# Patient Record
Sex: FEMALE | Race: WHITE | NOT HISPANIC OR LATINO | Employment: OTHER | URBAN - NONMETROPOLITAN AREA
[De-identification: names, ages, dates, MRNs, and addresses within clinical notes are randomized per-mention and may not be internally consistent; named-entity substitution may affect disease eponyms.]

---

## 2017-01-01 ENCOUNTER — HOSPITAL ENCOUNTER (OUTPATIENT)
Dept: PHYSICAL THERAPY | Facility: HOSPITAL | Age: 72
Setting detail: THERAPIES SERIES
Discharge: HOME | End: 2017-01-01
Attending: ANESTHESIOLOGY | Admitting: ANESTHESIOLOGY

## 2017-01-09 ENCOUNTER — HOSPITAL ENCOUNTER (OUTPATIENT)
Dept: GENERAL RADIOLOGY | Facility: HOSPITAL | Age: 72
Discharge: HOME | End: 2017-01-09

## 2017-01-09 ENCOUNTER — OFFICE VISIT (OUTPATIENT)
Dept: NEUROSURGERY | Facility: CLINIC | Age: 72
End: 2017-01-09

## 2017-01-09 ENCOUNTER — HOSPITAL ENCOUNTER (OUTPATIENT)
Dept: GENERAL RADIOLOGY | Facility: HOSPITAL | Age: 72
Discharge: HOME | End: 2017-01-09
Admitting: NURSE PRACTITIONER

## 2017-01-09 VITALS — HEIGHT: 65 IN | BODY MASS INDEX: 33.32 KG/M2 | WEIGHT: 200 LBS

## 2017-01-09 DIAGNOSIS — M25.512 CHRONIC LEFT SHOULDER PAIN: ICD-10-CM

## 2017-01-09 DIAGNOSIS — G89.29 CHRONIC LEFT SHOULDER PAIN: ICD-10-CM

## 2017-01-09 DIAGNOSIS — M50.320 DEGENERATION OF INTERVERTEBRAL DISC OF MID-CERVICAL REGION, UNSPECIFIED SPINAL LEVEL: ICD-10-CM

## 2017-01-09 DIAGNOSIS — E66.01 MORBID OBESITY DUE TO EXCESS CALORIES (HCC): ICD-10-CM

## 2017-01-09 DIAGNOSIS — M50.320 DEGENERATION OF INTERVERTEBRAL DISC OF MID-CERVICAL REGION, UNSPECIFIED SPINAL LEVEL: Primary | ICD-10-CM

## 2017-01-09 DIAGNOSIS — Z87.891 FORMER SMOKER: ICD-10-CM

## 2017-01-09 PROCEDURE — 73030 X-RAY EXAM OF SHOULDER: CPT

## 2017-01-09 PROCEDURE — 72040 X-RAY EXAM NECK SPINE 2-3 VW: CPT

## 2017-01-09 PROCEDURE — 99213 OFFICE O/P EST LOW 20 MIN: CPT | Performed by: NURSE PRACTITIONER

## 2017-01-09 RX ORDER — BUPROPION HYDROCHLORIDE 75 MG/1
75 TABLET ORAL 2 TIMES DAILY
COMMUNITY
End: 2020-07-30 | Stop reason: ALTCHOICE

## 2017-01-09 RX ORDER — ROSUVASTATIN CALCIUM 10 MG/1
10 TABLET, COATED ORAL DAILY
COMMUNITY
End: 2020-08-24 | Stop reason: SDUPTHER

## 2017-01-09 RX ORDER — OXYCODONE HYDROCHLORIDE 30 MG/1
30 TABLET ORAL EVERY 12 HOURS
Status: ON HOLD | COMMUNITY
End: 2020-07-21

## 2017-01-09 RX ORDER — NEBIVOLOL 10 MG/1
20 TABLET ORAL DAILY
Status: ON HOLD | COMMUNITY
End: 2020-07-21

## 2017-01-09 RX ORDER — OXYCODONE AND ACETAMINOPHEN 10; 325 MG/1; MG/1
1 TABLET ORAL EVERY 6 HOURS PRN
COMMUNITY
End: 2021-07-09

## 2017-01-09 RX ORDER — FUROSEMIDE 40 MG/1
20 TABLET ORAL 2 TIMES DAILY
COMMUNITY
End: 2020-08-24 | Stop reason: SDUPTHER

## 2017-01-09 RX ORDER — ZOLPIDEM TARTRATE 10 MG/1
10 TABLET ORAL NIGHTLY PRN
Qty: 30 TABLET | Refills: 0 | Status: SHIPPED | OUTPATIENT
Start: 2017-01-09 | End: 2017-02-08

## 2017-01-09 RX ORDER — ALPRAZOLAM 1 MG/1
1 TABLET ORAL NIGHTLY
COMMUNITY
End: 2020-08-24

## 2017-01-09 RX ORDER — AMLODIPINE BESYLATE 10 MG/1
10 TABLET ORAL DAILY
Status: ON HOLD | COMMUNITY
End: 2020-07-23 | Stop reason: SDUPTHER

## 2017-01-09 RX ORDER — GUAIFENESIN AND DEXTROMETHORPHAN HYDROBROMIDE 100; 10 MG/5ML; MG/5ML
5 SOLUTION ORAL
Status: ON HOLD | COMMUNITY
End: 2020-07-21

## 2017-01-09 RX ORDER — ALPRAZOLAM 0.5 MG/1
1 TABLET ORAL EVERY MORNING
COMMUNITY
End: 2020-07-30 | Stop reason: SDDI

## 2017-01-09 RX ORDER — RANITIDINE 150 MG/1
150 TABLET ORAL 2 TIMES DAILY
COMMUNITY
End: 2020-07-23 | Stop reason: HOSPADM

## 2017-01-09 RX ORDER — DULOXETIN HYDROCHLORIDE 60 MG/1
60 CAPSULE, DELAYED RELEASE ORAL DAILY
COMMUNITY
End: 2020-07-30 | Stop reason: ALTCHOICE

## 2017-01-09 RX ORDER — FLUOROMETHOLONE 0.1 %
1 SUSPENSION, DROPS(FINAL DOSAGE FORM)(ML) OPHTHALMIC (EYE) DAILY
COMMUNITY
End: 2020-07-23 | Stop reason: HOSPADM

## 2017-01-09 NOTE — MR AVS SNAPSHOT
PatriziaVerna Blair   1/9/2017 10:30 AM   Office Visit    Dept Phone:  [phone]   Encounter #:  [id number]    Provider:  CIARA Bates   Department:  [facility] NEUROSURGERY                Your Full Care Plan              Today's Medication Changes          These changes are accurate as of: 1/9/17 11:41 AM.  If you have any questions, ask your nurse or doctor.               New Medication(s)Ordered:     zolpidem 10 MG tablet   Commonly known as:  AMBIEN   Take 1 tablet by mouth At Night As Needed for sleep for up to 30 days.   Started by:  CIARA Bates            Where to Get Your Medications      You can get these medications from any pharmacy     Bring a paper prescription for each of these medications     zolpidem 10 MG tablet                  Your Updated Medication List          This list is accurate as of: 1/9/17 11:41 AM.  Always use your most recent med list.                * ALPRAZolam 0.5 MG tablet   Commonly known as:  XANAX       * ALPRAZolam 1 MG tablet   Commonly known as:  XANAX       amLODIPine 10 MG tablet   Commonly known as:  NORVASC       buPROPion 75 MG tablet   Commonly known as:  WELLBUTRIN       dextromethorphan-guaifenesin  MG/5ML syrup   Commonly known as:  ROBITUSSIN-DM       DULoxetine 60 MG capsule   Commonly known as:  CYMBALTA       fluorometholone 0.1 % ophthalmic suspension   Commonly known as:  FML       furosemide 40 MG tablet   Commonly known as:  LASIX       metFORMIN 1000 MG tablet   Commonly known as:  GLUCOPHAGE       nebivolol 10 MG tablet   Commonly known as:  BYSTOLIC       oxyCODONE 30 MG immediate release tablet   Commonly known as:  ROXICODONE       oxyCODONE-acetaminophen  MG per tablet   Commonly known as:  PERCOCET       raNITIdine 150 MG tablet   Commonly known as:  ZANTAC       rosuvastatin 10 MG tablet   Commonly known as:  CRESTOR       zolpidem 10 MG tablet   Commonly known as:  AMBIEN    "  Take 1 tablet by mouth At Night As Needed for sleep for up to 30 days.       * Notice:  This list has 2 medication(s) that are the same as other medications prescribed for you. Read the directions carefully, and ask your doctor or other care provider to review them with you.            We Performed the Following     Ambulatory Referral to Physical Therapy Evaluate and treat (3 days a week x 3-4 weeks : may allow aqua therapy)       You Were Diagnosed With        Codes Comments    Degeneration of intervertebral disc of mid-cervical region, unspecified spinal level    -  Primary ICD-10-CM: M50.320  ICD-9-CM: 722.4     Chronic left shoulder pain     ICD-10-CM: M25.512, G89.29  ICD-9-CM: 719.41, 338.29     Morbid obesity due to excess calories     ICD-10-CM: E66.01  ICD-9-CM: 278.01     Former smoker     ICD-10-CM: Z87.891  ICD-9-CM: V15.82       Instructions     None    Patient Instructions History      Upcoming Appointments     Visit Type Date Time Department    FOLLOW UP 1/9/2017 10:30 AM [facility]OS[facility]    FOLLOW UP 2/20/2017  2:00 PM [facility] NEUROSURGICAL [facility]      MyChart Signup     Our records indicate that you have declined [facility] [employer] signup. If you would like to sign up for [employer], please email [email or web address] or call [phone] to obtain an activation code.             Other Info from Your Visit           Your Appointments     Feb 20, 2017  2:00 PM CST   Follow Up with Barry Rhodes MD   [facility] (--)    [address] [place] [id number] [phone]           Arrive 15 minutes prior to appointment.              Allergies     Aspirin  GI Intolerance      Reason for Visit     Arm Pain Severe \"bone\" pain in the left arm, and numbness of her index finger for the past 5 months. Dr. Salmon has not tried any new treatments or new imaging. She states that her screws from her previous surgery has \"started backing out\", she was " "reportedly told that by Dr. Rhodes a few years back.       Vital Signs     Height Weight Body Mass Index Smoking Status          65\" (165.1 cm) 200 lb (90.7 kg) 33.28 kg/m2 Former Smoker        Problems and Diagnoses Noted     Degeneration of intervertebral disc of cervical region    -  Primary    Chronic left shoulder pain        Severe obesity        Former smoker            "

## 2017-01-09 NOTE — PROGRESS NOTES
"Neurosurgery Follow Up Office Visit      Patient Name:  Ronda Blair  Age:  71 y.o.  YOB: 1945  MR#:  [id number]    Visit Vitals   • Ht 65\" (165.1 cm)   • Wt 200 lb (90.7 kg)   • BMI 33.28 kg/m2       Social History   Substance Use Topics   • Smoking status: Former Smoker   • Smokeless tobacco: Never Used   • Alcohol use No       Chief Complaint   Patient presents with   • Arm Pain     Severe \"bone\" pain in the left arm, and numbness of her index finger for the past 5 months. Dr. Salmon has not tried any new treatments or new imaging. She states that her screws from her previous surgery has \"started backing out\", she was reportedly told that by Dr. Rhodes a few years back.          History  Chief Complaint:  She and her  return to the office for follow-up with problems.  She was last seen in April 2016, and continues under the care of Dr. Salmon, for chronic pain issues, mostly her low back.she has history of previous cervical fusion by Dr. Guerin as subsequent imaging has shown that a middle screw has slightly backed out of position.  It has, however, been stable.following her surgery, she was completely pain free. She does not relate any problems with neck or arm pain until about 5 months ago.  She describes more of an insidious onset with no known injury.  She states this reminds her of what she has had in the past.  It is primarily pain in her left arm and she indicates the mid humerus, stating it is \"in the bone.\"  This radiates to about the elbow.  She then has numbness of the left second finger.  She is asymptomatic on the right.she has recently been hospitalized with pneumonia and has just been home for a few days.  She had to go to an urgent care last night as apparently she is having diarrhea related to previous antibiotic therapy.  Prior to this she had been in physical therapy for her low back.  She has had no current treatment for her neck and indicates that Dr. Salmon " treats her for chronic low back pain only.    Physical Examination:  Upon exam, she looks reasonably well.  Overall, her health seems to be somewhat debilitated.  She is seated in a wheelchair.  She is alert and oriented, pleasant and cooperative, speech clear and appropriate.  Skin is warm and dry. Cervical range of motion is limited but reasonable.  She has impaired range of motion of the left shoulder with some tenderness to palpation.  She cannot abduct the left shoulder or reach overhead.  She does not appear with a true, reproducible weakness to the left upper extremity; however, her exam is somewhat limited due to pain.  Deep tendon reflexes 1-2+ bilaterally.  Shasha's negative.      Medical Decision Making  Treatment Options:   In the office I reviewed her past medical records and x-rays.  She needs current x-rays of the cervical spine, and we will also get one of the left shoulder.needs to be in physical therapy and we will order that 3 days a week for 4 weeks, focusing on the neck, left shoulder, and left arm.  A good portion of etiology may be from the left shoulder, which she tends to guard and protect.  I am also concerned about development of a frozen shoulder.due to the ongoing nature of her pain we will proceed with a cervical MRI to rule out complication of her previous surgery or hardware and also to ensure there has been no new development to explain her current radicular features.  She is asked about something to help her sleep and has used Ambien in the past.  It seems reasonable to give her a small supply of Ambien to use as needed.  We will see her back to review testing and determine her response to therapy.  She probably should not start the therapy until next week to give her time to recover more from her recent hospitalization.  They understand and are agreeable.      Assessment and Plan  Ronda was seen today for arm pain.    Diagnoses and all orders for this visit:    Degeneration of  intervertebral disc of mid-cervical region, unspecified spinal level  -     Ambulatory Referral to Physical Therapy Evaluate and treat (3 days a week x 3-4 weeks : may allow aqua therapy)  -     XR spine cervical 2 vw; Future  -     MRI cervical spine wo contrast; Future  -     zolpidem (AMBIEN) 10 MG tablet; Take 1 tablet by mouth At Night As Needed for sleep for up to 30 days.    Chronic left shoulder pain  -     Ambulatory Referral to Physical Therapy Evaluate and treat (3 days a week x 3-4 weeks : may allow aqua therapy)  -     XR shoulder 2+ vw left; Future  -     MRI cervical spine wo contrast; Future  -     zolpidem (AMBIEN) 10 MG tablet; Take 1 tablet by mouth At Night As Needed for sleep for up to 30 days.    Morbid obesity due to excess calories    Former smoker        Return in about 6 weeks (around 2/20/2017) for MRI and PT follow up with Dr. Rhodes.      Roxanne Jean, APRN

## 2017-01-17 ENCOUNTER — TRANSCRIBE ORDERS (OUTPATIENT)
Dept: PHYSICAL THERAPY | Facility: HOSPITAL | Age: 72
End: 2017-01-17

## 2017-01-20 ENCOUNTER — HOSPITAL ENCOUNTER (OUTPATIENT)
Dept: PAIN MANAGEMENT | Age: 72
Discharge: HOME | End: 2017-01-20
Payer: MEDICARE

## 2017-01-20 ENCOUNTER — HOSPITAL ENCOUNTER (OUTPATIENT)
Dept: MRI IMAGING | Facility: HOSPITAL | Age: 72
Discharge: HOME | End: 2017-01-20
Admitting: NURSE PRACTITIONER

## 2017-01-20 VITALS
OXYGEN SATURATION: 93 % | HEART RATE: 65 BPM | HEIGHT: 65 IN | RESPIRATION RATE: 16 BRPM | TEMPERATURE: 97.2 F | SYSTOLIC BLOOD PRESSURE: 171 MMHG | DIASTOLIC BLOOD PRESSURE: 87 MMHG | BODY MASS INDEX: 34.82 KG/M2 | WEIGHT: 209 LBS

## 2017-01-20 DIAGNOSIS — M79.604 LOW BACK PAIN RADIATING TO BOTH LEGS: ICD-10-CM

## 2017-01-20 DIAGNOSIS — M54.9 UPPER BACK PAIN: ICD-10-CM

## 2017-01-20 DIAGNOSIS — M50.320 DEGENERATION OF INTERVERTEBRAL DISC OF MID-CERVICAL REGION, UNSPECIFIED SPINAL LEVEL: ICD-10-CM

## 2017-01-20 DIAGNOSIS — M54.50 LOW BACK PAIN RADIATING TO BOTH LEGS: ICD-10-CM

## 2017-01-20 DIAGNOSIS — M51.16 LUMBAR DISC DISEASE WITH RADICULOPATHY: ICD-10-CM

## 2017-01-20 DIAGNOSIS — G89.29 CHRONIC PAIN OF RIGHT KNEE: ICD-10-CM

## 2017-01-20 DIAGNOSIS — G89.29 CHRONIC LEFT SHOULDER PAIN: ICD-10-CM

## 2017-01-20 DIAGNOSIS — M25.561 CHRONIC PAIN OF BOTH KNEES: ICD-10-CM

## 2017-01-20 DIAGNOSIS — G89.29 CHRONIC PAIN OF BOTH KNEES: ICD-10-CM

## 2017-01-20 DIAGNOSIS — M25.562 CHRONIC PAIN OF BOTH KNEES: ICD-10-CM

## 2017-01-20 DIAGNOSIS — B02.21 NEURALGIA, GENICULATE: ICD-10-CM

## 2017-01-20 DIAGNOSIS — M79.605 LOW BACK PAIN RADIATING TO BOTH LEGS: ICD-10-CM

## 2017-01-20 DIAGNOSIS — M25.512 CHRONIC LEFT SHOULDER PAIN: ICD-10-CM

## 2017-01-20 DIAGNOSIS — M25.561 CHRONIC PAIN OF RIGHT KNEE: ICD-10-CM

## 2017-01-20 PROCEDURE — 6360000002 HC RX W HCPCS

## 2017-01-20 PROCEDURE — 72141 MRI NECK SPINE W/O DYE: CPT

## 2017-01-20 PROCEDURE — 2500000003 HC RX 250 WO HCPCS

## 2017-01-20 PROCEDURE — 99214 OFFICE O/P EST MOD 30 MIN: CPT

## 2017-01-20 RX ORDER — OXYCODONE AND ACETAMINOPHEN 10; 325 MG/1; MG/1
1 TABLET ORAL 4 TIMES DAILY PRN
Qty: 120 TABLET | Refills: 0 | Status: SHIPPED | OUTPATIENT
Start: 2017-01-20 | End: 2017-02-03 | Stop reason: SDUPTHER

## 2017-01-20 RX ORDER — OXYCODONE HYDROCHLORIDE 30 MG/1
30 TABLET, FILM COATED, EXTENDED RELEASE ORAL EVERY 12 HOURS
Qty: 60 EACH | Refills: 0 | Status: SHIPPED | OUTPATIENT
Start: 2017-01-20 | End: 2017-02-03 | Stop reason: SDUPTHER

## 2017-01-20 ASSESSMENT — PAIN DESCRIPTION - DESCRIPTORS: DESCRIPTORS: ACHING;CONSTANT

## 2017-01-20 ASSESSMENT — PAIN DESCRIPTION - PROGRESSION: CLINICAL_PROGRESSION: NOT CHANGED

## 2017-01-20 ASSESSMENT — PAIN SCALES - GENERAL: PAINLEVEL_OUTOF10: 8

## 2017-01-20 ASSESSMENT — PAIN DESCRIPTION - FREQUENCY: FREQUENCY: CONTINUOUS

## 2017-01-20 ASSESSMENT — PAIN DESCRIPTION - ORIENTATION: ORIENTATION: LOWER

## 2017-01-20 ASSESSMENT — PAIN DESCRIPTION - ONSET: ONSET: ON-GOING

## 2017-01-20 ASSESSMENT — PAIN DESCRIPTION - PAIN TYPE: TYPE: CHRONIC PAIN

## 2017-01-20 ASSESSMENT — PAIN DESCRIPTION - LOCATION: LOCATION: BACK

## 2017-02-01 ENCOUNTER — HOSPITAL ENCOUNTER (OUTPATIENT)
Dept: PHYSICAL THERAPY | Facility: HOSPITAL | Age: 72
Setting detail: THERAPIES SERIES
Discharge: HOME | End: 2017-02-01

## 2017-02-01 DIAGNOSIS — M50.320 DEGENERATION OF INTERVERTEBRAL DISC OF MID-CERVICAL REGION, UNSPECIFIED SPINAL LEVEL: Primary | ICD-10-CM

## 2017-02-01 DIAGNOSIS — G89.29 CHRONIC LEFT SHOULDER PAIN: ICD-10-CM

## 2017-02-01 DIAGNOSIS — M25.512 CHRONIC LEFT SHOULDER PAIN: ICD-10-CM

## 2017-02-01 PROCEDURE — G8985 CARRY GOAL STATUS: HCPCS | Performed by: PHYSICAL THERAPIST

## 2017-02-01 PROCEDURE — G8984 CARRY CURRENT STATUS: HCPCS | Performed by: PHYSICAL THERAPIST

## 2017-02-01 PROCEDURE — 97162 PT EVAL MOD COMPLEX 30 MIN: CPT | Performed by: PHYSICAL THERAPIST

## 2017-02-01 NOTE — PROGRESS NOTES
Outpatient Physical Therapy Ortho Initial Evaluation  [facility]     Patient Name: Ronda Blair  : 1945  MRN: [id number]  Today's Date: 2017      Visit Date: 2017    Pt reports 2/10 pain.  Reports N/A% of improvement.  Attended  visits.  Insurance available: Based upon approval   Next MD appt: ANUJA  Recertification: 2017.     Past Medical History   Diagnosis Date   • Anxiety    • Arthritis    • COPD (chronic obstructive pulmonary disease)    • Depression    • Diabetes mellitus    • History of transfusion    • Hyperlipidemia    • Hypertension    • Stroke      Greater than 5 years ago        Past Surgical History   Procedure Laterality Date   • Cervical fusion     • Lumbar spine surgery     • Hemorrhoidectomy     • Hysterectomy     • Knee arthroscopy Right    • Pain pump insertion/revision       Removed in    • Intrathecal pump removal            MEDICATIONS:  1) Aggrenox 25mg-200mg  2) Advair Disckus 500mcg-50mcg  3) Crestor 10mg  4) Zantac 150mg  5) Cymbalta 90mg  6) Amlodipine Besylate 10mg  7) Percocet 10/325mg  8) Oxycontin 30mg  9) Vit B12 100mcg  10) Vit E 400 IU  11) Wellbutrin 75mg  12) Lasix 40mg  13) Bystolic 10mg  14) Alprazolam 1mg  15) Metformin Hydrochloride 1000mg  16) Flarex 0.1%  17) Fish Oil 1200mg  18) Dextromethorphan-Guafenesin 20mg-400mg  19) Sennosides 8.6mg  20) Metamucil 0.52gm      Visit Dx:     ICD-10-CM ICD-9-CM   1. Degeneration of intervertebral disc of mid-cervical region, unspecified spinal level M50.320 722.4   2. Chronic left shoulder pain M25.512 719.41    G89.29 338.29     Occupation: Patient is unemployed retired   Patient is , has grown children.            PT Ortho       17 1100    Subjective Comments    Subjective Comments Pt reports chronic neck paoin with limited motion. Reports had a fusion, and beleives the screws are backing out. Reports she's had multiple xrays and  it was stable. Reports had an MRI on 01/20/2017, no results yet. Also reports L shoulder pain down to the elbow, an aching in the bone, also reports a numbess in the index finger for 4-5 months. Reports she is R hand dominate.  -AJ    Subjective Pain    Able to rate subjective pain? yes  -AJ    Pre-Treatment Pain Level 2  -AJ    Post-Treatment Pain Level 4  -AJ    Subjective Pain Comment arm, not the neck, at rest, more like a muscle ache.  -AJ    Posture/Observations    Forward Head Moderate  -AJ    Cervical Lordosis Moderate;Increased  -AJ    Rounded Shoulders Bilateral:;Moderate  -AJ    Sensation    Light Touch Partial deficits in the LUE  -AJ    Sharp/Dull No apparent deficits  -AJ    Special Tests/Palpation    Special Tests/Palpation --   Cervical Quadrant negative B  -AJ    Cervical/Thoracic Special Tests    Spurlings (Foraminal Compression) Negative  -AJ    Cervical Compression (Forarminal Compression vs. Facet Pain) Negative  -AJ    Cervical Distraction (Foraminal Compression vs. Facet Pain) Negative  -AJ    Transverse Ligament (Instability) Negative  -AJ    Vertebral Artery Test (VBI Sign) Negative  -AJ    Upper Level Neural Tension Tests    Median Neural Tension Test Bilateral:;Negative  -AJ    Radial Neural Tension Test Bilateral:;Negative  -AJ    Ulnar Neural Tension Test Bilateral:;Negative  -AJ    Shoulder Impingement/Rotator Cuff Special Tests    Donahue-Dale Test (RC Lesion vs. Bursitis) Left:;Positive;Right:;Negative  -AJ    Neer Impingement Test (RC Lesion vs. Bursitis) Right:;Negative;Left:;Positive  -AJ    Full Can Test (RC Lesion) Left:;Unable to test;Right:;Negative  -AJ    Empty Can Test (RC Lesion) Left:;Unable to test;Right:;Negative  -AJ    Drop Arm Test (Full Thickness RC Lesion) Bilateral:;Negative  -AJ    Internal Impingement Sign Bilateral:;Negative  -AJ    Lift-Off Test (Subscapularis Lesion) Right:;Negative;Left:;Positive  -AJ    Belly Press (Subscapularis Lesion)  Bilateral:;Negative  -AJ    Speed's Test (LH of Biceps Lesion) Bilateral:;Negative  -AJ    Yergason Test (LH of Biceps Lesion) Bilateral:;Negative  -AJ    Shoulder Laxity/Instability Special Tests    Sulcus Sign, 0 Degrees Bilateral:;Negative  -AJ    Sulcus Sign, 90 Degrees Bilateral:;Negative  -AJ    Anterior Apprehension/Relocation Test, at 90 Degrees Bilateral:;Negative  -AJ    Horizontal Adduction Test (AC Joint Pain) Bilateral:;Negative  -AJ    Biceps/Labral Special Tests    York's Test (Labral Test) Bilateral:;Negative  -AJ    Speed's Test (Biceps Tendinopathy vs. Labral Tear) Bilateral:;Negative  -AJ    Yergason's Test (Biceps Tendinopathy vs. Labral Tear) Bilateral:;Negative  -AJ    Neck    Flexion AROM --   35 degrees  -AJ    Left Lateral Flexion AROM --   16 degrees  -AJ    Rt Lateral Flexion AROM Deficit --   12 degrees  -AJ    Left Rotation AROM --   54 degrees  -AJ    Right Rotation AROM --   20 degrees  -AJ    Left Shoulder    Flexion AROM --   62 increase in pain able to go to 98 degrees.  -AJ    Flexion PROM --   105 degrees  -AJ    ABduction AROM --   62 increase in pain, able to push to 75 degrees  -AJ    ABduction PROM --   123 degrees  -AJ    Horizontal ABd AROM WNL (0-45 degrees)  -AJ    External Rotation AROM --   55 degrees at 45 degrees of abduction  -AJ    External Rotation PROM --   approximately 75 degrees at 90 degrees of abduction  -AJ    Internal Rotation AROM --   53 degrees at ~45 degrees of abduction  -AJ    Internal Rotation PROM --   ~75degrees at 90 degrees of abduction  -AJ    Right Shoulder    Flexion AROM --   158 degrees  -AJ    ABduction AROM --   160 degrees  -AJ    Horizontal ABd AROM WNL (0-45 degrees)  -AJ    External Rotation AROM --   80 degrees at 90 degrees of abduction  -AJ    Internal Rotation AROM --   70 degrees at 90 degrees of abduction  -AJ    MMT (Manual Muscle Testing)    General MMT Assessment Detail R UE 5/5 for shoulder, elbow, wrist  -AJ    Upper  Extremity    Upper Ext Manual Muscle Testing left shoulder strength deficit  -    Upper Ext Manual Muscle Testing Detail No tolerance to MMT for L shoulder  -AJ    Left Elbow/Forearm    Biceps Brachii Elbow Flexion (5/5) normal  -AJ    Triceps Brachii Elbow Extension (5/5) normal  -AJ    Transfers    Transfers, Sit-Stand [place] independent  -AJ    Transfers, Stand-Sit [place] independent  -AJ    Transfers, Sit-Stand-Sit, Assist Device straight cane  -AJ    Gait Assessment/Treatment    Gait, [place] Level conditional independence  -AJ    Gait, Assistive Device straight cane  -AJ    Gait, Gait Deviations festinating  -AJ    Gait, Comment SC in R UE, slow, shuffling gait.  -AJ      User Key  (r) = Recorded By, (t) = Taken By, (c) = Cosigned By    Initials Name Provider Type    LEONARDO Cuevas, PT Physical Therapist                    Therapy Education       02/01/17 1323    Therapy Education    Given Posture/body mechanics;Other (comment)   Discussion of POC  -AJ    Program New  -    How Provided Verbal  -AJ    Provided to Patient  -AJ    Level of Understanding Verbalized  -      User Key  (r) = Recorded By, (t) = Taken By, (c) = Cosigned By    Initials Name Provider Type    LEONARDO Cuevas, PT Physical Therapist                PT OP Goals       02/01/17 1324 02/01/17 1300       PT Short Term Goals    STG Date to Achieve  02/22/17  -     STG 1  I in HEP and have additions/changes to HEP.  -     STG 2  AROM cervical flex/ext to 40 degrees each  -     STG 3  AROM L Shoulder flexion/abduction to 90 degrees or greater  -     STG 4  AROM L Shoulder IR/ER 55 degrees each at 90 degrees of abduction  -     STG 5  L UE 4/5 or greater in available AROM  -AJ     Long Term Goals    LTG Date to Achieve  03/08/17  -     LTG 1  AROM cervical SB 25 degrees or greater B  -AJ     LTG 2  AROM cervical ROT 65 degrees or greater B  -AJ     LTG 3  AROM L shoulder flexion 140 degrees or  greater  -     LTG 4  AROM L Shld ebduction 140 degrees or greater  -     LTG 5  AROM L Shoulder IR/ER 70 degrees at 90 degrees of abduction  -     LTG 6  L UE 4+/5 or greater.  -     LTG 7  Pt to be more aware of posture and postural correction technique.  -     LTG 8  I final HEP  -     LTG 9  D/C with free 30 day fitness formula membership.  -     Time Calculation    PT Goal Re-Cert Due Date 02/22/17  -AJ 02/22/17  -       User Key  (r) = Recorded By, (t) = Taken By, (c) = Cosigned By    Initials Name Provider Type    AJ Alejandra Cuevas, PT Physical Therapist         Barriers to Rehab:   Include significant arthritic changes that have occurred within the joint/spine, the patient's obesity, the patient's generally deconditioned state, and possible poor/questionable compliance with HEP.    Safety Issues: None noted            PT Assessment/Plan       02/01/17 1300          PT Assessment    Functional Limitations Limitation in home management;Limitations in community activities;Performance in leisure activities;Performance in self-care ADL  -      Impairments Range of motion;Sensation;Posture;Poor body mechanics;Pain;Muscle strength;Motor function;Joint mobility;Impaired flexibility  -      Assessment Comments No HEp secondary to patient's insurance requiring authorization prior to treatment beginning.   -      Rehab Potential Fair  -      Patient would benefit from skilled therapy intervention Yes  -      PT Plan    PT Frequency 2x/week  -      Predicted Duration of Therapy Intervention (days/wks) 4-6 weeks  -      Planned CPT's? PT EVAL: 34049;PT RE-EVAL: 62437;PT THER PROC EA 15 MIN: 64776;PT THER ACT EA 15 MIN: 82917;PT ELECTRICAL STIM UNATTEND: ;PT THER SUPP EA 15 MIN  -      Physical Therapy Interventions (Optional Details) dry needling;home exercise program;manual therapy techniques;modalities;patient/family education;postural re-education;ROM (Range of  Motion);strengthening;stretching  -AJ        User Key  (r) = Recorded By, (t) = Taken By, (c) = Cosigned By    Initials Name Provider Type    LEONARDO Cuevas PT Physical Therapist        Other therapeutic activities and/or exercises will be prescribed depending on the patients progress or lack there of.          Exercises       02/01/17 1100          Subjective Comments    Subjective Comments Pt reports chronic neck paoin with limited motion. Reports had a fusion, and beleives the screws are backing out. Reports she's had multiple xrays and it was stable. Reports had an MRI on 01/20/2017, no results yet. Also reports L shoulder pain down to the elbow, an aching in the bone, also reports a numbess in the index finger for 4-5 months. Reports she is R hand dominate.  -AJ      Subjective Pain    Able to rate subjective pain? yes  -AJ      Pre-Treatment Pain Level 2  -AJ      Post-Treatment Pain Level 4  -AJ      Subjective Pain Comment arm, not the neck, at rest, more like a muscle ache.  -AJ        User Key  (r) = Recorded By, (t) = Taken By, (c) = Cosigned By    Initials Name Provider Type    LEONARDO Cuevas PT Physical Therapist                    Outcome Measures       02/01/17 1300          Quick DASH    Open a tight or new jar. 4  -AJ      Do heavy household chores (e.g., wash walls, wash floors) 4  -AJ      Carry a shopping bag or briefcase 4  -AJ      Wash your back 5  -AJ      Use a knife to cut food 3  -AJ      Recreational activities in which you take some force or impact through your arm, should or hand (e.g. golf, hammering, tennis, etc.) 5  -AJ      During the past week, to what extent has your arm, shoulder, or hand problem interfered with your normal social activites with family, friends, neighbors or groups? 5  -AJ      During the past week, were you limited in your work or other regular daily activities as a result of your arm, shoulder or hand problem? 4  -AJ      Arm, Shoulder, or  hand pain 4  -AJ      Tingling (pins and needles) in your arm, shoulder, or hand 4  -AJ      During the past week, how much difficulty have you had sleeping because of the pain in your arm, shoulder or hand? 4  -AJ      Number of Questions Answered 11  -AJ      Quick DASH Score 79.55  -      Neck Disability Index    Section 1 - Pain Intensity 3  -AJ      Section 2 - Personal Care 2  -AJ      Section 3 - Lifting 3  -AJ      Section 4 - Work 3  -AJ      Section 5 - Headaches 1  -AJ      Section 6 - Concentration 2  -AJ      Section 7 - Sleeping 3  -AJ      Section 8 - Driving 2  -AJ      Section 9 - Reading 2  -AJ      Section 10 - Recreation 2  -AJ      Neck Disability Index Score 23  -AJ      Neck Disability Index Comments 46%  -      Functional Assessment    Outcome Measure Options Neck Disability Index (NDI)  -        User Key  (r) = Recorded By, (t) = Taken By, (c) = Cosigned By    Initials Name Provider Type    AJ Alejandra Cuevas PT Physical Therapist            Time Calculation:   Start Time: 1115  Stop Time: 1146  Time Calculation (min): 31 min     Therapy Charges for Today     Code Description Service Date Service Provider Modifiers Qty    14508733447 HC PT CARRY MOV HAND OBJ CURRENT 2/1/2017 Alejandra Cuevas, PT GP, CK 1    20296899208 HC PT CARRY MOV HAND OBJ PROJECTED 2/1/2017 Alejandra Cuevas, PT GP, CJ 1    84484827096 HC PT EVAL MOD COMPLEXITY 1 2/1/2017 Alejandra Cuevas PT GP 1    47665466382 HC PT THER SUPP EA 15 MIN 2/1/2017 Alejandra Cuevas PT GP 1          PT G-Codes  Outcome Measure Options: Neck Disability Index (NDI)  Functional Limitation: Carrying, moving and handling objects  Carrying, Moving and Handling Objects Current Status (): At least 40 percent but less than 60 percent impaired, limited or restricted  Carrying, Moving and Handling Objects Goal Status (): At least 20 percent but less than 40 percent impaired, limited or restricted         Alejandra  Ta Cuevas, PT, DPT, CSCS  2/1/2017

## 2017-02-03 ENCOUNTER — HOSPITAL ENCOUNTER (OUTPATIENT)
Dept: PAIN MANAGEMENT | Age: 72
Discharge: HOME | End: 2017-02-03
Payer: MEDICARE

## 2017-02-03 VITALS
HEART RATE: 56 BPM | TEMPERATURE: 96.4 F | WEIGHT: 200 LBS | OXYGEN SATURATION: 97 % | DIASTOLIC BLOOD PRESSURE: 67 MMHG | BODY MASS INDEX: 33.32 KG/M2 | SYSTOLIC BLOOD PRESSURE: 175 MMHG | HEIGHT: 65 IN | RESPIRATION RATE: 20 BRPM

## 2017-02-03 VITALS
RESPIRATION RATE: 20 BRPM | DIASTOLIC BLOOD PRESSURE: 56 MMHG | SYSTOLIC BLOOD PRESSURE: 143 MMHG | OXYGEN SATURATION: 97 % | HEART RATE: 53 BPM

## 2017-02-03 DIAGNOSIS — M51.16 LUMBAR DISC DISEASE WITH RADICULOPATHY: ICD-10-CM

## 2017-02-03 DIAGNOSIS — M79.604 LOW BACK PAIN RADIATING TO BOTH LEGS: ICD-10-CM

## 2017-02-03 DIAGNOSIS — M79.605 LOW BACK PAIN RADIATING TO BOTH LEGS: ICD-10-CM

## 2017-02-03 DIAGNOSIS — M54.9 UPPER BACK PAIN: ICD-10-CM

## 2017-02-03 DIAGNOSIS — M25.562 CHRONIC PAIN OF BOTH KNEES: ICD-10-CM

## 2017-02-03 DIAGNOSIS — M51.36 LUMBAR DEGENERATIVE DISC DISEASE: ICD-10-CM

## 2017-02-03 DIAGNOSIS — G89.29 CHRONIC PAIN OF BOTH KNEES: ICD-10-CM

## 2017-02-03 DIAGNOSIS — B02.21 NEURALGIA, GENICULATE: ICD-10-CM

## 2017-02-03 DIAGNOSIS — M25.561 CHRONIC PAIN OF RIGHT KNEE: ICD-10-CM

## 2017-02-03 DIAGNOSIS — G89.29 CHRONIC PAIN OF RIGHT KNEE: ICD-10-CM

## 2017-02-03 DIAGNOSIS — M25.561 CHRONIC PAIN OF BOTH KNEES: ICD-10-CM

## 2017-02-03 DIAGNOSIS — M54.50 LOW BACK PAIN RADIATING TO BOTH LEGS: ICD-10-CM

## 2017-02-03 PROCEDURE — 6360000002 HC RX W HCPCS

## 2017-02-03 PROCEDURE — 3209999900 FLUORO FOR SURGICAL PROCEDURES

## 2017-02-03 PROCEDURE — 20610 DRAIN/INJ JOINT/BURSA W/O US: CPT

## 2017-02-03 PROCEDURE — 2500000003 HC RX 250 WO HCPCS

## 2017-02-03 PROCEDURE — 2580000003 HC RX 258

## 2017-02-03 PROCEDURE — 62323 NJX INTERLAMINAR LMBR/SAC: CPT

## 2017-02-03 PROCEDURE — 20553 NJX 1/MLT TRIGGER POINTS 3/>: CPT

## 2017-02-03 RX ORDER — BUPIVACAINE HYDROCHLORIDE 2.5 MG/ML
INJECTION, SOLUTION EPIDURAL; INFILTRATION; INTRACAUDAL
Status: COMPLETED | OUTPATIENT
Start: 2017-02-03 | End: 2017-02-03

## 2017-02-03 RX ORDER — LIDOCAINE HYDROCHLORIDE 10 MG/ML
INJECTION, SOLUTION EPIDURAL; INFILTRATION; INTRACAUDAL; PERINEURAL
Status: COMPLETED | OUTPATIENT
Start: 2017-02-03 | End: 2017-02-03

## 2017-02-03 RX ORDER — TIZANIDINE 4 MG/1
4 TABLET ORAL DAILY PRN
Qty: 30 TABLET | Refills: 2 | Status: SHIPPED | OUTPATIENT
Start: 2017-02-03

## 2017-02-03 RX ORDER — OXYCODONE AND ACETAMINOPHEN 10; 325 MG/1; MG/1
1 TABLET ORAL 4 TIMES DAILY PRN
Qty: 120 TABLET | Refills: 0 | Status: SHIPPED | OUTPATIENT
Start: 2017-02-19 | End: 2017-03-07 | Stop reason: SDUPTHER

## 2017-02-03 RX ORDER — 0.9 % SODIUM CHLORIDE 0.9 %
VIAL (ML) INJECTION
Status: COMPLETED | OUTPATIENT
Start: 2017-02-03 | End: 2017-02-03

## 2017-02-03 RX ORDER — BUPIVACAINE HYDROCHLORIDE 5 MG/ML
INJECTION, SOLUTION EPIDURAL; INTRACAUDAL
Status: COMPLETED | OUTPATIENT
Start: 2017-02-03 | End: 2017-02-03

## 2017-02-03 RX ORDER — TRIAMCINOLONE ACETONIDE 40 MG/ML
INJECTION, SUSPENSION INTRA-ARTICULAR; INTRAMUSCULAR
Status: COMPLETED | OUTPATIENT
Start: 2017-02-03 | End: 2017-02-03

## 2017-02-03 RX ORDER — METHYLPREDNISOLONE ACETATE 80 MG/ML
INJECTION, SUSPENSION INTRA-ARTICULAR; INTRALESIONAL; INTRAMUSCULAR; SOFT TISSUE
Status: COMPLETED | OUTPATIENT
Start: 2017-02-03 | End: 2017-02-03

## 2017-02-03 RX ORDER — OXYCODONE HYDROCHLORIDE 30 MG/1
30 TABLET, FILM COATED, EXTENDED RELEASE ORAL EVERY 12 HOURS
Qty: 60 EACH | Refills: 0 | Status: SHIPPED | OUTPATIENT
Start: 2017-02-19 | End: 2017-03-07 | Stop reason: SDUPTHER

## 2017-02-03 RX ADMIN — TRIAMCINOLONE ACETONIDE 40 MG: 40 INJECTION, SUSPENSION INTRA-ARTICULAR; INTRAMUSCULAR at 15:01

## 2017-02-03 RX ADMIN — LIDOCAINE HYDROCHLORIDE 3 ML: 10 INJECTION, SOLUTION EPIDURAL; INFILTRATION; INTRACAUDAL; PERINEURAL at 14:58

## 2017-02-03 RX ADMIN — BUPIVACAINE HYDROCHLORIDE 9 ML: 5 INJECTION, SOLUTION EPIDURAL; INTRACAUDAL at 15:01

## 2017-02-03 RX ADMIN — BUPIVACAINE HYDROCHLORIDE 2.5 ML: 2.5 INJECTION, SOLUTION EPIDURAL; INFILTRATION; INTRACAUDAL at 14:59

## 2017-02-03 RX ADMIN — BUPIVACAINE HYDROCHLORIDE 9.5 ML: 5 INJECTION, SOLUTION EPIDURAL; INTRACAUDAL at 15:03

## 2017-02-03 RX ADMIN — Medication 1.5 ML: at 14:59

## 2017-02-03 RX ADMIN — TRIAMCINOLONE ACETONIDE 20 MG: 40 INJECTION, SUSPENSION INTRA-ARTICULAR; INTRAMUSCULAR at 15:04

## 2017-02-03 RX ADMIN — METHYLPREDNISOLONE ACETATE 80 MG: 80 INJECTION, SUSPENSION INTRA-ARTICULAR; INTRALESIONAL; INTRAMUSCULAR; SOFT TISSUE at 15:00

## 2017-02-03 ASSESSMENT — ACTIVITIES OF DAILY LIVING (ADL): EFFECT OF PAIN ON DAILY ACTIVITIES: DAILY CHORES AND ACTIVITIES

## 2017-02-03 ASSESSMENT — PAIN - FUNCTIONAL ASSESSMENT: PAIN_FUNCTIONAL_ASSESSMENT: 0-10

## 2017-02-07 ENCOUNTER — HOSPITAL ENCOUNTER (OUTPATIENT)
Dept: PHYSICAL THERAPY | Facility: HOSPITAL | Age: 72
Setting detail: THERAPIES SERIES
Discharge: HOME | End: 2017-02-07

## 2017-02-07 DIAGNOSIS — M50.320 DEGENERATION OF INTERVERTEBRAL DISC OF MID-CERVICAL REGION, UNSPECIFIED SPINAL LEVEL: Primary | ICD-10-CM

## 2017-02-07 DIAGNOSIS — G89.29 CHRONIC LEFT SHOULDER PAIN: ICD-10-CM

## 2017-02-07 DIAGNOSIS — M25.512 CHRONIC LEFT SHOULDER PAIN: ICD-10-CM

## 2017-02-07 PROCEDURE — 97110 THERAPEUTIC EXERCISES: CPT

## 2017-02-07 NOTE — PROGRESS NOTES
Outpatient Physical Therapy Ortho Treatment Note  [facility]     Patient Name: Ronda Blair  : 1945  MRN: [id number]  Today's Date: 2017      Visit Date: 2017     Pt's pre tx pain 1/10 post tx pain 0 /10.  Pt reports n/a % improvement.  / visits w/ visits approved best on medical necessity. Next recert 2017.  MD appt on 17 and to go back in 5 weeks for more shots.      Visit Dx:    ICD-10-CM ICD-9-CM   1. Degeneration of intervertebral disc of mid-cervical region, unspecified spinal level M50.320 722.4   2. Chronic left shoulder pain M25.512 719.41    G89.29 338.29       There is no problem list on file for this patient.       Past Medical History   Diagnosis Date   • Anxiety    • Arthritis    • COPD (chronic obstructive pulmonary disease)    • Depression    • Diabetes mellitus    • History of transfusion    • Hyperlipidemia    • Hypertension    • Stroke      Greater than 5 years ago        Past Surgical History   Procedure Laterality Date   • Cervical fusion     • Lumbar spine surgery     • Hemorrhoidectomy     • Hysterectomy     • Knee arthroscopy Right    • Pain pump insertion/revision       Removed in    • Intrathecal pump removal                    PT Ortho       17 1300    Subjective Comments    Subjective Comments pt reports that she got shots in neck and shoulder last friday and is feeling much better today.   -    Subjective Pain    Able to rate subjective pain? yes  -    Pre-Treatment Pain Level 1  -      User Key  (r) = Recorded By, (t) = Taken By, (c) = Cosigned By    Initials Name Provider Type     Roxanne Doyle PTA Physical Therapy Assistant                            PT Assessment/Plan       17 1300 17 1300       PT Assessment    Functional Limitations  Limitation in home management;Limitations in community activities;Performance in leisure activities;Performance in self-care ADL  -AJ      Impairments  Range of motion;Sensation;Posture;Poor body mechanics;Pain;Muscle strength;Motor function;Joint mobility;Impaired flexibility  -     Assessment Comments good tolerance to exercises.    - No HEp secondary to patient's insurance requiring authorization prior to treatment beginning.   -     Rehab Potential  Fair  -     Patient would benefit from skilled therapy intervention  Yes  -     PT Plan    PT Frequency 2x/week  - 2x/week  -     Predicted Duration of Therapy Intervention (days/wks)  4-6 weeks  -     Planned CPT's?  PT EVAL: 70531;PT RE-EVAL: 81073;PT THER PROC EA 15 MIN: 23970;PT THER ACT EA 15 MIN: 13873;PT ELECTRICAL STIM UNATTEND: ;PT THER SUPP EA 15 MIN  -     Physical Therapy Interventions (Optional Details)  dry needling;home exercise program;manual therapy techniques;modalities;patient/family education;postural re-education;ROM (Range of Motion);strengthening;stretching  -     PT Plan Comments continue per POC, review HEP  -        User Key  (r) = Recorded By, (t) = Taken By, (c) = Cosigned By    Initials Name Provider Type     Alejandra Cuevas, PT Physical Therapist     Roxanne Doyle PTA Physical Therapy Assistant                Modalities       02/07/17 1300          Ice    Ice Applied Yes  -      Location neck and L shoulder  -      Rx Minutes 15 mins  -        User Key  (r) = Recorded By, (t) = Taken By, (c) = Cosigned By    Initials Name Provider Type     Roxanne Doyle PTA Physical Therapy Assistant                Exercises       02/07/17 1300          Subjective Comments    Subjective Comments pt reports that she got shots in neck and shoulder last friday and is feeling much better today.   -      Subjective Pain    Able to rate subjective pain? yes  -      Pre-Treatment Pain Level 1  -      Exercise 1    Exercise Name 1 pulleys 3 way  -      Reps 1 20  -      Exercise 2    Exercise Name 2 UT S B  -      Sets 2 2  -      Time  (Seconds) 2 30  -AH      Exercise 3    Exercise Name 3 Lev S  -AH      Sets 3 2  -AH      Time (Seconds) 3 30  -AH      Exercise 4    Exercise Name 4 Cervical flexion/extension  -      Reps 4 10  -AH      Exercise 5    Exercise Name 5 cervical rotation  -      Reps 5 10  -AH      Exercise 6    Exercise Name 6 chin tucks  -      Reps 6 10  -AH      Time (Seconds) 6 5  -AH      Exercise 7    Exercise Name 7 no moneys  -      Reps 7 10  -AH      Time (Seconds) 7 5  -AH        User Key  (r) = Recorded By, (t) = Taken By, (c) = Cosigned By    Initials Name Provider Type     Roxanne Doyle, PTA Physical Therapy Assistant                               PT OP Goals       02/07/17 1300 02/01/17 1324 02/01/17 1300    PT Short Term Goals    STG Date to Achieve 02/22/17  -  02/22/17  -    STG 1 I in HEP and have additions/changes to HEP.  -  I in HEP and have additions/changes to HEP.  -    STG 1 Progress Not Met  [facility]      STG 2 AROM cervical flex/ext to 40 degrees each  -  AROM cervical flex/ext to 40 degrees each  -    STG 2 Progress Not Met  [facility]      STG 3 AROM L Shoulder flexion/abduction to 90 degrees or greater  -  AROM L Shoulder flexion/abduction to 90 degrees or greater  -    STG 3 Progress Not Met  [facility]      STG 4 AROM L Shoulder IR/ER 55 degrees each at 90 degrees of abduction  -  AROM L Shoulder IR/ER 55 degrees each at 90 degrees of abduction  -    STG 4 Progress Not Met  [facility]      STG 5 L UE 4/5 or greater in available AROM  -  L UE 4/5 or greater in available AROM  -    STG 5 Progress Not Met  -      Long Term Goals    LTG Date to Achieve 03/08/17  -AH  03/08/17  -    LTG 1 AROM cervical SB 25 degrees or greater B  -  AROM cervical SB 25 degrees or greater [facility]    LTG 1 Progress Not Met  [facility]      LTG 2 AROM cervical ROT 65 degrees or greater B  -  AROM cervical ROT 65 degrees or greater [facility]    LTG 2 Progress Not Met  [facility]      LTG 3 AROM L shoulder flexion 140 degrees or  greater  -  AROM L shoulder flexion 140 degrees or greater  -    LTG 3 Progress Not Met  -      LTG 4 AROM L Shld abduction 140 degrees or greater  -  AROM L Shld ebduction 140 degrees or greater  -    LTG 4 Progress Not Met  -      LTG 5 AROM L Shoulder IR/ER 70 degrees at 90 degrees of abduction  -  AROM L Shoulder IR/ER 70 degrees at 90 degrees of abduction  -    LTG 5 Progress Not Met  -      LTG 6 L UE 4+/5 or greater.  -  L UE 4+/5 or greater.  -    LTG 6 Progress Not Met  -      LTG 7 Pt to be more aware of posture and postural correction technique.  -  Pt to be more aware of posture and postural correction technique.  -    LTG 7 Progress Not Met  -      LTG 8 I final HEP  -  I final HEP  -    LTG 8 Progress Not Met  -      LTG 9 D/C with free 30 day fitness formula membership.  -  D/C with free 30 day fitness formula membership.  -    LTG 9 Progress Not Met  -      Time Calculation    PT Goal Re-Cert Due Date 02/22/17  - 02/22/17  - 02/22/17  -      User Key  (r) = Recorded By, (t) = Taken By, (c) = Cosigned By    Initials Name Provider Type    LEONARDO Cuevas, PT Physical Therapist     Roxanne Doyle, AMADOU Physical Therapy Assistant                Therapy Education       02/07/17 1402    Therapy Education    Program New  [facility]    How Provided Written;Demonstration  -    Provided to Patient  -    Level of Understanding Verbalized  -      02/07/17 1358    Therapy Education    Given HEP   UT S, LEV S, Chin tucks, no moneys, Cervical flex, ext and B rotation  -      02/01/17 1323    Therapy Education    Given Posture/body mechanics;Other (comment)   Discussion of POC  -    Program New  [facility]    How Provided Verbal  -    Provided to Patient  -    Level of Understanding Verbalized  -      User Key  (r) = Recorded By, (t) = Taken By, (c) = Cosigned By    Initials Name Provider Type    LEONARDO Cuevas, PT Physical Therapist    MARLENE LUNA  AMADOU Doyle Physical Therapy Assistant                Time Calculation:   Start Time: 1300  Stop Time: 1358  Time Calculation (min): 58 min  PT Non-Billable Time (min): 15 min    Therapy Charges for Today     Code Description Service Date Service Provider Modifiers Qty    20582733989 HC PT THER SUPP EA 15 MIN 2/7/2017 Roxanne Doyle PTA GP 1    64328606455 HC PT THER PROC EA 15 MIN 2/7/2017 Roxanne Doyle PTA GP 3                    Roxanne Doyle PTA  2/7/2017

## 2017-02-09 ENCOUNTER — HOSPITAL ENCOUNTER (OUTPATIENT)
Dept: PHYSICAL THERAPY | Facility: HOSPITAL | Age: 72
Setting detail: THERAPIES SERIES
Discharge: HOME | End: 2017-02-09

## 2017-02-09 DIAGNOSIS — M25.512 CHRONIC LEFT SHOULDER PAIN: ICD-10-CM

## 2017-02-09 DIAGNOSIS — G89.29 CHRONIC LEFT SHOULDER PAIN: ICD-10-CM

## 2017-02-09 DIAGNOSIS — M50.320 DEGENERATION OF INTERVERTEBRAL DISC OF MID-CERVICAL REGION, UNSPECIFIED SPINAL LEVEL: Primary | ICD-10-CM

## 2017-02-09 PROCEDURE — 97110 THERAPEUTIC EXERCISES: CPT

## 2017-02-09 NOTE — PROGRESS NOTES
"    Outpatient Physical Therapy Ortho Treatment Note  [facility]  Romelia Engle PTA       Patient Name: Ronda Blair  : 1945  MRN: [id number]  Today's Date: 2017      Visit Date: 2017     Visits:3/3  Insurance Visits Approved: based on medical necessity  Recert Due: 2017  MD Appt: 2017  Pain: pretreatment \"its there\"/10; post treatment 010  Improvement: pt is subjectively reporting \"not sure because I had shots just before I came and so I think I'm doing pretty good\" when asked % improvement since initial evaluation    Visit Dx:    ICD-10-CM ICD-9-CM   1. Degeneration of intervertebral disc of mid-cervical region, unspecified spinal level M50.320 722.4   2. Chronic left shoulder pain M25.512 719.41    G89.29 338.29       There is no problem list on file for this patient.       Past Medical History   Diagnosis Date   • Anxiety    • Arthritis    • COPD (chronic obstructive pulmonary disease)    • Depression    • Diabetes mellitus    • History of transfusion    • Hyperlipidemia    • Hypertension    • Stroke      Greater than 5 years ago        Past Surgical History   Procedure Laterality Date   • Cervical fusion     • Lumbar spine surgery     • Hemorrhoidectomy     • Hysterectomy     • Knee arthroscopy Right    • Pain pump insertion/revision       Removed in    • Intrathecal pump removal          • Eye surgery Bilateral      lasix eye surgery             PT Ortho       17 1400    Precautions and Contraindications    Precautions possible fusion based on patient's reports of having a metal plate in her c-spine that is 3-4 inches long and 2 inches wide with screws  -    Subjective Pain    Able to rate subjective pain? yes  -    Pre-Treatment Pain Level --   its just there, i just feel it  -    Post-Treatment Pain Level 0  -      17 1300    Subjective Comments    Subjective Comments pt reports that she got shots in " neck and shoulder last friday and is feeling much better today.   -    Subjective Pain    Able to rate subjective pain? yes  -    Pre-Treatment Pain Level 1  -      User Key  (r) = Recorded By, (t) = Taken By, (c) = Cosigned By    Initials Name Provider Type     Romelia Engle PTA Physical Therapy Assistant     Roxanne Doyle PTA Physical Therapy Assistant                            PT Assessment/Plan       02/09/17 1400 02/07/17 1300       PT Assessment    Assessment Comments good tolerance to therex. requires only minimal cues for HEP  - good tolerance to exercises.    -     PT Plan    PT Frequency 2x/week  - 2x/week  -     PT Plan Comments next visit tband rows, bilateral extension  - continue per POC, review HEP  -       User Key  (r) = Recorded By, (t) = Taken By, (c) = Cosigned By    Initials Name Provider Type     Romelia Engle PTA Physical Therapy Assistant     Roxanne Doyle PTA Physical Therapy Assistant                Modalities       02/09/17 1400          Ice    Ice Applied Yes  -      Location neck and L shoulder  -      Rx Minutes 15 mins  -        User Key  (r) = Recorded By, (t) = Taken By, (c) = Cosigned By    Initials Name Provider Type     Romelia Engle PTA Physical Therapy Assistant                Exercises       02/09/17 1400          Subjective Pain    Able to rate subjective pain? yes  -      Pre-Treatment Pain Level --   its just there, i just feel it  -      Post-Treatment Pain Level 0  -      Exercise 1    Exercise Name 1 Pro II UE  -      Resistance 1 --   L 2.0  -      Time (Minutes) 1 10 minutes  -      Exercise 2    Exercise Name 2 Pulleys 3 way  -      Reps 2 20  -MH      Exercise 3    Exercise Name 3 Bilteral UT Stretch  -      Sets 3 2  -      Time (Seconds) 3 30 seconds  -      Exercise 4    Exercise Name 4 Bilateral Levator Stretch  -      Reps 4 2  -      Time (Seconds) 4 30 seconds  -      Exercise 5    Exercise Name 5  AROM C-Spine Rotation  -      Reps 5 10  -      Exercise 6    Exercise Name 6 AROM C-Spine Flex/Ext  -      Reps 6 10  -      Exercise 7    Exercise Name 7 Chin Tucks  -      Reps 7 20  -      Time (Seconds) 7 5 second holds  -      Exercise 8    Exercise Name 8 No Money's  -      Equipment 8 Theraband  -      Resistance 8 Red  -      Reps 8 20  -MH        User Key  (r) = Recorded By, (t) = Taken By, (c) = Cosigned By    Initials Name Provider Type     Romelia Engle, PTA Physical Therapy Assistant                               PT OP Goals       02/09/17 1400 02/07/17 1300 02/01/17 1324    PT Short Term Goals    STG Date to Achieve 02/22/17  - 02/22/17  -     STG 1 I in HEP and have additions/changes to HEP.  - I in HEP and have additions/changes to HEP.  -     STG 1 Progress Not Met  - Not Met  [facility]     STG 2 AROM cervical flex/ext to 40 degrees each  - AROM cervical flex/ext to 40 degrees each  -     STG 2 Progress Not Met  - Not Met  [facility]     STG 3 AROM L Shoulder flexion/abduction to 90 degrees or greater  - AROM L Shoulder flexion/abduction to 90 degrees or greater  -     STG 3 Progress Not Met  - Not Met  [facility]     STG 4 AROM L Shoulder IR/ER 55 degrees each at 90 degrees of abduction  - AROM L Shoulder IR/ER 55 degrees each at 90 degrees of abduction  -     STG 4 Progress Not Met  - Not Met  [facility]     STG 5 L UE 4/5 or greater in available AROM  - L UE 4/5 or greater in available [facility]  -     STG 5 Progress Not Met  - Not Met  [facility]     Long Term Goals    LTG Date to Achieve 03/08/17  -MH 03/08/17  -     LTG 1 AROM cervical SB 25 degrees or greater B  - AROM cervical SB 25 degrees or greater B  [facility]     LTG 1 Progress Not Met  - Not Met  [facility]     LTG 2 AROM cervical ROT 65 degrees or greater B  - AROM cervical ROT 65 degrees or greater B  [facility]     LTG 2 Progress Not Met  - Not Met  [facility]     LTG 3 AROM L shoulder flexion 140 degrees or greater  - AROM L  shoulder flexion 140 degrees or greater  -     LTG 3 Progress Not Met  -MH Not Met  -     LTG 4 AROM L Shld abduction 140 degrees or greater  -MH AROM L Shld abduction 140 degrees or greater  -     LTG 4 Progress Not Met  -MH Not Met  -     LTG 5 AROM L Shoulder IR/ER 70 degrees at 90 degrees of abduction  -MH AROM L Shoulder IR/ER 70 degrees at 90 degrees of abduction  -     LTG 5 Progress Not Met  -MH Not Met  -     LTG 6 L UE 4+/5 or greater.  -MH L UE 4+/5 or greater.  -     LTG 6 Progress Not Met  -MH Not Met  -[facility] 7 Pt to be more aware of posture and postural correction technique.  - Pt to be more aware of posture and postural correction technique.  -     LTG 7 Progress Not Met  -MH Not Met  -     LTG 8 I final HEP  -MH I final HEP  -[facility] 8 Progress Not Met  -MH Not Met  -     LT 9 D/C with free 30 day fitness formula membership.  -MH D/C with free 30 day fitness formula membership.  -     LT 9 Progress Not Met  -MH Not Met  -     Time Calculation    PT Goal Re-Cert Due Date 02/22/17  -MH 02/22/17  - 02/22/17  -AJ      02/01/17 1300          PT Short Term Goals    STG Date to Achieve 02/22/17  -      STG 1 I in HEP and have additions/changes to HEP.  -      STG 2 AROM cervical flex/ext to 40 degrees each  -      STG 3 AROM L Shoulder flexion/abduction to 90 degrees or greater  -      STG 4 AROM L Shoulder IR/ER 55 degrees each at 90 degrees of abduction  -      STG 5 L UE 4/5 or greater in available AROM  -      Long Term Goals    LTG Date to Achieve 03/08/17  -      LTG 1 AROM cervical SB 25 degrees or greater B  -      LTG 2 AROM cervical ROT 65 degrees or greater B  -      LTG 3 AROM L shoulder flexion 140 degrees or greater  -      LTG 4 AROM L Shld ebduction 140 degrees or greater  -      LTG 5 AROM L Shoulder IR/ER 70 degrees at 90 degrees of abduction  -      LTG 6 L UE 4+/5 or greater.  -[facility] 7 Pt to be more aware of posture and  postural correction technique.  -      LTG 8 I final HEP  -      LTG 9 D/C with free 30 day fitness formula membership.  -      Time Calculation    PT Goal Re-Cert Due Date 02/22/17  -        User Key  (r) = Recorded By, (t) = Taken By, (c) = Cosigned By    Initials Name Provider Type     Romelia Engle PTA Physical Therapy Assistant     Alejandra Cuevas, PT Physical Therapist     Roxanne Doyle PTA Physical Therapy Assistant                Therapy Education       02/09/17 1400    Therapy Education    Given HEP  -MH    Program Reinforced  -MH    How Provided Written;Demonstration  -MH    Provided to Patient  -MH    Level of Understanding Verbalized  -MH      02/07/17 1402    Therapy Education    Program New  -AH    How Provided Written;Demonstration  -AH    Provided to Patient  -AH    Level of Understanding Verbalized  -AH      02/07/17 1358    Therapy Education    Given HEP   UT S, LEV S, Chin tucks, no moneys, Cervical flex, ext and B rotation  -      User Key  (r) = Recorded By, (t) = Taken By, (c) = Cosigned By    Initials Name Provider Type     Romelia Engle PTA Physical Therapy Assistant     Roxanne Doyle PTA Physical Therapy Assistant                Time Calculation:   Start Time: 1400  Stop Time: 1510  Time Calculation (min): 70 min    Therapy Charges for Today     Code Description Service Date Service Provider Modifiers Qty    24544135009 HC PT THER SUPP EA 15 MIN 2/9/2017 Romelia Engle PTA GP 1    40070404196 HC PT THER PROC EA 15 MIN 2/9/2017 Romelia Engle PTA GP 3                    Romelia Engle PTA  2/9/2017

## 2017-02-13 ENCOUNTER — HOSPITAL ENCOUNTER (OUTPATIENT)
Dept: PHYSICAL THERAPY | Facility: HOSPITAL | Age: 72
Setting detail: THERAPIES SERIES
Discharge: HOME | End: 2017-02-13

## 2017-02-13 DIAGNOSIS — M25.512 CHRONIC LEFT SHOULDER PAIN: ICD-10-CM

## 2017-02-13 DIAGNOSIS — M50.320 DEGENERATION OF INTERVERTEBRAL DISC OF MID-CERVICAL REGION, UNSPECIFIED SPINAL LEVEL: Primary | ICD-10-CM

## 2017-02-13 DIAGNOSIS — G89.29 CHRONIC LEFT SHOULDER PAIN: ICD-10-CM

## 2017-02-13 PROCEDURE — 97110 THERAPEUTIC EXERCISES: CPT

## 2017-02-13 NOTE — PROGRESS NOTES
Outpatient Physical Therapy Ortho Treatment Note  [facility]  Romelia Engle PTA       Patient Name: Ronda Blair  : 1945  MRN: [id number]  Today's Date: 2017      Visit Date: 2017     Visits:   Insurance Visits Approved: based on medical necessity  Recert Due: 2017  MD Appt: 2017  Pain: pretreatment 1/10; post treatment 0/10  Improvement: pt is subjectively reporting some% improvement since initial evaluation    Visit Dx:    ICD-10-CM ICD-9-CM   1. Degeneration of intervertebral disc of mid-cervical region, unspecified spinal level M50.320 722.4   2. Chronic left shoulder pain M25.512 719.41    G89.29 338.29       There is no problem list on file for this patient.       Past Medical History   Diagnosis Date   • Anxiety    • Arthritis    • COPD (chronic obstructive pulmonary disease)    • Depression    • Diabetes mellitus    • History of transfusion    • Hyperlipidemia    • Hypertension    • Stroke      Greater than 5 years ago        Past Surgical History   Procedure Laterality Date   • Cervical fusion     • Lumbar spine surgery     • Hemorrhoidectomy     • Hysterectomy     • Knee arthroscopy Right    • Pain pump insertion/revision       Removed in    • Intrathecal pump removal          • Eye surgery Bilateral      lasix eye surgery             PT Ortho       17 1400    Subjective Comments    Subjective Comments states that once in a while when she moves just wrong it feels like her shoulder gets stuck or catches. then she has to pop it out and then she is fine  -    Precautions and Contraindications    Precautions possible fusion based on patient's reports of having a metal plate in her c-spine that is 3-4 inches long and 2 inches wide with screws  -    Subjective Pain    Able to rate subjective pain? yes  -    Pre-Treatment Pain Level 1  -    Post-Treatment Pain Level 0  -    Neck    Flexion AROM --   35°   -MH    Extension AROM --   40°  -MH    Left Lateral Flexion AROM --   24°  -MH    Rt Lateral Flexion AROM Deficit --   21°  -MH    Left Rotation AROM --   58°  -MH    Right Rotation AROM --   60°  -MH    Left Shoulder    Flexion AROM --   154°  -MH    ABduction AROM --   160°  -MH    External Rotation AROM --   60° @ 90° of ABD  -MH    Internal Rotation AROM --   58° @90° of ABD  -MH    Right Shoulder    Flexion AROM --   162°  -MH    ABduction AROM --   160°  -MH    External Rotation AROM --   90° @90° of ABD  -MH    Internal Rotation AROM --   61°  -MH      User Key  (r) = Recorded By, (t) = Taken By, (c) = Cosigned By    Initials Name Provider Type     Romelia Engle PTA Physical Therapy Assistant                            PT Assessment/Plan       02/13/17 1400 02/09/17 1400 02/07/17 1300    PT Assessment    Assessment Comments displays improved arom of neck and shoulder. good performance with therex  -MH good tolerance to therex. requires only minimal cues for HEP  - good tolerance to exercises.    -    PT Plan    PT Frequency 2x/week  - 2x/week  - 2x/week  -    PT Plan Comments next visit tband horizontal abd  -MH next visit tband rows, bilateral extension  -MH continue per POC, review HEP  -      User Key  (r) = Recorded By, (t) = Taken By, (c) = Cosigned By    Initials Name Provider Type     Romelia Engle PTA Physical Therapy Assistant     Roxanne Doyle PTA Physical Therapy Assistant                Modalities       02/13/17 1400          Ice    Ice Applied Yes  -MH      Location neck and L shoulder  -MH      Rx Minutes --   20 minutes  -        User Key  (r) = Recorded By, (t) = Taken By, (c) = Cosigned By    Initials Name Provider Type     Romelia Engle PTA Physical Therapy Assistant                Exercises       02/13/17 1400          Subjective Comments    Subjective Comments states that once in a while when she moves just wrong it feels like her shoulder gets stuck or catches.  then she has to pop it out and then she is fine  -MH      Subjective Pain    Able to rate subjective pain? yes  -MH      Pre-Treatment Pain Level 1  -MH      Post-Treatment Pain Level 0  -MH      Exercise 1    Exercise Name 1 Pro II UE  -MH      Resistance 1 --   L 3.0  -MH      Time (Minutes) 1 10 minutes  -MH      Exercise 2    Exercise Name 2 Pulleys 3 way  -MH      Reps 2 20  -MH      Exercise 3    Exercise Name 3 Bilteral UT Stretch  -MH      Sets 3 2  -MH      Time (Seconds) 3 30 seconds  -MH      Exercise 4    Exercise Name 4 Bilateral Levator Stretch  -MH      Reps 4 2  -MH      Time (Seconds) 4 30 seconds  -MH      Exercise 5    Exercise Name 5 C-spine isometric Flex  -MH      Reps 5 15  -MH      Time (Seconds) 5 5 second hold  -MH      Exercise 6    Exercise Name 6 C-spine isometric Rotation  -MH      Reps 6 15  -MH      Time (Seconds) 6 5 second hold  -MH      Exercise 7    Exercise Name 7 C-spine Isometric ext  -MH      Reps 7 15  -MH      Time (Seconds) 7 5 second holds  -MH      Exercise 8    Exercise Name 8 No Money's  -MH      Equipment 8 Theraband  -MH      Resistance 8 Red  -MH      Reps 8 20  -MH      Exercise 9    Exercise Name 9 Tband Bilateral Shoulder Ext.   -MH      Equipment 9 Theraband  -MH      Resistance 9 Red  -MH      Reps 9 20  -MH      Exercise 10    Exercise Name 10 Tband Rows Mid/Low  -MH      Equipment 10 Theraband  -MH      Resistance 10 Red  -MH      Reps 10 20  -MH        User Key  (r) = Recorded By, (t) = Taken By, (c) = Cosigned By    Initials Name Provider Type     Romelia Engle, PTA Physical Therapy Assistant                               PT OP Goals       02/13/17 1400 02/09/17 1400 02/07/17 1300    PT Short Term Goals    STG Date to Achieve 02/22/17  -MH 02/22/17  -MH 02/22/17  -    STG 1 I in HEP and have additions/changes to HEP.  -MH I in HEP and have additions/changes to HEP.  -MH I in HEP and have additions/changes to HEP.  -AH    STG 1 Progress Met  -MH Not Met   - Not Met  -    STG 2 AROM cervical flex/ext to 40 degrees each  - AROM cervical flex/ext to 40 degrees each  - AROM cervical flex/ext to 40 degrees each  -    STG 2 Progress Partially Met  -MH Not Met  - Not Met  -    STG 2 Progress Comments extension met  -      STG 3 AROM L Shoulder flexion/abduction to 90 degrees or greater  - AROM L Shoulder flexion/abduction to 90 degrees or greater  - AROM L Shoulder flexion/abduction to 90 degrees or greater  -    STG 3 Progress Met  -MH Not Met  - Not Met  [facility]    STG 4 AROM L Shoulder IR/ER 55 degrees each at 90 degrees of abduction  - AROM L Shoulder IR/ER 55 degrees each at 90 degrees of abduction  - AROM L Shoulder IR/ER 55 degrees each at 90 degrees of abduction  -    STG 4 Progress Met  -MH Not Met  - Not Met  [facility]    STG 5 L UE 4/5 or greater in available AROM  - L UE 4/5 or greater in available AROM  - L UE 4/5 or greater in available AROM  -    STG 5 Progress Not Met  - Not Met  - Not Met  [facility]    Long Term Goals    LTG Date to Achieve 03/08/17  - 03/08/17  - 03/08/17  -    LTG 1 AROM cervical SB 25 degrees or greater B  - AROM cervical SB 25 degrees or greater B  - AROM cervical SB 25 degrees or greater B  -    LTG 1 Progress Partially Met  -MH Not Met  - Not Met  -AH    LTG 1 Progress Comments left SB met within a margin of error  -      LTG 2 AROM cervical ROT 65 degrees or greater B  - AROM cervical ROT 65 degrees or greater B  - AROM cervical ROT 65 degrees or greater B  -    LTG 2 Progress Not Met  -MH Not Met  - Not Met  -AH    LTG 3 AROM L shoulder flexion 140 degrees or greater  - AROM L shoulder flexion 140 degrees or greater  - AROM L shoulder flexion 140 degrees or greater  -    LTG 3 Progress Met  -MH Not Met  - Not Met  [facility]    LTG 4 AROM L Shld abduction 140 degrees or greater  - AROM L Shld abduction 140 degrees or greater  - AROM L Shld abduction 140 degrees or greater  [facility]     LTG 4 Progress Met  -MH Not Met  -MH Not Met  -    LTG 5 AROM L Shoulder IR/ER 70 degrees at 90 degrees of abduction  -MH AROM L Shoulder IR/ER 70 degrees at 90 degrees of abduction  -MH AROM L Shoulder IR/ER 70 degrees at 90 degrees of abduction  -    LTG 5 Progress Not Met  -MH Not Met  -MH Not Met  -    LTG 6 L UE 4+/5 or greater.  -MH L UE 4+/5 or greater.  -MH L UE 4+/5 or greater.  -    LTG 6 Progress Not Met  -MH Not Met  -MH Not Met  -    LTG 7 Pt to be more aware of posture and postural correction technique.  -MH Pt to be more aware of posture and postural correction technique.  -MH Pt to be more aware of posture and postural correction technique.  -    LTG 7 Progress Not Met  -MH Not Met  -MH Not Met  -    LT 8 I final HEP  -MH I final HEP  -MH I final HEP  -    LTG 8 Progress Not Met  -MH Not Met  -MH Not Met  -    LTG 9 D/C with free 30 day fitness formula membership.  -MH D/C with free 30 day fitness formula membership.  -MH D/C with free 30 day fitness formula membership.  -    LTG 9 Progress Not Met  -MH Not Met  -MH Not Met  -    Time Calculation    PT Goal Re-Cert Due Date 02/22/17  -MH 02/22/17  -MH 02/22/17  -AH      02/01/17 1324 02/01/17 1300       PT Short Term Goals    STG Date to Achieve  02/22/17  -     STG 1  I in HEP and have additions/changes to HEP.  -     STG 2  AROM cervical flex/ext to 40 degrees each  -     STG 3  AROM L Shoulder flexion/abduction to 90 degrees or greater  -     STG 4  AROM L Shoulder IR/ER 55 degrees each at 90 degrees of abduction  -     STG 5  L UE 4/5 or greater in available AROM  -     Long Term Goals    LTG Date to Achieve  03/08/17  -     LTG 1  AROM cervical SB 25 degrees or greater B  -     LTG 2  AROM cervical ROT 65 degrees or greater B  -     LTG 3  AROM L shoulder flexion 140 degrees or greater  -     LTG 4  AROM L Shld ebduction 140 degrees or greater  -     LTG 5  AROM L Shoulder IR/ER 70 degrees at 90  degrees of abduction  -     LTG 6  L UE 4+/5 or greater.  -     LTG 7  Pt to be more aware of posture and postural correction technique.  -     LTG 8  I final HEP  -     LTG 9  D/C with free 30 day fitness formula membership.  -     Time Calculation    PT Goal Re-Cert Due Date 02/22/17  -AJ 02/22/17  -       User Key  (r) = Recorded By, (t) = Taken By, (c) = Cosigned By    Initials Name Provider Type     Romelia Engle PTA Physical Therapy Assistant     Alejandra Cuevas, PT Physical Therapist     Roxanne Doyle PTA Physical Therapy Assistant                Therapy Education       02/13/17 1400    Therapy Education    Given HEP  -MH    Program Reinforced  -MH    How Provided Written;Demonstration  -MH    Provided to Patient  -MH    Level of Understanding Verbalized  -MH      02/09/17 1400    Therapy Education    Given HEP  -MH    Program Reinforced  -MH    How Provided Written;Demonstration  -MH    Provided to Patient  -MH    Level of Understanding Verbalized  -MH      02/07/17 1402    Therapy Education    Program New  -AH    How Provided Written;Demonstration  -AH    Provided to Patient  -AH    Level of Understanding Verbalized  -AH      02/07/17 1358    Therapy Education    Given HEP   UT S, LEV S, Chin tucks, no moneys, Cervical flex, ext and B rotation  -      User Key  (r) = Recorded By, (t) = Taken By, (c) = Cosigned By    Initials Name Provider Type     Romelia Engle PTA Physical Therapy Assistant     Roxanne Doyle PTA Physical Therapy Assistant                Time Calculation:   Start Time: 1400  Stop Time: 1520  Time Calculation (min): 80 min    Therapy Charges for Today     Code Description Service Date Service Provider Modifiers Qty    03453195361 HC PT THER PROC EA 15 MIN 2/13/2017 Romelia Engle PTA GP 4    05952650876 HC PT THER SUPP EA 15 MIN 2/13/2017 Romelia Engle, AMADOU GP 1                    Romelia Engle PTA  2/13/2017

## 2017-02-16 ENCOUNTER — HOSPITAL ENCOUNTER (OUTPATIENT)
Dept: PHYSICAL THERAPY | Facility: HOSPITAL | Age: 72
Setting detail: THERAPIES SERIES
Discharge: HOME | End: 2017-02-16

## 2017-02-16 DIAGNOSIS — M50.320 DEGENERATION OF INTERVERTEBRAL DISC OF MID-CERVICAL REGION, UNSPECIFIED SPINAL LEVEL: Primary | ICD-10-CM

## 2017-02-16 DIAGNOSIS — G89.29 CHRONIC LEFT SHOULDER PAIN: ICD-10-CM

## 2017-02-16 DIAGNOSIS — M25.512 CHRONIC LEFT SHOULDER PAIN: ICD-10-CM

## 2017-02-16 PROCEDURE — 97110 THERAPEUTIC EXERCISES: CPT | Performed by: PHYSICAL THERAPY ASSISTANT

## 2017-02-16 NOTE — PROGRESS NOTES
Outpatient Physical Therapy Ortho Treatment Note  [facility]     Patient Name: Ronda Blair  : 1945  MRN: [id number]  Today's Date: 2017      Visit Date: 2017    Visits    Recert Date 17   MD appointment 17   Pt reports  10% improvement       Visit Dx:    ICD-10-CM ICD-9-CM   1. Degeneration of intervertebral disc of mid-cervical region, unspecified spinal level M50.320 722.4   2. Chronic left shoulder pain M25.512 719.41    G89.29 338.29       There is no problem list on file for this patient.       Past Medical History   Diagnosis Date   • Anxiety    • Arthritis    • COPD (chronic obstructive pulmonary disease)    • Depression    • Diabetes mellitus    • History of transfusion    • Hyperlipidemia    • Hypertension    • Stroke      Greater than 5 years ago        Past Surgical History   Procedure Laterality Date   • Cervical fusion     • Lumbar spine surgery     • Hemorrhoidectomy     • Hysterectomy     • Knee arthroscopy Right    • Pain pump insertion/revision       Removed in    • Intrathecal pump removal          • Eye surgery Bilateral      lasix eye surgery             PT Ortho       17 1400    Subjective Pain    Post-Treatment Pain Level 0  -      User Key  (r) = Recorded By, (t) = Taken By, (c) = Cosigned By    Initials Name Provider Type     Darren Ritchie PTA Physical Therapy Assistant                            PT Assessment/Plan       17 1500 17 1400       PT Assessment    Assessment Comments Pt debbie tx well, good technique with ther ex this date,   - displays improved arom of neck and shoulder. good performance with therex  -     PT Plan    PT Frequency  2x/week  -     PT Plan Comments dawnr alicia next visit  - next visit tband horizontal abd  -       User Key  (r) = Recorded By, (t) = Taken By, (c) = Cosigned By    Initials Name Provider Type     Romelia Engle PTA Physical  Therapy Assistant     Darren Ritchie PTA Physical Therapy Assistant                Modalities       02/16/17 1400          Ice    Ice Applied Yes  -      Location L shldr  -JH      Rx Minutes --   20  -JH      Ice S/P Rx Yes  -JH        User Key  (r) = Recorded By, (t) = Taken By, (c) = Cosigned By    Initials Name Provider Type     Darren Ritchie PTA Physical Therapy Assistant                Exercises       02/16/17 1400          Subjective Comments    Subjective Comments Pt reports that she thinks the shot is helping.   -JH      Subjective Pain    Able to rate subjective pain? yes  -JH      Pre-Treatment Pain Level 1  -JH      Post-Treatment Pain Level 0  -JH      Exercise 1    Exercise Name 1 Pro II UE  -JH      Resistance 1 --   L3.0  -JH      Time (Minutes) 1 10 minutes  -JH      Exercise 2    Exercise Name 2 Pulleys 3 way  -JH      Time (Minutes) 2 1 min ea  -JH      Exercise 3    Exercise Name 3 Bilteral UT Stretch  -JH      Sets 3 2  -JH      Time (Seconds) 3 30 seconds  -JH      Exercise 4    Exercise Name 4 Bilateral Levator Stretch  -JH      Reps 4 2  -JH      Time (Seconds) 4 30 seconds  -JH      Exercise 5    Exercise Name 5 C-spine isometric Flex  -JH      Reps 5 15  -JH      Time (Seconds) 5 5 second hold  -JH      Exercise 6    Exercise Name 6 C-spine isometric Rotation  -JH      Reps 6 15  -JH      Time (Seconds) 6 5 second hold  -JH      Exercise 7    Exercise Name 7 C-spine Isometric ext  -JH      Reps 7 15  -JH      Time (Seconds) 7 5 second holds  -JH      Exercise 8    Exercise Name 8 No Money's  -JH      Equipment 8 Theraband  -JH      Resistance 8 Red  -JH      Reps 8 20  -JH      Exercise 9    Exercise Name 9 Tband Bilateral Shoulder Ext.   -JH      Equipment 9 Theraband  -JH      Resistance 9 Red  -JH      Reps 9 20  -JH      Exercise 10    Exercise Name 10 Tband Rows Mid/Low  -JH      Equipment 10 Theraband  -JH      Resistance 10 Red  -JH      Reps 10 20  -JH      Exercise 11     Exercise Name 11 scap sqeeze  -JH      Sets 11 1  -JH      Reps 11 20  -JH      Exercise 12    Exercise Name 12 horizontal abd  -      Equipment 12 Theraband  -      Resistance 12 Red  -JH      Reps 12 20  -JH      Exercise 13    Exercise Name 13 wall wipes flex/abd  -JH      Reps 13 20  -JH        User Key  (r) = Recorded By, (t) = Taken By, (c) = Cosigned By    Initials Name Provider Type     Darren Ritchie, PTA Physical Therapy Assistant                               PT OP Goals       02/16/17 1400 02/13/17 1400 02/09/17 1400    PT Short Term Goals    STG Date to Achieve 02/22/17  -JH 02/22/17  - 02/22/17  -    STG 1 I in HEP and have additions/changes to HEP.  - I in HEP and have additions/changes to HEP.  - I in HEP and have additions/changes to HEP.  -    STG 1 Progress Met  - Met  - Not Met  -    STG 2 AROM cervical flex/ext to 40 degrees each  - AROM cervical flex/ext to 40 degrees each  - AROM cervical flex/ext to 40 degrees each  -    STG 2 Progress Partially Met  - Partially Met  - Not Met  -    STG 2 Progress Comments  extension met  -     STG 3 AROM L Shoulder flexion/abduction to 90 degrees or greater  - AROM L Shoulder flexion/abduction to 90 degrees or greater  - AROM L Shoulder flexion/abduction to 90 degrees or greater  -    STG 3 Progress Met  - Met  - Not Met  [facility]    STG 4 AROM L Shoulder IR/ER 55 degrees each at 90 degrees of abduction  - AROM L Shoulder IR/ER 55 degrees each at 90 degrees of abduction  - AROM L Shoulder IR/ER 55 degrees each at 90 degrees of abduction  -    STG 4 Progress Met  - Met  - Not Met  -    STG 5 L UE 4/5 or greater in available AROM  - L UE 4/5 or greater in available AROM  - L UE 4/5 or greater in available AROM  -    STG 5 Progress Not Met  - Not Met  - Not Met  -    Long Term Goals    LTG Date to Achieve 03/08/17  -JH 03/08/17  - 03/08/17  -    LTG 1 AROM cervical SB 25 degrees or greater B   -JH AROM cervical SB 25 degrees or greater B  -MH AROM cervical SB 25 degrees or greater B  -MH    LTG 1 Progress Partially Met  -JH Partially Met  -MH Not Met  -MH    LTG 1 Progress Comments  left SB met within a margin of error  -     LTG 2 AROM cervical ROT 65 degrees or greater B  -JH AROM cervical ROT 65 degrees or greater B  -MH AROM cervical ROT 65 degrees or greater B  -MH    LTG 2 Progress Not Met  -JH Not Met  -MH Not Met  -MH    LTG 3 AROM L shoulder flexion 140 degrees or greater  -JH AROM L shoulder flexion 140 degrees or greater  -MH AROM L shoulder flexion 140 degrees or greater  -    LTG 3 Progress Met  -JH Met  -MH Not Met  -MH    LTG 4 AROM L Shld abduction 140 degrees or greater  -JH AROM L Shld abduction 140 degrees or greater  -MH AROM L Shld abduction 140 degrees or greater  -    LTG 4 Progress Met  -JH Met  -MH Not Met  -MH    LTG 5 AROM L Shoulder IR/ER 70 degrees at 90 degrees of abduction  -JH AROM L Shoulder IR/ER 70 degrees at 90 degrees of abduction  -MH AROM L Shoulder IR/ER 70 degrees at 90 degrees of abduction  -    LTG 5 Progress Not Met  -JH Not Met  -MH Not Met  -MH    LTG 6 L UE 4+/5 or greater.  -JH L UE 4+/5 or greater.  -MH L UE 4+/5 or greater.  -    LTG 6 Progress Not Met  -JH Not Met  -MH Not Met  -MH    LTG 7 Pt to be more aware of posture and postural correction technique.  -JH Pt to be more aware of posture and postural correction technique.  -MH Pt to be more aware of posture and postural correction technique.  -MH    LTG 7 Progress Not Met  -JH Not Met  -MH Not Met  -MH    LTG 8 I final HEP  -JH I final HEP  -MH I final HEP  -MH    LTG 8 Progress Not Met  -JH Not Met  -MH Not Met  -MH    LTG 9 D/C with free 30 day fitness formula membership.  -JH D/C with free 30 day fitness formula membership.  -MH D/C with free 30 day fitness formula membership.  -MH    LTG 9 Progress Not Met  -JH Not Met  -MH Not Met  -MH    Time Calculation    PT Goal Re-Cert Due Date   02/22/17  - 02/22/17  -      02/07/17 1300          PT Short Term Goals    STG Date to Achieve 02/22/17  -      STG 1 I in HEP and have additions/changes to HEP.  -      STG 1 Progress Not Met  -      STG 2 AROM cervical flex/ext to 40 degrees each  -      STG 2 Progress Not Met  [facility]      STG 3 AROM L Shoulder flexion/abduction to 90 degrees or greater  -      STG 3 Progress Not Met  -      STG 4 AROM L Shoulder IR/ER 55 degrees each at 90 degrees of abduction  -      STG 4 Progress Not Met  [facility]      STG 5 L UE 4/5 or greater in available AROM  -      STG 5 Progress Not Met  [facility]      Long Term Goals    LTG Date to Achieve 03/08/17  -      LTG 1 AROM cervical SB 25 degrees or greater B  [facility]      LTG 1 Progress Not Met  -      LTG 2 AROM cervical ROT 65 degrees or greater B  [facility]      LTG 2 Progress Not Met  -      LTG 3 AROM L shoulder flexion 140 degrees or greater  -      LTG 3 Progress Not Met  -      LTG 4 AROM L Shld abduction 140 degrees or greater  -      LTG 4 Progress Not Met  -      LTG 5 AROM L Shoulder IR/ER 70 degrees at 90 degrees of abduction  -      LTG 5 Progress Not Met  [facility]      LTG 6 L UE 4+/5 or greater.  -      LTG 6 Progress Not Met  [facility]      LTG 7 Pt to be more aware of posture and postural correction technique.  -      LTG 7 Progress Not Met  [facility]      LTG 8 I final HEP  -      LTG 8 Progress Not Met  [facility]      LTG 9 D/C with free 30 day fitness formula membership.  -      LTG 9 Progress Not Met  -      Time Calculation    PT Goal Re-Cert Due Date 02/22/17  -        User Key  (r) = Recorded By, (t) = Taken By, (c) = Cosigned By    Initials Name Provider Type     Romelia Engle PTA Physical Therapy Assistant     Darren Ritchie PTA Physical Therapy Assistant     Roxanne Doyle PTA Physical Therapy Assistant                Therapy Education       02/13/17 1400    Therapy Education    Given [facility]    Program Reinforced  -    How Provided  Written;Demonstration  -MH    Provided to Patient  -MH    Level of Understanding Verbalized  -MH      User Key  (r) = Recorded By, (t) = Taken By, (c) = Cosigned By    Initials Name Provider Type    JARED Engle PTA Physical Therapy Assistant                Time Calculation:        Therapy Charges for Today     Code Description Service Date Service Provider Modifiers Qty    79698933622 HC PT THER PROC EA 15 MIN 2/16/2017 Darren Ritchie PTA GP 4    86557713718 HC PT THER SUPP EA 15 MIN 2/16/2017 Darren Ritchie PTA GP 1                    Darren Ritchie PTA  2/16/2017

## 2017-02-20 ENCOUNTER — OFFICE VISIT (OUTPATIENT)
Dept: NEUROSURGERY | Facility: CLINIC | Age: 72
End: 2017-02-20

## 2017-02-20 VITALS
HEIGHT: 65 IN | WEIGHT: 200 LBS | SYSTOLIC BLOOD PRESSURE: 150 MMHG | BODY MASS INDEX: 33.32 KG/M2 | DIASTOLIC BLOOD PRESSURE: 78 MMHG

## 2017-02-20 DIAGNOSIS — M50.320 DEGENERATION OF INTERVERTEBRAL DISC OF MID-CERVICAL REGION, UNSPECIFIED SPINAL LEVEL: Primary | ICD-10-CM

## 2017-02-20 DIAGNOSIS — E66.01 MORBID OBESITY DUE TO EXCESS CALORIES (HCC): ICD-10-CM

## 2017-02-20 DIAGNOSIS — Z87.891 FORMER SMOKER: ICD-10-CM

## 2017-02-20 PROCEDURE — 99213 OFFICE O/P EST LOW 20 MIN: CPT | Performed by: NEUROLOGICAL SURGERY

## 2017-02-20 NOTE — PROGRESS NOTES
Patient ID: Ronda Blair is a 71 y.o. female here today for [] chronic neck and low back pain    HPI:  [] Miss Blair has had previous anterior cervical fusion C5 6 7 by Dr. Guerin in the 1990s.  She also had lumbar surgery by him as well.  She has chronic low back pain chronic neck pain.  She has had some recent physical therapy and pain management.  She has had pain in the left shoulder which has improved with physical therapy.  No radicular pain arms or legs.  She has had x-ray of cervical spine and shows a screw with half a turn outward which is been this way for well over 2-3 years.  It is unchanged.  She has had an MRI.  There is a lot of artifact from the plate and screws.  She appears though to have adjacent level disease at C4 5.    The following portions of the patient's history were reviewed and updated as appropriate: allergies, current medications, past family history, past medical history, past social history, past surgical history and problem list.    Review of Systems   Constitutional: Negative.    HENT: Positive for sinus pressure.    Eyes: Negative.    Respiratory: Positive for chest tightness and shortness of breath.         Recent diagnosis of pneumonia treated   Cardiovascular: Negative.    Gastrointestinal: Negative.         History of peptic ulcer disease   Endocrine: Negative.    Genitourinary: Negative.    Musculoskeletal: Positive for back pain and neck pain.   Skin: Negative.    Allergic/Immunologic: Negative.    Neurological: Positive for numbness.        History of repeated TIAs.  She is on Aggrenox.  She cannot remember the name of her neurologist.   Hematological: Negative.    Psychiatric/Behavioral: Negative.         Anxiety and depressive condition   All other systems reviewed and are negative.      Past Medical History   Diagnosis Date   • Anxiety    • Arthritis    • COPD (chronic obstructive pulmonary disease)    • Depression    • Diabetes mellitus    • History  of transfusion    • Hyperlipidemia    • Hypertension    • Stroke      Greater than 5 years ago       No family history on file.    Social History   Substance Use Topics   • Smoking status: Former Smoker   • Smokeless tobacco: Never Used   • Alcohol use No       Past Surgical History   Procedure Laterality Date   • Cervical fusion  1999   • Lumbar spine surgery  1999   • Hemorrhoidectomy     • Hysterectomy     • Knee arthroscopy Right 2015   • Pain pump insertion/revision       Removed in 2006   • Intrathecal pump removal       2006   • Eye surgery Bilateral 2014     lasix eye surgery       Aspirin      Physical Exam:  [] Awake alert and oriented properly.  She is pleasant.  She has marked impaired range of motion rotation and extension in particular.  She has good motion of her shoulders today.  Chest is clear no wheezes.  Heart tones regular I heard no murmur nor bruit.  Abdomen soft and obese.  Cranial nerves II through XII intact.  No major or gross weakness.  Her reflexes are good 2-2-3+ upper and lower extremities.  No clonus Shasha's negative.  Some numbness of left index finger chronic.  Cerebellar intact.    Review of Radiographic Studies:  [] MRI and x-ray cervical spine described above    Medical Decision Making:  [] She continues on Aggrenox she has had previous mini strokes repeatedly shortly affecting left face and left arm and speech she has chronic pain in both neck and lower back.  Currently she does not have symptoms that would warrant consideration of any current surgery to her neck or lower back and she should remain in pain clinic and I will see her again in 6 months    1. Degeneration of intervertebral disc of mid-cervical region, unspecified spinal level    2. Morbid obesity due to excess calories    3. Former smoker         Return in about 6 months (around 8/20/2017).

## 2017-02-21 ENCOUNTER — HOSPITAL ENCOUNTER (OUTPATIENT)
Dept: PHYSICAL THERAPY | Facility: HOSPITAL | Age: 72
Setting detail: THERAPIES SERIES
Discharge: HOME | End: 2017-02-21

## 2017-02-21 DIAGNOSIS — G89.29 CHRONIC LEFT SHOULDER PAIN: ICD-10-CM

## 2017-02-21 DIAGNOSIS — M25.512 CHRONIC LEFT SHOULDER PAIN: ICD-10-CM

## 2017-02-21 DIAGNOSIS — M50.320 DEGENERATION OF INTERVERTEBRAL DISC OF MID-CERVICAL REGION, UNSPECIFIED SPINAL LEVEL: Primary | ICD-10-CM

## 2017-02-21 PROCEDURE — 97110 THERAPEUTIC EXERCISES: CPT

## 2017-02-21 NOTE — PROGRESS NOTES
Outpatient Physical Therapy Ortho Treatment Note  [facility]  Romelia Engle PTA       Patient Name: Ronda Blair  : 1945  MRN: [id number]  Today's Date: 2017      Visit Date: 2017     Visits: 6/6  Insurance Visits Approved: based on medical necessity  Recert Due: 2017  MD Appt: 6 months  Pain: pretreatment 1/10; post treatment 0/10  Improvement: pt is subjectively reporting 10% improvement since initial evaluation    Visit Dx:    ICD-10-CM ICD-9-CM   1. Degeneration of intervertebral disc of mid-cervical region, unspecified spinal level M50.320 722.4   2. Chronic left shoulder pain M25.512 719.41    G89.29 338.29       Patient Active Problem List   Diagnosis   • Degeneration of intervertebral disc of mid-cervical region   • Morbid obesity due to excess calories   • Former smoker        Past Medical History   Diagnosis Date   • Anxiety    • Arthritis    • COPD (chronic obstructive pulmonary disease)    • Depression    • Diabetes mellitus    • History of transfusion    • Hyperlipidemia    • Hypertension    • Stroke      Greater than 5 years ago        Past Surgical History   Procedure Laterality Date   • Cervical fusion     • Lumbar spine surgery     • Hemorrhoidectomy     • Hysterectomy     • Knee arthroscopy Right    • Pain pump insertion/revision       Removed in    • Intrathecal pump removal          • Eye surgery Bilateral      lasix eye surgery             PT Ortho       17 1400    Subjective Comments    Subjective Comments pt reports that she has a screw backing out from one of her surgeries on the spine. states that the MRI also showed that she has some spurs developing in her cervical spine. states that she just isn't feeling up to par right now.   -    Precautions and Contraindications    Precautions possible fusion based on patient's reports of having a metal plate in her c-spine that is 3-4 inches long and 2  inches wide with screws  -    Subjective Pain    Able to rate subjective pain? yes  -    Pre-Treatment Pain Level 1  -    Post-Treatment Pain Level 0  -    Posture/Observations    Posture/Observations Comments pt ambulates with Head extended, mouth open and eyes half closed and dragging feet. palor skin and excessive sweating.   -    Neck    Flexion AROM --   30°  -MH    Extension AROM --   25°  -    Left Lateral Flexion AROM --   15°  -    Rt Lateral Flexion AROM Deficit --   10°  -    Left Rotation AROM --   48°  -    Right Rotation AROM --   48°  -    Left Shoulder    Flexion AROM --   158°  -    ABduction AROM --   160°  -MH    External Rotation AROM --   80° @ 90° ABD  -MH    Internal Rotation AROM --   68° @ 90° ABD  -MH    MMT (Manual Muscle Testing)    General MMT Assessment Detail Bilateral UE 5/5 throughout   -      User Key  (r) = Recorded By, (t) = Taken By, (c) = Cosigned By    Initials Name Provider Type     Romelia Engle PTA Physical Therapy Assistant                            PT Assessment/Plan       02/21/17 1400 02/16/17 1500       PT Assessment    Assessment Comments patient has improved left shoulder AROM as well as strength. however is still limited in s-spine AROM  - Pt debbie tx well, good technique with ther ex this date,   -     PT Plan    PT Frequency 2x/week  -      PT Plan Comments recert next visit  - dawnr alicia next visit  -       User Key  (r) = Recorded By, (t) = Taken By, (c) = Cosigned By    Initials Name Provider Type     Romelia Engle PTA Physical Therapy Assistant     Darren Ritchie PTA Physical Therapy Assistant                Modalities       02/21/17 1400          Ice    Ice Applied Yes  -      Location L shldr  -      Rx Minutes --   20 minutes  -      Ice S/P Rx Yes  -        User Key  (r) = Recorded By, (t) = Taken By, (c) = Cosigned By    Initials Name Provider Type     Romelia Engle PTA Physical Therapy Assistant                 Exercises       02/21/17 1400          Subjective Comments    Subjective Comments pt reports that she has a screw backing out from one of her surgeries on the spine. states that the MRI also showed that she has some spurs developing in her cervical spine. states that she just isn't feeling up to par right now.   -      Subjective Pain    Able to rate subjective pain? yes  -      Pre-Treatment Pain Level 1  -MH      Post-Treatment Pain Level 0  -      Exercise 1    Exercise Name 1 Pro II UE  -      Resistance 1 --   L 4.0  -MH      Time (Minutes) 1 10 minutes  -      Exercise 2    Exercise Name 2 AROM C-Spine Flex;Ext;Rot; SB  -MH      Reps 2 20  -MH      Exercise 3    Exercise Name 3 Bilteral UT Stretch  -MH      Sets 3 2  -MH      Time (Seconds) 3 30 seconds  -MH      Exercise 4    Exercise Name 4 Bilateral Levator Stretch  -MH      Reps 4 2  -MH      Time (Seconds) 4 30 seconds  -MH      Exercise 5    Exercise Name 5 C-spine isometric Flex  -MH      Reps 5 15  -MH      Time (Seconds) 5 5 second hold  -      Exercise 6    Exercise Name 6 C-spine isometric Rotation  -MH      Reps 6 15  -MH      Time (Seconds) 6 5 second hold  -MH      Exercise 7    Exercise Name 7 C-spine Isometric ext  -MH      Reps 7 15  -MH      Time (Seconds) 7 5 second holds  -MH      Exercise 8    Exercise Name 8 No Money's   -      Equipment 8 Theraband  -      Resistance 8 Green  -MH      Reps 8 20  -MH        User Key  (r) = Recorded By, (t) = Taken By, (c) = Cosigned By    Initials Name Provider Type     Romelia Engle, PTA Physical Therapy Assistant                               PT OP Goals       02/21/17 1400 02/16/17 1400 02/13/17 1400    PT Short Term Goals    STG Date to Achieve 02/22/17  - 02/22/17  -[facility] 02/22/17  -    STG 1 I in HEP and have additions/changes to HEP.  - I in HEP and have additions/changes to HEP.  - I in HEP and have additions/changes to HEP.  -    STG 1 Progress Met  - Met   - Met  -    STG 2 AROM cervical flex/ext to 40 degrees each  - AROM cervical flex/ext to 40 degrees each  - AROM cervical flex/ext to 40 degrees each  -    STG 2 Progress Not Met  - Partially Met  - Partially Met  -    STG 2 Progress Comments   extension met  -    STG 3 AROM L Shoulder flexion/abduction to 90 degrees or greater  - AROM L Shoulder flexion/abduction to 90 degrees or greater  - AROM L Shoulder flexion/abduction to 90 degrees or greater  -    STG 3 Progress Met  - Met  - Met  [facility]    STG 4 AROM L Shoulder IR/ER 55 degrees each at 90 degrees of abduction  - AROM L Shoulder IR/ER 55 degrees each at 90 degrees of abduction  - AROM L Shoulder IR/ER 55 degrees each at 90 degrees of abduction  -    STG 4 Progress Met  - Met  -[facility]    STG 5 L UE 4/5 or greater in available AROM  - L UE 4/5 or greater in available AROM  - L UE 4/5 or greater in available AROM  -    STG 5 Progress Met  - Not Met  -[facility] Not Met  -MH    Long Term Goals    LTG Date to Achieve 03/08/17  - 03/08/17  - 03/08/17  -    LTG 1 AROM cervical SB 25 degrees or greater B  - AROM cervical SB 25 degrees or greater B  - AROM cervical SB 25 degrees or greater B  -    LTG 1 Progress Not Met  - Partially Met  - Partially Met  [facility]    LTG 1 Progress Comments   left SB met within a margin of error  -    LTG 2 AROM cervical ROT 65 degrees or greater B  - AROM cervical ROT 65 degrees or greater B  - AROM cervical ROT 65 degrees or greater B  [facility]    LTG 2 Progress Not Met  - Not Met  [facility] Not Met  -MH    LTG 3 AROM L shoulder flexion 140 degrees or greater  - AROM L shoulder flexion 140 degrees or greater  - AROM L shoulder flexion 140 degrees or greater  -    LTG 3 Progress Met  - Met  - Met  [facility]    LTG 4 AROM L Shld abduction 140 degrees or greater  - AROM L Shld abduction 140 degrees or greater  - AROM L Shld abduction 140 degrees or greater  -    LTG 4 Progress Met   -MH Met  -[facility] Met  -MH    LTG 5 AROM L Shoulder IR/ER 70 degrees at 90 degrees of abduction  -MH AROM L Shoulder IR/ER 70 degrees at 90 degrees of abduction  -[facility] AROM L Shoulder IR/ER 70 degrees at 90 degrees of abduction  -    LTG 5 Progress Met  -MH Not Met  -[facility] Not Met  -MH    LTG 6 L UE 4+/5 or greater.  -MH L UE 4+/5 or greater.  -[facility] L UE 4+/5 or greater.  -    LTG 6 Progress Met  -MH Not Met  -[facility] Not Met  -MH    LTG 7 Pt to be more aware of posture and postural correction technique.  -MH Pt to be more aware of posture and postural correction technique.  -[facility] Pt to be more aware of posture and postural correction technique.  -    LTG 7 Progress Met  -MH Not Met  -[facility] Not Met  -MH    LTG 8 I final HEP  -MH I final HEP  -J[facility] I final HEP  -    LTG 8 Progress Not Met  -MH Not Met  -[facility] Not Met  -    LTG 9 D/C with free 30 day fitness formula membership.  -MH D/C with free 30 day fitness formula membership.  -J[facility] D/C with free 30 day fitness formula membership.  -    LTG 9 Progress Not Met  - Not Met  -[facility] Not Met  -    Time Calculation    PT Goal Re-Cert Due Date 02/22/17  -  02/22/17  -      02/09/17 1400          PT Short Term Goals    STG Date to Achieve 02/22/17  -      STG 1 I in HEP and have additions/changes to HEP.  -      STG 1 Progress Not Met  -      STG 2 AROM cervical flex/ext to 40 degrees each  -      STG 2 Progress Not Met  -      STG 3 AROM L Shoulder flexion/abduction to 90 degrees or greater  -      STG 3 Progress Not Met  -      STG 4 AROM L Shoulder IR/ER 55 degrees each at 90 degrees of abduction  -      STG 4 Progress Not Met  -      STG 5 L UE 4/5 or greater in available AROM  -      STG 5 Progress Not Met  -      Long Term Goals    LTG Date to Achieve 03/08/17  -      LTG 1 AROM cervical SB 25 degrees or greater B  -      LTG 1 Progress Not Met  -      LTG 2 AROM cervical ROT 65 degrees or greater B  -      LTG 2 Progress Not Met  -      LTG 3  AROM L shoulder flexion 140 degrees or greater  -      LTG 3 Progress Not Met  -      LTG 4 AROM L Shld abduction 140 degrees or greater  -      LTG 4 Progress Not Met  -      LTG 5 AROM L Shoulder IR/ER 70 degrees at 90 degrees of abduction  -      LTG 5 Progress Not Met  -      LTG 6 L UE 4+/5 or greater.  -      LTG 6 Progress Not Met  -      LTG 7 Pt to be more aware of posture and postural correction technique.  -      LTG 7 Progress Not Met  -      LTG 8 I final HEP  -      LTG 8 Progress Not Met  -      LTG 9 D/C with free 30 day fitness formula membership.  -      LTG 9 Progress Not Met  -      Time Calculation    PT Goal Re-Cert Due Date 02/22/17  -        User Key  (r) = Recorded By, (t) = Taken By, (c) = Cosigned By    Initials Name Provider Type     Romelia Engle PTA Physical Therapy Assistant     Darren Ritchie PTA Physical Therapy Assistant                    Time Calculation:   Start Time: 1400  Stop Time: 1520  Time Calculation (min): 80 min    Therapy Charges for Today     Code Description Service Date Service Provider Modifiers Qty    16956731210 HC PT THER PROC EA 15 MIN 2/21/2017 Romelia Engle PTA GP 4    34751738948 HC PT THER SUPP EA 15 MIN 2/21/2017 Romelia Engle PTA GP 1                    Romelia Engle PTA  2/21/2017

## 2017-02-23 ENCOUNTER — HOSPITAL ENCOUNTER (OUTPATIENT)
Dept: PHYSICAL THERAPY | Facility: HOSPITAL | Age: 72
Setting detail: THERAPIES SERIES
Discharge: HOME | End: 2017-02-23

## 2017-02-23 DIAGNOSIS — G89.29 CHRONIC LEFT SHOULDER PAIN: ICD-10-CM

## 2017-02-23 DIAGNOSIS — M25.512 CHRONIC LEFT SHOULDER PAIN: ICD-10-CM

## 2017-02-23 DIAGNOSIS — M50.320 DEGENERATION OF INTERVERTEBRAL DISC OF MID-CERVICAL REGION, UNSPECIFIED SPINAL LEVEL: Primary | ICD-10-CM

## 2017-02-23 PROCEDURE — G8986 CARRY D/C STATUS: HCPCS | Performed by: PHYSICAL THERAPIST

## 2017-02-23 PROCEDURE — 97110 THERAPEUTIC EXERCISES: CPT | Performed by: PHYSICAL THERAPIST

## 2017-02-23 PROCEDURE — 97164 PT RE-EVAL EST PLAN CARE: CPT | Performed by: PHYSICAL THERAPIST

## 2017-02-23 PROCEDURE — G8985 CARRY GOAL STATUS: HCPCS | Performed by: PHYSICAL THERAPIST

## 2017-02-23 NOTE — THERAPY DISCHARGE NOTE
Outpatient Physical Therapy Ortho Re-evaluation/Discharge Summary  [facility]  Alejandra Cuevas, PT, DPT, CSCS       Patient Name: Ronda Blair  : 1945  MRN: [id number]  Today's Date: 2017      Visit Date: 2017     Pt reports 1/10 pain.  Reports 90-95% of improvement.  Attended 7/7 visits.  Insurance available: Medical necessity and medicare cap.  Next MD appt: 6 months.      Visit Dx:    ICD-10-CM ICD-9-CM   1. Degeneration of intervertebral disc of mid-cervical region, unspecified spinal level M50.320 722.4   2. Chronic left shoulder pain M25.512 719.41    G89.29 338.29       Patient Active Problem List   Diagnosis   • Degeneration of intervertebral disc of mid-cervical region   • Morbid obesity due to excess calories   • Former smoker        Past Medical History   Diagnosis Date   • Anxiety    • Arthritis    • COPD (chronic obstructive pulmonary disease)    • Depression    • Diabetes mellitus    • History of transfusion    • Hyperlipidemia    • Hypertension    • Stroke      Greater than 5 years ago        Past Surgical History   Procedure Laterality Date   • Cervical fusion     • Lumbar spine surgery     • Hemorrhoidectomy     • Hysterectomy     • Knee arthroscopy Right    • Pain pump insertion/revision       Removed in    • Intrathecal pump removal          • Eye surgery Bilateral      lasix eye surgery     Changes to medications: None noted.    Changes to MD orders: None noted.          PT Ortho       17 1300    Subjective Comments    Subjective Comments Just general muscle pain  -AJ    Precautions and Contraindications    Precautions possible fusion based on patient's reports of having a metal plate in her c-spine that is 3-4 inches long and 2 inches wide with screws  -AJ    Subjective Pain    Able to rate subjective pain? yes  -AJ    Pre-Treatment Pain Level 1  -AJ    Posture/Observations    Forward Head Moderate  -AJ     Cervical Lordosis Moderate;Increased  -AJ    Rounded Shoulders Bilateral:;Moderate;Increased  -AJ    Posture/Observations Comments Ambulates looking at the floor, mouth closed today, pale skin with excessive sweating.  -AJ    Sensation    Light Touch Partial deficits in the LUE   numbness in index finger.  -AJ    Sharp/Dull No apparent deficits  -AJ    Cervical/Thoracic Special Tests    Spurlings (Foraminal Compression) Bilateral:;Negative  -AJ    Cervical Compression (Forarminal Compression vs. Facet Pain) Bilateral:;Negative  -AJ    Cervical Distraction (Foraminal Compression vs. Facet Pain) Negative  -AJ    Transverse Ligament (Instability) Negative  -AJ    Vertebral Artery Test (VBI Sign) Negative  -AJ    Upper Level Neural Tension Tests    Median Neural Tension Test Bilateral:;Negative  -AJ    Radial Neural Tension Test Bilateral:;Negative  -AJ    Ulnar Neural Tension Test Bilateral:;Negative  -AJ    Shoulder Impingement/Rotator Cuff Special Tests    Donahue-Dale Test (RC Lesion vs. Bursitis) Bilateral:;Negative  -AJ    Neer Impingement Test (RC Lesion vs. Bursitis) Bilateral:;Negative  -AJ    Full Can Test (RC Lesion) Bilateral:;Negative  -AJ    Empty Can Test (RC Lesion) Bilateral:;Negative  -AJ    Drop Arm Test (Full Thickness RC Lesion) Bilateral:;Negative  -AJ    Internal Impingement Sign Bilateral:;Negative  -AJ    Lift-Off Test (Subscapularis Lesion) Bilateral:;Negative  -AJ    Belly Press (Subscapularis Lesion) Bilateral:;Negative  -AJ    Speed's Test (LH of Biceps Lesion) Bilateral:;Negative  -AJ    Yergason Test (LH of Biceps Lesion) Bilateral:;Negative  -AJ    Shoulder Laxity/Instability Special Tests    Sulcus Sign, 0 Degrees Bilateral:;Negative  -AJ    Sulcus Sign, 90 Degrees Bilateral:;Negative  -AJ    Anterior Apprehension/Relocation Test, at 90 Degrees Bilateral:;Negative  -AJ    Horizontal Adduction Test (AC Joint Pain) Bilateral:;Negative  -AJ    Biceps/Labral Special Tests    Wayland's  Test (Labral Test) Bilateral:;Negative  -    Speed's Test (Biceps Tendinopathy vs. Labral Tear) Bilateral:;Negative  -    Yergason's Test (Biceps Tendinopathy vs. Labral Tear) Bilateral:;Negative  -    Neck    Flexion AROM --   30°  -AJ    Extension AROM --   50°  -AJ    Left Lateral Flexion AROM --   28°  -AJ    Rt Lateral Flexion AROM Deficit --   25°  -AJ    Left Rotation AROM --   63°  -AJ    Right Rotation AROM --   40°  -    Left Shoulder    Flexion AROM --   152°  -AJ    ABduction AROM --   171°  -AJ    External Rotation AROM --   72° at 90° of shoulder abduction  -    Internal Rotation AROM --   70° at 90° of shoulder abduction  -    MMT (Manual Muscle Testing)    General MMT Assessment Detail B UE 5/5 no change in pain.  -    Gait Assessment/Treatment    Gait, Gait Deviations festinating;forward flexed posture  -      02/21/17 1400    Subjective Comments    Subjective Comments pt reports that she has a screw backing out from one of her surgeries on the spine. states that the MRI also showed that she has some spurs developing in her cervical spine. states that she just isn't feeling up to par right now.   -    Precautions and Contraindications    Precautions possible fusion based on patient's reports of having a metal plate in her c-spine that is 3-4 inches long and 2 inches wide with screws  -    Subjective Pain    Able to rate subjective pain? yes  -    Pre-Treatment Pain Level 1  -    Post-Treatment Pain Level 0  -    Posture/Observations    Posture/Observations Comments pt ambulates with Head extended, mouth open and eyes half closed and dragging feet. palor skin and excessive sweating.   -    Neck    Flexion AROM --   30°  -    Extension AROM --   25°  -    Left Lateral Flexion AROM --   15°  -    Rt Lateral Flexion AROM Deficit --   10°  -    Left Rotation AROM --   48°  -    Right Rotation AROM --   48°  -    Left Shoulder    Flexion AROM --   158°  -     ABduction AROM --   160°  -    External Rotation AROM --   80° @ 90° ABD  -    Internal Rotation AROM --   68° @ 90° ABD  -MH    MMT (Manual Muscle Testing)    General MMT Assessment Detail Bilateral UE 5/5 throughout   -      User Key  (r) = Recorded By, (t) = Taken By, (c) = Cosigned By    Initials Name Provider Type     Romelia Engle, PTA Physical Therapy Assistant    LEONARDO Cuevas, PT Physical Therapist         Barriers to Rehab: Include significant or possible significant arthritic or degenerative changes that have occurred within the joint/spine, The patient's generally deconditioned state.    Safety Issues: None noted.            PT Assessment/Plan       02/23/17 1300       PT Assessment    Functional Limitations Impaired gait;Limitations in community activities  -     Impairments Range of motion;Balance  -     Assessment Comments Rayne has met almost all goals and has functinal ROM and normal strength. Limitations at this time. Pt I with final HEP.  -     Rehab Potential Fair  -     Patient would benefit from skilled therapy intervention No  -     PT Plan    PT Frequency --   N/A  -     Predicted Duration of Therapy Intervention (days/wks) N/A  -     PT Plan Comments D/C today with final HEP and free 30 day fitness formula membership.  -       User Key  (r) = Recorded By, (t) = Taken By, (c) = Cosigned By    Initials Name Provider Type    LEONARDO Cuevas, PT Physical Therapist                   Modalities       02/23/17 1300          Ice    Patient denies application of Ice Yes  -        User Key  (r) = Recorded By, (t) = Taken By, (c) = Cosigned By    Initials Name Provider Type    LEONARDO Cuevas, PT Physical Therapist                Exercises       02/23/17 1300          Subjective Comments    Subjective Comments Just general muscle pain  -      Subjective Pain    Able to rate subjective pain? yes  -      Pre-Treatment Pain Level 1  -       Exercise 1    Exercise Name 1 Pro II UE F/R  -      Resistance 1 --   Level 4.0  -AJ      Time (Minutes) 1 10  -AJ      Exercise 2    Exercise Name 2 Pulley's 3-way  -AJ      Time (Minutes) 2 1 min ea  -AJ      Exercise 3    Exercise Name 3 B UT S  -AJ      Sets 3 2  -AJ      Time (Seconds) 3 30  -AJ      Exercise 4    Exercise Name 4 B LS S  -AJ      Reps 4 2  -AJ      Time (Seconds) 4 30  -AJ      Exercise 5    Exercise Name 5 Verbal review of HEP.  -        User Key  (r) = Recorded By, (t) = Taken By, (c) = Cosigned By    Initials Name Provider Type    AJ Alejandra Cuevas, PT Physical Therapist                               PT OP Goals       02/23/17 1300       PT Short Term Goals    STG Date to Achieve 02/22/17  -     STG 1 I in HEP and have additions/changes to HEP.  -     STG 1 Progress Met  -     STG 2 AROM cervical flex/ext to 40 degrees each  -     STG 2 Progress Partially Met  -     STG 2 Progress Comments extension met, not flexion  -     STG 3 AROM L Shoulder flexion/abduction to 90 degrees or greater  -     STG 3 Progress Met  -     STG 4 AROM L Shoulder IR/ER 55 degrees each at 90 degrees of abduction  -     STG 4 Progress Met  -     STG 5 L UE 4/5 or greater in available AROM  -     STG 5 Progress Met  -     Long Term Goals    LTG Date to Achieve 03/08/17  -     LTG 1 AROM cervical SB 25 degrees or greater B  -     LTG 1 Progress Met  -     LTG 2 AROM cervical ROT 65 degrees or greater B  [facility]     LTG 2 Progress Partially Met  -     LTG 2 Progress Comments L ROT met, not R  -     LTG 3 AROM L shoulder flexion 140 degrees or greater  -     LTG 3 Progress Met  -     LTG 4 AROM L Shld abduction 140 degrees or greater  -     LTG 4 Progress Met  -     LTG 5 AROM L Shoulder IR/ER 70 degrees at 90 degrees of abduction  -     LTG 5 Progress Met  -     LTG 6 L UE 4+/5 or greater.  -     LTG 6 Progress Met  -     LTG 7 Pt to be more aware of posture and  postural correction technique.  -     LTG 7 Progress Met  -     LTG 8 I final HEP  -     LTG 8 Progress Met  -     LTG 9 D/C with free 30 day fitness formula membership.  -     LTG 9 Progress Met  -       User Key  (r) = Recorded By, (t) = Taken By, (c) = Cosigned By    Initials Name Provider Type    LEONARDO Cuevas, PT Physical Therapist                Therapy Education       02/23/17 1300          Therapy Education    Given [facility]  -      Program Reinforced  -AJ      How Provided Verbal;Demonstration;Written  -AJ      Provided to Patient  -AJ      Level of Understanding Teach back education performed;Verbalized;Demonstrated  -AJ        User Key  (r) = Recorded By, (t) = Taken By, (c) = Cosigned By    Initials Name Provider Type    LEONARDO Cuevas PT Physical Therapist                Outcome Measures       02/23/17 1300          Quick DASH    Open a tight or new jar. 2  -AJ      Do heavy household chores (e.g., wash walls, wash floors) 4  -AJ      Carry a shopping bag or briefcase 2  -AJ      Wash your back 2  -AJ      Use a knife to cut food 1  -AJ      Recreational activities in which you take some force or impact through your arm, should or hand (e.g. golf, hammering, tennis, etc.) 2  -AJ      During the past week, to what extent has your arm, shoulder, or hand problem interfered with your normal social activites with family, friends, neighbors or groups? 1  -AJ      During the past week, were you limited in your work or other regular daily activities as a result of your arm, shoulder or hand problem? 2  -AJ      Arm, Shoulder, or hand pain 2  -AJ      Tingling (pins and needles) in your arm, shoulder, or hand 2  -AJ      During the past week, how much difficulty have you had sleeping because of the pain in your arm, shoulder or hand? 1  -AJ      Number of Questions Answered 11  -AJ      Quick DASH Score 22.73  -AJ      Neck Disability Index    Section 1 - Pain Intensity 1  -AJ       Section 2 - Personal Care 1  -AJ      Section 3 - Lifting 2  -AJ      Section 4 - Work 3  -AJ      Section 5 - Headaches 0  -AJ      Section 6 - Concentration 2  -AJ      Section 7 - Sleeping 0  -AJ      Section 8 - Driving 1  -AJ      Section 9 - Reading 2  -AJ      Section 10 - Recreation 1  -AJ      Neck Disability Index Score 13  -AJ      Neck Disability Index Comments 26%  -AJ      Functional Assessment    Outcome Measure Options Neck Disability Index (NDI);Quick DASH  -AJ        User Key  (r) = Recorded By, (t) = Taken By, (c) = Cosigned By    Initials Name Provider Type    AJ Alejandra Cuevas, PT Physical Therapist            Time Calculation:   Start Time: 1320 (Patient arrived late and then had to fill our surveys prior.)  Stop Time: 1352  Time Calculation (min): 32 min    Therapy Charges for Today     Code Description Service Date Service Provider Modifiers Qty    61498759171 HC PT CARRY MOV HAND OBJ PROJECTED 2/23/2017 Alejandra Cuevas, PT GP, CH 1    91096747249 HC PT CARRY MOV HAND OBJ DISCHARGE 2/23/2017 Alejandra Cuevas PT GP, CI 1    [id number] INACTIVE - HC PT RE-EVAL ESTABLISHED PLAN 1 2/23/2017 Alejandra Cuevas PT  1    28071358712 HC PT THER PROC EA 15 MIN 2/23/2017 Alejandra Cuevas PT GP 2    42695939830 HC PT THER SUPP EA 15 MIN 2/23/2017 Alejandra Cuevas PT GP 1          PT G-Codes  Outcome Measure Options: Neck Disability Index (NDI), Quick DASH  Functional Limitation: Carrying, moving and handling objects  Carrying, Moving and Handling Objects Goal Status (): 0 percent impaired, limited or restricted  Carrying, Moving and Handling Objects Discharge Status (): At least 1 percent but less than 20 percent impaired, limited or restricted     OP PT Discharge Summary  Reason for Discharge: All goals achieved  Outcomes Achieved: Able to achieve all goals within established timeline  Discharge Destination: Home with home program  Discharge Instructions: D/C  with free 30 day fitness formula membership.      Alejandra Cuevas, PT, DPT, CSCS  2/23/2017

## 2017-02-27 ENCOUNTER — APPOINTMENT (OUTPATIENT)
Dept: PHYSICAL THERAPY | Facility: HOSPITAL | Age: 72
End: 2017-02-27

## 2017-03-01 ENCOUNTER — APPOINTMENT (OUTPATIENT)
Dept: PHYSICAL THERAPY | Facility: HOSPITAL | Age: 72
End: 2017-03-01

## 2017-03-07 RX ORDER — OXYCODONE HYDROCHLORIDE 30 MG/1
30 TABLET, FILM COATED, EXTENDED RELEASE ORAL EVERY 12 HOURS
Qty: 60 EACH | Refills: 0 | Status: SHIPPED | OUTPATIENT
Start: 2017-03-21 | End: 2017-03-24 | Stop reason: SDUPTHER

## 2017-03-07 RX ORDER — OXYCODONE AND ACETAMINOPHEN 10; 325 MG/1; MG/1
1 TABLET ORAL 4 TIMES DAILY PRN
Qty: 120 TABLET | Refills: 0 | Status: SHIPPED | OUTPATIENT
Start: 2017-03-21 | End: 2017-03-24 | Stop reason: SDUPTHER

## 2017-03-24 ENCOUNTER — HOSPITAL ENCOUNTER (OUTPATIENT)
Dept: PAIN MANAGEMENT | Age: 72
Discharge: HOME | End: 2017-03-24
Payer: MEDICARE

## 2017-03-24 VITALS
HEIGHT: 65 IN | WEIGHT: 202 LBS | RESPIRATION RATE: 18 BRPM | OXYGEN SATURATION: 94 % | DIASTOLIC BLOOD PRESSURE: 67 MMHG | TEMPERATURE: 97.2 F | HEART RATE: 55 BPM | BODY MASS INDEX: 33.66 KG/M2 | SYSTOLIC BLOOD PRESSURE: 132 MMHG

## 2017-03-24 DIAGNOSIS — G89.29 CHRONIC PAIN OF RIGHT KNEE: ICD-10-CM

## 2017-03-24 DIAGNOSIS — M25.561 CHRONIC PAIN OF RIGHT KNEE: ICD-10-CM

## 2017-03-24 DIAGNOSIS — M79.605 LOW BACK PAIN RADIATING TO BOTH LEGS: ICD-10-CM

## 2017-03-24 DIAGNOSIS — M25.562 CHRONIC PAIN OF BOTH KNEES: ICD-10-CM

## 2017-03-24 DIAGNOSIS — M54.50 LOW BACK PAIN RADIATING TO BOTH LEGS: ICD-10-CM

## 2017-03-24 DIAGNOSIS — M54.9 UPPER BACK PAIN: ICD-10-CM

## 2017-03-24 DIAGNOSIS — M79.604 LOW BACK PAIN RADIATING TO BOTH LEGS: ICD-10-CM

## 2017-03-24 DIAGNOSIS — M51.16 LUMBAR DISC DISEASE WITH RADICULOPATHY: ICD-10-CM

## 2017-03-24 DIAGNOSIS — G89.29 CHRONIC PAIN OF BOTH KNEES: ICD-10-CM

## 2017-03-24 DIAGNOSIS — M25.561 CHRONIC PAIN OF BOTH KNEES: ICD-10-CM

## 2017-03-24 DIAGNOSIS — B02.21 NEURALGIA, GENICULATE: ICD-10-CM

## 2017-03-24 PROCEDURE — 6360000002 HC RX W HCPCS

## 2017-03-24 PROCEDURE — 64640 INJECTION TREATMENT OF NERVE: CPT

## 2017-03-24 PROCEDURE — 2500000003 HC RX 250 WO HCPCS

## 2017-03-24 PROCEDURE — 20610 DRAIN/INJ JOINT/BURSA W/O US: CPT

## 2017-03-24 RX ORDER — OXYCODONE HYDROCHLORIDE 30 MG/1
30 TABLET, FILM COATED, EXTENDED RELEASE ORAL EVERY 12 HOURS
Qty: 60 EACH | Refills: 0 | Status: SHIPPED | OUTPATIENT
Start: 2017-04-21 | End: 2017-06-13 | Stop reason: SDUPTHER

## 2017-03-24 RX ORDER — OXYCODONE AND ACETAMINOPHEN 10; 325 MG/1; MG/1
1 TABLET ORAL 4 TIMES DAILY PRN
Qty: 120 TABLET | Refills: 0 | Status: SHIPPED | OUTPATIENT
Start: 2017-04-21 | End: 2017-05-05 | Stop reason: SDUPTHER

## 2017-03-24 RX ORDER — TRIAMCINOLONE ACETONIDE 40 MG/ML
INJECTION, SUSPENSION INTRA-ARTICULAR; INTRAMUSCULAR
Status: COMPLETED | OUTPATIENT
Start: 2017-03-24 | End: 2017-03-24

## 2017-03-24 RX ORDER — LIDOCAINE HYDROCHLORIDE 10 MG/ML
INJECTION, SOLUTION EPIDURAL; INFILTRATION; INTRACAUDAL; PERINEURAL
Status: COMPLETED | OUTPATIENT
Start: 2017-03-24 | End: 2017-03-24

## 2017-03-24 RX ORDER — MECLIZINE HYDROCHLORIDE 25 MG/1
TABLET ORAL
COMMUNITY
Start: 2017-03-20

## 2017-03-24 RX ORDER — BUPIVACAINE HYDROCHLORIDE 5 MG/ML
INJECTION, SOLUTION EPIDURAL; INTRACAUDAL
Status: COMPLETED | OUTPATIENT
Start: 2017-03-24 | End: 2017-03-24

## 2017-03-24 RX ADMIN — TRIAMCINOLONE ACETONIDE 40 MG: 40 INJECTION, SUSPENSION INTRA-ARTICULAR; INTRAMUSCULAR at 14:05

## 2017-03-24 RX ADMIN — BUPIVACAINE HYDROCHLORIDE 9 ML: 5 INJECTION, SOLUTION EPIDURAL; INTRACAUDAL at 14:04

## 2017-03-24 ASSESSMENT — PAIN DESCRIPTION - LOCATION: LOCATION: KNEE

## 2017-03-24 ASSESSMENT — PAIN DESCRIPTION - DESCRIPTORS: DESCRIPTORS: ACHING;CONSTANT;THROBBING

## 2017-03-24 ASSESSMENT — PAIN SCALES - GENERAL: PAINLEVEL_OUTOF10: 4

## 2017-03-24 ASSESSMENT — PAIN DESCRIPTION - ORIENTATION: ORIENTATION: RIGHT;LEFT

## 2017-03-24 ASSESSMENT — PAIN DESCRIPTION - FREQUENCY: FREQUENCY: CONTINUOUS

## 2017-03-24 ASSESSMENT — PAIN DESCRIPTION - DIRECTION: RADIATING_TOWARDS: BILATERAL KNEE PAIN.

## 2017-03-24 ASSESSMENT — PAIN DESCRIPTION - PAIN TYPE: TYPE: CHRONIC PAIN

## 2017-03-24 ASSESSMENT — PAIN DESCRIPTION - PROGRESSION: CLINICAL_PROGRESSION: NOT CHANGED

## 2017-03-24 ASSESSMENT — PAIN DESCRIPTION - ONSET: ONSET: ON-GOING

## 2017-05-05 ENCOUNTER — HOSPITAL ENCOUNTER (OUTPATIENT)
Dept: PAIN MANAGEMENT | Age: 72
Discharge: HOME | End: 2017-05-05
Payer: MEDICARE

## 2017-05-05 VITALS
HEART RATE: 50 BPM | SYSTOLIC BLOOD PRESSURE: 150 MMHG | HEIGHT: 65 IN | TEMPERATURE: 96 F | BODY MASS INDEX: 34.32 KG/M2 | DIASTOLIC BLOOD PRESSURE: 72 MMHG | WEIGHT: 206 LBS | RESPIRATION RATE: 18 BRPM | OXYGEN SATURATION: 94 %

## 2017-05-05 DIAGNOSIS — M79.605 LOW BACK PAIN RADIATING TO BOTH LEGS: ICD-10-CM

## 2017-05-05 DIAGNOSIS — G89.29 CHRONIC PAIN OF RIGHT KNEE: ICD-10-CM

## 2017-05-05 DIAGNOSIS — M25.561 CHRONIC PAIN OF BOTH KNEES: ICD-10-CM

## 2017-05-05 DIAGNOSIS — M51.16 LUMBAR DISC DISEASE WITH RADICULOPATHY: ICD-10-CM

## 2017-05-05 DIAGNOSIS — M25.562 CHRONIC PAIN OF BOTH KNEES: ICD-10-CM

## 2017-05-05 DIAGNOSIS — M79.604 LOW BACK PAIN RADIATING TO BOTH LEGS: ICD-10-CM

## 2017-05-05 DIAGNOSIS — B02.21 NEURALGIA, GENICULATE: ICD-10-CM

## 2017-05-05 DIAGNOSIS — M54.9 UPPER BACK PAIN: ICD-10-CM

## 2017-05-05 DIAGNOSIS — G89.29 CHRONIC PAIN OF BOTH KNEES: ICD-10-CM

## 2017-05-05 DIAGNOSIS — M25.561 CHRONIC PAIN OF RIGHT KNEE: ICD-10-CM

## 2017-05-05 DIAGNOSIS — M51.36 LUMBAR DEGENERATIVE DISC DISEASE: ICD-10-CM

## 2017-05-05 DIAGNOSIS — M54.50 LOW BACK PAIN RADIATING TO BOTH LEGS: ICD-10-CM

## 2017-05-05 PROCEDURE — 3209999900 FLUORO FOR SURGICAL PROCEDURES

## 2017-05-05 PROCEDURE — 2500000003 HC RX 250 WO HCPCS

## 2017-05-05 PROCEDURE — 6360000002 HC RX W HCPCS

## 2017-05-05 PROCEDURE — 2580000003 HC RX 258

## 2017-05-05 PROCEDURE — 62323 NJX INTERLAMINAR LMBR/SAC: CPT

## 2017-05-05 RX ORDER — 0.9 % SODIUM CHLORIDE 0.9 %
VIAL (ML) INJECTION
Status: COMPLETED | OUTPATIENT
Start: 2017-05-05 | End: 2017-05-05

## 2017-05-05 RX ORDER — LIDOCAINE HYDROCHLORIDE 10 MG/ML
INJECTION, SOLUTION EPIDURAL; INFILTRATION; INTRACAUDAL; PERINEURAL
Status: COMPLETED | OUTPATIENT
Start: 2017-05-05 | End: 2017-05-05

## 2017-05-05 RX ORDER — CITALOPRAM 20 MG/1
TABLET ORAL
COMMUNITY
Start: 2017-04-06 | End: 2018-06-27 | Stop reason: DRUGHIGH

## 2017-05-05 RX ORDER — METHYLPREDNISOLONE ACETATE 80 MG/ML
INJECTION, SUSPENSION INTRA-ARTICULAR; INTRALESIONAL; INTRAMUSCULAR; SOFT TISSUE
Status: COMPLETED | OUTPATIENT
Start: 2017-05-05 | End: 2017-05-05

## 2017-05-05 RX ORDER — OXYCODONE AND ACETAMINOPHEN 10; 325 MG/1; MG/1
1 TABLET ORAL 4 TIMES DAILY PRN
Qty: 120 TABLET | Refills: 0 | Status: SHIPPED | OUTPATIENT
Start: 2017-05-20 | End: 2017-06-13 | Stop reason: SDUPTHER

## 2017-05-05 RX ORDER — BUPIVACAINE HYDROCHLORIDE 2.5 MG/ML
INJECTION, SOLUTION EPIDURAL; INFILTRATION; INTRACAUDAL
Status: COMPLETED | OUTPATIENT
Start: 2017-05-05 | End: 2017-05-05

## 2017-05-05 RX ADMIN — LIDOCAINE HYDROCHLORIDE 3 ML: 10 INJECTION, SOLUTION EPIDURAL; INFILTRATION; INTRACAUDAL; PERINEURAL at 14:28

## 2017-05-05 RX ADMIN — BUPIVACAINE HYDROCHLORIDE 2.5 ML: 2.5 INJECTION, SOLUTION EPIDURAL; INFILTRATION; INTRACAUDAL at 14:29

## 2017-05-05 RX ADMIN — METHYLPREDNISOLONE ACETATE 80 MG: 80 INJECTION, SUSPENSION INTRA-ARTICULAR; INTRALESIONAL; INTRAMUSCULAR; SOFT TISSUE at 14:30

## 2017-05-05 RX ADMIN — Medication 1.5 ML: at 14:30

## 2017-05-05 ASSESSMENT — PAIN DESCRIPTION - PROGRESSION: CLINICAL_PROGRESSION: NOT CHANGED

## 2017-05-05 ASSESSMENT — PAIN DESCRIPTION - DESCRIPTORS: DESCRIPTORS: ACHING;CONSTANT;THROBBING

## 2017-05-05 ASSESSMENT — PAIN DESCRIPTION - DIRECTION: RADIATING_TOWARDS: LOWER BACK PAIN

## 2017-05-05 ASSESSMENT — PAIN SCALES - GENERAL: PAINLEVEL_OUTOF10: 3

## 2017-05-05 ASSESSMENT — PAIN DESCRIPTION - LOCATION: LOCATION: BACK

## 2017-05-05 ASSESSMENT — PAIN DESCRIPTION - PAIN TYPE: TYPE: CHRONIC PAIN

## 2017-05-05 ASSESSMENT — PAIN DESCRIPTION - ORIENTATION: ORIENTATION: LOWER

## 2017-05-05 ASSESSMENT — PAIN DESCRIPTION - ONSET: ONSET: ON-GOING

## 2017-05-05 ASSESSMENT — PAIN DESCRIPTION - FREQUENCY: FREQUENCY: CONTINUOUS

## 2017-06-14 RX ORDER — OXYCODONE AND ACETAMINOPHEN 10; 325 MG/1; MG/1
1 TABLET ORAL 4 TIMES DAILY PRN
Qty: 120 TABLET | Refills: 0 | Status: SHIPPED | OUTPATIENT
Start: 2017-06-20 | End: 2017-07-11 | Stop reason: SDUPTHER

## 2017-06-14 RX ORDER — OXYCODONE HYDROCHLORIDE 30 MG/1
30 TABLET, FILM COATED, EXTENDED RELEASE ORAL EVERY 12 HOURS
Qty: 60 EACH | Refills: 0 | Status: SHIPPED | OUTPATIENT
Start: 2017-06-19 | End: 2017-07-11 | Stop reason: SDUPTHER

## 2017-06-20 ENCOUNTER — HOSPITAL ENCOUNTER (OUTPATIENT)
Dept: GENERAL RADIOLOGY | Age: 72
Discharge: HOME | End: 2017-06-20
Payer: MEDICARE

## 2017-06-20 ENCOUNTER — HOSPITAL ENCOUNTER (OUTPATIENT)
Dept: PAIN MANAGEMENT | Age: 72
Discharge: HOME | End: 2017-06-20
Payer: MEDICARE

## 2017-06-20 VITALS
BODY MASS INDEX: 34.16 KG/M2 | SYSTOLIC BLOOD PRESSURE: 133 MMHG | HEIGHT: 65 IN | RESPIRATION RATE: 18 BRPM | OXYGEN SATURATION: 94 % | DIASTOLIC BLOOD PRESSURE: 75 MMHG | HEART RATE: 52 BPM | WEIGHT: 205 LBS | TEMPERATURE: 97.7 F

## 2017-06-20 DIAGNOSIS — M79.604 LOW BACK PAIN RADIATING TO BOTH LEGS: ICD-10-CM

## 2017-06-20 DIAGNOSIS — B02.21 NEURALGIA, GENICULATE: ICD-10-CM

## 2017-06-20 DIAGNOSIS — M25.562 CHRONIC PAIN OF BOTH KNEES: ICD-10-CM

## 2017-06-20 DIAGNOSIS — M25.561 CHRONIC PAIN OF BOTH KNEES: ICD-10-CM

## 2017-06-20 DIAGNOSIS — G89.29 CHRONIC PAIN OF BOTH KNEES: ICD-10-CM

## 2017-06-20 DIAGNOSIS — M51.16 LUMBAR DISC DISEASE WITH RADICULOPATHY: ICD-10-CM

## 2017-06-20 DIAGNOSIS — M25.561 CHRONIC PAIN OF RIGHT KNEE: ICD-10-CM

## 2017-06-20 DIAGNOSIS — M54.50 LOW BACK PAIN RADIATING TO BOTH LEGS: ICD-10-CM

## 2017-06-20 DIAGNOSIS — G89.29 CHRONIC PAIN OF RIGHT KNEE: ICD-10-CM

## 2017-06-20 DIAGNOSIS — M54.9 UPPER BACK PAIN: ICD-10-CM

## 2017-06-20 DIAGNOSIS — M79.605 LOW BACK PAIN RADIATING TO BOTH LEGS: ICD-10-CM

## 2017-06-20 PROCEDURE — 73560 X-RAY EXAM OF KNEE 1 OR 2: CPT

## 2017-06-20 PROCEDURE — 99214 OFFICE O/P EST MOD 30 MIN: CPT

## 2017-06-20 PROCEDURE — 72120 X-RAY BEND ONLY L-S SPINE: CPT

## 2017-06-20 PROCEDURE — G0463 HOSPITAL OUTPT CLINIC VISIT: HCPCS

## 2017-06-20 ASSESSMENT — ACTIVITIES OF DAILY LIVING (ADL): EFFECT OF PAIN ON DAILY ACTIVITIES: LIMITS ACTIVITY

## 2017-06-20 ASSESSMENT — PAIN DESCRIPTION - ORIENTATION: ORIENTATION: RIGHT;LEFT

## 2017-06-20 ASSESSMENT — PAIN DESCRIPTION - FREQUENCY: FREQUENCY: CONTINUOUS

## 2017-06-20 ASSESSMENT — PAIN DESCRIPTION - PROGRESSION: CLINICAL_PROGRESSION: NOT CHANGED

## 2017-06-20 ASSESSMENT — PAIN DESCRIPTION - DIRECTION: RADIATING_TOWARDS: INTO LEFT LEG

## 2017-06-20 ASSESSMENT — PAIN DESCRIPTION - ONSET: ONSET: ON-GOING

## 2017-06-20 ASSESSMENT — PAIN DESCRIPTION - DESCRIPTORS: DESCRIPTORS: ACHING;CONSTANT;THROBBING

## 2017-06-20 ASSESSMENT — PAIN DESCRIPTION - LOCATION: LOCATION: BACK;NECK;KNEE

## 2017-06-20 ASSESSMENT — PAIN SCALES - GENERAL: PAINLEVEL_OUTOF10: 6

## 2017-06-20 ASSESSMENT — PAIN DESCRIPTION - PAIN TYPE: TYPE: CHRONIC PAIN

## 2017-07-12 RX ORDER — OXYCODONE AND ACETAMINOPHEN 10; 325 MG/1; MG/1
1 TABLET ORAL 4 TIMES DAILY PRN
Qty: 120 TABLET | Refills: 0 | Status: SHIPPED | OUTPATIENT
Start: 2017-07-20 | End: 2017-07-21 | Stop reason: SDUPTHER

## 2017-07-12 RX ORDER — OXYCODONE HYDROCHLORIDE 30 MG/1
30 TABLET, FILM COATED, EXTENDED RELEASE ORAL EVERY 12 HOURS
Qty: 60 EACH | Refills: 0 | Status: SHIPPED | OUTPATIENT
Start: 2017-07-19 | End: 2017-07-21 | Stop reason: SDUPTHER

## 2017-07-21 ENCOUNTER — HOSPITAL ENCOUNTER (OUTPATIENT)
Dept: PAIN MANAGEMENT | Age: 72
Discharge: HOME | End: 2017-07-21
Payer: MEDICARE

## 2017-07-21 VITALS
RESPIRATION RATE: 16 BRPM | OXYGEN SATURATION: 96 % | WEIGHT: 204 LBS | HEIGHT: 64 IN | DIASTOLIC BLOOD PRESSURE: 50 MMHG | TEMPERATURE: 96.8 F | SYSTOLIC BLOOD PRESSURE: 124 MMHG | BODY MASS INDEX: 34.83 KG/M2 | HEART RATE: 50 BPM

## 2017-07-21 DIAGNOSIS — B02.21 NEURALGIA, GENICULATE: Chronic | ICD-10-CM

## 2017-07-21 PROCEDURE — 80307 DRUG TEST PRSMV CHEM ANLYZR: CPT

## 2017-07-21 PROCEDURE — 20610 DRAIN/INJ JOINT/BURSA W/O US: CPT

## 2017-07-21 PROCEDURE — 6360000002 HC RX W HCPCS

## 2017-07-21 PROCEDURE — 64640 INJECTION TREATMENT OF NERVE: CPT

## 2017-07-21 PROCEDURE — 2500000003 HC RX 250 WO HCPCS

## 2017-07-21 ASSESSMENT — PAIN DESCRIPTION - LOCATION: LOCATION: KNEE

## 2017-07-21 ASSESSMENT — PAIN DESCRIPTION - DESCRIPTORS: DESCRIPTORS: CONSTANT

## 2017-07-21 ASSESSMENT — PAIN SCALES - GENERAL: PAINLEVEL_OUTOF10: 3

## 2017-07-21 ASSESSMENT — PAIN DESCRIPTION - PROGRESSION: CLINICAL_PROGRESSION: NOT CHANGED

## 2017-07-21 ASSESSMENT — PAIN DESCRIPTION - FREQUENCY: FREQUENCY: CONTINUOUS

## 2017-07-21 ASSESSMENT — ACTIVITIES OF DAILY LIVING (ADL): EFFECT OF PAIN ON DAILY ACTIVITIES: DAILY CHORES AND ACTIVITIES

## 2017-07-21 ASSESSMENT — PAIN DESCRIPTION - PAIN TYPE: TYPE: CHRONIC PAIN

## 2017-07-26 LAB
ALPRAZOLAM, URINE CONFIRM: 230 NG/ML
ALPRAZOLAM, URINE: POSITIVE
AMPHETAMINES, URINE: NEGATIVE NG/ML
BARBITURATES, URINE: NEGATIVE NG/ML
BENZODIAZEPINES, URINE: ABNORMAL NG/ML
BENZODIAZEPINES, URINE: POSITIVE NG/ML
CANNABINOIDS, URINE: NEGATIVE NG/ML
CLONAZEPAM, URINE: NEGATIVE
COCAINE METABOLITE, URINE: NEGATIVE NG/ML
CREATININE, URINE: 37.5 MG/DL (ref 20–300)
ETHANOL U, QUAN: NEGATIVE %
FENTANYL URINE: NEGATIVE PG/ML
FLURAZEPAM, UR: NEGATIVE
LORAZEPAM, URINE: NEGATIVE
MEPERIDINE, UR: NEGATIVE NG/ML
METHADONE SCREEN, URINE: NEGATIVE NG/ML
MIDAZOLAM, URINE: NEGATIVE
NORDIAZEPAM, URINE: NEGATIVE
OPIATES, URINE: ABNORMAL NG/ML
OPIATES, URINE: NEGATIVE NG/ML
OXAZEPAM, URINE: NEGATIVE
OXYCODONE URINE: POSITIVE
OXYCODONE, UR GC/MS: 1660 NG/ML
OXYCODONE/OXYMORPH, UR: POSITIVE
OXYCODONE/OXYMORPHONE, UR: ABNORMAL NG/ML
OXYMORPHONE, UR GC/MS: 1920 NG/ML
OXYMORPHONE, URINE: POSITIVE
PH, URINE: 5.3 (ref 4.5–8.9)
PHENCYCLIDINE, URINE: NEGATIVE NG/ML
PROPOXYPHENE, URINE: NEGATIVE NG/ML
TEMAZEPAM, URINE: NEGATIVE
TRIAZOLAM, URINE: NEGATIVE

## 2017-08-11 RX ORDER — OXYCODONE AND ACETAMINOPHEN 10; 325 MG/1; MG/1
1 TABLET ORAL 4 TIMES DAILY PRN
Qty: 120 TABLET | Refills: 0 | Status: SHIPPED | OUTPATIENT
Start: 2017-08-20 | End: 2017-09-11 | Stop reason: SDUPTHER

## 2017-08-11 RX ORDER — OXYCODONE HYDROCHLORIDE 30 MG/1
30 TABLET, FILM COATED, EXTENDED RELEASE ORAL EVERY 12 HOURS
Qty: 60 EACH | Refills: 0 | Status: SHIPPED | OUTPATIENT
Start: 2017-08-20 | End: 2017-09-11 | Stop reason: SDUPTHER

## 2017-08-28 ENCOUNTER — OFFICE VISIT (OUTPATIENT)
Dept: NEUROSURGERY | Facility: CLINIC | Age: 72
End: 2017-08-28

## 2017-08-28 VITALS
DIASTOLIC BLOOD PRESSURE: 62 MMHG | SYSTOLIC BLOOD PRESSURE: 120 MMHG | HEIGHT: 65 IN | WEIGHT: 204 LBS | BODY MASS INDEX: 33.99 KG/M2

## 2017-08-28 DIAGNOSIS — E66.01 MORBID OBESITY DUE TO EXCESS CALORIES (HCC): ICD-10-CM

## 2017-08-28 DIAGNOSIS — Z87.891 FORMER SMOKER: ICD-10-CM

## 2017-08-28 DIAGNOSIS — M50.322 DEGENERATION OF INTERVERTEBRAL DISC AT C5-C6 LEVEL: Primary | ICD-10-CM

## 2017-08-28 PROCEDURE — 99212 OFFICE O/P EST SF 10 MIN: CPT | Performed by: NEUROLOGICAL SURGERY

## 2017-08-28 RX ORDER — SWAB
SWAB, NON-MEDICATED MISCELLANEOUS
COMMUNITY
End: 2020-07-30 | Stop reason: SDDI

## 2017-08-28 RX ORDER — LISINOPRIL 2.5 MG/1
TABLET ORAL
COMMUNITY
Start: 2017-08-08 | End: 2020-07-23 | Stop reason: HOSPADM

## 2017-08-28 RX ORDER — MECLIZINE HYDROCHLORIDE 25 MG/1
TABLET ORAL
COMMUNITY
Start: 2017-03-20 | End: 2020-07-23 | Stop reason: HOSPADM

## 2017-08-28 RX ORDER — AMOXICILLIN 500 MG
1200 CAPSULE ORAL
COMMUNITY

## 2017-08-28 RX ORDER — UBIDECARENONE 75 MG
CAPSULE ORAL DAILY
COMMUNITY
End: 2020-07-30 | Stop reason: SDDI

## 2017-08-28 RX ORDER — ALBUTEROL SULFATE 90 UG/1
AEROSOL, METERED RESPIRATORY (INHALATION)
COMMUNITY
End: 2020-11-05 | Stop reason: SDUPTHER

## 2017-08-28 RX ORDER — ASPIRIN AND DIPYRIDAMOLE 25; 200 MG/1; MG/1
CAPSULE, EXTENDED RELEASE ORAL
COMMUNITY
Start: 2017-07-06 | End: 2020-08-31 | Stop reason: SDUPTHER

## 2017-08-28 RX ORDER — NEBIVOLOL 20 MG/1
TABLET ORAL
COMMUNITY
Start: 2016-10-04 | End: 2020-07-23 | Stop reason: HOSPADM

## 2017-08-28 NOTE — PROGRESS NOTES
Patient ID: Ronda Blair is a 72 y.o. female here today for []follow-up for chronic neck and low back pain    HPI:  []She is 72-year-old  female.  She does not describe any radicular pain or symptoms with regards to her neck or low back.  She had previous anterior cervical fusion C5 6 7 and lumbar disc surgery by Dr. Guerin in the past.  Her last MRI was reviewed.  There is a great deal of artifact from the plate and screws.  She is in pain clinic with Dr. Salmon and he has performed some injections across the cervical thoracic area.  She complains of discoloration and coldness of the legs and feet and has been told she has vascular disease.  She is to have an appointment to see a peripheral vascular surgeon near her home    The following portions of the patient's history were reviewed and updated as appropriate: allergies, current medications, past family history, past medical history, past social history, past surgical history and problem list.    Review of Systems   Constitutional: Negative.    HENT: Negative.    Eyes: Negative.    Respiratory: Positive for shortness of breath and wheezing.    Cardiovascular: Positive for chest pain.   Gastrointestinal: Negative.         GERD   Endocrine: Negative.    Genitourinary: Negative.    Musculoskeletal: Positive for arthralgias, back pain and neck pain.   Skin: Negative.    Allergic/Immunologic: Negative.    Neurological: Negative.         History of TIAs.  She is on Aggrenox chronically   Hematological: Bruises/bleeds easily.   Psychiatric/Behavioral: Negative.         Anxiety and depression   All other systems reviewed and are negative.      Past Medical History:   Diagnosis Date   • Anxiety    • Arthritis    • COPD (chronic obstructive pulmonary disease)    • Depression    • Diabetes mellitus    • History of transfusion    • Hyperlipidemia    • Hypertension    • Stroke     Greater than 5 years ago       No family history on file.    Social  History   Substance Use Topics   • Smoking status: Former Smoker   • Smokeless tobacco: Never Used   • Alcohol use No       Past Surgical History:   Procedure Laterality Date   • CERVICAL FUSION  1999   • EYE SURGERY Bilateral 2014    lasix eye surgery   • HEMORRHOIDECTOMY     • HYSTERECTOMY     • INTRATHECAL PUMP REMOVAL      2006   • KNEE ARTHROSCOPY Right 2015   • LUMBAR SPINE SURGERY  1999   • PAIN PUMP INSERTION/REVISION      Removed in 2006       Aspirin      Physical Exam:  []Awake alert oriented properly.  Decreased breath sounds with expiratory wheezes bilateral.  Heart tones distant but regular.  No definite bruit.  Abdomen obese.  Some mild swelling and discoloration of both feet and ankles.  Her equal strength upper extremities areflexia.  No major sensory complaints.  Cranial nerves intact.    Review of Radiographic Studies:  []Previous MRI of cervical spine with multiple abnormalities artifactual from the plate and screws    Medical Decision Making:  []She does not have symptoms currently that would warrant any surgical intervention.  Should she then myelogram would be required.  He has significant cerebrovascular and peripheral vascular disease.  She may be seen as necessary    No diagnosis found.  Cervical disc degeneration and spondylosis,   lumbar disc degeneration    There are no diagnoses linked to this encounter.    No Follow-up on file.  As necessary

## 2017-09-12 RX ORDER — OXYCODONE HYDROCHLORIDE 30 MG/1
30 TABLET, FILM COATED, EXTENDED RELEASE ORAL EVERY 12 HOURS
Qty: 60 EACH | Refills: 0 | Status: SHIPPED | OUTPATIENT
Start: 2017-09-19 | End: 2017-09-22 | Stop reason: SDUPTHER

## 2017-09-12 RX ORDER — OXYCODONE AND ACETAMINOPHEN 10; 325 MG/1; MG/1
1 TABLET ORAL 4 TIMES DAILY PRN
Qty: 120 TABLET | Refills: 0 | Status: SHIPPED | OUTPATIENT
Start: 2017-09-19 | End: 2017-09-22 | Stop reason: SDUPTHER

## 2017-09-22 ENCOUNTER — HOSPITAL ENCOUNTER (OUTPATIENT)
Dept: PAIN MANAGEMENT | Age: 72
Discharge: HOME | End: 2017-09-22
Payer: MEDICARE

## 2017-09-22 VITALS
DIASTOLIC BLOOD PRESSURE: 52 MMHG | HEIGHT: 65 IN | RESPIRATION RATE: 16 BRPM | HEART RATE: 52 BPM | WEIGHT: 205 LBS | SYSTOLIC BLOOD PRESSURE: 105 MMHG | TEMPERATURE: 98.5 F | OXYGEN SATURATION: 95 % | BODY MASS INDEX: 34.16 KG/M2

## 2017-09-22 DIAGNOSIS — B02.21 NEURALGIA, GENICULATE: ICD-10-CM

## 2017-09-22 DIAGNOSIS — M51.16 LUMBAR DISC DISEASE WITH RADICULOPATHY: ICD-10-CM

## 2017-09-22 DIAGNOSIS — M25.561 CHRONIC PAIN OF RIGHT KNEE: ICD-10-CM

## 2017-09-22 DIAGNOSIS — M79.604 LOW BACK PAIN RADIATING TO BOTH LEGS: ICD-10-CM

## 2017-09-22 DIAGNOSIS — M79.605 LOW BACK PAIN RADIATING TO BOTH LEGS: ICD-10-CM

## 2017-09-22 DIAGNOSIS — G89.29 CHRONIC PAIN OF RIGHT KNEE: ICD-10-CM

## 2017-09-22 DIAGNOSIS — M25.561 CHRONIC PAIN OF BOTH KNEES: ICD-10-CM

## 2017-09-22 DIAGNOSIS — M25.562 CHRONIC PAIN OF BOTH KNEES: ICD-10-CM

## 2017-09-22 DIAGNOSIS — G89.29 CHRONIC PAIN OF BOTH KNEES: ICD-10-CM

## 2017-09-22 DIAGNOSIS — M54.9 UPPER BACK PAIN: ICD-10-CM

## 2017-09-22 DIAGNOSIS — M51.36 LUMBAR DEGENERATIVE DISC DISEASE: ICD-10-CM

## 2017-09-22 DIAGNOSIS — M54.50 LOW BACK PAIN RADIATING TO BOTH LEGS: ICD-10-CM

## 2017-09-22 PROCEDURE — 3209999900 FLUORO FOR SURGICAL PROCEDURES

## 2017-09-22 PROCEDURE — 6360000002 HC RX W HCPCS

## 2017-09-22 PROCEDURE — 62323 NJX INTERLAMINAR LMBR/SAC: CPT

## 2017-09-22 PROCEDURE — 2580000003 HC RX 258

## 2017-09-22 PROCEDURE — 2500000003 HC RX 250 WO HCPCS

## 2017-09-22 RX ORDER — LIDOCAINE HYDROCHLORIDE 10 MG/ML
INJECTION, SOLUTION EPIDURAL; INFILTRATION; INTRACAUDAL; PERINEURAL
Status: COMPLETED | OUTPATIENT
Start: 2017-09-22 | End: 2017-09-22

## 2017-09-22 RX ORDER — OXYCODONE HYDROCHLORIDE 30 MG/1
30 TABLET, FILM COATED, EXTENDED RELEASE ORAL EVERY 12 HOURS
Qty: 60 EACH | Refills: 0 | Status: SHIPPED | OUTPATIENT
Start: 2017-10-19 | End: 2017-11-06 | Stop reason: SDUPTHER

## 2017-09-22 RX ORDER — METHYLPREDNISOLONE ACETATE 80 MG/ML
INJECTION, SUSPENSION INTRA-ARTICULAR; INTRALESIONAL; INTRAMUSCULAR; SOFT TISSUE
Status: COMPLETED | OUTPATIENT
Start: 2017-09-22 | End: 2017-09-22

## 2017-09-22 RX ORDER — OXYCODONE AND ACETAMINOPHEN 10; 325 MG/1; MG/1
1 TABLET ORAL 4 TIMES DAILY PRN
Qty: 120 TABLET | Refills: 0 | Status: SHIPPED | OUTPATIENT
Start: 2017-10-19 | End: 2017-11-06 | Stop reason: SDUPTHER

## 2017-09-22 RX ORDER — 0.9 % SODIUM CHLORIDE 0.9 %
VIAL (ML) INJECTION
Status: COMPLETED | OUTPATIENT
Start: 2017-09-22 | End: 2017-09-22

## 2017-09-22 RX ORDER — BUPIVACAINE HYDROCHLORIDE 2.5 MG/ML
INJECTION, SOLUTION EPIDURAL; INFILTRATION; INTRACAUDAL
Status: COMPLETED | OUTPATIENT
Start: 2017-09-22 | End: 2017-09-22

## 2017-09-22 RX ADMIN — Medication 1.5 ML: at 12:57

## 2017-09-22 RX ADMIN — LIDOCAINE HYDROCHLORIDE 3 ML: 10 INJECTION, SOLUTION EPIDURAL; INFILTRATION; INTRACAUDAL; PERINEURAL at 12:57

## 2017-09-22 RX ADMIN — METHYLPREDNISOLONE ACETATE 80 MG: 80 INJECTION, SUSPENSION INTRA-ARTICULAR; INTRALESIONAL; INTRAMUSCULAR; SOFT TISSUE at 12:59

## 2017-09-22 RX ADMIN — BUPIVACAINE HYDROCHLORIDE 2.5 ML: 2.5 INJECTION, SOLUTION EPIDURAL; INFILTRATION; INTRACAUDAL at 12:57

## 2017-09-22 ASSESSMENT — PAIN - FUNCTIONAL ASSESSMENT
PAIN_FUNCTIONAL_ASSESSMENT: 0-10
PAIN_FUNCTIONAL_ASSESSMENT: 0-10

## 2017-09-22 ASSESSMENT — ACTIVITIES OF DAILY LIVING (ADL): EFFECT OF PAIN ON DAILY ACTIVITIES: DAILY CHORES AND ACTIVITIES

## 2017-11-07 RX ORDER — OXYCODONE AND ACETAMINOPHEN 10; 325 MG/1; MG/1
1 TABLET ORAL 4 TIMES DAILY PRN
Qty: 120 TABLET | Refills: 0 | Status: SHIPPED | OUTPATIENT
Start: 2017-11-18 | End: 2017-12-05 | Stop reason: SDUPTHER

## 2017-11-07 RX ORDER — OXYCODONE HYDROCHLORIDE 30 MG/1
30 TABLET, FILM COATED, EXTENDED RELEASE ORAL EVERY 12 HOURS
Qty: 60 EACH | Refills: 0 | Status: SHIPPED | OUTPATIENT
Start: 2017-11-18 | End: 2017-12-05 | Stop reason: SDUPTHER

## 2017-11-10 ENCOUNTER — HOSPITAL ENCOUNTER (OUTPATIENT)
Dept: PAIN MANAGEMENT | Age: 72
Discharge: HOME | End: 2017-11-10
Payer: MEDICARE

## 2017-11-10 VITALS
HEART RATE: 76 BPM | DIASTOLIC BLOOD PRESSURE: 57 MMHG | RESPIRATION RATE: 18 BRPM | TEMPERATURE: 97.1 F | OXYGEN SATURATION: 92 % | HEIGHT: 65 IN | SYSTOLIC BLOOD PRESSURE: 109 MMHG | WEIGHT: 205 LBS | BODY MASS INDEX: 34.16 KG/M2

## 2017-11-10 DIAGNOSIS — M79.604 LOW BACK PAIN RADIATING TO BOTH LEGS: ICD-10-CM

## 2017-11-10 DIAGNOSIS — M79.605 LOW BACK PAIN RADIATING TO BOTH LEGS: ICD-10-CM

## 2017-11-10 DIAGNOSIS — M54.50 LOW BACK PAIN RADIATING TO BOTH LEGS: ICD-10-CM

## 2017-11-10 DIAGNOSIS — M25.561 CHRONIC PAIN OF RIGHT KNEE: ICD-10-CM

## 2017-11-10 DIAGNOSIS — B02.21 NEURALGIA, GENICULATE: ICD-10-CM

## 2017-11-10 DIAGNOSIS — M25.562 CHRONIC PAIN OF BOTH KNEES: ICD-10-CM

## 2017-11-10 DIAGNOSIS — M51.16 LUMBAR DISC DISEASE WITH RADICULOPATHY: ICD-10-CM

## 2017-11-10 DIAGNOSIS — G89.29 CHRONIC PAIN OF BOTH KNEES: ICD-10-CM

## 2017-11-10 DIAGNOSIS — M54.9 UPPER BACK PAIN: ICD-10-CM

## 2017-11-10 DIAGNOSIS — M25.561 CHRONIC PAIN OF BOTH KNEES: ICD-10-CM

## 2017-11-10 DIAGNOSIS — G89.29 CHRONIC PAIN OF RIGHT KNEE: ICD-10-CM

## 2017-11-10 PROCEDURE — 99214 OFFICE O/P EST MOD 30 MIN: CPT

## 2017-11-10 ASSESSMENT — PAIN DESCRIPTION - ORIENTATION: ORIENTATION: LOWER

## 2017-11-10 ASSESSMENT — PAIN DESCRIPTION - DESCRIPTORS: DESCRIPTORS: ACHING;CONSTANT;RADIATING;THROBBING;TINGLING;NUMBNESS

## 2017-11-10 ASSESSMENT — PAIN DESCRIPTION - PAIN TYPE: TYPE: CHRONIC PAIN

## 2017-11-10 ASSESSMENT — PAIN DESCRIPTION - LOCATION: LOCATION: BACK

## 2017-11-10 ASSESSMENT — PAIN DESCRIPTION - ONSET: ONSET: ON-GOING

## 2017-11-10 ASSESSMENT — PAIN DESCRIPTION - DIRECTION: RADIATING_TOWARDS: RADIATES INTO BILATERAL LEGS

## 2017-11-10 ASSESSMENT — PAIN SCALES - GENERAL: PAINLEVEL_OUTOF10: 4

## 2017-11-10 ASSESSMENT — PAIN DESCRIPTION - FREQUENCY: FREQUENCY: INTERMITTENT

## 2017-11-10 ASSESSMENT — PAIN DESCRIPTION - PROGRESSION: CLINICAL_PROGRESSION: NOT CHANGED

## 2017-11-10 NOTE — PROGRESS NOTES
Nursing Admission Record    Current Issues / Falls / ER Visits:  No    Percentage of Pain Relief after Last Procedure:  0 %    How long lasted:  0 days    Radiology exams received during the last 12 months: Yes       When 6/2017                                              Where [address] on chart: Yes         Imaging records requested: No  MRI exams received in the past 2 years:  Yes  Physical therapy during the last 6 months: No       When: na                                             Where  na  Labs during the last 12 months: Yes    Education Provided:  [x] Review of Amado Humphries  [x] Agreement Review  [x] Compliance Issues Discussed    [] Cognitive Behavior Needs [x] Exercise [] Review of Test [] Financial Issues  [] Tobacco/Alcohol Use [x] Teaching [] New Patient [] Picture Obtained    Physician Plan:  [] Outgoing Referral  [] Pharmacy Consult  [] Test Ordered   [] Obtained Test Results / Consult Notes  [] UDS due at next visit, verified per EPIC      [] Suspected Physical Abuse or Suicide Risk assessed - IF YES COMPLETE QUESTIONS BELOW    If any of the following questions are answered yes - contact attending physician for referral:    Has been considering harming self to escape stress, pain problems? [] YES  [x] NO  Has a suicide plan? [] YES  [x] NO  Has attempted suicide in the past?   [] YES  [x] NO  Has a close friend or family member who committed suicide? [] YES  [x] NO    Patient Referred To :      Additional Notes:    Assessment Completed by:  Electronically signed by Samy Christensen RN on 11/10/2017 at 2:30 PM

## 2017-11-10 NOTE — PROGRESS NOTES
Ewelina Rangel/Panchito  Patient Pain Assessment    Primary / Referring Physician: Nikkie Freeman    Chief complaint:   Chief Complaint   Patient presents with    Lower Back Pain     radiates into bilateral legs       History of Present Illness -   Eden Castro is a 67 y.o. female that presents to the office for follow-up of LESI. She says that she obtained 0% relief with this injection. She says that she has more pain when she is walking. As long as she sits she has no pain. She is wanting to stop the injections for a while and start back at a later date. Current Pain Assessment  Pain Assessment  Pain Assessment: 0-10  Pain Level: 4  Pain Type: Chronic pain  Pain Location: Back  Pain Orientation: Lower  Pain Radiating Towards: radiates into bilateral legs  Pain Descriptors: Aching, Constant, Radiating, Throbbing, Tingling, Numbness  Pain Frequency: Intermittent  Pain Onset: On-going  Clinical Progression: Not changed  Effect of Pain on Daily Activities: limits activity, worse with standing  Patient's Stated Pain Goal: No pain  Pain Intervention(s): Medication (see eMar), Repositioned, Rest  Response to Pain Intervention: Patient Satisfied  Multiple Pain Sites: Yes      Pain medication effectiveness:   Reports that pain medication helps to increase activities of daily living    Issues of concern (physical, mental, social) or changes in condition since last visit per patient report: none    BMI: Body mass index is 34.11 kg/m². low fat, low carb diet discussed for weight reduction    Activity: discussed exercise as beneficial to pain reduction, encouraged stretching exercise with a focus on torso strengthening.      Acute Bowel or Bladder Changes: no    Side Effects of Medication: no    Social History  Social History     Social History    Marital status:      Spouse name: Maurilio Elias Number of children: 2    Years of education: 10     Social History Main Topics    Smoking status: Former Smoker Packs/day: 1.50     Years: 40.00    Smokeless tobacco: Never Used    Alcohol use No    Drug use: Unknown    Sexual activity: Not Asked     Other Topics Concern    None     Social History Narrative    None      has no drug history on file. History   Alcohol Use No       Tobacco Use:    none    Review of Systems:  Other than stated above in the HPI, is negative     UDS done today? no  Are you currently pregnant? no     Amado Humphries compliant? yes  Concern for prescription abuse? no          Past Medical History      Diagnosis Date    Anxiety     COPD (chronic obstructive pulmonary disease) ([facility] [address]     DDD (degenerative disc disease), lumbar     Depression     Diabetes mellitus (HCC)     GERD (gastroesophageal reflux disease)     Hyperlipidemia     Hypertension     Knee pain, bilateral 1/29/2016    Low back pain radiating to both legs 1/29/2016    Right knee pain 3/3/2016    Upper back pain 1/29/2016       Surgical History  Past Surgical History:   Procedure Laterality Date    BACK SURGERY      HEMORRHOID SURGERY      HYSTERECTOMY      JOINT REPLACEMENT      OTHER SURGICAL HISTORY      pain pump insertion and removal       Medications  Current Outpatient Prescriptions   Medication Sig Dispense Refill    [START ON 11/18/2017] oxyCODONE-acetaminophen (PERCOCET)  MG per tablet Take 1 tablet by mouth 4 times daily as needed for Pain . Earliest Fill Date: 11/18/17 120 tablet 0    [START ON 11/18/2017] oxyCODONE (OXYCONTIN) 30 MG T12A extended release tablet Take 30 mg by mouth every 12 hours .  Earliest Fill Date: 11/18/17 60 each 0    gabapentin (NEURONTIN) 300 MG capsule Take 1 capsule by mouth 3 times daily Take 1 pill a day for 3-4 days  Then   Take 1 pill twice a day for 3-4 days  Then  Take 1 pill three times a day for 3-4 days then  Take one pill four times a day 120 capsule 2    citalopram (CELEXA) 20 MG tablet       meclizine (ANTIVERT) 25 MG tablet       tiZANidine (ZANAFLEX) 4 MG lb (93 kg)   SpO2 92%   BMI 34.11 kg/m² , Body mass index is 34.11 kg/m². Physical Exam  General appearance: no acute distress  Head: NCAT, EOMI  SKIN: Warm, Dry   Musculoskeletal: ambulatory per self, steady gait with use of cane  Continues to c/o pain with walking. No pain while sitting  Neurologic: alert and oriented X 3, speech clear  Mood and affect: appropriate, no SI or HI     UDS:  Lab Results   Component Value Date    BARBITURATES Negative 07/21/2017    BENZODIAZURI SEE BELOW 07/21/2017    BENZODIAZURI Positive 07/21/2017    OPIAU SEE BELOW 07/21/2017    OPIAU Negative 07/21/2017    OXYCOOXYMO SEE BELOW 07/21/2017    OXYCOOXYMO Positive 07/21/2017    PHENCYCU Negative 07/21/2017    LABMETH Negative 07/21/2017    PPXUR Negative 07/21/2017    MEPERIDU Negative 07/21/2017    FENTU Negative 07/21/2017    ETHUQN Negative 07/21/2017    CANNANBINURI Negative 07/21/2017    AMPHETUR Negative 07/21/2017    COCAINEMETUR Negative 07/21/2017       Physical therapy during the last 6 months: no    Injections for pain control discussed: yes    ASSESSMENT  Patient Active Problem List   Diagnosis    Knee pain, bilateral    Upper back pain    Low back pain radiating to both legs    Right knee pain    Neuralgia, geniculate    Neuralgia, geniculate    Lumbar disc disease with radiculopathy    Neuralgia, geniculate    Lumbar disc disease with radiculopathy    Lumbar disc disease with radiculopathy    Neuralgia, geniculate    Lumbar disc disease with radiculopathy    Neuralgia, geniculate    Lumbar disc disease with radiculopathy        PLAN  1. Patient is to call with any questions or concerns which may arise prior to the next office visit   2. Continue current dosage of Percocet and Oxycontin per ELLY fill date   3. F/U appointment in 3 months with me  4.   Flector Patch    Discussion:    Education Provided:  [x] Review of Amada Parker [x] Agreement Review [] Compliance Issues Discussed   [] Cognitive Behavior Needs [x] Exercise [] Review of Test [] Financial Issues  [] Tobacco/Alcohol Use [x] Teaching [] New Patient [] Picture Obtained    Controlled Substance Monitoring:   Discussed with patient possible medication side effects, risk of tolerance and/or dependence, and alternative treatments. Discussed the growing epidemic in the [address] [place] [address] with the overprescribing and at times the abuse of narcotics. Discussed the detrimental effects of long term narcotic use in younger patients. Patient encouraged to set daily goals of exercising and decreasing daily narcotic intake. Discussed with the patient about the development of hyperalgesia with long term narcotic intake.      Electronically signed by ADINA East on 11/10/2017 at 2:38 PM

## 2017-12-05 RX ORDER — OXYCODONE AND ACETAMINOPHEN 10; 325 MG/1; MG/1
1 TABLET ORAL 4 TIMES DAILY PRN
Qty: 120 TABLET | Refills: 0 | Status: SHIPPED | OUTPATIENT
Start: 2017-12-18 | End: 2018-01-08 | Stop reason: SDUPTHER

## 2017-12-05 RX ORDER — OXYCODONE HYDROCHLORIDE 30 MG/1
30 TABLET, FILM COATED, EXTENDED RELEASE ORAL EVERY 12 HOURS
Qty: 60 EACH | Refills: 0 | Status: SHIPPED | OUTPATIENT
Start: 2017-12-18 | End: 2018-01-08 | Stop reason: SDUPTHER

## 2018-01-09 RX ORDER — OXYCODONE HYDROCHLORIDE 30 MG/1
30 TABLET, FILM COATED, EXTENDED RELEASE ORAL EVERY 12 HOURS
Qty: 60 EACH | Refills: 0 | Status: SHIPPED | OUTPATIENT
Start: 2018-01-17 | End: 2018-02-05 | Stop reason: SDUPTHER

## 2018-01-09 RX ORDER — OXYCODONE AND ACETAMINOPHEN 10; 325 MG/1; MG/1
1 TABLET ORAL 4 TIMES DAILY PRN
Qty: 120 TABLET | Refills: 0 | Status: SHIPPED | OUTPATIENT
Start: 2018-01-17 | End: 2018-02-05 | Stop reason: SDUPTHER

## 2018-02-06 RX ORDER — OXYCODONE AND ACETAMINOPHEN 10; 325 MG/1; MG/1
1 TABLET ORAL 4 TIMES DAILY PRN
Qty: 120 TABLET | Refills: 0 | Status: SHIPPED | OUTPATIENT
Start: 2018-02-16 | End: 2018-02-21 | Stop reason: SDUPTHER

## 2018-02-06 RX ORDER — OXYCODONE HYDROCHLORIDE 30 MG/1
30 TABLET, FILM COATED, EXTENDED RELEASE ORAL EVERY 12 HOURS
Qty: 60 EACH | Refills: 0 | Status: SHIPPED | OUTPATIENT
Start: 2018-02-16 | End: 2018-02-21 | Stop reason: SDUPTHER

## 2018-02-21 ENCOUNTER — HOSPITAL ENCOUNTER (OUTPATIENT)
Dept: PAIN MANAGEMENT | Age: 73
Discharge: HOME | End: 2018-02-21
Payer: MEDICARE

## 2018-02-21 VITALS
HEART RATE: 57 BPM | HEIGHT: 65 IN | SYSTOLIC BLOOD PRESSURE: 133 MMHG | BODY MASS INDEX: 32.65 KG/M2 | TEMPERATURE: 97.1 F | RESPIRATION RATE: 20 BRPM | OXYGEN SATURATION: 94 % | DIASTOLIC BLOOD PRESSURE: 56 MMHG | WEIGHT: 196 LBS

## 2018-02-21 DIAGNOSIS — B02.21 NEURALGIA, GENICULATE: ICD-10-CM

## 2018-02-21 DIAGNOSIS — M51.16 LUMBAR DISC DISEASE WITH RADICULOPATHY: ICD-10-CM

## 2018-02-21 PROCEDURE — 99214 OFFICE O/P EST MOD 30 MIN: CPT

## 2018-02-21 RX ORDER — CLONIDINE HYDROCHLORIDE 0.1 MG/1
0.1 TABLET ORAL 2 TIMES DAILY
COMMUNITY

## 2018-02-21 RX ORDER — ROPINIROLE 0.5 MG/1
0.5 TABLET, FILM COATED ORAL NIGHTLY
Qty: 30 TABLET | Refills: 2 | Status: SHIPPED | OUTPATIENT
Start: 2018-02-21 | End: 2018-04-09 | Stop reason: DRUGHIGH

## 2018-02-21 ASSESSMENT — PAIN DESCRIPTION - PAIN TYPE: TYPE: CHRONIC PAIN

## 2018-02-21 ASSESSMENT — PAIN DESCRIPTION - FREQUENCY: FREQUENCY: INTERMITTENT

## 2018-02-21 ASSESSMENT — PAIN DESCRIPTION - LOCATION: LOCATION: BACK

## 2018-02-21 ASSESSMENT — PAIN DESCRIPTION - ORIENTATION: ORIENTATION: LOWER

## 2018-02-21 ASSESSMENT — PAIN DESCRIPTION - PROGRESSION: CLINICAL_PROGRESSION: NOT CHANGED

## 2018-02-21 ASSESSMENT — PAIN DESCRIPTION - ONSET: ONSET: ON-GOING

## 2018-02-21 ASSESSMENT — PAIN SCALES - GENERAL: PAINLEVEL_OUTOF10: 4

## 2018-02-21 NOTE — PROGRESS NOTES
Ewelina Rangel/Panchito  Patient Pain Assessment  Consultation - @ATTENDING RTUFWFU@    Primary Care Physician: Ward Sousa    Chief complaint:   Chief Complaint   Patient presents with    Knee Pain    Neck Pain    Back Pain   . HISTORY OF PRESENT ILLNESS:  Mike Baum is 67 y.o. female with    HPI       Nursing Admission Record    Current Issues / Vince Piety / ER Visits:  Yes ER for elevated BP    Percentage of Pain Relief after Last Procedure:  na %    How long lasted:  0 days    Radiology exams received during the last 12 months: No       When na                                              Where na       Imaging on chart: No         Imaging records requested: No  MRI exams received in the past 2 years:  No  Physical therapy during the last 6 months: No       When: na                                             Where na  Labs during the last 12 months: Yes        Ofe Ousmane compliant?  yes  Concern for prescription abuse?no    Current Pain Assessment                       ADVERSE MEDICATION EFFECTS:   Constipation: yes  Bowel Regimen: Yes  Diet: common adult  Appetite:  ok  Sedation:  no  Urinary Retention: no    FOCUSED PAIN SCALE:  Highest : 10  Lowest :3  Average: Range-6  When and What  was your last procedure:      Was your procedure effective:  not applicable    ACTIVITY/SOCIAL/EMOTIONAL:  Sleep Pattern: 7 hours per night. nightime awakenings  Energy Level:  Tired/Fatigued  Currently attending Physical Therapy:  No  Home Exercises: rarely bicycling  Mobility: no current mobility problem   Currently seeing a Psychiatrist or Psychologist:  Yes  Emotional Issues: normal   Mood: depressed     ABERRANT BEHAVIORS SINCE LAST VISIT:  Have you ever been treated in another Pain Clinic no  Refills for prescriptions appropriate: yes  Lost rx/pills: no  Taking more medication than prescribed:  no  Are you receiving PAIN medications from  other doctors: no  Last Urine/Serum Drug Screen :  Was Serum/UDS as into the lungs every 12 hours      Cholecalciferol 400 UNIT TABS tablet Take 400 Units by mouth daily      Omega-3 Fatty Acids (FISH OIL) 1200 MG CAPS Take 1 capsule by mouth 2 times daily      BYSTOLIC 20 MG TABS tablet       amLODIPine (NORVASC) 10 MG tablet Take 10 mg by mouth daily      ranitidine (ZANTAC) 150 MG tablet Take 150 mg by mouth 2 times daily      dextromethorphan-guaiFENesin  MG TABS Take 1 tablet by mouth 4 times daily      psyllium (METAMUCIL) 0.52 G capsule Take 0.52 g by mouth daily      SENNOSIDES-DOCUSATE SODIUM PO Take 8.6 mg by mouth daily      FLAREX 0.1 % ophthalmic suspension       lidocaine (XYLOCAINE) 5 % ointment       nystatin (MYCOSTATIN) 616097 UNIT/GM powder       potassium chloride SA (K-DUR;KLOR-CON M) 20 MEQ tablet       dipyridamole-aspirin (AGGRENOX)  MG per SR capsule Take 1 capsule by mouth 2 times daily      ALPRAZolam (XANAX) 1 MG tablet Take 1 mg by mouth 2 times daily Indications: 1/2 in am 1 at night      metFORMIN (GLUCOPHAGE) 1000 MG tablet Take 1,000 mg by mouth 2 times daily (with meals)      furosemide (LASIX) 40 MG tablet Take 60 mg by mouth 2 times daily      rosuvastatin (CRESTOR) 10 MG tablet Take 10 mg by mouth daily      albuterol sulfate  (90 BASE) MCG/ACT inhaler Inhale 2 puffs into the lungs daily Indications: and prn      vitamin B-12 (CYANOCOBALAMIN) 100 MCG tablet Take 50 mcg by mouth daily      docusate sodium (COLACE) 100 MG capsule Take 100 mg by mouth 4 times daily       No current facility-administered medications for this encounter. Allergies  Asa [aspirin]    Family History  family history is not on file.     Social History  Social History     Social History    Marital status:      Spouse name: Minh Ayoub Number of children: 2    Years of education: 8     Social History Main Topics    Smoking status: Former Smoker     Packs/day: 1.50     Years: 40.00    Smokeless tobacco: Never Used   

## 2018-04-09 ENCOUNTER — HOSPITAL ENCOUNTER (OUTPATIENT)
Dept: PAIN MANAGEMENT | Age: 73
Discharge: HOME | End: 2018-04-09
Payer: MEDICARE

## 2018-04-09 VITALS
BODY MASS INDEX: 32.32 KG/M2 | DIASTOLIC BLOOD PRESSURE: 61 MMHG | RESPIRATION RATE: 20 BRPM | OXYGEN SATURATION: 96 % | HEIGHT: 65 IN | WEIGHT: 194 LBS | TEMPERATURE: 97.4 F | SYSTOLIC BLOOD PRESSURE: 125 MMHG | HEART RATE: 49 BPM

## 2018-04-09 DIAGNOSIS — M54.50 LOW BACK PAIN RADIATING TO BOTH LEGS: ICD-10-CM

## 2018-04-09 DIAGNOSIS — M51.16 LUMBAR DISC DISEASE WITH RADICULOPATHY: ICD-10-CM

## 2018-04-09 DIAGNOSIS — M25.561 CHRONIC PAIN OF BOTH KNEES: ICD-10-CM

## 2018-04-09 DIAGNOSIS — G89.29 CHRONIC PAIN OF BOTH KNEES: ICD-10-CM

## 2018-04-09 DIAGNOSIS — M25.562 CHRONIC PAIN OF BOTH KNEES: ICD-10-CM

## 2018-04-09 DIAGNOSIS — M54.9 UPPER BACK PAIN: ICD-10-CM

## 2018-04-09 DIAGNOSIS — G89.29 CHRONIC PAIN OF RIGHT KNEE: ICD-10-CM

## 2018-04-09 DIAGNOSIS — M54.5 CHRONIC LOW BACK PAIN, UNSPECIFIED BACK PAIN LATERALITY, WITH SCIATICA PRESENCE UNSPECIFIED: ICD-10-CM

## 2018-04-09 DIAGNOSIS — M25.561 CHRONIC PAIN OF RIGHT KNEE: ICD-10-CM

## 2018-04-09 DIAGNOSIS — M79.605 LOW BACK PAIN RADIATING TO BOTH LEGS: ICD-10-CM

## 2018-04-09 DIAGNOSIS — M51.36 LUMBAR DEGENERATIVE DISC DISEASE: ICD-10-CM

## 2018-04-09 DIAGNOSIS — B02.21 NEURALGIA, GENICULATE: ICD-10-CM

## 2018-04-09 DIAGNOSIS — G89.29 CHRONIC LOW BACK PAIN, UNSPECIFIED BACK PAIN LATERALITY, WITH SCIATICA PRESENCE UNSPECIFIED: ICD-10-CM

## 2018-04-09 DIAGNOSIS — M79.604 LOW BACK PAIN RADIATING TO BOTH LEGS: ICD-10-CM

## 2018-04-09 PROCEDURE — 2500000003 HC RX 250 WO HCPCS

## 2018-04-09 PROCEDURE — 6360000002 HC RX W HCPCS

## 2018-04-09 PROCEDURE — 62323 NJX INTERLAMINAR LMBR/SAC: CPT

## 2018-04-09 PROCEDURE — 2580000003 HC RX 258

## 2018-04-09 PROCEDURE — 3209999900 FLUORO FOR SURGICAL PROCEDURES

## 2018-04-09 RX ORDER — METHYLPREDNISOLONE ACETATE 80 MG/ML
INJECTION, SUSPENSION INTRA-ARTICULAR; INTRALESIONAL; INTRAMUSCULAR; SOFT TISSUE
Status: COMPLETED | OUTPATIENT
Start: 2018-04-09 | End: 2018-04-09

## 2018-04-09 RX ORDER — BUPIVACAINE HYDROCHLORIDE 2.5 MG/ML
INJECTION, SOLUTION EPIDURAL; INFILTRATION; INTRACAUDAL
Status: COMPLETED | OUTPATIENT
Start: 2018-04-09 | End: 2018-04-09

## 2018-04-09 RX ORDER — ROPINIROLE 1 MG/1
TABLET, FILM COATED ORAL
Qty: 60 TABLET | Refills: 2 | Status: SHIPPED | OUTPATIENT
Start: 2018-04-09

## 2018-04-09 RX ORDER — OXYCODONE AND ACETAMINOPHEN 10; 325 MG/1; MG/1
1 TABLET ORAL 4 TIMES DAILY PRN
Qty: 120 TABLET | Refills: 0 | Status: SHIPPED | OUTPATIENT
Start: 2018-04-20 | End: 2018-05-07 | Stop reason: SDUPTHER

## 2018-04-09 RX ORDER — OXYCODONE HYDROCHLORIDE 30 MG/1
30 TABLET, FILM COATED, EXTENDED RELEASE ORAL EVERY 12 HOURS
Qty: 60 EACH | Refills: 0 | Status: SHIPPED | OUTPATIENT
Start: 2018-04-20 | End: 2018-05-07 | Stop reason: SDUPTHER

## 2018-04-09 RX ORDER — 0.9 % SODIUM CHLORIDE 0.9 %
VIAL (ML) INJECTION
Status: COMPLETED | OUTPATIENT
Start: 2018-04-09 | End: 2018-04-09

## 2018-04-09 RX ORDER — LIDOCAINE HYDROCHLORIDE 10 MG/ML
INJECTION, SOLUTION EPIDURAL; INFILTRATION; INTRACAUDAL; PERINEURAL
Status: COMPLETED | OUTPATIENT
Start: 2018-04-09 | End: 2018-04-09

## 2018-04-09 RX ORDER — GABAPENTIN 300 MG/1
300 CAPSULE ORAL 3 TIMES DAILY
Qty: 90 CAPSULE | Refills: 2 | Status: SHIPPED | OUTPATIENT
Start: 2018-04-09 | End: 2018-07-08

## 2018-04-09 RX ADMIN — BUPIVACAINE HYDROCHLORIDE 2.5 ML: 2.5 INJECTION, SOLUTION EPIDURAL; INFILTRATION; INTRACAUDAL at 15:48

## 2018-04-09 RX ADMIN — LIDOCAINE HYDROCHLORIDE 3 ML: 10 INJECTION, SOLUTION EPIDURAL; INFILTRATION; INTRACAUDAL; PERINEURAL at 15:46

## 2018-04-09 RX ADMIN — METHYLPREDNISOLONE ACETATE 80 MG: 80 INJECTION, SUSPENSION INTRA-ARTICULAR; INTRALESIONAL; INTRAMUSCULAR; SOFT TISSUE at 15:49

## 2018-04-09 RX ADMIN — Medication 1.5 ML: at 15:48

## 2018-04-09 ASSESSMENT — ACTIVITIES OF DAILY LIVING (ADL): EFFECT OF PAIN ON DAILY ACTIVITIES: LIMITS ACTIVITY

## 2018-04-09 ASSESSMENT — PAIN - FUNCTIONAL ASSESSMENT: PAIN_FUNCTIONAL_ASSESSMENT: 0-10

## 2018-05-07 DIAGNOSIS — M54.5 CHRONIC LOW BACK PAIN, UNSPECIFIED BACK PAIN LATERALITY, WITH SCIATICA PRESENCE UNSPECIFIED: ICD-10-CM

## 2018-05-07 DIAGNOSIS — G89.29 CHRONIC PAIN OF BOTH KNEES: ICD-10-CM

## 2018-05-07 DIAGNOSIS — G89.29 CHRONIC LOW BACK PAIN, UNSPECIFIED BACK PAIN LATERALITY, WITH SCIATICA PRESENCE UNSPECIFIED: ICD-10-CM

## 2018-05-07 DIAGNOSIS — M25.562 CHRONIC PAIN OF BOTH KNEES: ICD-10-CM

## 2018-05-07 DIAGNOSIS — M25.561 CHRONIC PAIN OF BOTH KNEES: ICD-10-CM

## 2018-05-08 RX ORDER — OXYCODONE AND ACETAMINOPHEN 10; 325 MG/1; MG/1
1 TABLET ORAL 4 TIMES DAILY PRN
Qty: 120 TABLET | Refills: 0 | Status: SHIPPED | OUTPATIENT
Start: 2018-05-20 | End: 2018-05-11 | Stop reason: SDUPTHER

## 2018-05-08 RX ORDER — OXYCODONE HYDROCHLORIDE 30 MG/1
30 TABLET, FILM COATED, EXTENDED RELEASE ORAL EVERY 12 HOURS
Qty: 60 EACH | Refills: 0 | Status: SHIPPED | OUTPATIENT
Start: 2018-05-20 | End: 2018-05-11 | Stop reason: SDUPTHER

## 2018-05-11 ENCOUNTER — HOSPITAL ENCOUNTER (OUTPATIENT)
Dept: PAIN MANAGEMENT | Age: 73
Discharge: HOME | End: 2018-05-11
Payer: MEDICARE

## 2018-05-11 VITALS
TEMPERATURE: 97.3 F | HEIGHT: 65 IN | WEIGHT: 193 LBS | DIASTOLIC BLOOD PRESSURE: 55 MMHG | RESPIRATION RATE: 20 BRPM | BODY MASS INDEX: 32.15 KG/M2 | SYSTOLIC BLOOD PRESSURE: 105 MMHG | OXYGEN SATURATION: 94 % | HEART RATE: 44 BPM

## 2018-05-11 DIAGNOSIS — B02.21 NEURALGIA, GENICULATE: Chronic | ICD-10-CM

## 2018-05-11 DIAGNOSIS — B02.21 NEURALGIA, GENICULATE: ICD-10-CM

## 2018-05-11 PROCEDURE — 3209999900 FLUORO FOR SURGICAL PROCEDURES

## 2018-05-11 PROCEDURE — 99152 MOD SED SAME PHYS/QHP 5/>YRS: CPT

## 2018-05-11 PROCEDURE — 64450 NJX AA&/STRD OTHER PN/BRANCH: CPT

## 2018-05-11 PROCEDURE — 2500000003 HC RX 250 WO HCPCS

## 2018-05-11 PROCEDURE — 6360000002 HC RX W HCPCS

## 2018-05-11 RX ORDER — LIDOCAINE HYDROCHLORIDE 10 MG/ML
INJECTION, SOLUTION EPIDURAL; INFILTRATION; INTRACAUDAL; PERINEURAL
Status: COMPLETED | OUTPATIENT
Start: 2018-05-11 | End: 2018-05-11

## 2018-05-11 RX ORDER — MIDAZOLAM HYDROCHLORIDE 1 MG/ML
INJECTION INTRAMUSCULAR; INTRAVENOUS
Status: COMPLETED | OUTPATIENT
Start: 2018-05-11 | End: 2018-05-11

## 2018-05-11 RX ORDER — BUPIVACAINE HYDROCHLORIDE 5 MG/ML
INJECTION, SOLUTION EPIDURAL; INTRACAUDAL
Status: COMPLETED | OUTPATIENT
Start: 2018-05-11 | End: 2018-05-11

## 2018-05-11 RX ORDER — TRIAMCINOLONE ACETONIDE 40 MG/ML
INJECTION, SUSPENSION INTRA-ARTICULAR; INTRAMUSCULAR
Status: COMPLETED | OUTPATIENT
Start: 2018-05-11 | End: 2018-05-11

## 2018-05-11 RX ADMIN — MIDAZOLAM HYDROCHLORIDE 2 MG: 1 INJECTION INTRAMUSCULAR; INTRAVENOUS at 14:08

## 2018-05-11 RX ADMIN — BUPIVACAINE HYDROCHLORIDE 9 ML: 5 INJECTION, SOLUTION EPIDURAL; INTRACAUDAL at 13:50

## 2018-05-11 RX ADMIN — TRIAMCINOLONE ACETONIDE 40 MG: 40 INJECTION, SUSPENSION INTRA-ARTICULAR; INTRAMUSCULAR at 13:51

## 2018-05-11 RX ADMIN — LIDOCAINE HYDROCHLORIDE 3 ML: 10 INJECTION, SOLUTION EPIDURAL; INFILTRATION; INTRACAUDAL; PERINEURAL at 13:49

## 2018-05-11 ASSESSMENT — PAIN - FUNCTIONAL ASSESSMENT: PAIN_FUNCTIONAL_ASSESSMENT: 0-10

## 2018-05-11 ASSESSMENT — PAIN DESCRIPTION - LOCATION: LOCATION: BACK;KNEE;FOOT

## 2018-05-11 ASSESSMENT — PAIN DESCRIPTION - PAIN TYPE: TYPE: CHRONIC PAIN

## 2018-06-25 DIAGNOSIS — M25.561 CHRONIC PAIN OF BOTH KNEES: ICD-10-CM

## 2018-06-25 DIAGNOSIS — M54.5 CHRONIC LOW BACK PAIN, UNSPECIFIED BACK PAIN LATERALITY, WITH SCIATICA PRESENCE UNSPECIFIED: ICD-10-CM

## 2018-06-25 DIAGNOSIS — M25.562 CHRONIC PAIN OF BOTH KNEES: ICD-10-CM

## 2018-06-25 DIAGNOSIS — G89.29 CHRONIC LOW BACK PAIN, UNSPECIFIED BACK PAIN LATERALITY, WITH SCIATICA PRESENCE UNSPECIFIED: ICD-10-CM

## 2018-06-25 DIAGNOSIS — G89.29 CHRONIC PAIN OF BOTH KNEES: ICD-10-CM

## 2018-06-25 RX ORDER — OXYCODONE HYDROCHLORIDE 30 MG/1
30 TABLET, FILM COATED, EXTENDED RELEASE ORAL EVERY 12 HOURS
Qty: 60 EACH | Refills: 0 | Status: SHIPPED | OUTPATIENT
Start: 2018-07-20 | End: 2018-08-19

## 2018-06-25 RX ORDER — OXYCODONE AND ACETAMINOPHEN 10; 325 MG/1; MG/1
1 TABLET ORAL 4 TIMES DAILY PRN
Qty: 120 TABLET | Refills: 0 | Status: SHIPPED | OUTPATIENT
Start: 2018-07-19 | End: 2018-08-18

## 2018-06-27 ENCOUNTER — HOSPITAL ENCOUNTER (OUTPATIENT)
Dept: PAIN MANAGEMENT | Age: 73
Discharge: HOME | End: 2018-06-27
Payer: MEDICARE

## 2018-06-27 VITALS
OXYGEN SATURATION: 97 % | HEIGHT: 65 IN | DIASTOLIC BLOOD PRESSURE: 72 MMHG | SYSTOLIC BLOOD PRESSURE: 158 MMHG | RESPIRATION RATE: 18 BRPM | WEIGHT: 194 LBS | HEART RATE: 53 BPM | BODY MASS INDEX: 32.32 KG/M2 | TEMPERATURE: 96.8 F

## 2018-06-27 DIAGNOSIS — M54.9 UPPER BACK PAIN: ICD-10-CM

## 2018-06-27 DIAGNOSIS — G89.29 CHRONIC PAIN OF RIGHT KNEE: ICD-10-CM

## 2018-06-27 DIAGNOSIS — M25.561 CHRONIC PAIN OF RIGHT KNEE: ICD-10-CM

## 2018-06-27 DIAGNOSIS — M51.36 DDD (DEGENERATIVE DISC DISEASE), LUMBAR: ICD-10-CM

## 2018-06-27 DIAGNOSIS — M79.605 LOW BACK PAIN RADIATING TO BOTH LEGS: ICD-10-CM

## 2018-06-27 DIAGNOSIS — M79.604 LOW BACK PAIN RADIATING TO BOTH LEGS: ICD-10-CM

## 2018-06-27 DIAGNOSIS — M54.50 LOW BACK PAIN RADIATING TO BOTH LEGS: ICD-10-CM

## 2018-06-27 DIAGNOSIS — M51.16 LUMBAR DISC DISEASE WITH RADICULOPATHY: ICD-10-CM

## 2018-06-27 DIAGNOSIS — B02.21 NEURALGIA, GENICULATE: ICD-10-CM

## 2018-06-27 PROCEDURE — 99213 OFFICE O/P EST LOW 20 MIN: CPT

## 2018-06-27 RX ORDER — LISINOPRIL 10 MG/1
10 TABLET ORAL DAILY
COMMUNITY

## 2018-06-27 RX ORDER — CITALOPRAM 40 MG/1
40 TABLET ORAL NIGHTLY
COMMUNITY

## 2018-06-27 ASSESSMENT — ENCOUNTER SYMPTOMS
WHEEZING: 0
DOUBLE VISION: 0
CONSTIPATION: 0
SORE THROAT: 0
SHORTNESS OF BREATH: 0
BACK PAIN: 1
ABDOMINAL PAIN: 0

## 2018-06-27 ASSESSMENT — PAIN SCALES - GENERAL: PAINLEVEL_OUTOF10: 7

## 2018-06-27 ASSESSMENT — ACTIVITIES OF DAILY LIVING (ADL): EFFECT OF PAIN ON DAILY ACTIVITIES: LIMITS ACTIVITIES

## 2018-06-27 ASSESSMENT — PAIN DESCRIPTION - PAIN TYPE: TYPE: CHRONIC PAIN

## 2018-06-27 ASSESSMENT — PAIN DESCRIPTION - ONSET: ONSET: ON-GOING

## 2018-06-27 ASSESSMENT — PAIN DESCRIPTION - LOCATION: LOCATION: BACK;KNEE

## 2018-06-27 ASSESSMENT — PAIN DESCRIPTION - DESCRIPTORS: DESCRIPTORS: ACHING;CONSTANT;RADIATING

## 2018-06-27 ASSESSMENT — PAIN DESCRIPTION - FREQUENCY: FREQUENCY: CONTINUOUS

## 2018-06-27 ASSESSMENT — PAIN DESCRIPTION - PROGRESSION: CLINICAL_PROGRESSION: NOT CHANGED

## 2018-06-27 ASSESSMENT — PAIN DESCRIPTION - ORIENTATION: ORIENTATION: RIGHT;LEFT;LOWER;MID

## 2018-07-02 ENCOUNTER — TRANSCRIBE ORDERS (OUTPATIENT)
Dept: PHYSICAL THERAPY | Facility: HOSPITAL | Age: 73
End: 2018-07-02

## 2018-07-02 DIAGNOSIS — E66.01 MORBID OBESITY DUE TO EXCESS CALORIES (HCC): ICD-10-CM

## 2018-07-02 DIAGNOSIS — M51.16 LUMBAR DISC DISEASE WITH RADICULOPATHY: Primary | ICD-10-CM

## 2018-07-09 ENCOUNTER — TELEPHONE (OUTPATIENT)
Dept: PAIN MANAGEMENT | Age: 73
End: 2018-07-09

## 2018-07-20 ENCOUNTER — HOSPITAL ENCOUNTER (OUTPATIENT)
Dept: PHYSICAL THERAPY | Facility: HOSPITAL | Age: 73
Setting detail: THERAPIES SERIES
Discharge: HOME | End: 2018-07-20

## 2018-07-20 DIAGNOSIS — M51.16 LUMBAR DISC DISEASE WITH RADICULOPATHY: Primary | ICD-10-CM

## 2018-07-20 PROCEDURE — 97162 PT EVAL MOD COMPLEX 30 MIN: CPT | Performed by: PHYSICAL THERAPIST

## 2018-07-20 PROCEDURE — G8978 MOBILITY CURRENT STATUS: HCPCS | Performed by: PHYSICAL THERAPIST

## 2018-07-20 PROCEDURE — G8979 MOBILITY GOAL STATUS: HCPCS | Performed by: PHYSICAL THERAPIST

## 2018-07-20 NOTE — THERAPY EVALUATION
Outpatient Physical Therapy Ortho Initial Evaluation  [facility]     Patient Name: Ronda Blair  : 1945  MRN: [id number]  Today's Date: 2018      Visit Date: 2018  Visit   Return to MD: ANUJA  Re-cert date: 8/10/18  Patient Active Problem List   Diagnosis   • Degeneration of intervertebral disc of mid-cervical region   • Morbid obesity due to excess calories (CMS/HCC)   • Former smoker        Past Medical History:   Diagnosis Date   • Anxiety    • Arthritis    • COPD (chronic obstructive pulmonary disease) (CMS/HCC)    • Depression    • Diabetes mellitus (CMS/HCC)    • History of transfusion    • Hyperlipidemia    • Hypertension    • Stroke (CMS/HCC)     Greater than 5 years ago        Past Surgical History:   Procedure Laterality Date   • CERVICAL FUSION     • EYE SURGERY Bilateral 2014    lasix eye surgery   • HEMORRHOIDECTOMY     • HYSTERECTOMY     • INTRATHECAL PUMP REMOVAL         • KNEE ARTHROSCOPY Right    • LUMBAR SPINE SURGERY     • PAIN PUMP INSERTION/REVISION      Removed in        Visit Dx:     ICD-10-CM ICD-9-CM   1. Lumbar disc disease with radiculopathy M51.16 722.10     Medications (Admitted on 2018)     albuterol (PROVENTIL HFA;VENTOLIN HFA) 108 (90 Base) MCG/ACT inhaler     ALPRAZolam (XANAX) 0.5 MG tablet     ALPRAZolam (XANAX) 1 MG tablet     amLODIPine (NORVASC) 10 MG tablet     aspirin-dipyridamole (AGGRENOX)  MG per 12 hr capsule     buPROPion (WELLBUTRIN) 75 MG tablet     dextromethorphan-guaifenesin (ROBITUSSIN-DM)  MG/5ML syrup     DULoxetine (CYMBALTA) 60 MG capsule     fluorometholone (FML) 0.1 % ophthalmic suspension     furosemide (LASIX) 40 MG tablet     lisinopril (PRINIVIL,ZESTRIL) 2.5 MG tablet     meclizine (ANTIVERT) 25 MG tablet     metFORMIN (GLUCOPHAGE) 1000 MG tablet     nebivolol (BYSTOLIC) 10 MG tablet     nebivolol (BYSTOLIC) 20 MG tablet     Omega-3 Fatty Acids (FISH OIL) 1200 MG  capsule capsule     oxyCODONE (ROXICODONE) 30 MG immediate release tablet     oxyCODONE-acetaminophen (PERCOCET)  MG per tablet     raNITIdine (ZANTAC) 150 MG tablet     rosuvastatin (CRESTOR) 10 MG tablet     vitamin B-12 (CYANOCOBALAMIN) 100 MCG tablet     Vitamin D, Cholecalciferol, 400 units capsule     vitamin E 100 UNIT capsule      Allergies: Aspirin            PT Ortho     Row Name 07/20/18 1100       Subjective Comments    Subjective Comments 71 yo female with chronic LBP due to lumbar DDD with B LE radiculopathy. 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 Had lumbar surgery in 1998 and has had pain ever since. Easing factors: heating pad,   -BS       Precautions and Contraindications    Precautions/Limitations fall precautions  -BS    Precautions COPD, has O2 at night, keep HOB elevated, can't lay flat  -BS       Subjective Pain    Able to rate subjective pain? yes  -BS    Pre-Treatment Pain Level 5  -BS    Post-Treatment Pain Level 6  -BS    Subjective Pain Comment 5-8/10 LBP constantly, and 3-4/10 bilat lower leg region pain.  -BS       Quarter Clearing    Quarter Clearing Lower Quarter Clearing  -BS       Sensory Screen for Light Touch- Lower Quarter Clearing    L1 (inguinal area) Bilateral:;Intact  -BS    L2 (anterior mid thigh) Bilateral:;Intact  -BS    L3 (distal anterior thigh) Bilateral:;Intact  -BS    L4 (medial lower leg/foot) Right:;Diminished;Left:;Intact  -BS    L5 (lateral lower leg/great toe) Right:;Diminished;Left:;Intact  -BS    S1 (bottom of foot) Bilateral:;Intact  -BS       Myotomal Screen- Lower Quarter Clearing    Hip flexion (L2) Right:;4+ (Good +);Left:;4 (Good)  -BS    Knee extension (L3) Right:;4 (Good);Left:;4+ (Good +)  -BS    Ankle DF (L4) Bilateral:;5 (Normal)  -BS    Great toe extension (L5) Bilateral:;5 (Normal)  -BS    Ankle PF (S1) Bilateral:;5 (Normal)  -BS    Knee flexion (S2) Bilateral:;5 (Normal)  -BS       Lumbar ROM Screen- Lower  Quarter Clearing    Lumbar Flexion Impaired   fingers to knee level  -BS    Lumbar Extension Impaired   mod limit 50%  -BS    Lumbar Lateral Flexion Impaired   min limit 75% b  -BS    Lumbar Rotation Impaired   50% mod limit R, 25% severe limit L   -BS      User Key  (r) = Recorded By, (t) = Taken By, (c) = Cosigned By    Initials Name Provider Type    BS Addison Hand, PT Physical Therapist                      Therapy Education  Given: HEP  Program: New  How Provided: Verbal  Provided to: Patient  Level of Understanding: Verbalized, Demonstrated, Teach back education performed           PT OP Goals     Row Name 07/20/18 1100          PT Short Term Goals    STG Date to Achieve 08/03/18  -BS     STG 1 Pt indep with HEP  -BS     STG 1 Progress New  -BS     STG 2 Improve lumbar flex AROM-fingers to mid shin (pain-free)  -BS     STG 2 Progress New  -BS     STG 3 Improve B hip flex MMT to 5/5 level  -BS     STG 3 Progress New  -BS     STG 4 Reduce LBP by 25% with performing household chores  -BS     STG 4 Progress New  -BS        Long Term Goals    LTG Date to Achieve 08/17/18  -BS     LTG 1 Improve lumbar flex AROM-fingers to ankles (pain-free)  -BS     LTG 1 Progress New  -BS     LTG 2 Reduce LBP by 50% with performing household chores  -BS     LTG 2 Progress New  -BS     LTG 3 Reduce Modified Oswestry score to 24 or less  -BS     LTG 3 Progress New  -BS        Time Calculation    PT Goal Re-Cert Due Date 08/10/18  -BS       User Key  (r) = Recorded By, (t) = Taken By, (c) = Cosigned By    Initials Name Provider Type    NORMAN Hand, PT Physical Therapist                PT Assessment/Plan     Row Name 07/20/18 1100          PT Assessment    Functional Limitations Performance in self-care ADL;Performance in sport activities;Performance in work activities;Performance in leisure activities  -BS     Impairments Balance;Gait;Sensation;Range of motion;Posture;Pain;Muscle strength  -BS     Assessment Comments chronic LBP  with LE radiculopathy.  -BS     Please refer to paper survey for additional self-reported information Yes  -BS     Rehab Potential Fair  -BS     Patient/caregiver participated in establishment of treatment plan and goals Yes  -BS     Patient would benefit from skilled therapy intervention Yes  -BS        PT Plan    PT Frequency 2x/week  -BS     Predicted Duration of Therapy Intervention (Therapy Eval) 4-6 weeks  -BS     Planned CPT's? PT EVAL MOD COMPLELITY: 14189;PT RE-EVAL: 22614;PT THER PROC EA 15 MIN: 70273;PT MANUAL THERAPY EA 15 MIN: 04482;PT NEUROMUSC RE-EDUCATION EA 15 MIN: 06108;PT ELECTRICAL STIM UNATTEND: ;PT ULTRASOUND EA 15 MIN: 01293;PT THER SUPP EA 15 MIN  -BS     Physical Therapy Interventions (Optional Details) balance training;home exercise program;joint mobilization;lumbar stabilization;manual therapy techniques;modalities;neuromuscular re-education;patient/family education;postural re-education;strengthening;stretching;transfer training  -BS     PT Plan Comments f/u with core stability and stretching lumbar paraspinals, add pool therapy to POC latera if land based therapy proves ineffective at reducing her back and LE symptoms.  -BS       User Key  (r) = Recorded By, (t) = Taken By, (c) = Cosigned By    Initials Name Provider Type    BS Addison Hand, PT Physical Therapist                  Exercises     Row Name 07/20/18 1100             Subjective Comments    Subjective Comments 71 yo female with chronic LBP due to lumbar DDD with B LE radiculopathy. 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 Had lumbar surgery in 1998 and has had pain ever since. Easing factors: heating pad,   -BS         Subjective Pain    Able to rate subjective pain? yes  -BS      Pre-Treatment Pain Level 5  -BS      Post-Treatment Pain Level 6  -BS      Subjective Pain Comment 5-8/10 LBP constantly, and 3-4/10 bilat lower leg region pain.  -BS         Exercise 1    Exercise Name 1 B [facility]  "stretch  -BS      Sets 1 1  -BS      Reps 1 1  -BS      Time 1 30\" hold  -BS         Exercise 2    Exercise Name 2 H/L alt leg lifts  -BS      Sets 2 1  -BS      Reps 2 5  -BS         Exercise 3    Exercise Name 3 sitting B piriformis stretch  -BS      Sets 3 1  -BS      Reps 3 1  -BS      Time 3 30\" hold  -BS        User Key  (r) = Recorded By, (t) = Taken By, (c) = Cosigned By    Initials Name Provider Type    BS Addison Hand, PT Physical Therapist                        Outcome Measure Options: Modifed Owestry  Modified Oswestry  Modified Oswestry Score/Comments: 29/50-58%      Time Calculation:     Therapy Suggested Charges     Code   Minutes Charges    None             Start Time: 1105  Stop Time: 1140  Time Calculation (min): 35 min     Therapy Charges for Today     Code Description Service Date Service Provider Modifiers Qty    06232304684 HC PT MOBILITY CURRENT 7/20/2018 Addison Hand, PT GP, CK 1    29935959039 HC PT MOBILITY PROJECTED 7/20/2018 Addison Hand, PT GP, CK 1    44522357522 HC PT EVAL MOD COMPLEXITY 2 7/20/2018 Addison Hand, PT GP 1          PT G-Codes  Outcome Measure Options: Modifed Owestry  Score: 29-58%  Functional Limitation: Mobility: Walking and moving around  Mobility: Walking and Moving Around Current Status (): At least 40 percent but less than 60 percent impaired, limited or restricted  Mobility: Walking and Moving Around Goal Status (): At least 40 percent but less than 60 percent impaired, limited or restricted         Addison aHnd PT  7/20/2018      "

## 2018-07-24 ENCOUNTER — APPOINTMENT (OUTPATIENT)
Dept: PHYSICAL THERAPY | Facility: HOSPITAL | Age: 73
End: 2018-07-24

## 2018-08-14 ENCOUNTER — HOSPITAL ENCOUNTER (OUTPATIENT)
Dept: PHYSICAL THERAPY | Facility: HOSPITAL | Age: 73
Setting detail: THERAPIES SERIES
Discharge: HOME | End: 2018-08-14

## 2018-08-14 DIAGNOSIS — M51.16 LUMBAR DISC DISEASE WITH RADICULOPATHY: Primary | ICD-10-CM

## 2018-08-14 PROCEDURE — G8979 MOBILITY GOAL STATUS: HCPCS | Performed by: PHYSICAL THERAPIST

## 2018-08-14 PROCEDURE — 97110 THERAPEUTIC EXERCISES: CPT | Performed by: PHYSICAL THERAPIST

## 2018-08-14 PROCEDURE — G8978 MOBILITY CURRENT STATUS: HCPCS | Performed by: PHYSICAL THERAPIST

## 2018-08-14 NOTE — THERAPY PROGRESS REPORT/RE-CERT
Outpatient Physical Therapy Ortho Progress Note  [facility]     Patient Name: Ronda Blair  : 1945  MRN: [id number]  Today's Date: 2018    Visit 2/  Return to MD: ANUJA  Re-cert date: 18  % improvement: 0%    Visit Date: 2018    Patient Active Problem List   Diagnosis   • Degeneration of intervertebral disc of mid-cervical region   • Morbid obesity due to excess calories (CMS/HCC)   • Former smoker        Past Medical History:   Diagnosis Date   • Anxiety    • Arthritis    • COPD (chronic obstructive pulmonary disease) (CMS/HCC)    • Depression    • Diabetes mellitus (CMS/HCC)    • History of transfusion    • Hyperlipidemia    • Hypertension    • Stroke (CMS/[facility])     Greater than 5 years ago        Past Surgical History:   Procedure Laterality Date   • CERVICAL FUSION     • EYE SURGERY Bilateral     lasix eye surgery   • HEMORRHOIDECTOMY     • HYSTERECTOMY     • INTRATHECAL PUMP REMOVAL         • KNEE ARTHROSCOPY Right    • LUMBAR SPINE SURGERY     • PAIN PUMP INSERTION/REVISION      Removed in        Visit Dx:     ICD-10-CM ICD-9-CM   1. Lumbar disc disease with radiculopathy M51.16 722.10                 PT Ortho     Row Name 18 1700       Subjective Comments    Subjective Comments pt arrived 18 min late for her session, pt reports sick over the past month and unable to attend PT since the PT consult  -BS       Precautions and Contraindications    Precautions/Limitations fall precautions  -BS    Precautions COPD, has O2 at night, keep HOB elevated, can't lay flat  -BS       Subjective Pain    Able to rate subjective pain? yes  -BS    Pre-Treatment Pain Level 4  -BS    Post-Treatment Pain Level 4  -BS       Myotomal Screen- Lower Quarter Clearing    Hip flexion (L2) Right:;5 (Normal);Left:;4+ (Good +)  -BS       Lumbar ROM Screen- Lower Quarter Clearing    Lumbar Flexion Impaired   fingers to mid shin  -BS      User Key  (r) =  Recorded By, (t) = Taken By, (c) = Cosigned By    Initials Name Provider Type    Addison Felder, PT Physical Therapist                      Therapy Education  Given: HEP  Program: Reinforced  How Provided: Verbal, Written  Provided to: Patient  Level of Understanding: Verbalized, Demonstrated, Teach back education performed           PT OP Goals     Row Name 08/14/18 1700          PT Short Term Goals    STG Date to Achieve 08/03/18  -BS     STG 1 Pt indep with HEP  -BS     STG 1 Progress New  -BS     STG 2 Improve lumbar flex AROM-fingers to mid shin (pain-free)  -BS     STG 2 Progress Met  -BS     STG 3 Improve B hip flex MMT to 5/5 level  -BS     STG 3 Progress Partially Met  -BS     STG 4 Reduce LBP by 25% with performing household chores  -BS     STG 4 Progress Ongoing  -BS        Long Term Goals    LTG Date to Achieve 08/17/18  -BS     LTG 1 Improve lumbar flex AROM-fingers to ankles (pain-free)  -BS     LTG 1 Progress Progressing  -BS     LTG 2 Reduce LBP by 50% with performing household chores  -BS     LTG 2 Progress Ongoing  -BS     LTG 3 Reduce Modified Oswestry score to 24 or less  -BS     LTG 3 Progress New  -BS        Time Calculation    PT Goal Re-Cert Due Date 09/04/18  -BS       User Key  (r) = Recorded By, (t) = Taken By, (c) = Cosigned By    Initials Name Provider Type    Addison Felder, PT Physical Therapist                PT Assessment/Plan     Row Name 08/14/18 1700          PT Assessment    Functional Limitations Performance in self-care ADL;Performance in sport activities;Performance in work activities;Performance in leisure activities  -BS     Impairments Balance;Gait;Sensation;Range of motion;Posture;Pain;Muscle strength  -BS     Assessment Comments minimal change toward goals due mostly to poor compliance with attending PT. Slight improvement in lumbar flex ROM.  -BS     Please refer to paper survey for additional self-reported information Yes  -BS     Rehab Potential Fair  -BS      "Patient/caregiver participated in establishment of treatment plan and goals Yes  -BS     Patient would benefit from skilled therapy intervention Yes  -BS        PT Plan    PT Frequency 2x/week  -BS     Predicted Duration of Therapy Intervention (Therapy Eval) 4 weeks  -BS     Planned CPT's? PT EVAL MOD COMPLELITY: 33234;PT RE-EVAL: 30300;PT THER PROC EA 15 MIN: 34346;PT MANUAL THERAPY EA 15 MIN: 94666;PT NEUROMUSC RE-EDUCATION EA 15 MIN: 55085;PT HOT OR COLD PACK TREAT MCARE;PT ELECTRICAL STIM UNATTEND: ;PT ULTRASOUND EA 15 MIN: 32695;PT THER SUPP EA 15 MIN  -BS     Physical Therapy Interventions (Optional Details) balance training;home exercise program;joint mobilization;lumbar stabilization;manual therapy techniques;modalities;neuromuscular re-education;patient/family education;postural re-education;ROM (Range of Motion);stair training;stretching;strengthening  -BS     PT Plan Comments continue with core stability  -BS       User Key  (r) = Recorded By, (t) = Taken By, (c) = Cosigned By    Initials Name Provider Type    BS Addison Hand, PT Physical Therapist                  Exercises     Row Name 08/14/18 1700             Subjective Comments    Subjective Comments pt arrived 18 min late for her session, pt reports sick over the past month and unable to attend PT since the PT consult  -BS         Subjective Pain    Able to rate subjective pain? yes  -BS      Pre-Treatment Pain Level 4  -BS      Post-Treatment Pain Level 4  -BS         Exercise 1    Exercise Name 1 Pro II, level 2  -BS      Time 1 5 min  -BS         Exercise 2    Exercise Name 2 H/L alt leg lifts  -BS      Sets 2 1  -BS      Reps 2 10  -BS         Exercise 3    Exercise Name 3 sitting B piriformis stretch  -BS      Reps 3 2  -BS      Time 3 30\" hold  -BS         Exercise 4    Exercise Name 4 B SKC stretch  -BS      Sets 4 1  -BS      Reps 4 2  -BS      Time 4 30\" hold  -BS         Exercise 5    Exercise Name 5 LTR  -BS      Sets 5 1  -BS   " "   Reps 5 5 ea  -BS      Time 5 5\" hold  -BS        User Key  (r) = Recorded By, (t) = Taken By, (c) = Cosigned By    Initials Name Provider Type    BS Addison Hand, PT Physical Therapist                        Outcome Measure Options: Modifed Owestry  Modified Oswestry  Modified Oswestry Score/Comments: 31-62%      Time Calculation:     Therapy Suggested Charges     Code   Minutes Charges    None             Start Time: 1703  Stop Time: 1730  Time Calculation (min): 27 min     Therapy Charges for Today     Code Description Service Date Service Provider Modifiers Qty    15770131534  PT MOBILITY CURRENT 8/14/2018 Addison Hand, PT GP, CL 1    63034695830 HC PT MOBILITY PROJECTED 8/14/2018 Addison Hand, PT GP, CK 1    10791455833 HC PT THER PROC EA 15 MIN 8/14/2018 Addison Hand, PT GP 2          PT G-Codes  Outcome Measure Options: Modifed Owestry  Score: 31-62%  Functional Limitation: Mobility: Walking and moving around  Mobility: Walking and Moving Around Current Status (): At least 60 percent but less than 80 percent impaired, limited or restricted  Mobility: Walking and Moving Around Goal Status (): At least 40 percent but less than 60 percent impaired, limited or restricted         Addison Hand, JANINE  8/14/2018      "

## 2018-08-16 ENCOUNTER — HOSPITAL ENCOUNTER (OUTPATIENT)
Dept: PHYSICAL THERAPY | Facility: HOSPITAL | Age: 73
Setting detail: THERAPIES SERIES
Discharge: HOME | End: 2018-08-16

## 2018-08-16 DIAGNOSIS — M51.16 LUMBAR DISC DISEASE WITH RADICULOPATHY: Primary | ICD-10-CM

## 2018-08-16 PROCEDURE — 97110 THERAPEUTIC EXERCISES: CPT

## 2018-08-16 NOTE — THERAPY TREATMENT NOTE
Outpatient Physical Therapy Ortho Treatment Note  [facility]     Patient Name: Ronda Blair  : 1945  MRN: [id number]  Today's Date: 2018      Visit Date: 2018  Visits 3/3. John . MD f/arin TBISAAK. 0% improvement.   Visit Dx:    ICD-10-CM ICD-9-CM   1. Lumbar disc disease with radiculopathy M51.16 722.10       Patient Active Problem List   Diagnosis   • Degeneration of intervertebral disc of mid-cervical region   • Morbid obesity due to excess calories (CMS/HCC)   • Former smoker        Past Medical History:   Diagnosis Date   • Anxiety    • Arthritis    • COPD (chronic obstructive pulmonary disease) (CMS/HCC)    • Depression    • Diabetes mellitus (CMS/HCC)    • History of transfusion    • Hyperlipidemia    • Hypertension    • Stroke (CMS/HCC)     Greater than 5 years ago        Past Surgical History:   Procedure Laterality Date   • CERVICAL FUSION     • EYE SURGERY Bilateral     lasix eye surgery   • HEMORRHOIDECTOMY     • HYSTERECTOMY     • INTRATHECAL PUMP REMOVAL         • KNEE ARTHROSCOPY Right    • LUMBAR SPINE SURGERY     • PAIN PUMP INSERTION/REVISION      Removed in              PT Ortho     Row Name 18 1400       Subjective Comments    Subjective Comments Pt late for treatment. She reports feeling better today than prev visit.   -JW       Precautions and Contraindications    Precautions/Limitations fall precautions  -    Precautions COPD, has O2 at night, keep HOB elevated, can't lay flat  -       Subjective Pain    Able to rate subjective pain? yes  -    Pre-Treatment Pain Level 6  -    Post-Treatment Pain Level 4  -    Row Name 18 1700       Subjective Comments    Subjective Comments pt arrived 18 min late for her session, pt reports sick over the past month and unable to attend PT since the PT consult  -BS       Precautions and Contraindications    Precautions/Limitations fall precautions  -BS    Precautions  COPD, has O2 at night, keep HOB elevated, can't lay flat  -BS       Subjective Pain    Able to rate subjective pain? yes  -BS    Pre-Treatment Pain Level 4  -BS    Post-Treatment Pain Level 4  -BS       Myotomal Screen- Lower Quarter Clearing    Hip flexion (L2) Right:;5 (Normal);Left:;4+ (Good +)  -BS       Lumbar ROM Screen- Lower Quarter Clearing    Lumbar Flexion Impaired   fingers to mid shin  -BS      User Key  (r) = Recorded By, (t) = Taken By, (c) = Cosigned By    Initials Name Provider Type    Gurmeet Ferris, PTA Physical Therapy Assistant    BS Addison Hand, PT Physical Therapist                            PT Assessment/Plan     Row Name 08/16/18 1400          PT Assessment    Assessment Comments Fair debbie of today's treatment with increased TE intensity.   -        PT Plan    PT Frequency 2x/week  -JW     Predicted Duration of Therapy Intervention (Therapy Eval) 4 weeks  -     PT Plan Comments Cont PT per POC.   -       User Key  (r) = Recorded By, (t) = Taken By, (c) = Cosigned By    Initials Name Provider Type    Gurmeet Ferris, PTA Physical Therapy Assistant                    Exercises     Row Name 08/16/18 1400 08/16/18 1300          Subjective Comments    Subjective Comments Pt late for treatment. She reports feeling better today than prev visit.   -JW  --        Subjective Pain    Able to rate subjective pain? yes  -JW  --     Pre-Treatment Pain Level 6  -JW  --     Post-Treatment Pain Level 4  -JW  --        Exercise 1    Exercise Name 1  -- Pro II, level 2  -     Time 1  -- 5 min  -JW        Exercise 2    Exercise Name 2  -- LTR  -JW     Reps 2  -- 10  -JW        Exercise 3    Exercise Name 3  -- GS  -JW     Sets 3  -- 2  -JW     Reps 3  -- 10  -JW        Exercise 4    Exercise Name 4  -- BKLL  -JW     Reps 4  -- 10  -JW        Exercise 5    Exercise Name 5  -- BKFO  -JW     Reps 5  -- 10  -JW        Exercise 6    Exercise Name 6  -- LTR iso  -JW     Reps 6  -- 10x ea  -JW         Exercise 7    Exercise Name 7  -- Seated lumbar ext iso  -     Sets 7  -- 2  -JW     Reps 7  -- 10  -JW        Exercise 8    Exercise Name 8  -- Seated thoracic ext  -     Sets 8  -- 2  -JW     Reps 8  -- 10  -JW        Exercise 9    Exercise Name 9  -- Amb  -     Time 9  -- 4'  -       User Key  (r) = Recorded By, (t) = Taken By, (c) = Cosigned By    Initials Name Provider Type    Gurmeet Ferris PTA Physical Therapy Assistant                               PT OP Goals     Row Name 08/16/18 1415 08/16/18 1400       PT Short Term Goals    STG Date to Achieve  -- 08/03/18  -    STG 1  -- Pt indep with HEP  -    STG 1 Progress  -- Ongoing  -    STG 2  -- Improve lumbar flex AROM-fingers to mid shin (pain-free)  -    STG 2 Progress  -- Ongoing  -    STG 3  -- Improve B hip flex MMT to 5/5 level  -    STG 3 Progress  -- Partially Met  -    STG 4  -- Reduce LBP by 25% with performing household chores  -    STG 4 Progress  -- Ongoing  -       Long Term Goals    LTG Date to Achieve  -- 08/17/18  -    LTG 1  -- Improve lumbar flex AROM-fingers to ankles (pain-free)  -    LTG 1 Progress  -- Progressing  -    LTG 2  -- Reduce LBP by 50% with performing household chores  -    LTG 2 Progress  -- Ongoing  -    LTG 3  -- Reduce Modified Oswestry score to 24 or less  -    LT 3 Progress  -- Ongoing  -       Time Calculation    PT Goal Re-Cert Due Date 09/04/18  -  --      User Key  (r) = Recorded By, (t) = Taken By, (c) = Cosigned By    Initials Name Provider Type    Gurmeet Ferris PTA Physical Therapy Assistant                         Time Calculation:   Start Time: 1312  Stop Time: 1400  Time Calculation (min): 48 min  Total Timed Code Minutes- PT: 48 minute(s)  Therapy Suggested Charges     Code   Minutes Charges    None           Therapy Charges for Today     Code Description Service Date Service Provider Modifiers Qty    87575764001 HC PT THER PROC EA 15 MIN 8/16/2018  Sage, Gurmeet HUERTA, PTA GP 3                    Gurmeet Cazares, PTA  8/16/2018

## 2018-08-20 ENCOUNTER — HOSPITAL ENCOUNTER (OUTPATIENT)
Dept: PHYSICAL THERAPY | Facility: HOSPITAL | Age: 73
Setting detail: THERAPIES SERIES
Discharge: HOME | End: 2018-08-20

## 2018-08-20 DIAGNOSIS — M51.16 LUMBAR DISC DISEASE WITH RADICULOPATHY: Primary | ICD-10-CM

## 2018-08-20 PROCEDURE — 97110 THERAPEUTIC EXERCISES: CPT

## 2018-08-20 NOTE — THERAPY TREATMENT NOTE
Outpatient Physical Therapy Ortho Treatment Note  [facility]  Romelia Engle PTA       Patient Name: Ronda Blair  : 1945  MRN: [id number]  Today's Date: 2018      Visit Date: 2018     Visits: 4/5  Insurance Visits Approved: based on medicare guidelines  Recert Due: 2018  MD Appt: no follow up  Pain: pretreatment 4/10; post treatment 4/10  Improvement: pt is subjectively reporting 0% improvement since initial evaluation    Visit Dx:    ICD-10-CM ICD-9-CM   1. Lumbar disc disease with radiculopathy M51.16 722.10       Patient Active Problem List   Diagnosis   • Degeneration of intervertebral disc of mid-cervical region   • Morbid obesity due to excess calories (CMS/HCC)   • Former smoker        Past Medical History:   Diagnosis Date   • Anxiety    • Arthritis    • COPD (chronic obstructive pulmonary disease) (CMS/HCC)    • Depression    • Diabetes mellitus (CMS/HCC)    • History of transfusion    • Hyperlipidemia    • Hypertension    • Stroke (CMS/HCC)     Greater than 5 years ago        Past Surgical History:   Procedure Laterality Date   • CERVICAL FUSION     • EYE SURGERY Bilateral     lasix eye surgery   • HEMORRHOIDECTOMY     • HYSTERECTOMY     • INTRATHECAL PUMP REMOVAL         • KNEE ARTHROSCOPY Right    • LUMBAR SPINE SURGERY     • PAIN PUMP INSERTION/REVISION      Removed in              PT Ortho     Row Name 18 1000       Subjective Comments    Subjective Comments states that her upper back hurts more than her lower back. needs to talk to her doctor about that.   -       Precautions and Contraindications    Precautions/Limitations fall precautions  -    Precautions COPD, has O2 at night, keep HOB elevated, can't lay flat  -       Subjective Pain    Able to rate subjective pain? yes  -MH    Pre-Treatment Pain Level 4  -MH    Post-Treatment Pain Level 4  -MH      User Key  (r) = Recorded By, (t) = Taken By, (c) =  Cosigned By    Initials Name Provider Type     Romelia Engle PTA Physical Therapy Assistant                            PT Assessment/Plan     Row Name 08/20/18 1000          PT Assessment    Assessment Comments patient able to complete all therex with no increase in complaints today.   -        PT Plan    PT Frequency 2x/week  -     PT Plan Comments attempt standing Lx Ext  -MH       User Key  (r) = Recorded By, (t) = Taken By, (c) = Cosigned By    Initials Name Provider Type     Romelia Engle PTA Physical Therapy Assistant                    Exercises     Row Name 08/20/18 1000             Subjective Comments    Subjective Comments states that her upper back hurts more than her lower back. needs to talk to her doctor about that.   -         Subjective Pain    Able to rate subjective pain? yes  -      Pre-Treatment Pain Level 4  -      Post-Treatment Pain Level 4  -MH         Exercise 1    Exercise Name 1 Pro II LE's  -MH      Time 1 10 minutes  -      Additional Comments L 4.0  -MH         Exercise 2    Exercise Name 2 assess BP and O2  -      Additional Comments HR 88, O2 94%, /75  -         Exercise 3    Exercise Name 3 Long Sitting B HS S  -MH      Reps 3 2  -MH      Time 3 30 sec hold  -MH         Exercise 4    Exercise Name 4 B Sitting Piriformis S  -MH      Reps 4 2  -MH         Exercise 5    Exercise Name 5 LTR  -MH      Reps 5 10  -MH      Time 5 10 sec hold  -MH         Exercise 6    Exercise Name 6 Bridges  -MH      Reps 6 20  -MH      Time 6 5 sec hold  -MH         Exercise 7    Exercise Name 7 BKLL  -MH      Time 7 3 minutes; 5 sec hold each leg up  -MH         Exercise 8    Exercise Name 8 Bent Knee Fall Outs  -MH      Reps 8 15  -MH      Time 8 5 sec hold  -        User Key  (r) = Recorded By, (t) = Taken By, (c) = Cosigned By    Initials Name Provider Type     Romelia Engle PTA Physical Therapy Assistant                               PT OP Goals     Row Name  08/20/18 1000          PT Short Term Goals    STG Date to Achieve 08/03/18  -     STG 1 Pt indep with HEP  -     STG 1 Progress Ongoing  -     STG 2 Improve lumbar flex AROM-fingers to mid shin (pain-free)  -     STG 2 Progress Ongoing  -     STG 3 Improve B hip flex MMT to 5/5 level  -     STG 3 Progress Partially Met  -     STG 4 Reduce LBP by 25% with performing household chores  -     STG 4 Progress Ongoing  -        Long Term Goals    LTG Date to Achieve 08/17/18  -     LTG 1 Improve lumbar flex AROM-fingers to ankles (pain-free)  -     LTG 1 Progress Progressing  -     LTG 2 Reduce LBP by 50% with performing household chores  -     LTG 2 Progress Ongoing  -     LTG 3 Reduce Modified Oswestry score to 24 or less  -     LTG 3 Progress Ongoing  -        Time Calculation    PT Goal Re-Cert Due Date 09/04/18  -       User Key  (r) = Recorded By, (t) = Taken By, (c) = Cosigned By    Initials Name Provider Type     Romelia Engle PTA Physical Therapy Assistant          Therapy Education  Given: HEP, Symptoms/condition management, Pain management, Posture/body mechanics, Fall prevention and home safety  Program: Reinforced  How Provided: Verbal, Demonstration  Provided to: Patient  Level of Understanding: Verbalized, Demonstrated              Time Calculation:   Start Time: 1018  Stop Time: 1105  Time Calculation (min): 47 min  Total Timed Code Minutes- PT: 47 minute(s)    Therapy Charges for Today     Code Description Service Date Service Provider Modifiers Qty    16085835426 HC PT THER PROC EA 15 MIN 8/20/2018 Romelia Engle PTA GP 3    98625301493 HC PT THER SUPP EA 15 MIN 8/20/2018 Romelia Engle PTA GP 1                    Romelia Engle PTA  8/20/2018

## 2018-08-22 ENCOUNTER — APPOINTMENT (OUTPATIENT)
Dept: PHYSICAL THERAPY | Facility: HOSPITAL | Age: 73
End: 2018-08-22

## 2018-08-27 ENCOUNTER — HOSPITAL ENCOUNTER (OUTPATIENT)
Dept: PHYSICAL THERAPY | Facility: HOSPITAL | Age: 73
Setting detail: THERAPIES SERIES
Discharge: HOME | End: 2018-08-27

## 2018-08-27 DIAGNOSIS — M51.16 LUMBAR DISC DISEASE WITH RADICULOPATHY: Primary | ICD-10-CM

## 2018-08-27 PROCEDURE — 97110 THERAPEUTIC EXERCISES: CPT

## 2018-08-27 NOTE — THERAPY TREATMENT NOTE
Outpatient Physical Therapy Ortho Treatment Note  [facility]  Romelia Engle PTA       Patient Name: Ronda Blair  : 1945  MRN: [id number]  Today's Date: 2018      Visit Date: 2018     Visits:  /  Insurance Visits Approved: based on medicare guidelines  Recert Due: 2018  MD Appt: TBD  Pain: pretreatment 310; post treatment 3.5/10  Improvement: pt is subjectively reporting 0% improvement since initial evaluation    Visit Dx:    ICD-10-CM ICD-9-CM   1. Lumbar disc disease with radiculopathy M51.16 722.10       Patient Active Problem List   Diagnosis   • Degeneration of intervertebral disc of mid-cervical region   • Morbid obesity due to excess calories (CMS/HCC)   • Former smoker        Past Medical History:   Diagnosis Date   • Anxiety    • Arthritis    • COPD (chronic obstructive pulmonary disease) (CMS/HCC)    • Depression    • Diabetes mellitus (CMS/HCC)    • History of transfusion    • Hyperlipidemia    • Hypertension    • Stroke (CMS/HCC)     Greater than 5 years ago        Past Surgical History:   Procedure Laterality Date   • CERVICAL FUSION     • EYE SURGERY Bilateral     lasix eye surgery   • HEMORRHOIDECTOMY     • HYSTERECTOMY     • INTRATHECAL PUMP REMOVAL         • KNEE ARTHROSCOPY Right    • LUMBAR SPINE SURGERY     • PAIN PUMP INSERTION/REVISION      Removed in              PT Ortho     Row Name 18 1100       Subjective Comments    Subjective Comments states that she had to cancel last visit secondary to being out of pain medication and in too much pain.   -       Precautions and Contraindications    Precautions/Limitations fall precautions  -    Precautions COPD, has O2 at night, keep HOB elevated, can't lay flat  -       Subjective Pain    Able to rate subjective pain? yes  -    Pre-Treatment Pain Level 3  -       Posture/Observations    Posture/Observations Comments business office does not notify  clinical staff that patient is present, therefore patient is started 15 mintues late.   -      User Key  (r) = Recorded By, (t) = Taken By, (c) = Cosigned By    Initials Name Provider Type    Romelia Vinson PTA Physical Therapy Assistant                            PT Assessment/Plan     Row Name 08/27/18 1100          PT Assessment    Assessment Comments patient did well with seated posture and core stability exercises today with only minimal increase in complaints of pain. did not complete supine activities secondary to patient wearing a short skirt  -        PT Plan    PT Frequency 2x/week  -     PT Plan Comments next visit resume supine activities.   -       User Key  (r) = Recorded By, (t) = Taken By, (c) = Cosigned By    Initials Name Provider Type    Romelia Vinson PTA Physical Therapy Assistant                Modalities     Row Name 08/27/18 1100             Subjective Pain    Post-Treatment Pain Level --   3.5/10  -        User Key  (r) = Recorded By, (t) = Taken By, (c) = Cosigned By    Initials Name Provider Type    Romelia Vinson PTA Physical Therapy Assistant                Exercises     Row Name 08/27/18 1100             Subjective Comments    Subjective Comments states that she had to cancel last visit secondary to being out of pain medication and in too much pain.   -         Subjective Pain    Able to rate subjective pain? yes  -      Pre-Treatment Pain Level 3  -      Post-Treatment Pain Level --   3.5/10  -         Exercise 1    Exercise Name 1 Pro II LE's  -      Time 1 10 minutes  -      Additional Comments L 4.0  -         Exercise 2    Exercise Name 2 B St. HS S  -      Reps 2 2  -      Time 2 30 sec hold  -         Exercise 3    Exercise Name 3 B Sitting Piriformis S  -      Reps 3 2  -      Time 3 30 sec hold  -         Exercise 4    Exercise Name 4 Sit to/from stand with Lx Ext   -      Cueing 4 Verbal;Demo;Tactile  -      Reps 4 10  -MH       Time 4 5 sec hold  -      Additional Comments Sled in front of patient for balance  -         Exercise 5    Exercise Name 5 Sitting Marching  -      Time 5 3 minutes  -         Exercise 6    Exercise Name 6 Tband Hip ABD with trans abs  -      Time 6 3 minutes  -         Exercise 7    Exercise Name 7 Tbar Chest Press up and Press out  -      Time 7 3 minutes  -      Additional Comments focus on posture  -         Exercise 8    Exercise Name 8 LAQ with Transabs  -      Time 8 4 minutes  -        User Key  (r) = Recorded By, (t) = Taken By, (c) = Cosigned By    Initials Name Provider Type     Romelia Engle, AMADOU Physical Therapy Assistant                               PT OP Goals     Row Name 08/27/18 1100          PT Short Term Goals    STG Date to Achieve 08/03/18  -     STG 1 Pt indep with HEP  -     STG 1 Progress Ongoing  -     STG 2 Improve lumbar flex AROM-fingers to mid shin (pain-free)  -     STG 2 Progress Ongoing  -     STG 3 Improve B hip flex MMT to 5/5 level  -     STG 3 Progress Partially Met  -     STG 4 Reduce LBP by 25% with performing household chores  -     STG 4 Progress Ongoing  -        Long Term Goals    LTG Date to Achieve 08/17/18  -     LTG 1 Improve lumbar flex AROM-fingers to ankles (pain-free)  -     LTG 1 Progress Progressing  -     LTG 2 Reduce LBP by 50% with performing household chores  -     LTG 2 Progress Ongoing  -     LTG 3 Reduce Modified Oswestry score to 24 or less  -     LTG 3 Progress Ongoing  -        Time Calculation    PT Goal Re-Cert Due Date 09/04/18  -       User Key  (r) = Recorded By, (t) = Taken By, (c) = Cosigned By    Initials Name Provider Type     Romelia Engle PTA Physical Therapy Assistant          Therapy Education  Given: HEP, Symptoms/condition management, Pain management, Posture/body mechanics, Fall prevention and home safety  Program: Reinforced  How Provided: Verbal, Demonstration  Provided  to: Patient  Level of Understanding: Verbalized, Demonstrated              Time Calculation:   Start Time: 1114  Stop Time: 1157  Time Calculation (min): 43 min  Total Timed Code Minutes- PT: 43 minute(s)    Therapy Charges for Today     Code Description Service Date Service Provider Modifiers Qty    86787189711 HC PT THER PROC EA 15 MIN 8/27/2018 Romelia Engle PTA GP 3    97060773798 HC PT THER SUPP EA 15 MIN 8/27/2018 Romelia Engle PTA GP 1                    Romelia Engle PTA  8/27/2018

## 2018-08-30 ENCOUNTER — HOSPITAL ENCOUNTER (OUTPATIENT)
Dept: PHYSICAL THERAPY | Facility: HOSPITAL | Age: 73
Setting detail: THERAPIES SERIES
Discharge: HOME | End: 2018-08-30

## 2018-08-30 DIAGNOSIS — G89.29 CHRONIC LEFT SHOULDER PAIN: ICD-10-CM

## 2018-08-30 DIAGNOSIS — M50.320 DEGENERATION OF INTERVERTEBRAL DISC OF MID-CERVICAL REGION, UNSPECIFIED SPINAL LEVEL: ICD-10-CM

## 2018-08-30 DIAGNOSIS — M51.16 LUMBAR DISC DISEASE WITH RADICULOPATHY: Primary | ICD-10-CM

## 2018-08-30 DIAGNOSIS — M25.512 CHRONIC LEFT SHOULDER PAIN: ICD-10-CM

## 2018-08-30 PROCEDURE — 97110 THERAPEUTIC EXERCISES: CPT

## 2018-09-06 ENCOUNTER — HOSPITAL ENCOUNTER (OUTPATIENT)
Dept: PHYSICAL THERAPY | Facility: HOSPITAL | Age: 73
Setting detail: THERAPIES SERIES
Discharge: HOME | End: 2018-09-06

## 2018-09-06 DIAGNOSIS — M51.16 LUMBAR DISC DISEASE WITH RADICULOPATHY: Primary | ICD-10-CM

## 2018-09-06 DIAGNOSIS — M25.512 CHRONIC LEFT SHOULDER PAIN: ICD-10-CM

## 2018-09-06 DIAGNOSIS — M50.320 DEGENERATION OF INTERVERTEBRAL DISC OF MID-CERVICAL REGION, UNSPECIFIED SPINAL LEVEL: ICD-10-CM

## 2018-09-06 DIAGNOSIS — G89.29 CHRONIC LEFT SHOULDER PAIN: ICD-10-CM

## 2018-09-06 PROCEDURE — 97110 THERAPEUTIC EXERCISES: CPT

## 2018-09-06 PROCEDURE — G8979 MOBILITY GOAL STATUS: HCPCS

## 2018-09-06 PROCEDURE — G8980 MOBILITY D/C STATUS: HCPCS

## 2018-09-06 NOTE — THERAPY DISCHARGE NOTE
Outpatient Physical Therapy Ortho Treatment Note/Discharge Summary  [facility]  Romelia Engle PTA       Patient Name: Ronda Blair  : 1945  MRN: [id number]  Today's Date: 2018      Visit Date: 2018     Visits: 7/10  Insurance Visits Approved: based on medicare guidelines  Recert Due: 2018  MD Appt:   Pain: pretreatment 3/10; post treatment 3/10  Improvement: pt is subjectively reporting 0% improvement since initial evaluation    Visit Dx:    ICD-10-CM ICD-9-CM   1. Lumbar disc disease with radiculopathy M51.16 722.10   2. Degeneration of intervertebral disc of mid-cervical region, unspecified spinal level M50.320 722.4   3. Chronic left shoulder pain M25.512 719.41    G89.29 338.29       Patient Active Problem List   Diagnosis   • Degeneration of intervertebral disc of mid-cervical region   • Morbid obesity due to excess calories (CMS/HCC)   • Former smoker        Past Medical History:   Diagnosis Date   • Anxiety    • Arthritis    • COPD (chronic obstructive pulmonary disease) (CMS/HCC)    • Depression    • Diabetes mellitus (CMS/HCC)    • History of transfusion    • Hyperlipidemia    • Hypertension    • Stroke (CMS/HCC)     Greater than 5 years ago        Past Surgical History:   Procedure Laterality Date   • CERVICAL FUSION     • EYE SURGERY Bilateral     lasix eye surgery   • HEMORRHOIDECTOMY     • HYSTERECTOMY     • INTRATHECAL PUMP REMOVAL         • KNEE ARTHROSCOPY Right 2015   • LUMBAR SPINE SURGERY     • PAIN PUMP INSERTION/REVISION      Removed in              PT Ortho     Row Name 18 1000       Subjective Comments    Subjective Comments didnt feel well she other day. tried to call but her phone didnt work. states that she doesn't feel like therapy is helping any at all. states that she sees her doctor the .   -MH       Precautions and Contraindications    Precautions/Limitations fall precautions   -    Precautions COPD, has O2 at night, keep HOB elevated, can't lay flat  -       Subjective Pain    Able to rate subjective pain? yes  -    Pre-Treatment Pain Level 3  -    Post-Treatment Pain Level 3  -       Head/Neck/Trunk    Trunk Flexion AROM fingertips to mid shin; painful  -      User Key  (r) = Recorded By, (t) = Taken By, (c) = Cosigned By    Initials Name Provider Type     Romelia Engle PTA Physical Therapy Assistant                            PT Assessment/Plan     Row Name 09/06/18 1000          PT Assessment    Assessment Comments patient reporting no improvement. modified oswestry has progressively gotten worse since initial evaluation. patient does have improved lumbar flexion but continues to be painful. patient unable to perform therex with no cues. patient admits to non compliance with HEP. patient has not fully met any goals at this time.   -        PT Plan    PT Frequency 2x/week  -     PT Plan Comments per patient request, discharge to independent management  -       User Key  (r) = Recorded By, (t) = Taken By, (c) = Cosigned By    Initials Name Provider Type     Romelia Engle PTA Physical Therapy Assistant                    Exercises     Row Name 09/06/18 1000             Subjective Comments    Subjective Comments didnt feel well she other day. tried to call but her phone didnt work. states that she doesn't feel like therapy is helping any at all. states that she sees her doctor the 24th of september.   -         Subjective Pain    Able to rate subjective pain? yes  -      Pre-Treatment Pain Level 3  -      Post-Treatment Pain Level 3  -         Exercise 1    Exercise Name 1 Pro II LE's  -      Time 1 10 mins  -      Additional Comments L 5.0  -         Exercise 2    Exercise Name 2 Sit to/from stand   -      Reps 2 10  -         Exercise 3    Exercise Name 3 sitting marching   -      Reps 3 30  -         Exercise 4    Exercise Name 4 B Alt  LAQ  -      Reps 4 20  -         Exercise 5    Exercise Name 5 Sitting Hip ABD Tband  -      Reps 5 20  -MH      Additional Comments green tband  -         Exercise 6    Exercise Name 6 B sitting Piriformis S  -      Reps 6 2  -      Time 6 30 sec hold  -        User Key  (r) = Recorded By, (t) = Taken By, (c) = Cosigned By    Initials Name Provider Type     Romelia Engle PTA Physical Therapy Assistant                               PT OP Goals     Row Name 09/06/18 1000          PT Short Term Goals    STG Date to Achieve 08/03/18  -     STG 1 Pt indep with HEP  -     STG 1 Progress Not Met  -     STG 2 Improve lumbar flex AROM-fingers to mid shin (pain-free)  -     STG 2 Progress Partially Met  -     STG 2 Progress Comments painful  -     STG 3 Improve B hip flex MMT to 5/5 level  -     STG 3 Progress Partially Met  -     STG 4 Reduce LBP by 25% with performing household chores  -     STG 4 Progress Not Met  -        Long Term Goals    LTG Date to Achieve 08/17/18  -     LTG 1 Improve lumbar flex AROM-fingers to ankles (pain-free)  -     LTG 1 Progress Not Met  -     LTG 2 Reduce LBP by 50% with performing household chores  -     LTG 2 Progress Not Met  -     LTG 3 Reduce Modified Oswestry score to 24 or less  -     LTG 3 Progress Not Met  -        Time Calculation    PT Goal Re-Cert Due Date 09/04/18  -       User Key  (r) = Recorded By, (t) = Taken By, (c) = Cosigned By    Initials Name Provider Type     Romelia Engle PTA Physical Therapy Assistant          Therapy Education  Given: HEP, Symptoms/condition management, Pain management, Posture/body mechanics  Program: Reinforced  How Provided: Verbal, Demonstration  Provided to: Patient  Level of Understanding: Verbalized, Demonstrated    Outcome Measure Options: Modifed Owestry  Modified Oswestry  Modified Oswestry Score/Comments: 33=66%      Time Calculation:   Start Time: 1020  Stop Time: 1104  Time  Calculation (min): 44 min  Total Timed Code Minutes- PT: 44 minute(s)    Therapy Charges for Today     Code Description Service Date Service Provider Modifiers Qty    43263936247 HC PT MOBILITY PROJECTED 9/6/2018 Romelia Engle PTA GP, CK 1    28938946313 HC PT MOBILITY DISCHARGE 9/6/2018 Romelia Engle PTA GP, CL 1    47258050082 HC PT THER PROC EA 15 MIN 9/6/2018 Romelia Engle PTA GP 3    29012637444 HC PT THER SUPP EA 15 MIN 9/6/2018 Romelia Engle PTA GP 1          PT G-Codes  Outcome Measure Options: Lucy Marinelli  Modified Oswestry Score/Comments: 33=66%  Functional Limitation: Mobility: Walking and moving around  Mobility: Walking and Moving Around Goal Status (): At least 40 percent but less than 60 percent impaired, limited or restricted  Mobility: Walking and Moving Around Discharge Status (): At least 60 percent but less than 80 percent impaired, limited or restricted     OP PT Discharge Summary  Date of Discharge: 09/06/18  Reason for Discharge: Patient/Caregiver request, Lack of progress, Maximum functional potential achieved, Non-compliant  Outcomes Achieved: Refer to plan of care for updates on goals achieved, Unable to make functional progress toward goals at this time  Discharge Destination: Home with home program      Romelia Engle PTA  9/6/2018

## 2020-07-21 ENCOUNTER — APPOINTMENT (OUTPATIENT)
Dept: GENERAL RADIOLOGY | Facility: HOSPITAL | Age: 75
End: 2020-07-21

## 2020-07-21 ENCOUNTER — HOSPITAL ENCOUNTER (OUTPATIENT)
Facility: HOSPITAL | Age: 75
Setting detail: OBSERVATION
Discharge: HOME | End: 2020-07-23
Attending: EMERGENCY MEDICINE | Admitting: INTERNAL MEDICINE

## 2020-07-21 ENCOUNTER — APPOINTMENT (OUTPATIENT)
Dept: CT IMAGING | Facility: HOSPITAL | Age: 75
End: 2020-07-21

## 2020-07-21 DIAGNOSIS — R07.9 CHEST PAIN, UNSPECIFIED TYPE: Primary | ICD-10-CM

## 2020-07-21 DIAGNOSIS — M50.322 DEGENERATION OF INTERVERTEBRAL DISC AT C5-C6 LEVEL: ICD-10-CM

## 2020-07-21 DIAGNOSIS — I16.0 HYPERTENSIVE URGENCY: ICD-10-CM

## 2020-07-21 DIAGNOSIS — J18.9 PNEUMONIA OF BOTH LUNGS DUE TO INFECTIOUS ORGANISM, UNSPECIFIED PART OF LUNG: ICD-10-CM

## 2020-07-21 LAB
ALBUMIN SERPL-MCNC: 4.9 G/DL (ref 3.5–5.2)
ALBUMIN/GLOB SERPL: 1.8 G/DL
ALP SERPL-CCNC: 79 U/L (ref 39–117)
ALT SERPL W P-5'-P-CCNC: 15 U/L (ref 1–33)
ANION GAP SERPL CALCULATED.3IONS-SCNC: 12 MMOL/L (ref 5–15)
APTT PPP: 31.9 SECONDS (ref 20–40.3)
AST SERPL-CCNC: 24 U/L (ref 1–32)
BACTERIA UR QL AUTO: ABNORMAL /HPF
BASOPHILS # BLD AUTO: 0.03 10*3/MM3 (ref 0–0.2)
BASOPHILS NFR BLD AUTO: 0.4 % (ref 0–1.5)
BILIRUB SERPL-MCNC: 0.5 MG/DL (ref 0–1.2)
BILIRUB UR QL STRIP: NEGATIVE
BUN SERPL-MCNC: 15 MG/DL (ref 8–23)
BUN/CREAT SERPL: 13.8 (ref 7–25)
CALCIUM SPEC-SCNC: 9.6 MG/DL (ref 8.6–10.5)
CHLORIDE SERPL-SCNC: 101 MMOL/L (ref 98–107)
CHOLEST SERPL-MCNC: 160 MG/DL (ref 0–200)
CLARITY UR: CLEAR
CO2 SERPL-SCNC: 27 MMOL/L (ref 22–29)
COLOR UR: YELLOW
CREAT SERPL-MCNC: 1.09 MG/DL (ref 0.57–1)
D-DIMER, QUANTITATIVE (MAD,POW, STR): 907 NG/ML (FEU) (ref 0–470)
D-LACTATE SERPL-SCNC: 0.9 MMOL/L (ref 0.5–2)
DEPRECATED RDW RBC AUTO: 46.5 FL (ref 37–54)
EOSINOPHIL # BLD AUTO: 0.07 10*3/MM3 (ref 0–0.4)
EOSINOPHIL NFR BLD AUTO: 0.9 % (ref 0.3–6.2)
ERYTHROCYTE [DISTWIDTH] IN BLOOD BY AUTOMATED COUNT: 15 % (ref 12.3–15.4)
GFR SERPL CREATININE-BSD FRML MDRD: 49 ML/MIN/1.73
GLOBULIN UR ELPH-MCNC: 2.8 GM/DL
GLUCOSE BLDC GLUCOMTR-MCNC: 94 MG/DL (ref 70–130)
GLUCOSE SERPL-MCNC: 114 MG/DL (ref 65–99)
GLUCOSE UR STRIP-MCNC: NEGATIVE MG/DL
HBA1C MFR BLD: 6 % (ref 4.8–5.6)
HCT VFR BLD AUTO: 43 % (ref 34–46.6)
HDLC SERPL-MCNC: 41 MG/DL (ref 40–60)
HGB BLD-MCNC: 13.8 G/DL (ref 12–15.9)
HGB UR QL STRIP.AUTO: ABNORMAL
HOLD SPECIMEN: NORMAL
HOLD SPECIMEN: NORMAL
HYALINE CASTS UR QL AUTO: ABNORMAL /LPF
IMM GRANULOCYTES # BLD AUTO: 0.02 10*3/MM3 (ref 0–0.05)
IMM GRANULOCYTES NFR BLD AUTO: 0.3 % (ref 0–0.5)
INR PPP: 0.95 (ref 0.8–1.2)
KETONES UR QL STRIP: NEGATIVE
LDLC SERPL CALC-MCNC: 86 MG/DL (ref 0–100)
LDLC/HDLC SERPL: 2.09 {RATIO}
LEUKOCYTE ESTERASE UR QL STRIP.AUTO: ABNORMAL
LYMPHOCYTES # BLD AUTO: 1.73 10*3/MM3 (ref 0.7–3.1)
LYMPHOCYTES NFR BLD AUTO: 21.9 % (ref 19.6–45.3)
MCH RBC QN AUTO: 27.7 PG (ref 26.6–33)
MCHC RBC AUTO-ENTMCNC: 32.1 G/DL (ref 31.5–35.7)
MCV RBC AUTO: 86.2 FL (ref 79–97)
MONOCYTES # BLD AUTO: 0.5 10*3/MM3 (ref 0.1–0.9)
MONOCYTES NFR BLD AUTO: 6.3 % (ref 5–12)
NEUTROPHILS NFR BLD AUTO: 5.56 10*3/MM3 (ref 1.7–7)
NEUTROPHILS NFR BLD AUTO: 70.2 % (ref 42.7–76)
NITRITE UR QL STRIP: NEGATIVE
NRBC BLD AUTO-RTO: 0 /100 WBC (ref 0–0.2)
NT-PROBNP SERPL-MCNC: 568.3 PG/ML (ref 0–1800)
PH UR STRIP.AUTO: 6.5 [PH] (ref 5–9)
PLATELET # BLD AUTO: 162 10*3/MM3 (ref 140–450)
PMV BLD AUTO: 11.2 FL (ref 6–12)
POTASSIUM SERPL-SCNC: 3.9 MMOL/L (ref 3.5–5.2)
PROT SERPL-MCNC: 7.7 G/DL (ref 6–8.5)
PROT UR QL STRIP: ABNORMAL
PROTHROMBIN TIME: 13.1 SECONDS (ref 11.1–15.3)
RBC # BLD AUTO: 4.99 10*6/MM3 (ref 3.77–5.28)
RBC # UR: ABNORMAL /HPF
REF LAB TEST METHOD: ABNORMAL
SARS-COV-2 N GENE RESP QL NAA+PROBE: NOT DETECTED
SODIUM SERPL-SCNC: 140 MMOL/L (ref 136–145)
SP GR UR STRIP: 1.02 (ref 1–1.03)
SQUAMOUS #/AREA URNS HPF: ABNORMAL /HPF
TRIGL SERPL-MCNC: 167 MG/DL (ref 0–150)
TROPONIN T SERPL-MCNC: <0.01 NG/ML (ref 0–0.03)
UROBILINOGEN UR QL STRIP: ABNORMAL
VLDLC SERPL-MCNC: 33.4 MG/DL
WBC # BLD AUTO: 7.91 10*3/MM3 (ref 3.4–10.8)
WBC UR QL AUTO: ABNORMAL /HPF
WHOLE BLOOD HOLD SPECIMEN: NORMAL
WHOLE BLOOD HOLD SPECIMEN: NORMAL

## 2020-07-21 PROCEDURE — 25010000002 HYDRALAZINE PER 20 MG: Performed by: EMERGENCY MEDICINE

## 2020-07-21 PROCEDURE — 85730 THROMBOPLASTIN TIME PARTIAL: CPT | Performed by: EMERGENCY MEDICINE

## 2020-07-21 PROCEDURE — 84484 ASSAY OF TROPONIN QUANT: CPT | Performed by: EMERGENCY MEDICINE

## 2020-07-21 PROCEDURE — 25010000002 ENOXAPARIN PER 10 MG: Performed by: INTERNAL MEDICINE

## 2020-07-21 PROCEDURE — 96372 THER/PROPH/DIAG INJ SC/IM: CPT

## 2020-07-21 PROCEDURE — 80061 LIPID PANEL: CPT | Performed by: INTERNAL MEDICINE

## 2020-07-21 PROCEDURE — 85025 COMPLETE CBC W/AUTO DIFF WBC: CPT | Performed by: EMERGENCY MEDICINE

## 2020-07-21 PROCEDURE — 85379 FIBRIN DEGRADATION QUANT: CPT | Performed by: EMERGENCY MEDICINE

## 2020-07-21 PROCEDURE — 83880 ASSAY OF NATRIURETIC PEPTIDE: CPT | Performed by: EMERGENCY MEDICINE

## 2020-07-21 PROCEDURE — 71045 X-RAY EXAM CHEST 1 VIEW: CPT

## 2020-07-21 PROCEDURE — 96374 THER/PROPH/DIAG INJ IV PUSH: CPT

## 2020-07-21 PROCEDURE — 87635 SARS-COV-2 COVID-19 AMP PRB: CPT | Performed by: EMERGENCY MEDICINE

## 2020-07-21 PROCEDURE — 71275 CT ANGIOGRAPHY CHEST: CPT

## 2020-07-21 PROCEDURE — 84484 ASSAY OF TROPONIN QUANT: CPT | Performed by: INTERNAL MEDICINE

## 2020-07-21 PROCEDURE — 93005 ELECTROCARDIOGRAM TRACING: CPT

## 2020-07-21 PROCEDURE — 96375 TX/PRO/DX INJ NEW DRUG ADDON: CPT

## 2020-07-21 PROCEDURE — 87040 BLOOD CULTURE FOR BACTERIA: CPT | Performed by: EMERGENCY MEDICINE

## 2020-07-21 PROCEDURE — 0 IOPAMIDOL PER 1 ML: Performed by: EMERGENCY MEDICINE

## 2020-07-21 PROCEDURE — 96361 HYDRATE IV INFUSION ADD-ON: CPT

## 2020-07-21 PROCEDURE — G0378 HOSPITAL OBSERVATION PER HR: HCPCS

## 2020-07-21 PROCEDURE — 25010000002 MORPHINE PER 10 MG: Performed by: STUDENT IN AN ORGANIZED HEALTH CARE EDUCATION/TRAINING PROGRAM

## 2020-07-21 PROCEDURE — 81001 URINALYSIS AUTO W/SCOPE: CPT | Performed by: EMERGENCY MEDICINE

## 2020-07-21 PROCEDURE — 25010000002 KETOROLAC TROMETHAMINE PER 15 MG: Performed by: INTERNAL MEDICINE

## 2020-07-21 PROCEDURE — 82962 GLUCOSE BLOOD TEST: CPT

## 2020-07-21 PROCEDURE — 99285 EMERGENCY DEPT VISIT HI MDM: CPT

## 2020-07-21 PROCEDURE — 85610 PROTHROMBIN TIME: CPT | Performed by: EMERGENCY MEDICINE

## 2020-07-21 PROCEDURE — 83036 HEMOGLOBIN GLYCOSYLATED A1C: CPT | Performed by: INTERNAL MEDICINE

## 2020-07-21 PROCEDURE — 80053 COMPREHEN METABOLIC PANEL: CPT | Performed by: EMERGENCY MEDICINE

## 2020-07-21 PROCEDURE — 83605 ASSAY OF LACTIC ACID: CPT | Performed by: EMERGENCY MEDICINE

## 2020-07-21 PROCEDURE — 25010000002 LEVOFLOXACIN PER 250 MG: Performed by: EMERGENCY MEDICINE

## 2020-07-21 RX ORDER — ISOSORBIDE MONONITRATE 30 MG/1
30 TABLET, EXTENDED RELEASE ORAL
Status: DISCONTINUED | OUTPATIENT
Start: 2020-07-21 | End: 2020-07-23 | Stop reason: HOSPADM

## 2020-07-21 RX ORDER — DEXTROSE MONOHYDRATE 25 G/50ML
25 INJECTION, SOLUTION INTRAVENOUS
Status: DISCONTINUED | OUTPATIENT
Start: 2020-07-21 | End: 2020-07-23 | Stop reason: HOSPADM

## 2020-07-21 RX ORDER — POTASSIUM CHLORIDE 750 MG/1
10 TABLET, EXTENDED RELEASE ORAL 2 TIMES DAILY
COMMUNITY
End: 2020-07-23 | Stop reason: HOSPADM

## 2020-07-21 RX ORDER — SODIUM CHLORIDE 0.9 % (FLUSH) 0.9 %
10 SYRINGE (ML) INJECTION AS NEEDED
Status: DISCONTINUED | OUTPATIENT
Start: 2020-07-21 | End: 2020-07-23 | Stop reason: HOSPADM

## 2020-07-21 RX ORDER — HYDRALAZINE HYDROCHLORIDE 20 MG/ML
10 INJECTION INTRAMUSCULAR; INTRAVENOUS EVERY 6 HOURS PRN
Status: DISCONTINUED | OUTPATIENT
Start: 2020-07-21 | End: 2020-07-23 | Stop reason: HOSPADM

## 2020-07-21 RX ORDER — TIZANIDINE 4 MG/1
4 TABLET ORAL NIGHTLY PRN
Status: DISCONTINUED | OUTPATIENT
Start: 2020-07-21 | End: 2020-07-23 | Stop reason: HOSPADM

## 2020-07-21 RX ORDER — SODIUM CHLORIDE 9 MG/ML
100 INJECTION, SOLUTION INTRAVENOUS CONTINUOUS
Status: DISCONTINUED | OUTPATIENT
Start: 2020-07-21 | End: 2020-07-22

## 2020-07-21 RX ORDER — PREGABALIN 75 MG/1
75 CAPSULE ORAL DAILY
COMMUNITY
End: 2020-08-31 | Stop reason: SDUPTHER

## 2020-07-21 RX ORDER — VITAMIN E 268 MG
400 CAPSULE ORAL DAILY
Status: DISCONTINUED | OUTPATIENT
Start: 2020-07-22 | End: 2020-07-23 | Stop reason: HOSPADM

## 2020-07-21 RX ORDER — BISACODYL 5 MG/1
5 TABLET, DELAYED RELEASE ORAL DAILY PRN
Status: DISCONTINUED | OUTPATIENT
Start: 2020-07-21 | End: 2020-07-23 | Stop reason: HOSPADM

## 2020-07-21 RX ORDER — SODIUM CHLORIDE 0.9 % (FLUSH) 0.9 %
10 SYRINGE (ML) INJECTION EVERY 12 HOURS SCHEDULED
Status: DISCONTINUED | OUTPATIENT
Start: 2020-07-21 | End: 2020-07-23 | Stop reason: HOSPADM

## 2020-07-21 RX ORDER — GABAPENTIN 300 MG/1
300 CAPSULE ORAL 4 TIMES DAILY
Status: DISCONTINUED | OUTPATIENT
Start: 2020-07-21 | End: 2020-07-23 | Stop reason: HOSPADM

## 2020-07-21 RX ORDER — FOLIC ACID 1 MG/1
1 TABLET ORAL DAILY
Status: DISCONTINUED | OUTPATIENT
Start: 2020-07-22 | End: 2020-07-23 | Stop reason: HOSPADM

## 2020-07-21 RX ORDER — FAMOTIDINE 20 MG/1
20 TABLET, FILM COATED ORAL DAILY
Status: DISCONTINUED | OUTPATIENT
Start: 2020-07-22 | End: 2020-07-23 | Stop reason: HOSPADM

## 2020-07-21 RX ORDER — ACETAMINOPHEN 650 MG/1
650 SUPPOSITORY RECTAL EVERY 4 HOURS PRN
Status: DISCONTINUED | OUTPATIENT
Start: 2020-07-21 | End: 2020-07-23 | Stop reason: HOSPADM

## 2020-07-21 RX ORDER — ROSUVASTATIN CALCIUM 10 MG/1
10 TABLET, COATED ORAL DAILY
Status: DISCONTINUED | OUTPATIENT
Start: 2020-07-22 | End: 2020-07-23 | Stop reason: HOSPADM

## 2020-07-21 RX ORDER — ALBUTEROL SULFATE 90 UG/1
2 AEROSOL, METERED RESPIRATORY (INHALATION) EVERY 6 HOURS PRN
Status: DISCONTINUED | OUTPATIENT
Start: 2020-07-21 | End: 2020-07-23 | Stop reason: HOSPADM

## 2020-07-21 RX ORDER — ASPIRIN 81 MG/1
81 TABLET ORAL DAILY
Status: DISCONTINUED | OUTPATIENT
Start: 2020-07-22 | End: 2020-07-22

## 2020-07-21 RX ORDER — ACETAMINOPHEN 160 MG/5ML
650 SOLUTION ORAL EVERY 4 HOURS PRN
Status: DISCONTINUED | OUTPATIENT
Start: 2020-07-21 | End: 2020-07-23 | Stop reason: HOSPADM

## 2020-07-21 RX ORDER — ACETAMINOPHEN 325 MG/1
650 TABLET ORAL EVERY 4 HOURS PRN
Status: DISCONTINUED | OUTPATIENT
Start: 2020-07-21 | End: 2020-07-23 | Stop reason: HOSPADM

## 2020-07-21 RX ORDER — NYSTATIN 100000 [USP'U]/G
1 POWDER TOPICAL 2 TIMES DAILY
COMMUNITY
End: 2020-07-23 | Stop reason: HOSPADM

## 2020-07-21 RX ORDER — TIZANIDINE 4 MG/1
4 TABLET ORAL NIGHTLY PRN
COMMUNITY
End: 2020-07-30 | Stop reason: SDDI

## 2020-07-21 RX ORDER — FOLIC ACID 1 MG/1
1 TABLET ORAL DAILY
COMMUNITY
End: 2020-08-31 | Stop reason: SDUPTHER

## 2020-07-21 RX ORDER — HYDRALAZINE HYDROCHLORIDE 20 MG/ML
10 INJECTION INTRAMUSCULAR; INTRAVENOUS ONCE
Status: COMPLETED | OUTPATIENT
Start: 2020-07-21 | End: 2020-07-21

## 2020-07-21 RX ORDER — ASPIRIN 325 MG
325 TABLET ORAL ONCE
Status: COMPLETED | OUTPATIENT
Start: 2020-07-21 | End: 2020-07-21

## 2020-07-21 RX ORDER — GABAPENTIN 300 MG/1
300 CAPSULE ORAL 4 TIMES DAILY
COMMUNITY
End: 2020-12-16

## 2020-07-21 RX ORDER — LEVOFLOXACIN 5 MG/ML
750 INJECTION, SOLUTION INTRAVENOUS ONCE
Status: COMPLETED | OUTPATIENT
Start: 2020-07-21 | End: 2020-07-21

## 2020-07-21 RX ORDER — ROPINIROLE 1 MG/1
1 TABLET, FILM COATED ORAL NIGHTLY
Status: DISCONTINUED | OUTPATIENT
Start: 2020-07-21 | End: 2020-07-23 | Stop reason: HOSPADM

## 2020-07-21 RX ORDER — OXYCODONE AND ACETAMINOPHEN 10; 325 MG/1; MG/1
1 TABLET ORAL EVERY 4 HOURS PRN
Status: DISCONTINUED | OUTPATIENT
Start: 2020-07-21 | End: 2020-07-23 | Stop reason: HOSPADM

## 2020-07-21 RX ORDER — UBIDECARENONE 75 MG
100 CAPSULE ORAL DAILY
Status: DISCONTINUED | OUTPATIENT
Start: 2020-07-22 | End: 2020-07-23 | Stop reason: HOSPADM

## 2020-07-21 RX ORDER — LABETALOL HYDROCHLORIDE 5 MG/ML
10 INJECTION, SOLUTION INTRAVENOUS ONCE
Status: DISCONTINUED | OUTPATIENT
Start: 2020-07-21 | End: 2020-07-21

## 2020-07-21 RX ORDER — NITROGLYCERIN 0.4 MG/1
0.4 TABLET SUBLINGUAL
Status: DISCONTINUED | OUTPATIENT
Start: 2020-07-21 | End: 2020-07-23 | Stop reason: HOSPADM

## 2020-07-21 RX ORDER — KETOROLAC TROMETHAMINE 15 MG/ML
15 INJECTION, SOLUTION INTRAMUSCULAR; INTRAVENOUS ONCE
Status: COMPLETED | OUTPATIENT
Start: 2020-07-21 | End: 2020-07-21

## 2020-07-21 RX ORDER — ALPRAZOLAM 1 MG/1
1 TABLET ORAL NIGHTLY
Status: DISCONTINUED | OUTPATIENT
Start: 2020-07-21 | End: 2020-07-23 | Stop reason: HOSPADM

## 2020-07-21 RX ORDER — ONDANSETRON 4 MG/1
4 TABLET, FILM COATED ORAL EVERY 6 HOURS PRN
Status: DISCONTINUED | OUTPATIENT
Start: 2020-07-21 | End: 2020-07-23 | Stop reason: HOSPADM

## 2020-07-21 RX ORDER — ROPINIROLE 1 MG/1
1 TABLET, FILM COATED ORAL NIGHTLY
COMMUNITY
End: 2020-08-24

## 2020-07-21 RX ORDER — AMLODIPINE BESYLATE 10 MG/1
10 TABLET ORAL DAILY
Status: DISCONTINUED | OUTPATIENT
Start: 2020-07-22 | End: 2020-07-23 | Stop reason: HOSPADM

## 2020-07-21 RX ORDER — NICOTINE POLACRILEX 4 MG
15 LOZENGE BUCCAL
Status: DISCONTINUED | OUTPATIENT
Start: 2020-07-21 | End: 2020-07-23 | Stop reason: HOSPADM

## 2020-07-21 RX ORDER — ONDANSETRON 2 MG/ML
4 INJECTION INTRAMUSCULAR; INTRAVENOUS EVERY 6 HOURS PRN
Status: DISCONTINUED | OUTPATIENT
Start: 2020-07-21 | End: 2020-07-23 | Stop reason: HOSPADM

## 2020-07-21 RX ORDER — PREGABALIN 75 MG/1
75 CAPSULE ORAL DAILY
Status: DISCONTINUED | OUTPATIENT
Start: 2020-07-22 | End: 2020-07-23 | Stop reason: HOSPADM

## 2020-07-21 RX ADMIN — ASPIRIN 325 MG: 325 TABLET ORAL at 14:14

## 2020-07-21 RX ADMIN — KETOROLAC TROMETHAMINE 15 MG: 15 INJECTION, SOLUTION INTRAMUSCULAR; INTRAVENOUS at 17:12

## 2020-07-21 RX ADMIN — ROPINIROLE 1 MG: 1 TABLET, FILM COATED ORAL at 22:15

## 2020-07-21 RX ADMIN — ALPRAZOLAM 1 MG: 1 TABLET ORAL at 22:00

## 2020-07-21 RX ADMIN — ENOXAPARIN SODIUM 40 MG: 40 INJECTION SUBCUTANEOUS at 22:01

## 2020-07-21 RX ADMIN — OXYCODONE HYDROCHLORIDE AND ACETAMINOPHEN 1 TABLET: 10; 325 TABLET ORAL at 22:00

## 2020-07-21 RX ADMIN — GABAPENTIN 300 MG: 300 CAPSULE ORAL at 22:00

## 2020-07-21 RX ADMIN — IOPAMIDOL 60 ML: 755 INJECTION, SOLUTION INTRAVENOUS at 14:41

## 2020-07-21 RX ADMIN — SODIUM CHLORIDE 100 ML/HR: 9 INJECTION, SOLUTION INTRAVENOUS at 22:19

## 2020-07-21 RX ADMIN — HYDRALAZINE HYDROCHLORIDE 10 MG: 20 INJECTION INTRAMUSCULAR; INTRAVENOUS at 16:23

## 2020-07-21 RX ADMIN — NITROGLYCERIN 0.4 MG: 0.4 TABLET SUBLINGUAL at 15:23

## 2020-07-21 RX ADMIN — MORPHINE SULFATE 4 MG: 4 INJECTION INTRAVENOUS at 14:15

## 2020-07-21 RX ADMIN — SODIUM CHLORIDE, PRESERVATIVE FREE 10 ML: 5 INJECTION INTRAVENOUS at 22:19

## 2020-07-21 RX ADMIN — SODIUM CHLORIDE 100 ML/HR: 9 INJECTION, SOLUTION INTRAVENOUS at 13:50

## 2020-07-21 RX ADMIN — LEVOFLOXACIN 750 MG: 5 INJECTION, SOLUTION INTRAVENOUS at 16:25

## 2020-07-21 RX ADMIN — ISOSORBIDE MONONITRATE 30 MG: 30 TABLET, EXTENDED RELEASE ORAL at 17:13

## 2020-07-21 RX ADMIN — NITROGLYCERIN 0.4 MG: 0.4 TABLET SUBLINGUAL at 13:38

## 2020-07-21 NOTE — ED NOTES
Pt presents to the ED with c/o left sided chest pain that radiates into her back. Pt reports pain started 1 week ago but pain is worse today. Pt denies cardiac hx. Pt reports she is currently out of her blood pressure medications.      Dana Salmon RN  07/21/20 [id number]       Dana Salmon RN  07/21/20 [id number]

## 2020-07-21 NOTE — H&P
[facility] Medicine Services  INPATIENT HISTORY AND PHYSICAL       Patient Care Team:  Emil Hedrick MD as PCP - Barry Yusuf MD (Inactive) as PCP - Family Medicine    Chief complaint   Chief Complaint   Patient presents with   • Chest Pain   • Back Pain       Subjective     Patient is a 74 y.o. female with history of diabetes mellitus, hypertension, COPD, depressive/anxiety disorder, dyslipidemia, previous CVA with no residual deficits, chronic pain syndrome, CHF, restless leg syndrome presents to the emergency room secondary to 1 week history of progressively worsening and constant left-sided /left lateral chest wall pain which is worse with inspiration.    She started having the pain after a fall.  She denies fever, chills, nausea, vomiting, shortness of air, palpitation, diaphoresis, syncope, presyncope, hematemesis, melena or hematochezia.    On triage her blood pressure was 222/98.  Was given a dose of hydralazine with some relief.  Patient has been out of her blood pressure medication for a few days.  Her first doses of troponin were negative, chest x-ray was clear and EKG did not show any acute changes.  D-dimer was 907 and patient had a CT pulmonary angiogram which was negative for pulmonary embolism but showed 2 to 3 mm peripheral pulmonary nodules and ill-defined groundglass opacity bilateral lower lobes.      Review of Systems   Constitutional: Positive for activity change and fatigue. Negative for appetite change, chills, diaphoresis and fever.   HENT: Negative for trouble swallowing and voice change.    Eyes: Negative for photophobia and visual disturbance.   Respiratory: Negative for cough, choking, chest tightness, shortness of breath, wheezing and stridor.    Cardiovascular: Positive for chest pain. Negative for palpitations and leg swelling.   Gastrointestinal: Negative for abdominal distention, abdominal pain, blood in stool,  constipation, diarrhea, nausea and vomiting.   Endocrine: Negative for cold intolerance, heat intolerance, polydipsia, polyphagia and polyuria.   Genitourinary: Negative for decreased urine volume, difficulty urinating, dysuria, enuresis, flank pain, frequency, hematuria and urgency.   Musculoskeletal: Negative for arthralgias, gait problem, myalgias, neck pain and neck stiffness.   Skin: Negative for pallor, rash and wound.   Neurological: Negative for dizziness, tremors, seizures, syncope, facial asymmetry, speech difficulty, weakness, light-headedness, numbness and headaches.   Hematological: Does not bruise/bleed easily.   Psychiatric/Behavioral: Negative for agitation, behavioral problems and confusion.         History  Past Medical History:   Diagnosis Date   • Anxiety    • Arthritis    • COPD (chronic obstructive pulmonary disease) (CMS/[facility])    • Depression    • Diabetes mellitus (CMS/[facility])    • History of transfusion    • Hyperlipidemia    • Hypertension    • Stroke (CMS/[facility])     Greater than 5 years ago     Past Surgical History:   Procedure Laterality Date   • CERVICAL FUSION  1999   • EYE SURGERY Bilateral 2014    lasix eye surgery   • HEMORRHOIDECTOMY     • HYSTERECTOMY     • INTRATHECAL PUMP REMOVAL      2006   • KNEE ARTHROSCOPY Right 2015   • LUMBAR SPINE SURGERY  1999   • PAIN PUMP INSERTION/REVISION      Removed in 2006     History reviewed. No pertinent family history.  Social History     Tobacco Use   • Smoking status: Former Smoker   • Smokeless tobacco: Never Used   Substance Use Topics   • Alcohol use: No   • Drug use: No       (Not in a hospital admission)  Allergies:  Patient has no active allergies.  Prior to Admission medications    Medication Sig Start Date End Date Taking? Authorizing Provider   albuterol (PROVENTIL HFA;VENTOLIN HFA) 108 (90 Base) MCG/ACT inhaler Inhale.   Yes Provider, MD Samina   ALPRAZolam (XANAX) 1 MG tablet Take 1 mg by mouth Every Night.   Yes Provider,  MD Samina   amLODIPine (NORVASC) 10 MG tablet Take 10 mg by mouth Daily.   Yes Samina Leggett MD   aspirin-dipyridamole (AGGRENOX)  MG per 12 hr capsule  7/6/17  Yes Samina Leggett MD   folic acid (FOLVITE) 1 MG tablet Take 1 mg by mouth Daily.   Yes Samina Leggett MD   furosemide (LASIX) 40 MG tablet Take 20 mg by mouth 2 (Two) Times a Day.   Yes Samina Leggett MD   gabapentin (NEURONTIN) 300 MG capsule Take 300 mg by mouth 4 (Four) Times a Day.   Yes Samina Leggett MD   metFORMIN (GLUCOPHAGE) 1000 MG tablet Take 1,000 mg by mouth 2 (Two) Times a Day With Meals.   Yes Samina Leggett MD   nebivolol (BYSTOLIC) 10 MG tablet Take 20 mg by mouth Daily.   Yes Samina Leggett MD   Omega-3 Fatty Acids (FISH OIL) 1200 MG capsule capsule Take  by mouth.   Yes Samina Leggett MD   oxyCODONE-acetaminophen (PERCOCET)  MG per tablet Take 1 tablet by mouth Every 4 (Four) Hours As Needed for moderate pain (4-6).   Yes Samina Leggett MD   potassium chloride (K-DUR,KLOR-CON) 10 MEQ CR tablet Take 10 mEq by mouth 2 (Two) Times a Day.   Yes Samina Leggett MD   pregabalin (LYRICA) 75 MG capsule Take 75 mg by mouth Daily.   Yes Samina Leggett MD   raNITIdine (ZANTAC) 150 MG tablet Take 150 mg by mouth 2 (Two) Times a Day.   Yes Samina Leggett MD   rOPINIRole (REQUIP) 1 MG tablet Take 1 mg by mouth Every Night. Take 1 hour before bedtime.   Yes Samina Leggett MD   rosuvastatin (CRESTOR) 10 MG tablet Take 10 mg by mouth Daily.   Yes Samina Leggett MD   tiZANidine (ZANAFLEX) 4 MG tablet Take 4 mg by mouth At Night As Needed for Muscle Spasms.   Yes Samina Leggett MD   vitamin B-12 (CYANOCOBALAMIN) 100 MCG tablet Take  by mouth.   Yes Samina Leggett MD   Vitamin D, Cholecalciferol, 400 units capsule Take  by mouth.   Yes Samina Leggett MD   vitamin E 100 UNIT capsule Take 400 Units by mouth Daily.   Yes  Samina Leggett MD   ALPRAZolam (XANAX) 0.5 MG tablet Take 1 mg by mouth Every Morning.    Samina Leggett MD   buPROPion (WELLBUTRIN) 75 MG tablet Take 75 mg by mouth 2 (Two) Times a Day.    Samina Leggett MD   dextromethorphan-guaifenesin (ROBITUSSIN-DM)  MG/5ML syrup Take 5 mL by mouth 5 (Five) Times a Day As Needed.    Samina Leggett MD   DULoxetine (CYMBALTA) 60 MG capsule Take 60 mg by mouth Daily.    Samina Leggett MD   fluorometholone (FML) 0.1 % ophthalmic suspension Administer 1 drop to both eyes Daily.    Samina Leggett MD   lisinopril (PRINIVIL,ZESTRIL) 2.5 MG tablet  8/8/17   Samina Leggett MD   meclizine (ANTIVERT) 25 MG tablet  3/20/17   Samina Leggett MD   nebivolol (BYSTOLIC) 20 MG tablet  10/4/16   Samina Leggett MD   oxyCODONE (ROXICODONE) 30 MG immediate release tablet Take 30 mg by mouth Every 12 (Twelve) Hours.    Samina Leggett MD       Objective        Vital Signs  Temp:  [97 °F (36.1 °C)] 97 °F (36.1 °C)  Heart Rate:  [50-78] 55  Resp:  [17-18] 17  BP: (185-222)/(79-98) 198/84      Physical Exam   Constitutional: She is oriented to person, place, and time. She appears well-developed and well-nourished. She is cooperative. No distress.   HENT:   Head: Normocephalic and atraumatic.   Right Ear: External ear normal.   Left Ear: External ear normal.   Nose: Nose normal.   Mouth/Throat: Oropharynx is clear and moist.   Eyes: Pupils are equal, round, and reactive to light. Conjunctivae and EOM are normal.   Neck: Normal range of motion. Neck supple. No JVD present. No thyromegaly present.   Cardiovascular: Normal rate, regular rhythm, normal heart sounds and intact distal pulses. Exam reveals no gallop and no friction rub.   No murmur heard.  Pulmonary/Chest: Effort normal and breath sounds normal. No stridor. No respiratory distress. She has no wheezes. She has no rales. She exhibits no tenderness.   She has reproducible  pain on the left anterior chest wall under the left breast and extending laterally to the lateral chest wall.   Abdominal: Soft. Bowel sounds are normal. She exhibits no distension and no mass. There is no tenderness. There is no rebound and no guarding. No hernia.   Musculoskeletal: Normal range of motion. She exhibits no edema, tenderness or deformity.   Neurological: She is alert and oriented to person, place, and time. She has normal reflexes. No cranial nerve deficit or sensory deficit. She exhibits normal muscle tone. Coordination normal.   Skin: Skin is warm and dry. No rash noted. She is not diaphoretic. No erythema. No pallor.   Psychiatric: She has a normal mood and affect. Her behavior is normal. Judgment and thought content normal.   Nursing note and vitals reviewed.        Results Review:     Results from last 7 days   Lab Units 07/21/20  1322   SODIUM mmol/L 140   POTASSIUM mmol/L 3.9   CHLORIDE mmol/L 101   CO2 mmol/L 27.0   BUN mg/dL 15   CREATININE mg/dL 1.09*   GLUCOSE mg/dL 114*   CALCIUM mg/dL 9.6   BILIRUBIN mg/dL 0.5   ALK PHOS U/L 79   ALT (SGPT) U/L 15   AST (SGOT) U/L 24             Results from last 7 days   Lab Units 07/21/20  1322   WBC 10*3/mm3 7.91   HEMOGLOBIN g/dL 13.8   HEMATOCRIT % 43.0   PLATELETS 10*3/mm3 162       Results from last 7 days   Lab Units 07/21/20  1322   INR  0.95     Imaging Results (Last 7 Days)     Procedure Component Value Units Date/Time    CT Angiogram Chest [[id number]] Collected:  07/21/20 1435     Updated:  07/21/20 1510    Narrative:       STUDY: CT CHEST ANGIOGRAPHY WITH IV CONTRAST    COMPARISON: Chest radiograph dated same day    INDICATION: Chest pain    TECHNIQUE: Multiple CT images of the chest were obtained after  the uneventful administration of iodinated intravenous contrast  in the pulmonary arterial phase. Orthogonal reformats were  provided.    FINDINGS:   Examination is technically adequate. No filling defects within  the pulmonary arteries to  suggest emboli.    Visualized thyroid is unremarkable. No supraclavicular or  axillary lymphadenopathy.    Heart size is normal. No pericardial effusion. Multivessel  coronary artery disease. Esophagus is unremarkable. Right  anterior lower lymph node measures 1.2 cm in short axis (axial  series 3 image 57/151. There additional subcentimeter slightly  prominent mediastinal and hilar lymph nodes visualized. Airways  are patent.    No focal consolidation, effusion, or pneumothorax. There are  numerous scattered 2 to 3 mm predominantly peripheral pulmonary  nodules seen throughout the bilateral lower lobes with diffuse  mosaic attenuation and superimposed, very faint ill-defined  groundglass opacities in the lower lobes. Focal mild interstitial  thickening is seen at the anterior lingula.    There is a 1.6 x 1.3 cm left adrenal nodule with Hounsfield units  of 30.8 the remaining visualized upper abdominal contents are  unremarkable.      Impression:         1. No pulmonary embolus.    2. Diffuse mosaic attenuation with superimposed very faint  ill-defined groundglass opacities in the bilateral lower lobes as  well as innumerable 2 to 3 mm predominantly peripheral pulmonary  nodules. Intermediate features for COVID-19 pneumonia reported in  the literature, but not specific to arrive at a relatively  confident radiological diagnosis. Differential includes a wide  variety of infectious and noninfectious processes. Correlation  with patient history and physical is required.    3. Coronary artery disease.    4. Incompletely characterized left adrenal nodule with  indeterminate features. Recommend follow-up with nonemergent  noncontrast adrenal CT.    Electronically signed by:  Jonathan De La Torre MD  7/21/2020  3:09 PM CDT Workstation: [id number]    XR Chest 1 View [[id number]] Collected:  07/21/20 1320     Updated:  07/21/20 1334    Narrative:       PROCEDURE: XR CHEST 1 VIEW    Clinical History: Chest Pain triage  protocol  Chest Pain Triage Protocol    Indication:     Same as above.    Comparison:     None.    Technique:     Single portable frontal projection of the chest was done.    Findings:    Prior surgery is seen in the lower cervical spine. Multiple old  right-sided rib fractures are seen.    There are no discrete airspace infiltrates, pneumothoraces or  pleural effusions.    The pulmonary vascularity is normal.    The cardiomediastinal contour is  unremarkable.      Impression:       Impression:     There is no acute pleural-parenchymal process seen in the imaged  lung fields.    Electronically signed by:  Prashant Nicholson MD  7/21/2020 1:33 PM CDT  Workstation: [id number]          Assessment / Plan       Hospital Problem List:  Active Problems:    Chest pain    Chest pain (likely atypical): Patient has risk factors for cardiovascular events.  She will be admitted to rule out acute coronary syndrome and started on guideline directed medical therapy.  Trend troponin, ECG, check echocardiogram and schedule patient for CT coronary angiogram.  Consult cardiologist depending on outcome of CTA.    Uncontrolled hypertension: Patient has been out of her medications for the past few days.  Continue amlodipine and lisinopril with adjustments as needed for optimal blood pressure control.  Hold Bystolic secondary to low heart rate.    Ill-defined bilateral lower lobe groundglass opacities: Patient is mostly asymptomatic and maintaining O2 saturation of 95 to 97% on room air.  COVID-19 PCR is pending.      2 to 3 mm peripheral pulmonary nodules: Patient will be scheduled for follow-up CT scan in 3 to 6 months as outpatient.    Diabetes mellitus: Begin Accu-Cheks and sliding scale insulin.  Hold metformin for now.    History of CHF: Patient is euvolemic and clinically not in heart failure.  Chest x-ray is clear.    Restless leg syndrome: Continue Requip.    History of COPD: Patient is not in exacerbation.  Continue  bronchodilators.    Chronic pain syndrome: Continue pain control.    Anxiety/depressive disorder: Continue home medications.    Begin GI and DVT prophylaxis.    Additional orders and treatment plan as hospital course dictates.    The patient was evaluated during the global COVID-19 pandemic, and the diagnosis was suspected/considered upon their initial presentation.  Evaluation, treatment, and testing were consistent with current guidelines for patients who present with complaints or symptoms that may be related to COVID-19.    I discussed the patient's findings and my recommendations with patient.     Chandrakant Sal MD  07/21/20  16:27      Dictated Utilizing Dragon Dictation

## 2020-07-21 NOTE — ED PROVIDER NOTES
"Subjective   73 yo female presents with progressively worsening chest pain over the last week, stating that is predominantly in the left side radiating to her back.  Patient states that she takes OxyContin for her back pain, and she has been taking up to 4 times a day over the last week and has not improved her pain.  She complains that she has pain on inspiration and expiration, that radiates to her back where \"the bra clasp goes\".  Patient also states that she has had several falls over the last week.  She recently got  and moved to [place], and has not been able to get her medications from her physician for the last couple of days.  She states that she had a gastric ulcer years ago.  Patient states she takes blood thinners because of her \"mini strokes\" over 10 years ago.  Patient states that she is looking to transfer her care here to [place], her daughter is the point of contact.  Patient called her daughter on the phone to help complete the story on her medications and allergies.  Her daughter states that they have history of cancers in her family, lung and GYN.  Patient states that her father had \"heart problems\".  Patient states that she quit smoking 20 years ago.  Daughter states that she has had right knee surgery, several back surgeries, and a hysterectomy she thinks.           Review of Systems   Constitutional: Negative for chills, diaphoresis and fever.   HENT: Negative for rhinorrhea and sore throat.    Eyes: Negative for photophobia and visual disturbance.   Respiratory: Positive for shortness of breath. Negative for cough.    Cardiovascular: Positive for chest pain. Negative for palpitations.   Gastrointestinal: Negative for abdominal pain, constipation, nausea and vomiting.   Genitourinary: Negative for difficulty urinating and flank pain.   Musculoskeletal: Positive for back pain. Negative for arthralgias.   Neurological: Negative for dizziness and headaches.   Psychiatric/Behavioral: " Negative for confusion. The patient is not nervous/anxious.        Past Medical History:   Diagnosis Date   • Anxiety    • Arthritis    • COPD (chronic obstructive pulmonary disease) (CMS/HCC)    • Depression    • Diabetes mellitus (CMS/HCC)    • History of transfusion    • Hyperlipidemia    • Hypertension    • Stroke (CMS/HCC)     Greater than 5 years ago       Allergies   Allergen Reactions   • Aspirin GI Intolerance       Past Surgical History:   Procedure Laterality Date   • CERVICAL FUSION  1999   • EYE SURGERY Bilateral 2014    lasix eye surgery   • HEMORRHOIDECTOMY     • HYSTERECTOMY     • INTRATHECAL PUMP REMOVAL      2006   • KNEE ARTHROSCOPY Right 2015   • LUMBAR SPINE SURGERY  1999   • PAIN PUMP INSERTION/REVISION      Removed in 2006       No family history on file.    Social History     Socioeconomic History   • Marital status:      Spouse name: Not on file   • Number of children: Not on file   • Years of education: Not on file   • Highest education level: Not on file   Tobacco Use   • Smoking status: Former Smoker   • Smokeless tobacco: Never Used   Substance and Sexual Activity   • Alcohol use: No   • Drug use: No   • Sexual activity: Defer           Objective   Physical Exam   Constitutional: She is oriented to person, place, and time. She appears well-developed and well-nourished. She appears distressed. Face mask in place.   Patient was tearful on interview.   HENT:   Head: Normocephalic and atraumatic.   Right Ear: Hearing and external ear normal.   Left Ear: Hearing and external ear normal.   Nose: Nose normal.   Mouth/Throat: Uvula is midline, oropharynx is clear and moist and mucous membranes are normal.   Eyes: Pupils are equal, round, and reactive to light. Conjunctivae, EOM and lids are normal.   Neck: Neck supple.   Cardiovascular: Normal rate, regular rhythm, normal heart sounds and intact distal pulses. Frequent extrasystoles are present. Exam reveals no gallop and no friction rub.    No murmur heard.  Pulmonary/Chest: Effort normal and breath sounds normal. She has no wheezes. She has no rhonchi. She has no rales.   Abdominal: Soft. Normal appearance and bowel sounds are normal. There is tenderness in the right upper quadrant, epigastric area and left upper quadrant. There is no rigidity, no rebound and no guarding.   Lymphadenopathy:     She has no cervical adenopathy.   Neurological: She is alert and oriented to person, place, and time.   Skin: Skin is warm.   Psychiatric: Her speech is normal and behavior is normal. Judgment and thought content normal. Her mood appears anxious. Cognition and memory are normal.   Nursing note and vitals reviewed.      ECG 12 Lead    Date/Time: 7/21/2020 1:25 PM  Performed by: Nadeen Mccain MD  Authorized by: Emergency, Triage Protocol, MD   Interpreted by physician  Comparison: not compared with previous ECG   Rhythm: sinus rhythm  Rate: normal  BPM: 76  QRS axis: normal  Conduction: conduction normal  ST Segments: ST segments normal  T Waves: T waves normal  Other: no other findings  Clinical impression: normal ECG  Comments: Sinus arrhythmia              ED Course  ED Course as of Jul 21 1615   Tue Jul 21, 2020   1324 CC: chest pain, back pain.    EKG, BNP, CMP, Troponin trend, CBC, UA, CXR. D-dimer, PT/INR, aPTT.  ASA 325mg, Nitrostat 0.4mg, Labetalol 10mg.     [NA]   1429 D-dimer elevated. CT angiogram.     [NA]   1503 BP elevated after morphine. Labetalol 10mg.     [NA]   1514 Labetalol held, HR 55. Patient still complaining about pain. Nitro for pain.     [NA]   1543 Hydralazine for BP. Will call hospitalists for admission for observation. Hypertensive urgency, suspected Covid based on CT angio, no PE, CAD found.     [NA]   1550 Spoke with Dr. Sal with the hospitalist group. Will admit the patient for CP obs, and hypertensive urgency.       [NA]      ED Course User Index  [NA] Nadeen Mccain MD      Results for orders placed or performed during  the hospital encounter of 07/21/20   Troponin   Result Value Ref Range    Troponin T <0.010 0.000 - 0.030 ng/mL   Troponin   Result Value Ref Range    Troponin T <0.010 0.000 - 0.030 ng/mL   Comprehensive Metabolic Panel   Result Value Ref Range    Glucose 114 (H) 65 - 99 mg/dL    BUN 15 8 - 23 mg/dL    Creatinine 1.09 (H) 0.57 - 1.00 mg/dL    Sodium 140 136 - 145 mmol/L    Potassium 3.9 3.5 - 5.2 mmol/L    Chloride 101 98 - 107 mmol/L    CO2 27.0 22.0 - 29.0 mmol/L    Calcium 9.6 8.6 - 10.5 mg/dL    Total Protein 7.7 6.0 - 8.5 g/dL    Albumin 4.90 3.50 - 5.20 g/dL    ALT (SGPT) 15 1 - 33 U/L    AST (SGOT) 24 1 - 32 U/L    Alkaline Phosphatase 79 39 - 117 U/L    Total Bilirubin 0.5 0.0 - 1.2 mg/dL    eGFR Non African Amer 49 (L) >60 mL/min/1.73    Globulin 2.8 gm/dL    A/G Ratio 1.8 g/dL    BUN/Creatinine Ratio 13.8 7.0 - 25.0    Anion Gap 12.0 5.0 - 15.0 mmol/L   BNP   Result Value Ref Range    proBNP 568.3 0.0-1,800.0 pg/mL   CBC Auto Differential   Result Value Ref Range    WBC 7.91 3.40 - 10.80 10*3/mm3    RBC 4.99 3.77 - 5.28 10*6/mm3    Hemoglobin 13.8 12.0 - 15.9 g/dL    Hematocrit 43.0 34.0 - 46.6 %    MCV 86.2 79.0 - 97.0 fL    MCH 27.7 26.6 - 33.0 pg    MCHC 32.1 31.5 - 35.7 g/dL    RDW 15.0 12.3 - 15.4 %    RDW-SD 46.5 37.0 - 54.0 fl    MPV 11.2 6.0 - 12.0 fL    Platelets 162 140 - 450 10*3/mm3    Neutrophil % 70.2 42.7 - 76.0 %    Lymphocyte % 21.9 19.6 - 45.3 %    Monocyte % 6.3 5.0 - 12.0 %    Eosinophil % 0.9 0.3 - 6.2 %    Basophil % 0.4 0.0 - 1.5 %    Immature Grans % 0.3 0.0 - 0.5 %    Neutrophils, Absolute 5.56 1.70 - 7.00 10*3/mm3    Lymphocytes, Absolute 1.73 0.70 - 3.10 10*3/mm3    Monocytes, Absolute 0.50 0.10 - 0.90 10*3/mm3    Eosinophils, Absolute 0.07 0.00 - 0.40 10*3/mm3    Basophils, Absolute 0.03 0.00 - 0.20 10*3/mm3    Immature Grans, Absolute 0.02 0.00 - 0.05 10*3/mm3    nRBC 0.0 0.0 - 0.2 /100 WBC   Protime-INR   Result Value Ref Range    Protime 13.1 11.1 - 15.3 Seconds    INR 0.95  0.80 - 1.20   aPTT   Result Value Ref Range    PTT 31.9 20.0 - 40.3 seconds   Troponin   Result Value Ref Range    Troponin T <0.010 0.000 - 0.030 ng/mL   D-dimer, Quantitative   Result Value Ref Range    D-Dimer, Quantitative 907 (H) 0 - 470 ng/mL (FEU)   Urinalysis With Microscopic If Indicated (No Culture) - Urine, Clean Catch   Result Value Ref Range    Color, UA Yellow Yellow, Straw, Dark Yellow, Ning    Appearance, UA Clear Clear    pH, UA 6.5 5.0 - 9.0    Specific Gravity, UA 1.022 1.003 - 1.030    Glucose, UA Negative Negative    Ketones, UA Negative Negative    Bilirubin, UA Negative Negative    Blood, UA Trace (A) Negative    Protein, UA >=300 mg/dL (3+) (A) Negative    Leuk Esterase, UA Small (1+) (A) Negative    Nitrite, UA Negative Negative    Urobilinogen, UA 0.2 E.U./dL 0.2 - 1.0 E.U./dL   Urinalysis, Microscopic Only - Urine, Clean Catch   Result Value Ref Range    RBC, UA 3-5 (A) None Seen /HPF    WBC, UA 6-12 (A) None Seen, 0-2, 3-5 /HPF    Bacteria, UA None Seen None Seen /HPF    Squamous Epithelial Cells, UA 3-5 (A) None Seen, 0-2 /HPF    Hyaline Casts, UA 7-12 None Seen /LPF    Methodology Automated Microscopy    Light Blue Top   Result Value Ref Range    Extra Tube hold for add-on    Green Top (Gel)   Result Value Ref Range    Extra Tube Hold for add-ons.    Lavender Top   Result Value Ref Range    Extra Tube hold for add-on    Gold Top - SST   Result Value Ref Range    Extra Tube Hold for add-ons.      CT Angiogram Chest  Narrative: STUDY: CT CHEST ANGIOGRAPHY WITH IV CONTRAST    COMPARISON: Chest radiograph dated same day    INDICATION: Chest pain    TECHNIQUE: Multiple CT images of the chest were obtained after  the uneventful administration of iodinated intravenous contrast  in the pulmonary arterial phase. Orthogonal reformats were  provided.    FINDINGS:   Examination is technically adequate. No filling defects within  the pulmonary arteries to suggest emboli.    Visualized thyroid is  unremarkable. No supraclavicular or  axillary lymphadenopathy.    Heart size is normal. No pericardial effusion. Multivessel  coronary artery disease. Esophagus is unremarkable. Right  anterior lower lymph node measures 1.2 cm in short axis (axial  series 3 image 57/151. There additional subcentimeter slightly  prominent mediastinal and hilar lymph nodes visualized. Airways  are patent.    No focal consolidation, effusion, or pneumothorax. There are  numerous scattered 2 to 3 mm predominantly peripheral pulmonary  nodules seen throughout the bilateral lower lobes with diffuse  mosaic attenuation and superimposed, very faint ill-defined  groundglass opacities in the lower lobes. Focal mild interstitial  thickening is seen at the anterior lingula.    There is a 1.6 x 1.3 cm left adrenal nodule with Hounsfield units  of 30.8 the remaining visualized upper abdominal contents are  unremarkable.  Impression: 1. No pulmonary embolus.    2. Diffuse mosaic attenuation with superimposed very faint  ill-defined groundglass opacities in the bilateral lower lobes as  well as innumerable 2 to 3 mm predominantly peripheral pulmonary  nodules. Intermediate features for COVID-19 pneumonia reported in  the literature, but not specific to arrive at a relatively  confident radiological diagnosis. Differential includes a wide  variety of infectious and noninfectious processes. Correlation  with patient history and physical is required.    3. Coronary artery disease.    4. Incompletely characterized left adrenal nodule with  indeterminate features. Recommend follow-up with nonemergent  noncontrast adrenal CT.    Electronically signed by:  Jonathan De La Torre MD  7/21/2020  3:09 PM CDT Workstation: [id number]  XR Chest 1 View  Narrative: PROCEDURE: XR CHEST 1 VIEW    Clinical History: Chest Pain triage protocol  Chest Pain Triage Protocol    Indication:     Same as above.    Comparison:     None.    Technique:     Single portable  frontal projection of the chest was done.    Findings:    Prior surgery is seen in the lower cervical spine. Multiple old  right-sided rib fractures are seen.    There are no discrete airspace infiltrates, pneumothoraces or  pleural effusions.    The pulmonary vascularity is normal.    The cardiomediastinal contour is  unremarkable.  Impression: Impression:     There is no acute pleural-parenchymal process seen in the imaged  lung fields.    Electronically signed by:  Prashant Nicholson MD  7/21/2020 1:33 PM CDT  Workstation: [id number]          [employer]  Number of Diagnoses or Management Options     Amount and/or Complexity of Data Reviewed  Clinical lab tests: reviewed and ordered  Tests in the radiology section of CPT®: reviewed and ordered  Tests in the medicine section of CPT®: reviewed and ordered  Obtain history from someone other than the patient: yes  Discuss the patient with other providers: yes  Independent visualization of images, tracings, or specimens: yes    Risk of Complications, Morbidity, and/or Mortality  Presenting problems: moderate  Diagnostic procedures: moderate  Management options: moderate    Critical Care  Total time providing critical care: 30-74 minutes    Patient Progress  Patient progress: stable      Final diagnoses:   Chest pain, unspecified type   Hypertensive urgency   Pneumonia of both lungs due to infectious organism, unspecified part of lung       This document has been electronically signed by Nadeen Mccain MD on July 21, 2020 16:17            Nadeen Mccain MD  Resident  07/21/20 0510

## 2020-07-22 ENCOUNTER — APPOINTMENT (OUTPATIENT)
Dept: CT IMAGING | Facility: HOSPITAL | Age: 75
End: 2020-07-22

## 2020-07-22 ENCOUNTER — APPOINTMENT (OUTPATIENT)
Dept: CARDIOLOGY | Facility: HOSPITAL | Age: 75
End: 2020-07-22

## 2020-07-22 LAB
ANION GAP SERPL CALCULATED.3IONS-SCNC: 10 MMOL/L (ref 5–15)
BASOPHILS # BLD AUTO: 0.01 10*3/MM3 (ref 0–0.2)
BASOPHILS NFR BLD AUTO: 0.2 % (ref 0–1.5)
BH CV ECHO MEAS - ACS: 2 CM
BH CV ECHO MEAS - AO MAX PG (FULL): 6.9 MMHG
BH CV ECHO MEAS - AO MAX PG: 12.1 MMHG
BH CV ECHO MEAS - AO MEAN PG (FULL): 3 MMHG
BH CV ECHO MEAS - AO MEAN PG: 6 MMHG
BH CV ECHO MEAS - AO ROOT AREA (BSA CORRECTED): 1.4
BH CV ECHO MEAS - AO ROOT AREA: 6.2 CM^2
BH CV ECHO MEAS - AO ROOT DIAM: 2.8 CM
BH CV ECHO MEAS - AO V2 MAX: 174 CM/SEC
BH CV ECHO MEAS - AO V2 MEAN: 111 CM/SEC
BH CV ECHO MEAS - AO V2 VTI: 45.3 CM
BH CV ECHO MEAS - ASC AORTA: 3.2 CM
BH CV ECHO MEAS - AVA(I,A): 2.1 CM^2
BH CV ECHO MEAS - AVA(I,D): 2.1 CM^2
BH CV ECHO MEAS - AVA(V,A): 2.1 CM^2
BH CV ECHO MEAS - AVA(V,D): 2.1 CM^2
BH CV ECHO MEAS - BSA(HAYCOCK): 2.1 M^2
BH CV ECHO MEAS - BSA: 2 M^2
BH CV ECHO MEAS - BZI_BMI: 32.8 KILOGRAMS/M^2
BH CV ECHO MEAS - BZI_METRIC_HEIGHT: 165.1 CM
BH CV ECHO MEAS - BZI_METRIC_WEIGHT: 89.4 KG
BH CV ECHO MEAS - EDV(CUBED): 132.7 ML
BH CV ECHO MEAS - EDV(MOD-SP2): 87.6 ML
BH CV ECHO MEAS - EDV(MOD-SP4): 66.7 ML
BH CV ECHO MEAS - EDV(TEICH): 123.8 ML
BH CV ECHO MEAS - EF(CUBED): 68.2 %
BH CV ECHO MEAS - EF(MOD-SP2): 68.7 %
BH CV ECHO MEAS - EF(MOD-SP4): 49.8 %
BH CV ECHO MEAS - EF(TEICH): 59.5 %
BH CV ECHO MEAS - ESV(CUBED): 42.1 ML
BH CV ECHO MEAS - ESV(MOD-SP2): 27.4 ML
BH CV ECHO MEAS - ESV(MOD-SP4): 33.5 ML
BH CV ECHO MEAS - ESV(TEICH): 50.2 ML
BH CV ECHO MEAS - FS: 31.8 %
BH CV ECHO MEAS - IVS/LVPW: 0.93
BH CV ECHO MEAS - IVSD: 1.1 CM
BH CV ECHO MEAS - LA DIMENSION: 4.1 CM
BH CV ECHO MEAS - LA/AO: 1.5
BH CV ECHO MEAS - LV DIASTOLIC VOL/BSA (35-75): 33.9 ML/M^2
BH CV ECHO MEAS - LV MASS(C)D: 224.7 GRAMS
BH CV ECHO MEAS - LV MASS(C)DI: 114.3 GRAMS/M^2
BH CV ECHO MEAS - LV MAX PG: 5.2 MMHG
BH CV ECHO MEAS - LV MEAN PG: 3 MMHG
BH CV ECHO MEAS - LV SYSTOLIC VOL/BSA (12-30): 17 ML/M^2
BH CV ECHO MEAS - LV V1 MAX: 114 CM/SEC
BH CV ECHO MEAS - LV V1 MEAN: 77.1 CM/SEC
BH CV ECHO MEAS - LV V1 VTI: 30.8 CM
BH CV ECHO MEAS - LVIDD: 5.1 CM
BH CV ECHO MEAS - LVIDS: 3.5 CM
BH CV ECHO MEAS - LVLD AP2: 7.1 CM
BH CV ECHO MEAS - LVLD AP4: 7.6 CM
BH CV ECHO MEAS - LVLS AP2: 6 CM
BH CV ECHO MEAS - LVLS AP4: 6.4 CM
BH CV ECHO MEAS - LVOT AREA (M): 3.1 CM^2
BH CV ECHO MEAS - LVOT AREA: 3.1 CM^2
BH CV ECHO MEAS - LVOT DIAM: 2 CM
BH CV ECHO MEAS - LVPWD: 1.2 CM
BH CV ECHO MEAS - MR MAX PG: 233.5 MMHG
BH CV ECHO MEAS - MR MAX VEL: 764 CM/SEC
BH CV ECHO MEAS - MV A MAX VEL: 79.3 CM/SEC
BH CV ECHO MEAS - MV DEC SLOPE: 386 CM/SEC^2
BH CV ECHO MEAS - MV E MAX VEL: 99.8 CM/SEC
BH CV ECHO MEAS - MV E/A: 1.3
BH CV ECHO MEAS - MV P1/2T MAX VEL: 112 CM/SEC
BH CV ECHO MEAS - MV P1/2T: 85 MSEC
BH CV ECHO MEAS - MVA P1/2T LCG: 2 CM^2
BH CV ECHO MEAS - MVA(P1/2T): 2.6 CM^2
BH CV ECHO MEAS - PA MAX PG (FULL): 1.5 MMHG
BH CV ECHO MEAS - PA MAX PG: 3.4 MMHG
BH CV ECHO MEAS - PA V2 MAX: 92.3 CM/SEC
BH CV ECHO MEAS - PULM A REVS DUR: 0.14 SEC
BH CV ECHO MEAS - PULM A REVS VEL: 30.8 CM/SEC
BH CV ECHO MEAS - PULM DIAS VEL: 78.1 CM/SEC
BH CV ECHO MEAS - PULM S/D: 0.84
BH CV ECHO MEAS - PULM SYS VEL: 65.4 CM/SEC
BH CV ECHO MEAS - RAP SYSTOLE: 5 MMHG
BH CV ECHO MEAS - RV MAX PG: 1.9 MMHG
BH CV ECHO MEAS - RV MEAN PG: 1 MMHG
BH CV ECHO MEAS - RV V1 MAX: 69.8 CM/SEC
BH CV ECHO MEAS - RV V1 MEAN: 47.1 CM/SEC
BH CV ECHO MEAS - RV V1 VTI: 18.7 CM
BH CV ECHO MEAS - RVDD: 3.1 CM
BH CV ECHO MEAS - RVSP: 38.9 MMHG
BH CV ECHO MEAS - SI(AO): 141.9 ML/M^2
BH CV ECHO MEAS - SI(CUBED): 46 ML/M^2
BH CV ECHO MEAS - SI(LVOT): 49.2 ML/M^2
BH CV ECHO MEAS - SI(MOD-SP2): 30.6 ML/M^2
BH CV ECHO MEAS - SI(MOD-SP4): 16.9 ML/M^2
BH CV ECHO MEAS - SI(TEICH): 37.5 ML/M^2
BH CV ECHO MEAS - SV(AO): 278.9 ML
BH CV ECHO MEAS - SV(CUBED): 90.5 ML
BH CV ECHO MEAS - SV(LVOT): 96.8 ML
BH CV ECHO MEAS - SV(MOD-SP2): 60.2 ML
BH CV ECHO MEAS - SV(MOD-SP4): 33.2 ML
BH CV ECHO MEAS - SV(TEICH): 73.6 ML
BH CV ECHO MEAS - TR MAX VEL: 291 CM/SEC
BUN SERPL-MCNC: 18 MG/DL (ref 8–23)
BUN/CREAT SERPL: 17.1 (ref 7–25)
CALCIUM SPEC-SCNC: 9 MG/DL (ref 8.6–10.5)
CHLORIDE SERPL-SCNC: 106 MMOL/L (ref 98–107)
CO2 SERPL-SCNC: 25 MMOL/L (ref 22–29)
CREAT SERPL-MCNC: 1.05 MG/DL (ref 0.57–1)
DEPRECATED RDW RBC AUTO: 47.8 FL (ref 37–54)
EOSINOPHIL # BLD AUTO: 0.11 10*3/MM3 (ref 0–0.4)
EOSINOPHIL NFR BLD AUTO: 1.8 % (ref 0.3–6.2)
ERYTHROCYTE [DISTWIDTH] IN BLOOD BY AUTOMATED COUNT: 15.3 % (ref 12.3–15.4)
GFR SERPL CREATININE-BSD FRML MDRD: 51 ML/MIN/1.73
GLUCOSE BLDC GLUCOMTR-MCNC: 104 MG/DL (ref 70–130)
GLUCOSE BLDC GLUCOMTR-MCNC: 93 MG/DL (ref 70–130)
GLUCOSE SERPL-MCNC: 89 MG/DL (ref 65–99)
HCT VFR BLD AUTO: 35.1 % (ref 34–46.6)
HGB BLD-MCNC: 11.4 G/DL (ref 12–15.9)
IMM GRANULOCYTES # BLD AUTO: 0.02 10*3/MM3 (ref 0–0.05)
IMM GRANULOCYTES NFR BLD AUTO: 0.3 % (ref 0–0.5)
LYMPHOCYTES # BLD AUTO: 1.44 10*3/MM3 (ref 0.7–3.1)
LYMPHOCYTES NFR BLD AUTO: 23.5 % (ref 19.6–45.3)
MAXIMAL PREDICTED HEART RATE: 145 BPM
MCH RBC QN AUTO: 27.9 PG (ref 26.6–33)
MCHC RBC AUTO-ENTMCNC: 32.5 G/DL (ref 31.5–35.7)
MCV RBC AUTO: 86 FL (ref 79–97)
MONOCYTES # BLD AUTO: 0.56 10*3/MM3 (ref 0.1–0.9)
MONOCYTES NFR BLD AUTO: 9.2 % (ref 5–12)
NEUTROPHILS NFR BLD AUTO: 3.98 10*3/MM3 (ref 1.7–7)
NEUTROPHILS NFR BLD AUTO: 65 % (ref 42.7–76)
NRBC BLD AUTO-RTO: 0 /100 WBC (ref 0–0.2)
PLATELET # BLD AUTO: 137 10*3/MM3 (ref 140–450)
PMV BLD AUTO: 11.4 FL (ref 6–12)
POTASSIUM SERPL-SCNC: 3.7 MMOL/L (ref 3.5–5.2)
RBC # BLD AUTO: 4.08 10*6/MM3 (ref 3.77–5.28)
SODIUM SERPL-SCNC: 141 MMOL/L (ref 136–145)
STRESS TARGET HR: 123 BPM
WBC # BLD AUTO: 6.12 10*3/MM3 (ref 3.4–10.8)

## 2020-07-22 PROCEDURE — 93306 TTE W/DOPPLER COMPLETE: CPT

## 2020-07-22 PROCEDURE — 82962 GLUCOSE BLOOD TEST: CPT

## 2020-07-22 PROCEDURE — G0378 HOSPITAL OBSERVATION PER HR: HCPCS

## 2020-07-22 PROCEDURE — 75574 CT ANGIO HRT W/3D IMAGE: CPT

## 2020-07-22 PROCEDURE — 94760 N-INVAS EAR/PLS OXIMETRY 1: CPT

## 2020-07-22 PROCEDURE — 85025 COMPLETE CBC W/AUTO DIFF WBC: CPT | Performed by: INTERNAL MEDICINE

## 2020-07-22 PROCEDURE — 80048 BASIC METABOLIC PNL TOTAL CA: CPT | Performed by: INTERNAL MEDICINE

## 2020-07-22 PROCEDURE — 96361 HYDRATE IV INFUSION ADD-ON: CPT

## 2020-07-22 PROCEDURE — 94799 UNLISTED PULMONARY SVC/PX: CPT

## 2020-07-22 PROCEDURE — 96376 TX/PRO/DX INJ SAME DRUG ADON: CPT

## 2020-07-22 PROCEDURE — 25010000002 KETOROLAC TROMETHAMINE PER 15 MG: Performed by: INTERNAL MEDICINE

## 2020-07-22 PROCEDURE — 0 IOPAMIDOL PER 1 ML: Performed by: INTERNAL MEDICINE

## 2020-07-22 PROCEDURE — 93306 TTE W/DOPPLER COMPLETE: CPT | Performed by: INTERNAL MEDICINE

## 2020-07-22 RX ORDER — NEBIVOLOL 5 MG/1
5 TABLET ORAL
Status: DISCONTINUED | OUTPATIENT
Start: 2020-07-22 | End: 2020-07-23 | Stop reason: HOSPADM

## 2020-07-22 RX ORDER — ALPRAZOLAM 1 MG/1
1 TABLET ORAL EVERY MORNING
Status: DISCONTINUED | OUTPATIENT
Start: 2020-07-23 | End: 2020-07-23 | Stop reason: HOSPADM

## 2020-07-22 RX ORDER — BUPROPION HYDROCHLORIDE 75 MG/1
75 TABLET ORAL 2 TIMES DAILY
Status: DISCONTINUED | OUTPATIENT
Start: 2020-07-22 | End: 2020-07-23 | Stop reason: HOSPADM

## 2020-07-22 RX ORDER — LISINOPRIL 10 MG/1
10 TABLET ORAL
Status: DISCONTINUED | OUTPATIENT
Start: 2020-07-23 | End: 2020-07-22

## 2020-07-22 RX ORDER — LISINOPRIL 5 MG/1
5 TABLET ORAL
Status: DISCONTINUED | OUTPATIENT
Start: 2020-07-22 | End: 2020-07-22

## 2020-07-22 RX ORDER — SODIUM CHLORIDE 9 MG/ML
75 INJECTION, SOLUTION INTRAVENOUS CONTINUOUS
Status: DISCONTINUED | OUTPATIENT
Start: 2020-07-22 | End: 2020-07-23 | Stop reason: HOSPADM

## 2020-07-22 RX ORDER — MORPHINE SULFATE 2 MG/ML
2 INJECTION, SOLUTION INTRAMUSCULAR; INTRAVENOUS EVERY 6 HOURS PRN
Status: DISCONTINUED | OUTPATIENT
Start: 2020-07-22 | End: 2020-07-23 | Stop reason: HOSPADM

## 2020-07-22 RX ORDER — FUROSEMIDE 20 MG/1
20 TABLET ORAL
Status: DISCONTINUED | OUTPATIENT
Start: 2020-07-22 | End: 2020-07-22

## 2020-07-22 RX ORDER — ASPIRIN AND DIPYRIDAMOLE 25; 200 MG/1; MG/1
1 CAPSULE, EXTENDED RELEASE ORAL 2 TIMES DAILY
Status: DISCONTINUED | OUTPATIENT
Start: 2020-07-22 | End: 2020-07-23 | Stop reason: HOSPADM

## 2020-07-22 RX ORDER — DULOXETIN HYDROCHLORIDE 60 MG/1
60 CAPSULE, DELAYED RELEASE ORAL DAILY
Status: DISCONTINUED | OUTPATIENT
Start: 2020-07-22 | End: 2020-07-23 | Stop reason: HOSPADM

## 2020-07-22 RX ORDER — KETOROLAC TROMETHAMINE 15 MG/ML
15 INJECTION, SOLUTION INTRAMUSCULAR; INTRAVENOUS ONCE
Status: COMPLETED | OUTPATIENT
Start: 2020-07-22 | End: 2020-07-22

## 2020-07-22 RX ORDER — FUROSEMIDE 20 MG/1
20 TABLET ORAL
Status: DISCONTINUED | OUTPATIENT
Start: 2020-07-23 | End: 2020-07-23 | Stop reason: HOSPADM

## 2020-07-22 RX ORDER — IBUPROFEN 400 MG/1
400 TABLET ORAL 3 TIMES DAILY PRN
Status: DISCONTINUED | OUTPATIENT
Start: 2020-07-22 | End: 2020-07-23 | Stop reason: HOSPADM

## 2020-07-22 RX ORDER — HYDRALAZINE HYDROCHLORIDE 25 MG/1
25 TABLET, FILM COATED ORAL EVERY 8 HOURS SCHEDULED
Status: DISCONTINUED | OUTPATIENT
Start: 2020-07-22 | End: 2020-07-22

## 2020-07-22 RX ORDER — LISINOPRIL 10 MG/1
10 TABLET ORAL EVERY 12 HOURS SCHEDULED
Status: DISCONTINUED | OUTPATIENT
Start: 2020-07-22 | End: 2020-07-23 | Stop reason: HOSPADM

## 2020-07-22 RX ADMIN — ROSUVASTATIN CALCIUM 10 MG: 10 TABLET, FILM COATED ORAL at 11:05

## 2020-07-22 RX ADMIN — FOLIC ACID 1 MG: 1 TABLET ORAL at 11:05

## 2020-07-22 RX ADMIN — OXYCODONE HYDROCHLORIDE AND ACETAMINOPHEN 1 TABLET: 10; 325 TABLET ORAL at 18:49

## 2020-07-22 RX ADMIN — AMLODIPINE BESYLATE 10 MG: 10 TABLET ORAL at 11:06

## 2020-07-22 RX ADMIN — LISINOPRIL 10 MG: 10 TABLET ORAL at 21:48

## 2020-07-22 RX ADMIN — SODIUM CHLORIDE, PRESERVATIVE FREE 10 ML: 5 INJECTION INTRAVENOUS at 11:08

## 2020-07-22 RX ADMIN — SODIUM CHLORIDE, PRESERVATIVE FREE 10 ML: 5 INJECTION INTRAVENOUS at 21:49

## 2020-07-22 RX ADMIN — LISINOPRIL 5 MG: 5 TABLET ORAL at 16:15

## 2020-07-22 RX ADMIN — VITAMIN E CAP 400 UNIT 400 UNITS: 400 CAP at 11:06

## 2020-07-22 RX ADMIN — GABAPENTIN 300 MG: 300 CAPSULE ORAL at 11:06

## 2020-07-22 RX ADMIN — BUPROPION HYDROCHLORIDE 75 MG: 75 TABLET, FILM COATED ORAL at 20:14

## 2020-07-22 RX ADMIN — SODIUM CHLORIDE 75 ML/HR: 9 INJECTION, SOLUTION INTRAVENOUS at 16:19

## 2020-07-22 RX ADMIN — ASPIRIN 81 MG: 81 TABLET, COATED ORAL at 11:05

## 2020-07-22 RX ADMIN — ISOSORBIDE MONONITRATE 30 MG: 30 TABLET, EXTENDED RELEASE ORAL at 11:04

## 2020-07-22 RX ADMIN — KETOROLAC TROMETHAMINE 15 MG: 15 INJECTION, SOLUTION INTRAMUSCULAR; INTRAVENOUS at 11:06

## 2020-07-22 RX ADMIN — IOPAMIDOL 90 ML: 755 INJECTION, SOLUTION INTRAVENOUS at 09:45

## 2020-07-22 RX ADMIN — VITAM B12 100 MCG: 100 TAB at 11:07

## 2020-07-22 RX ADMIN — IBUPROFEN 400 MG: 400 TABLET, FILM COATED ORAL at 20:14

## 2020-07-22 RX ADMIN — FAMOTIDINE 20 MG: 20 TABLET ORAL at 11:04

## 2020-07-22 RX ADMIN — BUPROPION HYDROCHLORIDE 75 MG: 75 TABLET, FILM COATED ORAL at 16:17

## 2020-07-22 RX ADMIN — HYDRALAZINE HYDROCHLORIDE 25 MG: 25 TABLET, FILM COATED ORAL at 16:15

## 2020-07-22 RX ADMIN — DULOXETINE HYDROCHLORIDE 60 MG: 60 CAPSULE, DELAYED RELEASE ORAL at 16:14

## 2020-07-22 RX ADMIN — GABAPENTIN 300 MG: 300 CAPSULE ORAL at 20:12

## 2020-07-22 RX ADMIN — OXYCODONE HYDROCHLORIDE AND ACETAMINOPHEN 1 TABLET: 10; 325 TABLET ORAL at 11:05

## 2020-07-22 RX ADMIN — ASPIRIN AND EXTENDED-RELEASE DIPYRIDAMOLE 1 CAPSULE: 25; 200 CAPSULE ORAL at 20:12

## 2020-07-22 RX ADMIN — GABAPENTIN 300 MG: 300 CAPSULE ORAL at 18:46

## 2020-07-22 RX ADMIN — ALPRAZOLAM 1 MG: 1 TABLET ORAL at 20:15

## 2020-07-22 RX ADMIN — PREGABALIN 75 MG: 75 CAPSULE ORAL at 11:06

## 2020-07-22 RX ADMIN — ROPINIROLE 1 MG: 1 TABLET, FILM COATED ORAL at 20:13

## 2020-07-22 RX ADMIN — NEBIVOLOL HYDROCHLORIDE 5 MG: 5 TABLET ORAL at 17:30

## 2020-07-22 NOTE — PROGRESS NOTES
Adult Nutrition  Assessment    Patient Name:  Ronda Blair  YOB: 1945  MRN: [id number]  Admit Date:  7/21/2020    Assessment Date:  7/22/2020    Comments:  75yo female admit w/ CP and uncontrolled HTN. COVID 19 pending. PMH CHF, COPD, DM, CVA. Labs and meds noted. Soft ground diet, cardiac ADA- no intakes recorded. Wt 197.2#, BMI 32.7. Spoke w/ pt via phone d/t isolation restrictions. Pt states nkfa. She states no chewing problems but difficutly with pills- when asked if she wanted to keep the meats gound as orderd- she said yes, it works. She avoids spicy foods. She says appetite has been decreased. RD to follow hospital course, pt says she may be d/c later today.    Reason for Assessment     Row Name 07/22/20 1500          Reason for Assessment    Reason For Assessment  identified at risk by screening criteria     Diagnosis  cardiac disease     Identified At Risk by Screening Criteria  difficulty chewing/swallowing         Nutrition/Diet History     Row Name 07/22/20 1500          Nutrition/Diet History    Typical Food/Fluid Intake  Spoke w/ pt via phone d/t isolation restrictions. Pt states nkfa. She states no chewing problems but difficutly with pills- when asked if she wanted to keep the meats gound as orderd- she said yes, it works. She avoids spicy foods. She says appetite has been decreased.       Food Preferences  No spicy/chili.     Factors Affecting Nutritional Intake  chewing difficulties/inability to chew food           Labs/Tests/Procedures/Meds     Row Name 07/22/20 1503          Labs/Procedures/Meds    Lab Results Reviewed  reviewed     Lab Results Comments  A1C 6.0, Glu , BUN 18/Cr 1.0H, elev triglyc, chol wnl, Alb 4.9        Diagnostic Tests/Procedures    Diagnostic Test/Procedure Reviewed  reviewed     Diagnostic Test/Procedures Comments  COVID 19 pending        Medications    Pertinent Medications Reviewed  reviewed     Pertinent Medications Comments  Fa/B12, Vit  Hpi Title: Evaluation of Skin Lesions E, lasix 20Qd, SSI           Estimated/Assessed Needs     Row Name 07/22/20 1504          Calculation Measurements    Weight Used For Calculations  89.4 kg (197 lb)        Estimated/Assessed Needs    Additional Documentation  Fluid Requirements (Group);Protein Requirements (Group);Calorie Requirements (Group);KCAL/KG (Group)        Calorie Requirements    Measured Resting Energy Expenditure (MREE)  1700 Kcal/day        KCAL/KG    KCAL/KG  20 Kcal/Kg (kcal);18 Kcal/Kg (kcal)     18 Kcal/Kg (kcal)  1608.462     20 Kcal/Kg (kcal)  1787.18        Protein Requirements    Weight Used For Protein Calculations  89.4 kg (197 lb)     Est Protein Requirement Amount (gms/kg)  0.8 gm protein     Estimated Protein Requirements (gms/day)  71.49        Fluid Requirements    Estimated Fluid Requirements (mL/day)  1600     RDA Method (mL)  1600         Nutrition Prescription Ordered     Row Name 07/22/20 1505          Nutrition Prescription PO    Current PO Diet  Soft Texture     Texture  Ground     Fluid Consistency  Thin     Common Modifiers  Cardiac;Consistent Carbohydrate         Evaluation of Received Nutrient/Fluid Intake     Row Name 07/22/20 1505          PO Evaluation    Number of Days PO Intake Evaluated  Insufficient Data               Electronically signed by:  Rosy Patricia RD  07/22/20 15:06   How Severe Are Your Spot(S)?: moderate Have Your Spot(S) Been Treated In The Past?: has not been treated Location: Right arm Year Removed: 2008

## 2020-07-22 NOTE — PLAN OF CARE
Problem: Patient Care Overview  Goal: Plan of Care Review  Outcome: Ongoing (interventions implemented as appropriate)  Flowsheets (Taken 7/22/2020 2514)  Progress: no change  Plan of Care Reviewed With: patient  Outcome Summary: hospitalist and MD Cipriano believe patients chest pain is not cardaic related. Medically managaing BP to assess if chest pain is better controlled overnight. If so, may DC tomorrow.

## 2020-07-22 NOTE — PROGRESS NOTES
Discharge Planning Assessment  [facility]     Patient Name: Ronda Blair  MRN: [id number]  Today's Date: 7/22/2020    Admit Date: 7/21/2020    Discharge Needs Assessment     Row Name 07/22/20 135       Living Environment    Lives With  child(lalo), adult    Current Living Arrangements  home/apartment/condo    Primary Care Provided by  self    Provides Primary Care For  no one    Family Caregiver if Needed  child(lalo), adult    Quality of Family Relationships  helpful;involved;supportive    Able to Return to Prior Arrangements  yes    Living Arrangement Comments  Pt resides at home with daughter. Daughter voiced good support system.        Resource/Environmental Concerns    Resource/Environmental Concerns  none    Transportation Concerns  car, none       Transition Planning    Patient/Family Anticipates Transition to  home with family    Patient/Family Anticipated Services at Transition  none    Transportation Anticipated  family or friend will provide       Discharge Needs Assessment    Concerns to be Addressed  adjustment to diagnosis/illness    Equipment Currently Used at Home  oxygen;walker, standard;cane, straight;glucometer    Anticipated Changes Related to Illness  none    Equipment Needed After Discharge  none    Discharge Facility/Level of Care Needs  -- Daughter did not anticipate any needs at this time. Awaiting MD and therapy recomendations.     Current Discharge Risk  chronically ill        Discharge Plan     Row Name 07/22/20 8549       Plan    Plan Comments  LSW assessment complete. Pt recently moved in with daughter. Appears to have good support system. Daughter reports pt was independent with ADLs and IADLs prior to hospitalization. Pt has walker and cane if she needs assistance with mobility. Pt is on home o2 at night only. Pt has used home health in past no services currently. Plan is for pt to return home with daughter at d/c. Daughter unsure of any needs at this time. LSW  awaiting MD and therapy recomendations. LSW/case mgt will follow up as consulted and complete arrangements as ordered. oB BLAKE.         Destination      Coordination has not been started for this encounter.      Durable Medical Equipment      Coordination has not been started for this encounter.      Dialysis/Infusion      Coordination has not been started for this encounter.      Home Medical Care      Coordination has not been started for this encounter.      Therapy      Coordination has not been started for this encounter.      Community Resources      Coordination has not been started for this encounter.        Expected Discharge Date and Time     Expected Discharge Date Expected Discharge Time    Jul 26, 2020         Demographic Summary     Row Name 07/22/20 1345       General Information    Admission Type  inpatient    Referral Source  nursing    Reason for Consult  discharge planning    Preferred Language  English     Used During This Interaction  yes Daughter       Contact Information    Contact Information Obtained for          Functional Status     Row Name 07/22/20 1346       Functional Status    Usual Activity Tolerance  moderate    Current Activity Tolerance  fair    Functional Status Comments  Pt uses walker and cane to assist with mobility.        Functional Status, IADL    Medications  independent Pt uses [facility] in [place].     Meal Preparation  independent    Housekeeping  assistive person    Laundry  independent    Shopping  independent       Mental Status Summary    Recent Changes in Mental Status/Cognitive Functioning  unable to assess        Psychosocial    No documentation.       Abuse/Neglect    No documentation.       Legal    No documentation.       Substance Abuse    No documentation.       Patient Forms    No documentation.           HAYDEN Alves

## 2020-07-22 NOTE — PROGRESS NOTES
[facility] Medicine Services  INPATIENT PROGRESS NOTE    Length of Stay: 1  Date of Admission: 7/21/2020  Primary Care Physician: Emil Hedrick MD    Subjective   Chief Complaint: Left-sided chest wall pain    HPI: Patient is a 74 y.o. female with history of diabetes mellitus, hypertension, COPD, depressive/anxiety disorder, dyslipidemia, previous CVA with no residual deficits, chronic pain syndrome, CHF, restless leg syndrome who was admitted for chest pain (likely atypical) and uncontrolled blood pressure.    She still complains of left lateral chest wall pain which is worse with inspiration.  She is doing well otherwise and maintains O2 saturation of 97 to 98% on 2 L of supplemental oxygen.      Review of Systems   Constitutional: Positive for activity change and fatigue. Negative for appetite change, chills, diaphoresis and fever.   HENT: Negative for trouble swallowing and voice change.    Eyes: Negative for photophobia and visual disturbance.   Respiratory: Negative for cough, choking, chest tightness, shortness of breath, wheezing and stridor.    Cardiovascular: Positive for chest pain. Negative for palpitations and leg swelling.   Gastrointestinal: Negative for abdominal distention, abdominal pain, blood in stool, constipation, diarrhea, nausea and vomiting.   Endocrine: Negative for cold intolerance, heat intolerance, polydipsia, polyphagia and polyuria.   Genitourinary: Negative for decreased urine volume, difficulty urinating, dysuria, enuresis, flank pain, frequency, hematuria and urgency.   Musculoskeletal: Negative for arthralgias, gait problem, myalgias, neck pain and neck stiffness.   Skin: Negative for pallor, rash and wound.   Neurological: Negative for dizziness, tremors, seizures, syncope, facial asymmetry, speech difficulty, weakness, light-headedness, numbness and headaches.   Hematological: Does not bruise/bleed easily.   Psychiatric/Behavioral:  Negative for agitation, behavioral problems and confusion.       Objective    Temp:  [96 °F (35.6 °C)-97.3 °F (36.3 °C)] 96 °F (35.6 °C)  Heart Rate:  [48-86] 59  Resp:  [16-20] 18  BP: (131-222)/(64-98) 188/89    Physical Exam   Constitutional: She is oriented to person, place, and time. She appears well-developed and well-nourished. She is cooperative. No distress.   HENT:   Head: Normocephalic and atraumatic.   Right Ear: External ear normal.   Left Ear: External ear normal.   Nose: Nose normal.   Mouth/Throat: Oropharynx is clear and moist.   Eyes: Pupils are equal, round, and reactive to light. Conjunctivae and EOM are normal.   Neck: Normal range of motion. Neck supple. No JVD present. No thyromegaly present.   Cardiovascular: Normal rate, regular rhythm, normal heart sounds and intact distal pulses. Exam reveals no gallop and no friction rub.   No murmur heard.  Pulmonary/Chest: Effort normal and breath sounds normal. No stridor. No respiratory distress. She has no wheezes. She has no rales. She exhibits no tenderness.   She has reproducible pain beneath the left breast and extending to the lateral chest wall.   Abdominal: Soft. Bowel sounds are normal. She exhibits no distension and no mass. There is no tenderness. There is no rebound and no guarding. No hernia.   Musculoskeletal: Normal range of motion. She exhibits no edema, tenderness or deformity.   Neurological: She is alert and oriented to person, place, and time. She has normal reflexes. She displays normal reflexes. No cranial nerve deficit or sensory deficit. Coordination normal.   Skin: Skin is warm and dry. No rash noted. She is not diaphoretic. No erythema. No pallor.   Psychiatric: She has a normal mood and affect. Her behavior is normal. Judgment and thought content normal.   Nursing note and vitals reviewed.        Medication Review:    Current Facility-Administered Medications:   •  acetaminophen (TYLENOL) tablet 650 mg, 650 mg, Oral, Q4H PRN  **OR** acetaminophen (TYLENOL) 160 MG/5ML solution 650 mg, 650 mg, Oral, Q4H PRN **OR** acetaminophen (TYLENOL) suppository 650 mg, 650 mg, Rectal, Q4H PRN, Chandrakant Sal MD  •  albuterol sulfate HFA (PROVENTIL HFA;VENTOLIN HFA;PROAIR HFA) inhaler 2 puff, 2 puff, Inhalation, Q6H PRN, Chandrakant Sal MD  •  ALPRAZolam (XANAX) tablet 1 mg, 1 mg, Oral, Nightly, Chandrakant Sal MD, 1 mg at 07/21/20 2200  •  [START ON 7/23/2020] ALPRAZolam (XANAX) tablet 1 mg, 1 mg, Oral, QAM, Chandrakant Sal MD  •  amLODIPine (NORVASC) tablet 10 mg, 10 mg, Oral, Daily, Chandrakant Sal MD, 10 mg at 07/22/20 1106  •  aspirin-dipyridamole (AGGRENOX)  MG per 12 hr capsule 1 capsule, 1 capsule, Oral, BID, Chandrakant Sal MD  •  bisacodyl (DULCOLAX) EC tablet 5 mg, 5 mg, Oral, Daily PRN, Chandrakant Sal MD  •  buPROPion (WELLBUTRIN) tablet 75 mg, 75 mg, Oral, BID, Chandrakant Sal MD  •  dextrose (D50W) 25 g/ 50mL Intravenous Solution 25 g, 25 g, Intravenous, Q15 Min PRN, Chandrakant Sal MD  •  dextrose (GLUTOSE) oral gel 15 g, 15 g, Oral, Q15 Min PRN, Chandrakant Sal MD  •  DULoxetine (CYMBALTA) DR capsule 60 mg, 60 mg, Oral, Daily, Chandrakant Sal MD  •  enoxaparin (LOVENOX) syringe 40 mg, 40 mg, Subcutaneous, Q24H, Chandrakant Sal MD, 40 mg at 07/21/20 2201  •  famotidine (PEPCID) tablet 20 mg, 20 mg, Oral, Daily, Chandrakant Sal MD, 20 mg at 07/22/20 1104  •  folic acid (FOLVITE) tablet 1 mg, 1 mg, Oral, Daily, Chandrakant Sal MD, 1 mg at 07/22/20 1105  •  furosemide (LASIX) tablet 20 mg, 20 mg, Oral, BID, Chandrakant Sal MD  •  gabapentin (NEURONTIN) capsule 300 mg, 300 mg, Oral, 4x Daily, Chandrakant Sal MD, 300 mg at 07/22/20 1106  •  glucagon (human recombinant) (GLUCAGEN DIAGNOSTIC) injection 1 mg, 1 mg, Subcutaneous, Q15 Min PRN, Chandrakant Sal MD  •  hydrALAZINE (APRESOLINE) injection 10 mg, 10 mg, Intravenous, Q6H PRN, Chandrakant Sal MD  •   hydrALAZINE (APRESOLINE) tablet 25 mg, 25 mg, Oral, Q8H, Chandrakant Sal MD  •  insulin aspart (novoLOG) injection 0-9 Units, 0-9 Units, Subcutaneous, TID AC, Chandrakant Sal MD  •  isosorbide mononitrate (IMDUR) 24 hr tablet 30 mg, 30 mg, Oral, Q24H, Chandrakant Sal MD, 30 mg at 07/22/20 1104  •  lisinopril (PRINIVIL,ZESTRIL) tablet 5 mg, 5 mg, Oral, Q24H, Chandrakant Sal MD  •  nitroglycerin (NITROSTAT) SL tablet 0.4 mg, 0.4 mg, Sublingual, Q5 Min PRN, Chandrakant Sal MD, 0.4 mg at 07/21/20 1523  •  ondansetron (ZOFRAN) tablet 4 mg, 4 mg, Oral, Q6H PRN **OR** ondansetron (ZOFRAN) injection 4 mg, 4 mg, Intravenous, Q6H PRN, Chandrakant Sal MD  •  oxyCODONE-acetaminophen (PERCOCET)  MG per tablet 1 tablet, 1 tablet, Oral, Q4H PRN, Chandrakant Sal MD, 1 tablet at 07/22/20 1105  •  pregabalin (LYRICA) capsule 75 mg, 75 mg, Oral, Daily, Chandrakant Sal MD, 75 mg at 07/22/20 1106  •  rOPINIRole (REQUIP) tablet 1 mg, 1 mg, Oral, Nightly, Chandrakant Sal MD, 1 mg at 07/21/20 2215  •  rosuvastatin (CRESTOR) tablet 10 mg, 10 mg, Oral, Daily, Chandrakant Sal MD, 10 mg at 07/22/20 1105  •  sodium chloride 0.9 % flush 10 mL, 10 mL, Intravenous, PRN, Chandrakant Sal MD  •  sodium chloride 0.9 % flush 10 mL, 10 mL, Intravenous, Q12H, Chandrakant Sal MD, 10 mL at 07/22/20 1108  •  sodium chloride 0.9 % flush 10 mL, 10 mL, Intravenous, PRN, Chandrakant Sal MD  •  tiZANidine (ZANAFLEX) tablet 4 mg, 4 mg, Oral, Nightly PRN, Chandrakant Sal MD  •  vitamin B-12 (CYANOCOBALAMIN) tablet 100 mcg, 100 mcg, Oral, Daily, Chandrakant Sal MD, 100 mcg at 07/22/20 1107  •  vitamin E capsule 400 Units, 400 Units, Oral, Daily, Chandrakant Sal MD, 400 Units at 07/22/20 1106    Results Review:  I have reviewed the labs, radiology results, and diagnostic studies.    Laboratory Data:   Results from last 7 days   Lab Units 07/22/20  0652 07/21/20  1322   SODIUM mmol/L 141 140    POTASSIUM mmol/L 3.7 3.9   CHLORIDE mmol/L 106 101   CO2 mmol/L 25.0 27.0   BUN mg/dL 18 15   CREATININE mg/dL 1.05* 1.09*   GLUCOSE mg/dL 89 114*   CALCIUM mg/dL 9.0 9.6   BILIRUBIN mg/dL  --  0.5   ALK PHOS U/L  --  79   ALT (SGPT) U/L  --  15   AST (SGOT) U/L  --  24   ANION GAP mmol/L 10.0 12.0     Estimated Creatinine Clearance: 51.2 mL/min (A) (by C-G formula based on SCr of 1.05 mg/dL (H)).          Results from last 7 days   Lab Units 07/22/20  0652 07/21/20  1322   WBC 10*3/mm3 6.12 7.91   HEMOGLOBIN g/dL 11.4* 13.8   HEMATOCRIT % 35.1 43.0   PLATELETS 10*3/mm3 137* 162     Results from last 7 days   Lab Units 07/21/20  1322   INR  0.95       Culture Data:   No results found for: BLOODCX  No results found for: URINECX  No results found for: RESPCX  No results found for: WOUNDCX  No results found for: STOOLCX  No components found for: BODYFLD    Radiology Data:   Imaging Results (Last 24 Hours)     Procedure Component Value Units Date/Time    CT Angiogram Coronary [[id number]] Collected:  07/22/20 0830     Updated:  07/22/20 0957    Narrative:       PROCEDURE: CT ANGIOGRAM CORONARY WITH CONTRAST    CLINICAL HISTORY: Chest pain    COMPARISON: None.    Post processing was performed by the radiologist at the [employer]  workstation. Serial axial CT images were obtained through the  heart at 3 mm thickness without contrast for calcium scoring. 3D  images including vessel probing technique were also obtained.  Subsequently, following the intravenous administration of 90 ml  of Isovue-370, serial axial CT images were obtained through the  heart at 0.6 mm thickness utilizing retrospective gating. This  exam was performed according to our departmental dose  optimization program, which includes automated exposure control,  adjustment of the mA and/or KV according to patient size and/or  use of iterative reconstruction technique. Full field of view  reconstructed images were used for evaluation of the  extracardiac  tissues.    CALCIUM PLAQUE BURDEN:    REGION                                         CALCIUM SCORE  (Agatston)  Left Main                                                      31    Right Coronary Artery                                 171   Left Anterior Descending                            22   Circumflex                                                    121    Posterior Descending Artery                       0          Your Calcium Score is 345      This places the patent in the mild coronary artery disease highly  likely, significant narrowings possible risk for coronary artery  disease.    CTA OF THE CORONARY ARTERIES: There is good visualization of the  left main, LAD, diagonals, circumflex, and RCA. The patient is  right dominant.    Left Main: No calcified or soft tissue density plaque and no  stenosis.  LAD: Proximal calcified atherosclerotic plaque causing mild  stenosis of less than 50%. Second diagonal branch proximal  calcified atherosclerotic plaque causing mild stenosis of less  than 50%. No other calcified or soft tissue density plaque and/or  stenosis.  Circumflex: Proximal calcified atherosclerotic plaque causing  mild stenosis of less than 50%. No other calcified or soft tissue  density plaque and/or stenosis.  RCA: Mid calcified atherosclerotic plaque causing mild stenosis  of less than 50%. No other calcified or soft tissue density  plaque and/or stenosis.    The aortic valve is tricuspid. There is no myocardial bridging.  On short axis views, the myocardium is homogeneous in thickness.    EXTRACARDIAC SOFT TISSUES:  Mediastinum: On the imaging, the ascending and descending aorta  are normal in caliber. There is no mediastinal or hilar  lymphadenopathy. The pericardium is normal.  Lungs: No consolidation or focal nodules are present. There is no  pneumothorax or effusion. The pulmonary arteries are normal in  appearance.  Abdomen: No evidence of hiatal hernia. The imaged liver  and  spleen are unremarkable. There is no lymphadenopathy in the upper  abdomen.  Bones: There are no lytic or blastic lesions within the osseous  structures.    CT FUNCTIONAL ANALYSIS:  Ejection Fraction     71 %  Diastolic Volume     136 ml  Systolic Volume      40 ml  Stroke Volume        97 ml  Cardiac Output       4.1 L/minute      Impression:       CONCLUSION:   1.  Patient's calcium score is 345.This places the patent in the  mild coronary artery disease highly likely, significant  narrowings possible risk for coronary artery disease.  2.  LAD: Proximal calcified atherosclerotic plaque causing mild  stenosis of less than 50%. Second diagonal branch proximal  calcified atherosclerotic plaque causing mild stenosis of less  than 50%. No other calcified or soft tissue density plaque and/or  stenosis.  3.  Circumflex: Proximal calcified atherosclerotic plaque causing  mild stenosis of less than 50%. No other calcified or soft tissue  density plaque and/or stenosis.  4.  RCA: Mid calcified atherosclerotic plaque causing mild  stenosis of less than 50%. No other calcified or soft tissue  density plaque and/or stenosis.  5.  Remainder CT angiogram coronary exam unremarkable.    Electronically signed by:  Addison Saha MD  7/22/2020 9:55 AM CDT  Workstation: [id number]    CT Angiogram Chest [[id number]] Collected:  07/21/20 1435     Updated:  07/21/20 1510    Narrative:       STUDY: CT CHEST ANGIOGRAPHY WITH IV CONTRAST    COMPARISON: Chest radiograph dated same day    INDICATION: Chest pain    TECHNIQUE: Multiple CT images of the chest were obtained after  the uneventful administration of iodinated intravenous contrast  in the pulmonary arterial phase. Orthogonal reformats were  provided.    FINDINGS:   Examination is technically adequate. No filling defects within  the pulmonary arteries to suggest emboli.    Visualized thyroid is unremarkable. No supraclavicular or  axillary lymphadenopathy.    Heart size is normal.  No pericardial effusion. Multivessel  coronary artery disease. Esophagus is unremarkable. Right  anterior lower lymph node measures 1.2 cm in short axis (axial  series 3 image 57/151. There additional subcentimeter slightly  prominent mediastinal and hilar lymph nodes visualized. Airways  are patent.    No focal consolidation, effusion, or pneumothorax. There are  numerous scattered 2 to 3 mm predominantly peripheral pulmonary  nodules seen throughout the bilateral lower lobes with diffuse  mosaic attenuation and superimposed, very faint ill-defined  groundglass opacities in the lower lobes. Focal mild interstitial  thickening is seen at the anterior lingula.    There is a 1.6 x 1.3 cm left adrenal nodule with Hounsfield units  of 30.8 the remaining visualized upper abdominal contents are  unremarkable.      Impression:         1. No pulmonary embolus.    2. Diffuse mosaic attenuation with superimposed very faint  ill-defined groundglass opacities in the bilateral lower lobes as  well as innumerable 2 to 3 mm predominantly peripheral pulmonary  nodules. Intermediate features for COVID-19 pneumonia reported in  the literature, but not specific to arrive at a relatively  confident radiological diagnosis. Differential includes a wide  variety of infectious and noninfectious processes. Correlation  with patient history and physical is required.    3. Coronary artery disease.    4. Incompletely characterized left adrenal nodule with  indeterminate features. Recommend follow-up with nonemergent  noncontrast adrenal CT.    Electronically signed by:  Jonathan De La Torre MD  7/21/2020  3:09 PM CDT Workstation: [id number]    XR Chest 1 View [[id number]] Collected:  07/21/20 1320     Updated:  07/21/20 1334    Narrative:       PROCEDURE: XR CHEST 1 VIEW    Clinical History: Chest Pain triage protocol  Chest Pain Triage Protocol    Indication:     Same as above.    Comparison:     None.    Technique:     Single portable  frontal projection of the chest was done.    Findings:    Prior surgery is seen in the lower cervical spine. Multiple old  right-sided rib fractures are seen.    There are no discrete airspace infiltrates, pneumothoraces or  pleural effusions.    The pulmonary vascularity is normal.    The cardiomediastinal contour is  unremarkable.      Impression:       Impression:     There is no acute pleural-parenchymal process seen in the imaged  lung fields.    Electronically signed by:  Prashant Nicholson MD  7/21/2020 1:33 PM CDT  Workstation: [id number]          I have reviewed the patient's current medications.     Assessment/Plan     Hospital Problem List:  Active Problems:    Chest pain    Chest pain (likely atypical): Patient has risk factors for cardiovascular events.  EKG did not show any acute changes and she has had 3 negative serial troponins.  CTA showed a calcium score 345 with mild stenosis of less than 50% in the LAD, circumflex and RCA.  Patient is to be treated medically and is to follow-up with cardiologist as outpatient on discharge.  Echocardiogram showed:  · Estimated EF appears to be in the range of 56 - 60%.  · Left ventricular systolic function is normal.  · Left ventricular diastolic dysfunction (grade II) consistent with pseudonormalization.  · Left ventricular wall thickness is consistent with borderline concentric hypertrophy.  · Left atrial cavity size is moderately dilated.  · Mild mitral valve regurgitation is present  · Mild tricuspid valve regurgitation is present      Uncontrolled hypertension: Patient has been out of her medications for the past few days.  Blood pressure remains uncontrolled. Continue amlodipine and lisinopril with adjustments as needed for optimal blood pressure control.    Reduce the dose of Bystolic secondary to low heart rate.     Ill-defined bilateral lower lobe groundglass opacities: Patient is mostly asymptomatic and COVID-19 PCR is negative.         2 to 3 mm  peripheral pulmonary nodules: Patient will be scheduled for follow-up CT scan in 3 to 6 months as outpatient.     Diabetes mellitus: Stable.  Continue Accu-Cheks and sliding scale insulin.  Hold metformin for now.     History of CHF: Patient is euvolemic and clinically not in heart failure.  Chest x-ray is clear.     Restless leg syndrome: Continue Requip.     History of COPD: Patient is not in exacerbation.  Continue bronchodilators.     Chronic pain syndrome: Continue pain control.     Anxiety/depressive disorder: Continue home medications.     Continue GI and DVT prophylaxis    Discharge Planning: Likely discharge in [facility].    Chandrakant Sal MD   07/22/20   13:21

## 2020-07-22 NOTE — PROGRESS NOTES
Discharge Planning Assessment  [facility]     Patient Name: Ronda Blair  MRN: [id number]  Today's Date: 7/22/2020    Admit Date: 7/21/2020    Discharge Needs Assessment     Row Name 07/22/20 1354       Living Environment    Lives With  child(lalo), adult    Current Living Arrangements  home/apartment/condo    Primary Care Provided by  self    Provides Primary Care For  no one    Family Caregiver if Needed  child(lalo), adult    Quality of Family Relationships  helpful;involved;supportive    Able to Return to Prior Arrangements  yes    Living Arrangement Comments  Pt resides at home with daughter. Daughter voiced good support system.        Resource/Environmental Concerns    Resource/Environmental Concerns  none    Transportation Concerns  car, none       Transition Planning    Patient/Family Anticipates Transition to  home with family    Patient/Family Anticipated Services at Transition  none    Transportation Anticipated  family or friend will provide       Discharge Needs Assessment    Concerns to be Addressed  adjustment to diagnosis/illness    Equipment Currently Used at Home  oxygen;walker, standard;cane, straight;glucometer Pennyrile home medical.     Anticipated Changes Related to Illness  none    Equipment Needed After Discharge  none    Discharge Facility/Level of Care Needs  -- Daughter did not anticipate any needs at this time. Awaiting MD and therapy recomendations.     Current Discharge Risk  chronically ill        Discharge Plan     Row Name 07/22/20 6522       Plan    Plan Comments  LSW assessment complete. Pt recently moved in with daughter. Appears to have good support system. Daughter reports pt was independent with ADLs and IADLs prior to hospitalization. Pt has walker and cane if she needs assistance with mobility. Pt is on home o2 at night only. Pt has used home health in past no services currently. Plan is for pt to return home with daughter at d/c. Daughter unsure of any needs  at this time. LSW awaiting MD and therapy recomendations. LSW/case mgt will follow up as consulted and complete arrangements as ordered. Bo BLAKE.         Destination      Coordination has not been started for this encounter.      Durable Medical Equipment      Coordination has not been started for this encounter.      Dialysis/Infusion      Coordination has not been started for this encounter.      Home Medical Care      Coordination has not been started for this encounter.      Therapy      Coordination has not been started for this encounter.      Community Resources      Coordination has not been started for this encounter.        Expected Discharge Date and Time     Expected Discharge Date Expected Discharge Time    Jul 26, 2020         Demographic Summary     Row Name 07/22/20 1345       General Information    Admission Type  inpatient    Referral Source  nursing    Reason for Consult  discharge planning    Preferred Language  English     Used During This Interaction  yes Daughter       Contact Information    Contact Information Obtained for          Functional Status     Row Name 07/22/20 1346       Functional Status    Usual Activity Tolerance  moderate    Current Activity Tolerance  fair    Functional Status Comments  Pt uses walker and cane to assist with mobility.        Functional Status, IADL    Medications  independent Pt uses [facility] pharmacy in [place].     Meal Preparation  independent    Housekeeping  assistive person    Laundry  independent    Shopping  independent       Mental Status Summary    Recent Changes in Mental Status/Cognitive Functioning  unable to assess        Psychosocial    No documentation.       Abuse/Neglect    No documentation.       Legal    No documentation.       Substance Abuse    No documentation.       Patient Forms    No documentation.           HAYDEN Alves

## 2020-07-22 NOTE — PLAN OF CARE
Problem: Patient Care Overview  Goal: Plan of Care Review  7/22/2020 0451 by Amarilis Moura RN  Outcome: Ongoing (interventions implemented as appropriate)  Flowsheets  Taken 7/22/2020 6629  Progress: no change  Outcome Summary: new admission  Taken 7/21/2020 2105  Plan of Care Reviewed With: patient

## 2020-07-23 ENCOUNTER — READMISSION MANAGEMENT (OUTPATIENT)
Dept: CALL CENTER | Facility: HOSPITAL | Age: 75
End: 2020-07-23

## 2020-07-23 VITALS
BODY MASS INDEX: 32.99 KG/M2 | HEIGHT: 65 IN | WEIGHT: 198 LBS | TEMPERATURE: 97.7 F | DIASTOLIC BLOOD PRESSURE: 68 MMHG | SYSTOLIC BLOOD PRESSURE: 149 MMHG | OXYGEN SATURATION: 99 % | RESPIRATION RATE: 18 BRPM | HEART RATE: 51 BPM

## 2020-07-23 LAB
GLUCOSE BLDC GLUCOMTR-MCNC: 115 MG/DL (ref 70–130)
GLUCOSE BLDC GLUCOMTR-MCNC: 121 MG/DL (ref 70–130)
GLUCOSE BLDC GLUCOMTR-MCNC: 148 MG/DL (ref 70–130)

## 2020-07-23 PROCEDURE — 96361 HYDRATE IV INFUSION ADD-ON: CPT

## 2020-07-23 PROCEDURE — G0378 HOSPITAL OBSERVATION PER HR: HCPCS

## 2020-07-23 PROCEDURE — 82962 GLUCOSE BLOOD TEST: CPT

## 2020-07-23 RX ORDER — NEBIVOLOL 5 MG/1
5 TABLET ORAL
Qty: 30 TABLET | Refills: 1 | Status: SHIPPED | OUTPATIENT
Start: 2020-07-24 | End: 2020-08-31 | Stop reason: SDUPTHER

## 2020-07-23 RX ORDER — FAMOTIDINE 20 MG/1
20 TABLET, FILM COATED ORAL DAILY
Qty: 30 TABLET | Refills: 1 | Status: SHIPPED | OUTPATIENT
Start: 2020-07-24 | End: 2020-08-24 | Stop reason: SDUPTHER

## 2020-07-23 RX ORDER — ISOSORBIDE MONONITRATE 30 MG/1
30 TABLET, EXTENDED RELEASE ORAL
Qty: 30 TABLET | Refills: 1 | Status: SHIPPED | OUTPATIENT
Start: 2020-07-24 | End: 2020-08-24 | Stop reason: SDUPTHER

## 2020-07-23 RX ORDER — NITROGLYCERIN 0.4 MG/1
0.4 TABLET SUBLINGUAL
Qty: 30 TABLET | Refills: 1 | Status: SHIPPED | OUTPATIENT
Start: 2020-07-23 | End: 2020-07-30 | Stop reason: SDDI

## 2020-07-23 RX ORDER — LISINOPRIL 10 MG/1
10 TABLET ORAL EVERY 12 HOURS SCHEDULED
Qty: 60 TABLET | Refills: 1 | Status: SHIPPED | OUTPATIENT
Start: 2020-07-23 | End: 2020-08-24 | Stop reason: SDUPTHER

## 2020-07-23 RX ORDER — AMLODIPINE BESYLATE 10 MG/1
10 TABLET ORAL DAILY
Qty: 30 TABLET | Refills: 1 | Status: SHIPPED | OUTPATIENT
Start: 2020-07-23 | End: 2020-08-24 | Stop reason: SDUPTHER

## 2020-07-23 RX ADMIN — ROSUVASTATIN CALCIUM 10 MG: 10 TABLET, FILM COATED ORAL at 09:53

## 2020-07-23 RX ADMIN — BUPROPION HYDROCHLORIDE 75 MG: 75 TABLET, FILM COATED ORAL at 09:53

## 2020-07-23 RX ADMIN — LISINOPRIL 10 MG: 10 TABLET ORAL at 09:53

## 2020-07-23 RX ADMIN — OXYCODONE HYDROCHLORIDE AND ACETAMINOPHEN 1 TABLET: 10; 325 TABLET ORAL at 05:19

## 2020-07-23 RX ADMIN — FOLIC ACID 1 MG: 1 TABLET ORAL at 09:54

## 2020-07-23 RX ADMIN — VITAM B12 100 MCG: 100 TAB at 09:53

## 2020-07-23 RX ADMIN — FUROSEMIDE 20 MG: 20 TABLET ORAL at 09:54

## 2020-07-23 RX ADMIN — DULOXETINE HYDROCHLORIDE 60 MG: 60 CAPSULE, DELAYED RELEASE ORAL at 09:53

## 2020-07-23 RX ADMIN — GABAPENTIN 300 MG: 300 CAPSULE ORAL at 09:53

## 2020-07-23 RX ADMIN — FAMOTIDINE 20 MG: 20 TABLET ORAL at 09:54

## 2020-07-23 RX ADMIN — NEBIVOLOL HYDROCHLORIDE 5 MG: 5 TABLET ORAL at 09:53

## 2020-07-23 RX ADMIN — AMLODIPINE BESYLATE 10 MG: 10 TABLET ORAL at 09:54

## 2020-07-23 RX ADMIN — ISOSORBIDE MONONITRATE 30 MG: 30 TABLET, EXTENDED RELEASE ORAL at 09:54

## 2020-07-23 RX ADMIN — OXYCODONE HYDROCHLORIDE AND ACETAMINOPHEN 1 TABLET: 10; 325 TABLET ORAL at 12:20

## 2020-07-23 RX ADMIN — ALPRAZOLAM 1 MG: 1 TABLET ORAL at 06:01

## 2020-07-23 RX ADMIN — IBUPROFEN 400 MG: 400 TABLET, FILM COATED ORAL at 10:10

## 2020-07-23 RX ADMIN — VITAMIN E CAP 400 UNIT 400 UNITS: 400 CAP at 09:53

## 2020-07-23 RX ADMIN — PREGABALIN 75 MG: 75 CAPSULE ORAL at 09:54

## 2020-07-23 RX ADMIN — TIZANIDINE 4 MG: 4 TABLET ORAL at 12:20

## 2020-07-23 RX ADMIN — ASPIRIN AND EXTENDED-RELEASE DIPYRIDAMOLE 1 CAPSULE: 25; 200 CAPSULE ORAL at 09:53

## 2020-07-23 NOTE — DISCHARGE SUMMARY
[facility] Medicine Services  DISCHARGE SUMMARY       Date of Admission: 7/21/2020  Date of Discharge:  7/23/2020  Primary Care Physician: Emil Hedrick MD    Presenting Problem/History of Present Illness:  Hypertensive urgency [I16.0]  Pneumonia of both lungs due to infectious organism, unspecified part of lung [J18.9]  Chest pain, unspecified type [R07.9]   Patient is a 74 y.o. female with history of diabetes mellitus, hypertension, COPD, depressive/anxiety disorder, dyslipidemia, previous CVA with no residual deficits, chronic pain syndrome, CHF, restless leg syndrome presents to the emergency room secondary to 1 week history of progressively worsening and constant left-sided /left lateral chest wall pain which is worse with inspiration.    She started having the pain after a fall.  She denies fever, chills, nausea, vomiting, shortness of air, palpitation, diaphoresis, syncope, presyncope, hematemesis, melena or hematochezia.     On triage her blood pressure was 222/98.  Was given a dose of hydralazine with some relief.  Patient has been out of her blood pressure medication for a few days.  Her first doses of troponin were negative, chest x-ray was clear and EKG did not show any acute changes.  D-dimer was 907 and patient had a CT pulmonary angiogram which was negative for pulmonary embolism but showed 2 to 3 mm peripheral pulmonary nodules and ill-defined groundglass opacity bilateral lower lobes.    Final Discharge Diagnoses:  Active Hospital Problems    Diagnosis   • Chest pain       Consults:   Consults     Date and Time Order Name Status Description    7/21/2020 1542 Hospitalist (on-call MD unless specified)            Procedures Performed: None.                Pertinent Test Results:   Lab Results (last 7 days)     Procedure Component Value Units Date/Time    POC Glucose Once [[id number]]  (Abnormal) Collected:  07/23/20 1002    Specimen:  Blood Updated:  07/23/20  1020     Glucose 148 mg/dL      Comment: RN NotifiedOperator: [id number] BERNARDO Pedroza ID: [id number]       POC Glucose Once [[id number]]  (Normal) Collected:  07/23/20 0617    Specimen:  Blood Updated:  07/23/20 0705     Glucose 121 mg/dL      Comment: RN NotifiedOperator: [id number] KAISER NIEVESMeter ID: [id number]       POC Glucose Once [[id number]]  (Normal) Collected:  07/22/20 2001    Specimen:  Blood Updated:  07/23/20 0311     Glucose 115 mg/dL      Comment: RN NotifiedOperator: [id number] KAISER NIEVESMeter ID: [id number]       Blood Culture - Blood, Hand, Left [[id number]] Collected:  07/21/20 1908    Specimen:  Blood from Hand, Left Updated:  07/22/20 1915     Blood Culture No growth at 24 hours    POC Glucose Once [[id number]]  (Normal) Collected:  07/22/20 1616    Specimen:  Blood Updated:  07/22/20 1630     Glucose 104 mg/dL      Comment: : [id number] ADY Parkinson ID: [id number]       Blood Culture - Blood, Arm, Left [[id number]] Collected:  07/21/20 1619    Specimen:  Blood from Arm, Left Updated:  07/22/20 1630     Blood Culture No growth at 24 hours    POC Glucose Once [[id number]]  (Normal) Collected:  07/22/20 1024    Specimen:  Blood Updated:  07/22/20 1041     Glucose 93 mg/dL      Comment: : [id number] ADY Parkinson ID: [id number]       Basic Metabolic Panel [[id number]]  (Abnormal) Collected:  07/22/20 0652    Specimen:  Blood Updated:  07/22/20 0722     Glucose 89 mg/dL      BUN 18 mg/dL      Creatinine 1.05 mg/dL      Sodium 141 mmol/L      Potassium 3.7 mmol/L      Chloride 106 mmol/L      CO2 25.0 mmol/L      Calcium 9.0 mg/dL      eGFR Non African Amer 51 mL/min/1.73      BUN/Creatinine Ratio 17.1     Anion Gap 10.0 mmol/L     Narrative:       GFR Normal >60  Chronic Kidney Disease <60  Kidney Failure <15      CBC Auto Differential [[id number]]  (Abnormal) Collected:  07/22/20 0652    Specimen:  Blood Updated:  07/22/20 0701     WBC 6.12  10*3/mm3      RBC 4.08 10*6/mm3      Hemoglobin 11.4 g/dL      Hematocrit 35.1 %      MCV 86.0 fL      MCH 27.9 pg      MCHC 32.5 g/dL      RDW 15.3 %      RDW-SD 47.8 fl      MPV 11.4 fL      Platelets 137 10*3/mm3      Neutrophil % 65.0 %      Lymphocyte % 23.5 %      Monocyte % 9.2 %      Eosinophil % 1.8 %      Basophil % 0.2 %      Immature Grans % 0.3 %      Neutrophils, Absolute 3.98 10*3/mm3      Lymphocytes, Absolute 1.44 10*3/mm3      Monocytes, Absolute 0.56 10*3/mm3      Eosinophils, Absolute 0.11 10*3/mm3      Basophils, Absolute 0.01 10*3/mm3      Immature Grans, Absolute 0.02 10*3/mm3      nRBC 0.0 /100 WBC     Troponin [[id number]]  (Normal) Collected:  07/21/20 2116    Specimen:  Blood Updated:  07/21/20 2141     Troponin T <0.010 ng/mL     Narrative:       Troponin T Reference Range:  <= 0.03 ng/mL-   Negative for AMI  >0.03 ng/mL-     Abnormal for myocardial necrosis.  Clinicians would have to utilize clinical acumen, EKG, Troponin and serial changes to determine if it is an Acute Myocardial Infarction or myocardial injury due to an underlying chronic condition.       Results may be falsely decreased if patient taking Biotin.      Troponin [[id number]]  (Normal) Collected:  07/21/20 1908    Specimen:  Blood Updated:  07/21/20 1926     Troponin T <0.010 ng/mL     Narrative:       Troponin T Reference Range:  <= 0.03 ng/mL-   Negative for AMI  >0.03 ng/mL-     Abnormal for myocardial necrosis.  Clinicians would have to utilize clinical acumen, EKG, Troponin and serial changes to determine if it is an Acute Myocardial Infarction or myocardial injury due to an underlying chronic condition.       Results may be falsely decreased if patient taking Biotin.      COVID-19, [facility] IN-HOUSE, NP SWAB IN TRANSPORT MEDIA 8-10 HR TAT - Swab, Nasopharynx [[id number]]  (Normal) Collected:  07/21/20 1609    Specimen:  Swab from Nasopharynx Updated:  07/21/20 1905     COVID19 Not Detected    Narrative:       Testing  performed by Real Time RT-PCR  This test has not been approved by the [employer] but is authorized under the Emergency Use Act (EUA)    Fact sheet for providers: https://www.fda.gov/media/385051/download    Fact sheet for patients: https://www.fda.gov/media/922895/download        POC Glucose Once [[id number]]  (Normal) Collected:  07/21/20 1806    Specimen:  Blood Updated:  07/21/20 1818     Glucose 94 mg/dL      Comment: : [id number] GABRIELE Ramsey ID: [id number]       Lipid Panel [[id number]]  (Abnormal) Collected:  07/21/20 1525    Specimen:  Blood Updated:  07/21/20 1731     Total Cholesterol 160 mg/dL      Triglycerides 167 mg/dL      HDL Cholesterol 41 mg/dL      LDL Cholesterol  86 mg/dL      VLDL Cholesterol 33.4 mg/dL      LDL/HDL Ratio 2.09    Narrative:       Cholesterol Reference Ranges  (U.S. Department of Health and Human Services ATP III Classifications)    Desirable          <200 mg/dL  Borderline High    200-239 mg/dL  High Risk          >240 mg/dL      Triglyceride Reference Ranges  (U.S. Department of Health and Human Services ATP III Classifications)    Normal           <150 mg/dL  Borderline High  150-199 mg/dL  High             200-499 mg/dL  Very High        >500 mg/dL    HDL Reference Ranges  (U.S. Department of Health and Human Services ATP III Classifcations)    Low     <40 mg/dl (major risk factor for CHD)  High    >60 mg/dl ('negative' risk factor for CHD)        LDL Reference Ranges  (U.S. Department of Health and Human Services ATP III Classifcations)    Optimal          <100 mg/dL  Near Optimal     100-129 mg/dL  Borderline High  130-159 mg/dL  High             160-189 mg/dL  Very High        >189 mg/dL    Hemoglobin A1c [[id number]]  (Abnormal) Collected:  07/21/20 1322    Specimen:  Blood Updated:  07/21/20 1658     Hemoglobin A1C 6.00 %     Narrative:       Hemoglobin A1C Ranges:    Increased Risk for Diabetes  5.7% to 6.4%  Diabetes                     >= 6.5%  Diabetic Goal                 < 7.0%    Lactic Acid, Plasma [[id number]]  (Normal) Collected:  07/21/20 1619    Specimen:  Blood Updated:  07/21/20 1640     Lactate 0.9 mmol/L     Troponin [[id number]]  (Normal) Collected:  07/21/20 1525    Specimen:  Blood Updated:  07/21/20 1548     Troponin T <0.010 ng/mL     Narrative:       Troponin T Reference Range:  <= 0.03 ng/mL-   Negative for AMI  >0.03 ng/mL-     Abnormal for myocardial necrosis.  Clinicians would have to utilize clinical acumen, EKG, Troponin and serial changes to determine if it is an Acute Myocardial Infarction or myocardial injury due to an underlying chronic condition.       Results may be falsely decreased if patient taking Biotin.      Urinalysis With Microscopic If Indicated (No Culture) - Urine, Clean Catch [[id number]]  (Abnormal) Collected:  07/21/20 1407    Specimen:  Urine, Clean Catch Updated:  07/21/20 1435     Color, UA Yellow     Appearance, UA Clear     pH, UA 6.5     Specific Gravity, UA 1.022     Glucose, UA Negative     Ketones, UA Negative     Bilirubin, UA Negative     Blood, UA Trace     Protein, UA >=300 mg/dL (3+)     Leuk Esterase, UA Small (1+)     Nitrite, UA Negative     Urobilinogen, UA 0.2 E.U./dL    Urinalysis, Microscopic Only - Urine, Clean Catch [[id number]]  (Abnormal) Collected:  07/21/20 1407    Specimen:  Urine, Clean Catch Updated:  07/21/20 1435     RBC, UA 3-5 /HPF      WBC, UA 6-12 /HPF      Bacteria, UA None Seen /HPF      Squamous Epithelial Cells, UA 3-5 /HPF      Hyaline Casts, UA 7-12 /LPF      Methodology Automated Microscopy    Mobile Draw [[id number]] Collected:  07/21/20 1322    Specimen:  Blood Updated:  07/21/20 1430    Narrative:       The following orders were created for panel order Mobile Draw.  Procedure                               Abnormality         Status                     ---------                               -----------         ------                     Light Blue Top[[id number]]                                    Final result               Green Top (Gel)[[id number]]                                  Final result               Lavender Top[[id number]]                                     Final result               Gold Top - SST[[id number]]                                   Final result                 Please view results for these tests on the individual orders.    Light Blue Top [[id number]] Collected:  07/21/20 1322    Specimen:  Blood Updated:  07/21/20 1430     Extra Tube hold for add-on     Comment: Auto resulted       Green Top (Gel) [[id number]] Collected:  07/21/20 1322    Specimen:  Blood Updated:  07/21/20 1430     Extra Tube Hold for add-ons.     Comment: Auto resulted.       Lavender Top [[id number]] Collected:  07/21/20 1322    Specimen:  Blood Updated:  07/21/20 1430     Extra Tube hold for add-on     Comment: Auto resulted       Gold Top - SST [[id number]] Collected:  07/21/20 1322    Specimen:  Blood Updated:  07/21/20 1430     Extra Tube Hold for add-ons.     Comment: Auto resulted.       Troponin [[id number]]  (Normal) Collected:  07/21/20 1322    Specimen:  Blood Updated:  07/21/20 1401     Troponin T <0.010 ng/mL     Narrative:       Troponin T Reference Range:  <= 0.03 ng/mL-   Negative for AMI  >0.03 ng/mL-     Abnormal for myocardial necrosis.  Clinicians would have to utilize clinical acumen, EKG, Troponin and serial changes to determine if it is an Acute Myocardial Infarction or myocardial injury due to an underlying chronic condition.       Results may be falsely decreased if patient taking Biotin.      Protime-INR [[id number]]  (Normal) Collected:  07/21/20 1322    Specimen:  Blood Updated:  07/21/20 1354     Protime 13.1 Seconds      INR 0.95    Narrative:       Therapeutic range for most indications is 2.0-3.0 INR,  or 2.5-3.5 for mechanical heart valves.    aPTT [[id number]]  (Normal) Collected:  07/21/20 1322    Specimen:  Blood Updated:  07/21/20 1354     PTT 31.9 seconds     Narrative:       The  recommended Heparin therapeutic range is 68-97 seconds.    D-dimer, Quantitative [[id number]]  (Abnormal) Collected:  07/21/20 1322    Specimen:  Blood Updated:  07/21/20 1354     D-Dimer, Quantitative 907 ng/mL (FEU)     Narrative:       Dimer values <500 ng/ml FEU are FDA approved as aid in diagnosis of deep venous thrombosis and pulmonary embolism.  This test should not be used in an exclusion strategy with pretest probability alone.    A recent guideline regarding diagnosis for pulmonary thromboembolism recommends an adjusted exclusion criterion of age x 10 ng/ml FEU for patients >50 years of age ( Intern Med 2015; 163: 701-711).      Troponin [[id number]]  (Normal) Collected:  07/21/20 1322    Specimen:  Blood Updated:  07/21/20 1352     Troponin T <0.010 ng/mL     Narrative:       Troponin T Reference Range:  <= 0.03 ng/mL-   Negative for AMI  >0.03 ng/mL-     Abnormal for myocardial necrosis.  Clinicians would have to utilize clinical acumen, EKG, Troponin and serial changes to determine if it is an Acute Myocardial Infarction or myocardial injury due to an underlying chronic condition.       Results may be falsely decreased if patient taking Biotin.      Comprehensive Metabolic Panel [[id number]]  (Abnormal) Collected:  07/21/20 1322    Specimen:  Blood Updated:  07/21/20 1352     Glucose 114 mg/dL      BUN 15 mg/dL      Creatinine 1.09 mg/dL      Sodium 140 mmol/L      Potassium 3.9 mmol/L      Chloride 101 mmol/L      CO2 27.0 mmol/L      Calcium 9.6 mg/dL      Total Protein 7.7 g/dL      Albumin 4.90 g/dL      ALT (SGPT) 15 U/L      AST (SGOT) 24 U/L      Alkaline Phosphatase 79 U/L      Total Bilirubin 0.5 mg/dL      eGFR Non African Amer 49 mL/min/1.73      Globulin 2.8 gm/dL      A/G Ratio 1.8 g/dL      BUN/Creatinine Ratio 13.8     Anion Gap 12.0 mmol/L     Narrative:       GFR Normal >60  Chronic Kidney Disease <60  Kidney Failure <15      BNP [[id number]]  (Normal) Collected:  07/21/20 1322     Specimen:  Blood Updated:  07/21/20 1350     proBNP 568.3 pg/mL     Narrative:       Among patients with dyspnea, NT-proBNP is highly sensitive for the detection of acute congestive heart failure. In addition NT-proBNP of <300 pg/ml effectively rules out acute congestive heart failure with 99% negative predictive value.    Results may be falsely decreased if patient taking Biotin.      CBC & Differential [[id number]] Collected:  07/21/20 1322    Specimen:  Blood Updated:  07/21/20 1329    Narrative:       The following orders were created for panel order CBC & Differential.  Procedure                               Abnormality         Status                     ---------                               -----------         ------                     CBC Auto Differential[[id number]]        Normal              Final result                 Please view results for these tests on the individual orders.    CBC Auto Differential [[id number]]  (Normal) Collected:  07/21/20 1322    Specimen:  Blood Updated:  07/21/20 1329     WBC 7.91 10*3/mm3      RBC 4.99 10*6/mm3      Hemoglobin 13.8 g/dL      Hematocrit 43.0 %      MCV 86.2 fL      MCH 27.7 pg      MCHC 32.1 g/dL      RDW 15.0 %      RDW-SD 46.5 fl      MPV 11.2 fL      Platelets 162 10*3/mm3      Neutrophil % 70.2 %      Lymphocyte % 21.9 %      Monocyte % 6.3 %      Eosinophil % 0.9 %      Basophil % 0.4 %      Immature Grans % 0.3 %      Neutrophils, Absolute 5.56 10*3/mm3      Lymphocytes, Absolute 1.73 10*3/mm3      Monocytes, Absolute 0.50 10*3/mm3      Eosinophils, Absolute 0.07 10*3/mm3      Basophils, Absolute 0.03 10*3/mm3      Immature Grans, Absolute 0.02 10*3/mm3      nRBC 0.0 /100 WBC         Imaging Results (Last 7 Days)     Procedure Component Value Units Date/Time    CT Angiogram Coronary [[id number]] Collected:  07/22/20 0830     Updated:  07/22/20 0957    Narrative:       PROCEDURE: CT ANGIOGRAM CORONARY WITH CONTRAST    CLINICAL HISTORY: Chest  pain    COMPARISON: None.    Post processing was performed by the radiologist at the [employer]  workstation. Serial axial CT images were obtained through the  heart at 3 mm thickness without contrast for calcium scoring. 3D  images including vessel probing technique were also obtained.  Subsequently, following the intravenous administration of 90 ml  of Isovue-370, serial axial CT images were obtained through the  heart at 0.6 mm thickness utilizing retrospective gating. This  exam was performed according to our departmental dose  optimization program, which includes automated exposure control,  adjustment of the mA and/or KV according to patient size and/or  use of iterative reconstruction technique. Full field of view  reconstructed images were used for evaluation of the extracardiac  tissues.    CALCIUM PLAQUE BURDEN:    REGION                                         CALCIUM SCORE  ([place])  Left Main                                                      31    Right Coronary Artery                                 171   Left Anterior Descending                            22   Circumflex                                                    121    Posterior Descending Artery                       0          Your Calcium Score is 345      This places the patent in the mild coronary artery disease highly  likely, significant narrowings possible risk for coronary artery  disease.    CTA OF THE CORONARY ARTERIES: There is good visualization of the  left main, LAD, diagonals, circumflex, and RCA. The patient is  right dominant.    Left Main: No calcified or soft tissue density plaque and no  stenosis.  LAD: Proximal calcified atherosclerotic plaque causing mild  stenosis of less than 50%. Second diagonal branch proximal  calcified atherosclerotic plaque causing mild stenosis of less  than 50%. No other calcified or soft tissue density plaque and/or  stenosis.  Circumflex: Proximal calcified atherosclerotic plaque  causing  mild stenosis of less than 50%. No other calcified or soft tissue  density plaque and/or stenosis.  RCA: Mid calcified atherosclerotic plaque causing mild stenosis  of less than 50%. No other calcified or soft tissue density  plaque and/or stenosis.    The aortic valve is tricuspid. There is no myocardial bridging.  On short axis views, the myocardium is homogeneous in thickness.    EXTRACARDIAC SOFT TISSUES:  Mediastinum: On the imaging, the ascending and descending aorta  are normal in caliber. There is no mediastinal or hilar  lymphadenopathy. The pericardium is normal.  Lungs: No consolidation or focal nodules are present. There is no  pneumothorax or effusion. The pulmonary arteries are normal in  appearance.  Abdomen: No evidence of hiatal hernia. The imaged liver and  spleen are unremarkable. There is no lymphadenopathy in the upper  abdomen.  Bones: There are no lytic or blastic lesions within the osseous  structures.    CT FUNCTIONAL ANALYSIS:  Ejection Fraction     71 %  Diastolic Volume     136 ml  Systolic Volume      40 ml  Stroke Volume        97 ml  Cardiac Output       4.1 L/minute      Impression:       CONCLUSION:   1.  Patient's calcium score is 345.This places the patent in the  mild coronary artery disease highly likely, significant  narrowings possible risk for coronary artery disease.  2.  LAD: Proximal calcified atherosclerotic plaque causing mild  stenosis of less than 50%. Second diagonal branch proximal  calcified atherosclerotic plaque causing mild stenosis of less  than 50%. No other calcified or soft tissue density plaque and/or  stenosis.  3.  Circumflex: Proximal calcified atherosclerotic plaque causing  mild stenosis of less than 50%. No other calcified or soft tissue  density plaque and/or stenosis.  4.  RCA: Mid calcified atherosclerotic plaque causing mild  stenosis of less than 50%. No other calcified or soft tissue  density plaque and/or stenosis.  5.  Remainder CT  angiogram coronary exam unremarkable.    Electronically signed by:  Addison Saha MD  7/22/2020 9:55 AM CDT  Workstation: [id number]    CT Angiogram Chest [[id number]] Collected:  07/21/20 1435     Updated:  07/21/20 1510    Narrative:       STUDY: CT CHEST ANGIOGRAPHY WITH IV CONTRAST    COMPARISON: Chest radiograph dated same day    INDICATION: Chest pain    TECHNIQUE: Multiple CT images of the chest were obtained after  the uneventful administration of iodinated intravenous contrast  in the pulmonary arterial phase. Orthogonal reformats were  provided.    FINDINGS:   Examination is technically adequate. No filling defects within  the pulmonary arteries to suggest emboli.    Visualized thyroid is unremarkable. No supraclavicular or  axillary lymphadenopathy.    Heart size is normal. No pericardial effusion. Multivessel  coronary artery disease. Esophagus is unremarkable. Right  anterior lower lymph node measures 1.2 cm in short axis (axial  series 3 image 57/151. There additional subcentimeter slightly  prominent mediastinal and hilar lymph nodes visualized. Airways  are patent.    No focal consolidation, effusion, or pneumothorax. There are  numerous scattered 2 to 3 mm predominantly peripheral pulmonary  nodules seen throughout the bilateral lower lobes with diffuse  mosaic attenuation and superimposed, very faint ill-defined  groundglass opacities in the lower lobes. Focal mild interstitial  thickening is seen at the anterior lingula.    There is a 1.6 x 1.3 cm left adrenal nodule with Hounsfield units  of 30.8 the remaining visualized upper abdominal contents are  unremarkable.      Impression:         1. No pulmonary embolus.    2. Diffuse mosaic attenuation with superimposed very faint  ill-defined groundglass opacities in the bilateral lower lobes as  well as innumerable 2 to 3 mm predominantly peripheral pulmonary  nodules. Intermediate features for COVID-19 pneumonia reported in  the literature, but not  specific to arrive at a relatively  confident radiological diagnosis. Differential includes a wide  variety of infectious and noninfectious processes. Correlation  with patient history and physical is required.    3. Coronary artery disease.    4. Incompletely characterized left adrenal nodule with  indeterminate features. Recommend follow-up with nonemergent  noncontrast adrenal CT.    Electronically signed by:  Jonathan De La Torre MD  7/21/2020  3:09 PM CDT Workstation: [id number]    XR Chest 1 View [[id number]] Collected:  07/21/20 1320     Updated:  07/21/20 1334    Narrative:       PROCEDURE: XR CHEST 1 VIEW    Clinical History: Chest Pain triage protocol  Chest Pain Triage Protocol    Indication:     Same as above.    Comparison:     None.    Technique:     Single portable frontal projection of the chest was done.    Findings:    Prior surgery is seen in the lower cervical spine. Multiple old  right-sided rib fractures are seen.    There are no discrete airspace infiltrates, pneumothoraces or  pleural effusions.    The pulmonary vascularity is normal.    The cardiomediastinal contour is  unremarkable.      Impression:       Impression:     There is no acute pleural-parenchymal process seen in the imaged  lung fields.    Electronically signed by:  Prashant Nicholson MD  7/21/2020 1:33 PM CDT  Workstation: -[id number]            Chief Complaint on Day of Discharge: None.    Hospital Course:  Patient was admitted for chest pain (likely atypical) and started on guideline directed medical therapy. EKG did not show any acute changes and she had 3 negative serial troponins.  Pain was mostly reproducible and did improve with Motrin.  CTA showed a calcium score 345 with mild stenosis of less than 50% in the LAD, circumflex and RCA.  Patient is to be treated medically and is to follow-up with Dr. Cota as outpatient in 3 to 4 weeks.    Echocardiogram showed:  · Estimated EF appears to be in the range of 56 -  "60%.  · Left ventricular systolic function is normal.  · Left ventricular diastolic dysfunction (grade II) consistent with pseudonormalization.  · Left ventricular wall thickness is consistent with borderline concentric hypertrophy.  · Left atrial cavity size is moderately dilated.  · Mild mitral valve regurgitation is present  · Mild tricuspid valve regurgitation is present      Uncontrolled hypertension: Patient has been out of her medications for the past few days.  Blood pressure was controlled with some medication adjustments.  She will be discharged home on amlodipine, lisinopril and Bystolic.       Ill-defined bilateral lower lobe groundglass opacities: Patient is mostly asymptomatic and COVID-19 PCR is negative.         2 to 3 mm peripheral pulmonary nodules: Patient will be scheduled for follow-up CT scan in 3 to 6 months as outpatient by her primary care provider.    She was continued on outpatient medications for COPD, chronic pain syndrome, anxiety/depressive disorder and was placed on Accu-Cheks and sliding scale insulin.  She has been advised to start taking metformin in 48 hours secondary to recent contrast exposure.    Condition on Discharge: Stable and improved.    Physical Exam on Discharge:  /67 (BP Location: Left arm, Patient Position: Lying)   Pulse 67   Temp 97.3 °F (36.3 °C) (Oral)   Resp 18   Ht 165.1 cm (65\")   Wt 89.8 kg (198 lb)   SpO2 94%   BMI 32.95 kg/m²   Physical Exam   Constitutional: She is oriented to person, place, and time. She appears well-developed and well-nourished. She is cooperative. No distress.   HENT:   Head: Normocephalic and atraumatic.   Right Ear: External ear normal.   Left Ear: External ear normal.   Nose: Nose normal.   Mouth/Throat: Oropharynx is clear and moist.   Eyes: Pupils are equal, round, and reactive to light. Conjunctivae and EOM are normal.   Neck: Normal range of motion. Neck supple. No JVD present. No thyromegaly present. "   Cardiovascular: Normal rate, regular rhythm, normal heart sounds and intact distal pulses. Exam reveals no gallop and no friction rub.   No murmur heard.  Pulmonary/Chest: Effort normal and breath sounds normal. No stridor. No respiratory distress. She has no wheezes. She has no rales. She exhibits no tenderness.   Abdominal: Soft. Bowel sounds are normal. She exhibits no distension and no mass. There is no tenderness. There is no rebound and no guarding. No hernia.   Musculoskeletal: Normal range of motion. She exhibits no edema, tenderness or deformity.   Neurological: She is alert and oriented to person, place, and time. She has normal reflexes. No cranial nerve deficit or sensory deficit. She exhibits normal muscle tone. Coordination normal.   Skin: Skin is warm and dry. No rash noted. She is not diaphoretic. No erythema. No pallor.   Psychiatric: She has a normal mood and affect. Her behavior is normal. Judgment and thought content normal.   Nursing note and vitals reviewed.        Discharge Disposition:  Home or Self Care    Discharge Medications:     Discharge Medications      New Medications      Instructions Start Date   famotidine 20 MG tablet  Commonly known as:  PEPCID   20 mg, Oral, Daily   Start Date:  July 24, 2020     isosorbide mononitrate 30 MG 24 hr tablet  Commonly known as:  IMDUR   30 mg, Oral, Every 24 Hours Scheduled   Start Date:  July 24, 2020     nitroglycerin 0.4 MG SL tablet  Commonly known as:  NITROSTAT   0.4 mg, Sublingual, Every 5 Minutes PRN, Take no more than 3 doses in 15 minutes.         Changes to Medications      Instructions Start Date   lisinopril 10 MG tablet  Commonly known as:  KEHINDE GIRONSTRIL  What changed:    · medication strength  · how much to take  · how to take this  · when to take this   10 mg, Oral, Every 12 Hours Scheduled      nebivolol 5 MG tablet  Commonly known as:  BYSTOLIC  What changed:    · medication strength  · how much to take  · how to take  this  · when to take this   5 mg, Oral, Every 24 Hours Scheduled   Start Date:  July 24, 2020        Continue These Medications      Instructions Start Date   albuterol sulfate  (90 Base) MCG/ACT inhaler  Commonly known as:  PROVENTIL HFA;VENTOLIN HFA;PROAIR HFA   Inhalation      ALPRAZolam 0.5 MG tablet  Commonly known as:  XANAX   1 mg, Oral, Every Morning      ALPRAZolam 1 MG tablet  Commonly known as:  XANAX   1 mg, Oral, Nightly      amLODIPine 10 MG tablet  Commonly known as:  NORVASC   10 mg, Oral, Daily      aspirin-dipyridamole  MG per 12 hr capsule  Commonly known as:  AGGRENOX   No dose, route, or frequency recorded.      buPROPion 75 MG tablet  Commonly known as:  WELLBUTRIN   75 mg, Oral, 2 Times Daily      DULoxetine 60 MG capsule  Commonly known as:  CYMBALTA   60 mg, Oral, Daily      fish oil 1200 MG capsule capsule   Oral      folic acid 1 MG tablet  Commonly known as:  FOLVITE   1 mg, Oral, Daily      furosemide 40 MG tablet  Commonly known as:  LASIX   20 mg, Oral, 2 Times Daily      gabapentin 300 MG capsule  Commonly known as:  NEURONTIN   300 mg, Oral, 4 Times Daily      metFORMIN 1000 MG tablet  Commonly known as:  GLUCOPHAGE   1,000 mg, Oral, 2 Times Daily With Meals      oxyCODONE-acetaminophen  MG per tablet  Commonly known as:  PERCOCET   1 tablet, Oral, Every 6 Hours PRN      pregabalin 75 MG capsule  Commonly known as:  LYRICA   75 mg, Oral, Daily      rOPINIRole 1 MG tablet  Commonly known as:  REQUIP   1 mg, Oral, Nightly, Take 1 hour before bedtime.       rosuvastatin 10 MG tablet  Commonly known as:  CRESTOR   10 mg, Oral, Daily      Sennosides 8.6 MG capsule   1 capsule, Oral, Daily      tiZANidine 4 MG tablet  Commonly known as:  ZANAFLEX   4 mg, Oral, Nightly PRN      vitamin B-12 100 MCG tablet  Commonly known as:  CYANOCOBALAMIN   Oral, Daily      Vitamin D (Cholecalciferol) 10 MCG (400 UNIT) capsule   Oral      vitamin E 100 UNIT capsule   400 Units, Oral,  Daily         Stop These Medications    fluorometholone 0.1 % ophthalmic suspension  Commonly known as:  FML     meclizine 25 MG tablet  Commonly known as:  ANTIVERT     nystatin 431212 UNIT/GM powder  Commonly known as:  MYCOSTATIN     potassium chloride 10 MEQ CR tablet  Commonly known as:  K-DUR,KLOR-CON     Psyllium 52.3 % powder     raNITIdine 150 MG tablet  Commonly known as:  ZANTAC     triamcinolone 0.1 % ointment  Commonly known as:  KENALOG            Discharge Diet: Cardiac and diabetic.     Activity at Discharge: As tolerated.    Discharge Care Plan/Instructions: Patient has been advised to take her medications as prescribed and to return to emergency room in the event of any worsening symptoms.    Follow-up Appointments: Follow-up with primary care provider in 1 week and with Dr. Cota in 3 to 4 weeks.    Test Results Pending at Discharge:    Order Current Status    Blood Culture - Blood, Arm, Left Preliminary result    Blood Culture - Blood, Hand, Left Preliminary result          Chandrakant Sal MD  07/23/20  10:25    Time: 35 minutes.

## 2020-07-23 NOTE — NURSING NOTE
Dr. ILENE Hedrick office called and notified via voicemail to transfer patient prescriptions to [facility] for filling and refills as she no longer resides in [place], [place] and now resides in [place], [place]. Patient informed RN that she has called and left voicemails to have prescriptions filled and the office does not answer and voicemail box is full. Informed RN that her  still went to the office to fill her prescriptions and still did not receive a refill.

## 2020-07-23 NOTE — OUTREACH NOTE
Prep Survey      Responses   [facility] patient discharged from?  [place]   Is LACE score < 7 ?  Yes   Eligibility  [facility]   Date of Admission  07/21/20   Date of Discharge  07/23/20   Discharge Disposition  Home or Self Care   Discharge diagnosis  Hypertensive urgency, Pneumonia of both lungs due to infectious organism   COVID-19 Test Status  Negative   Does the patient have one of the following disease processes/diagnoses(primary or secondary)?  COPD/Pneumonia   Does the patient have Home health ordered?  No   Is there a DME ordered?  Yes   What DME was ordered?  rolling walker per [facility] DME   Prep survey completed?  Yes          Melanie Yun RN

## 2020-07-23 NOTE — DISCHARGE PLACEMENT REQUEST
"  If additional clinicals or questions, please call Aliyah at [phone]. Patient would like rollator delivered to room 327 please.      Jese Blair (74 y.o. Female)     Date of Birth Social Security Number Address Home Phone MRN    1945  [address] [place] [id number] [phone] [id number]    [employer] Mar[employer] Status          [employer]        Admission Date Admission Type Admitting Provider Attending Provider Department, Room/Bed    7/21/20 Emergency Chandrakant Sal MD Echendu, Anthony W, MD [address], 327/1    Discharge Date Discharge Disposition Discharge Destination         Home or Self Care              Attending Provider:  Chandrakant Sal MD    Allergies:  No Known Allergies    Isolation:  None   Infection:  None   Code Status:  CPR    Ht:  165.1 cm (65\")   Wt:  89.8 kg (198 lb)    Admission Cmt:  None   Principal Problem:  None                Active Insurance as of 7/21/2020     Primary Coverage     Payor Plan Insurance Group Employer/Plan Group    HUMANA MEDICARE REPLACEMENT HUMANA MEDICARE REPLACEMENT [id number]     Payor Plan Address Payor Plan Phone Number Payor Plan Fax Number Effective Dates    [address] [address] [phone]  1/1/2013 - None Entered    [facility] [address]       Subscriber Name Subscriber Birth Date Member ID       JESE BLAIR 1945 [id number]                 Emergency Contacts      (Rel.) Home Phone Work Phone Mobile Phone    Adore Ho (Daughter) [phone] -- [phone]            Insurance Information                HUMANA MEDICARE REPLACEMENT/HUMANA MEDICARE REPLACEMENT Phone: [phone]    Subscriber: Jese Blair Subscriber#: [id number]    Group#: [id number] Precert#:              History & Physical      Chandrakant Sal MD at 07/21/20 [address] [place] [address] Medicine Services  INPATIENT HISTORY AND " PHYSICAL       Patient Care Team:  Emil Hedrick MD as PCP - [facility]  Barry Rhodes MD (Inactive) as PCP - Family Medicine    Chief complaint   Chief Complaint   Patient presents with   • Chest Pain   • Back Pain       Subjective     Patient is a 74 y.o. female with history of diabetes mellitus, hypertension, COPD, depressive/anxiety disorder, dyslipidemia, previous CVA with no residual deficits, chronic pain syndrome, CHF, restless leg syndrome presents to the emergency room secondary to 1 week history of progressively worsening and constant left-sided /left lateral chest wall pain which is worse with inspiration.    She started having the pain after a fall.  She denies fever, chills, nausea, vomiting, shortness of air, palpitation, diaphoresis, syncope, presyncope, hematemesis, melena or hematochezia.    On triage her blood pressure was 222/98.  Was given a dose of hydralazine with some relief.  Patient has been out of her blood pressure medication for a few days.  Her first doses of troponin were negative, chest x-ray was clear and EKG did not show any acute changes.  D-dimer was 907 and patient had a CT pulmonary angiogram which was negative for pulmonary embolism but showed 2 to 3 mm peripheral pulmonary nodules and ill-defined groundglass opacity bilateral lower lobes.      Review of Systems   Constitutional: Positive for activity change and fatigue. Negative for appetite change, chills, diaphoresis and fever.   HENT: Negative for trouble swallowing and voice change.    Eyes: Negative for photophobia and visual disturbance.   Respiratory: Negative for cough, choking, chest tightness, shortness of breath, wheezing and stridor.    Cardiovascular: Positive for chest pain. Negative for palpitations and leg swelling.   Gastrointestinal: Negative for abdominal distention, abdominal pain, blood in stool, constipation, diarrhea, nausea and vomiting.   Endocrine: Negative for cold intolerance, heat  intolerance, polydipsia, polyphagia and polyuria.   Genitourinary: Negative for decreased urine volume, difficulty urinating, dysuria, enuresis, flank pain, frequency, hematuria and urgency.   Musculoskeletal: Negative for arthralgias, gait problem, myalgias, neck pain and neck stiffness.   Skin: Negative for pallor, rash and wound.   Neurological: Negative for dizziness, tremors, seizures, syncope, facial asymmetry, speech difficulty, weakness, light-headedness, numbness and headaches.   Hematological: Does not bruise/bleed easily.   Psychiatric/Behavioral: Negative for agitation, behavioral problems and confusion.         History  Past Medical History:   Diagnosis Date   • Anxiety    • Arthritis    • COPD (chronic obstructive pulmonary disease) (CMS/[facility])    • Depression    • Diabetes mellitus (CMS/[facility])    • History of transfusion    • Hyperlipidemia    • Hypertension    • Stroke (CMS/[facility])     Greater than 5 years ago     Past Surgical History:   Procedure Laterality Date   • CERVICAL FUSION  1999   • EYE SURGERY Bilateral 2014    lasix eye surgery   • HEMORRHOIDECTOMY     • HYSTERECTOMY     • INTRATHECAL PUMP REMOVAL      2006   • KNEE ARTHROSCOPY Right 2015   • LUMBAR SPINE SURGERY  1999   • PAIN PUMP INSERTION/REVISION      Removed in 2006     History reviewed. No pertinent family history.  Social History     Tobacco Use   • Smoking status: Former Smoker   • Smokeless tobacco: Never Used   Substance Use Topics   • Alcohol use: No   • Drug use: No       (Not in a hospital admission)  Allergies:  Patient has no active allergies.  Prior to Admission medications    Medication Sig Start Date End Date Taking? Authorizing Provider   albuterol (PROVENTIL HFA;VENTOLIN HFA) 108 (90 Base) MCG/ACT inhaler Inhale.   Yes Samina Leggett MD   ALPRAZolam (XANAX) 1 MG tablet Take 1 mg by mouth Every Night.   Yes Samina Leggett MD   amLODIPine (NORVASC) 10 MG tablet Take 10 mg by mouth Daily.   Yes Provider  MD Samina   aspirin-dipyridamole (AGGRENOX)  MG per 12 hr capsule  7/6/17  Yes Samina Leggett MD   folic acid (FOLVITE) 1 MG tablet Take 1 mg by mouth Daily.   Yes Samina Leggett MD   furosemide (LASIX) 40 MG tablet Take 20 mg by mouth 2 (Two) Times a Day.   Yes Samina Leggett MD   gabapentin (NEURONTIN) 300 MG capsule Take 300 mg by mouth 4 (Four) Times a Day.   Yes Samina Leggett MD   metFORMIN (GLUCOPHAGE) 1000 MG tablet Take 1,000 mg by mouth 2 (Two) Times a Day With Meals.   Yes Samina Leggett MD   nebivolol (BYSTOLIC) 10 MG tablet Take 20 mg by mouth Daily.   Yes Samina Leggett MD   Omega-3 Fatty Acids (FISH OIL) 1200 MG capsule capsule Take  by mouth.   Yes Samina Leggett MD   oxyCODONE-acetaminophen (PERCOCET)  MG per tablet Take 1 tablet by mouth Every 4 (Four) Hours As Needed for moderate pain (4-6).   Yes Samina Leggett MD   potassium chloride (K-DUR,KLOR-CON) 10 MEQ CR tablet Take 10 mEq by mouth 2 (Two) Times a Day.   Yes Samina Leggett MD   pregabalin (LYRICA) 75 MG capsule Take 75 mg by mouth Daily.   Yes Samina Leggett MD   raNITIdine (ZANTAC) 150 MG tablet Take 150 mg by mouth 2 (Two) Times a Day.   Yes Samina Leggett MD   rOPINIRole (REQUIP) 1 MG tablet Take 1 mg by mouth Every Night. Take 1 hour before bedtime.   Yes Samina Leggett MD   rosuvastatin (CRESTOR) 10 MG tablet Take 10 mg by mouth Daily.   Yes Samina Leggett MD   tiZANidine (ZANAFLEX) 4 MG tablet Take 4 mg by mouth At Night As Needed for Muscle Spasms.   Yes Samina Leggett MD   vitamin B-12 (CYANOCOBALAMIN) 100 MCG tablet Take  by mouth.   Yes Samina Leggett MD   Vitamin D, Cholecalciferol, 400 units capsule Take  by mouth.   Yes Samina Leggett MD   vitamin E 100 UNIT capsule Take 400 Units by mouth Daily.   Yes Samina Leggett MD   ALPRAZolam (XANAX) 0.5 MG tablet Take 1 mg by mouth Every Morning.     Samina Leggett MD   buPROPion (WELLBUTRIN) 75 MG tablet Take 75 mg by mouth 2 (Two) Times a Day.    Samina Leggett MD   dextromethorphan-guaifenesin (ROBITUSSIN-DM)  MG/5ML syrup Take 5 mL by mouth 5 (Five) Times a Day As Needed.    Samina Leggett MD   DULoxetine (CYMBALTA) 60 MG capsule Take 60 mg by mouth Daily.    Samina Leggett MD   fluorometholone (FML) 0.1 % ophthalmic suspension Administer 1 drop to both eyes Daily.    Samina Leggett MD   lisinopril (PRINIVIL,ZESTRIL) 2.5 MG tablet  8/8/17   Samina Leggett MD   meclizine (ANTIVERT) 25 MG tablet  3/20/17   Samina Leggett MD   nebivolol (BYSTOLIC) 20 MG tablet  10/4/16   Samina Leggett MD   oxyCODONE (ROXICODONE) 30 MG immediate release tablet Take 30 mg by mouth Every 12 (Twelve) Hours.    Samina Leggett MD       Objective        Vital Signs  Temp:  [97 °F (36.1 °C)] 97 °F (36.1 °C)  Heart Rate:  [50-78] 55  Resp:  [17-18] 17  BP: (185-222)/(79-98) 198/84      Physical Exam   Constitutional: She is oriented to person, place, and time. She appears well-developed and well-nourished. She is cooperative. No distress.   HENT:   Head: Normocephalic and atraumatic.   Right Ear: External ear normal.   Left Ear: External ear normal.   Nose: Nose normal.   Mouth/Throat: Oropharynx is clear and moist.   Eyes: Pupils are equal, round, and reactive to light. Conjunctivae and EOM are normal.   Neck: Normal range of motion. Neck supple. No JVD present. No thyromegaly present.   Cardiovascular: Normal rate, regular rhythm, normal heart sounds and intact distal pulses. Exam reveals no gallop and no friction rub.   No murmur heard.  Pulmonary/Chest: Effort normal and breath sounds normal. No stridor. No respiratory distress. She has no wheezes. She has no rales. She exhibits no tenderness.   She has reproducible pain on the left anterior chest wall under the left breast and extending laterally to the lateral  chest wall.   Abdominal: Soft. Bowel sounds are normal. She exhibits no distension and no mass. There is no tenderness. There is no rebound and no guarding. No hernia.   Musculoskeletal: Normal range of motion. She exhibits no edema, tenderness or deformity.   Neurological: She is alert and oriented to person, place, and time. She has normal reflexes. No cranial nerve deficit or sensory deficit. She exhibits normal muscle tone. Coordination normal.   Skin: Skin is warm and dry. No rash noted. She is not diaphoretic. No erythema. No pallor.   Psychiatric: She has a normal mood and affect. Her behavior is normal. Judgment and thought content normal.   Nursing note and vitals reviewed.        Results Review:     Results from last 7 days   Lab Units 07/21/20  1322   SODIUM mmol/L 140   POTASSIUM mmol/L 3.9   CHLORIDE mmol/L 101   CO2 mmol/L 27.0   BUN mg/dL 15   CREATININE mg/dL 1.09*   GLUCOSE mg/dL 114*   CALCIUM mg/dL 9.6   BILIRUBIN mg/dL 0.5   ALK PHOS U/L 79   ALT (SGPT) U/L 15   AST (SGOT) U/L 24             Results from last 7 days   Lab Units 07/21/20  1322   WBC 10*3/mm3 7.91   HEMOGLOBIN g/dL 13.8   HEMATOCRIT % 43.0   PLATELETS 10*3/mm3 162       Results from last 7 days   Lab Units 07/21/20  1322   INR  0.95     Imaging Results (Last 7 Days)     Procedure Component Value Units Date/Time    CT Angiogram Chest [[id number]] Collected:  07/21/20 1435     Updated:  07/21/20 1510    Narrative:       STUDY: CT CHEST ANGIOGRAPHY WITH IV CONTRAST    COMPARISON: Chest radiograph dated same day    INDICATION: Chest pain    TECHNIQUE: Multiple CT images of the chest were obtained after  the uneventful administration of iodinated intravenous contrast  in the pulmonary arterial phase. Orthogonal reformats were  provided.    FINDINGS:   Examination is technically adequate. No filling defects within  the pulmonary arteries to suggest emboli.    Visualized thyroid is unremarkable. No supraclavicular or  axillary  lymphadenopathy.    Heart size is normal. No pericardial effusion. Multivessel  coronary artery disease. Esophagus is unremarkable. Right  anterior lower lymph node measures 1.2 cm in short axis (axial  series 3 image 57/151. There additional subcentimeter slightly  prominent mediastinal and hilar lymph nodes visualized. Airways  are patent.    No focal consolidation, effusion, or pneumothorax. There are  numerous scattered 2 to 3 mm predominantly peripheral pulmonary  nodules seen throughout the bilateral lower lobes with diffuse  mosaic attenuation and superimposed, very faint ill-defined  groundglass opacities in the lower lobes. Focal mild interstitial  thickening is seen at the anterior lingula.    There is a 1.6 x 1.3 cm left adrenal nodule with Hounsfield units  of 30.8 the remaining visualized upper abdominal contents are  unremarkable.      Impression:         1. No pulmonary embolus.    2. Diffuse mosaic attenuation with superimposed very faint  ill-defined groundglass opacities in the bilateral lower lobes as  well as innumerable 2 to 3 mm predominantly peripheral pulmonary  nodules. Intermediate features for COVID-19 pneumonia reported in  the literature, but not specific to arrive at a relatively  confident radiological diagnosis. Differential includes a wide  variety of infectious and noninfectious processes. Correlation  with patient history and physical is required.    3. Coronary artery disease.    4. Incompletely characterized left adrenal nodule with  indeterminate features. Recommend follow-up with nonemergent  noncontrast adrenal CT.    Electronically signed by:  Jonathan De La Torre MD  7/21/2020  3:09 PM CDT Workstation: [id number]    XR Chest 1 View [[id number]] Collected:  07/21/20 1320     Updated:  07/21/20 1334    Narrative:       PROCEDURE: XR CHEST 1 VIEW    Clinical History: Chest Pain triage protocol  Chest Pain Triage Protocol    Indication:     Same as above.    Comparison:      None.    Technique:     Single portable frontal projection of the chest was done.    Findings:    Prior surgery is seen in the lower cervical spine. Multiple old  right-sided rib fractures are seen.    There are no discrete airspace infiltrates, pneumothoraces or  pleural effusions.    The pulmonary vascularity is normal.    The cardiomediastinal contour is  unremarkable.      Impression:       Impression:     There is no acute pleural-parenchymal process seen in the imaged  lung fields.    Electronically signed by:  Prashant Nicholson MD  7/21/2020 1:33 PM CDT  Workstation: [id number]          Assessment / Plan       Hospital Problem List:  Active Problems:    Chest pain    Chest pain (likely atypical): Patient has risk factors for cardiovascular events.  She will be admitted to rule out acute coronary syndrome and started on guideline directed medical therapy.  Trend troponin, ECG, check echocardiogram and schedule patient for CT coronary angiogram.  Consult cardiologist depending on outcome of CTA.    Uncontrolled hypertension: Patient has been out of her medications for the past few days.  Continue amlodipine and lisinopril with adjustments as needed for optimal blood pressure control.  Hold Bystolic secondary to low heart rate.    Ill-defined bilateral lower lobe groundglass opacities: Patient is mostly asymptomatic and maintaining O2 saturation of 95 to 97% on room air.  COVID-19 PCR is pending.      2 to 3 mm peripheral pulmonary nodules: Patient will be scheduled for follow-up CT scan in 3 to 6 months as outpatient.    Diabetes mellitus: Begin Accu-Cheks and sliding scale insulin.  Hold metformin for now.    History of CHF: Patient is euvolemic and clinically not in heart failure.  Chest x-ray is clear.    Restless leg syndrome: Continue Requip.    History of COPD: Patient is not in exacerbation.  Continue bronchodilators.    Chronic pain syndrome: Continue pain control.    Anxiety/depressive disorder:  Continue home medications.    Begin GI and DVT prophylaxis.    Additional orders and treatment plan as hospital course dictates.    The patient was evaluated during the global COVID-19 pandemic, and the diagnosis was suspected/considered upon their initial presentation.  Evaluation, treatment, and testing were consistent with current guidelines for patients who present with complaints or symptoms that may be related to COVID-19.    I discussed the patient's findings and my recommendations with patient.     Chandrakant Sal MD  07/21/20  16:27      Dictated Utilizing Dragon Dictation         Electronically signed by Chandrakant Sal MD at 07/21/20 1642  Walker [] (Order [id number])   Order   Date: 7/23/2020 Department: [address] Ordering/Authorizing: Chandrakant Sal MD   Order History   Outpatient   Date/Time Action Taken User Additional Information   07/23/20 1024 Sign Chandrakant Sal MD    Order Details     Frequency Duration Priority Order Class   None None Routine External   Start Date/Time     Start Date   07/23/20   Order Information     Order Date Service Start Date Start Time   07/23/20 Medicine 07/23/20    Reference Links     Associated Reports   View Encounter   Order Questions     Question Answer Comment   Equipment  Walker Folding with Wheels    Accessories:  Seat Attachment, Walker    Length of Need (99 Months = Lifetime) 99 Months = Lifetime    Note:  Custom values to enter length of need for DME          Source Order Set / Preference List     Preference List   AMB SUPPLIES [[id number]]   Clinical Indications      ICD-10-CM ICD-9-CM   Degeneration of intervertebral disc at C5-C6 level     M50.322 722.4   Reprint Order Requisition     Walker (Order #[id number]) on 7/23/20   Encounter-Level Cardiology Documents:     There are no encounter-level cardiology documents.   Encounter     View Encounter          Order Provider Info         Office phone Pager E-mail      Ordering User Chandrakant Sal [phone] -- --   Authorizing Provider Cahndrakant Sal [phone] -- --   Attending Provider Chandrakant Sal [phone] -- --   Linked Charges     No charges linked to this procedure   Tracking Reports      Cosign Tracking Order Transmittal Tracking   Authorized by:  Chandrakant Sal MD  (NPI: [id number])       Lab Component SmartPhrase Guide     Walker (Order #[id number]) on 7/23/20

## 2020-07-24 ENCOUNTER — TRANSITIONAL CARE MANAGEMENT TELEPHONE ENCOUNTER (OUTPATIENT)
Dept: CALL CENTER | Facility: HOSPITAL | Age: 75
End: 2020-07-24

## 2020-07-24 LAB
GLUCOSE BLDC GLUCOMTR-MCNC: 131 MG/DL (ref 70–130)
GLUCOSE BLDC GLUCOMTR-MCNC: 92 MG/DL (ref 70–130)

## 2020-07-24 NOTE — OUTREACH NOTE
Call Center TCM Note      Responses   [facility] patient discharged from?  [place]   COVID-19 Test Status  Negative   Does the patient have one of the following disease processes/diagnoses(primary or secondary)?  COPD/Pneumonia   Was the primary reason for admission:  Pneumonia   TCM attempt successful?  No   Unsuccessful attempts  Attempt 2          Jarocho Odgen RN    7/24/2020, 16:58

## 2020-07-24 NOTE — OUTREACH NOTE
Call Center TCM Note      Responses   [facility] patient discharged from?  [place]   COVID-19 Test Status  Negative   Does the patient have one of the following disease processes/diagnoses(primary or secondary)?  COPD/Pneumonia   Was the primary reason for admission:  Pneumonia   TCM attempt successful?  No   Unsuccessful attempts  Attempt 1          Jarocho Ogden RN    7/24/2020, 16:54

## 2020-07-26 ENCOUNTER — TRANSITIONAL CARE MANAGEMENT TELEPHONE ENCOUNTER (OUTPATIENT)
Dept: CALL CENTER | Facility: HOSPITAL | Age: 75
End: 2020-07-26

## 2020-07-26 LAB
BACTERIA SPEC AEROBE CULT: NORMAL
BACTERIA SPEC AEROBE CULT: NORMAL

## 2020-07-26 NOTE — OUTREACH NOTE
"Call Center TCM Note      Responses   [facility] patient discharged from?  [place]   COVID-19 Test Status  Negative   Does the patient have one of the following disease processes/diagnoses(primary or secondary)?  COPD/Pneumonia   Was the primary reason for admission:  Pneumonia   TCM attempt successful?  Yes   Call start time  1233   Call end time  1239   Discharge diagnosis  Hypertensive urgency, Pneumonia of both lungs due to infectious organism   Is patient permission given to speak with other caregiver?  Yes   List who call center can speak with  Martha Ho-daughter   Person spoke with today (if not patient) and relationship  Patient and Charlet-daughter   Meds reviewed with patient/caregiver?  Yes   Is the patient having any side effects they believe may be caused by any medication additions or changes?  No   Does the patient have all medications ordered at discharge?  Yes   Is the patient taking all medications as directed (includes completed medication regime)?  Yes   Does the patient have a primary care provider?   Yes   Does the patient have an appointment with their PCP or pulmonologist within 7 days of discharge?  Yes   Comments regarding PCP  7/30/20   Has the patient kept scheduled appointments due by today?  N/A   What DME was ordered?  rolling walker per [facility]   Has all DME been delivered?  Yes   Pulse Ox monitoring  Intermittent   Pulse Ox device source  Patient   Psychosocial issues?  No   Comments  Pt wears 2.5 LPM of O2-nocturnal only   Did the patient receive a copy of their discharge instructions?  Yes   Nursing interventions  Reviewed instructions with patient   What is the patient's perception of their health status since discharge?  Improving   Nursing Interventions  Nurse provided patient education   Are the patient's immunizations up to date?   -- [Pt reports, \"I think so\"]   Nursing interventions  Advised patient to discuss with provider at next visit, Educated on " "importance of maintaining up to date immunizations as advised by provider   If the patient is a current smoker, are they able to teach back resources for cessation?  -- [Nonsmoker]   Is the patient/caregiver able to teach back the hierarchy of who to call/visit for symptoms/problems? PCP, Specialist, Home health nurse, Urgent Care, ED, 911  Yes   Is the patient/caregiver able to teach back signs and symptoms of worsening condition:  Fever/chills, Shortness of breath, Chest pain   Is the patient/caregiver able to teach back importance of completing antibiotic course of treatment?  -- [Pt reports, \"not this time\" when talking about pneumonia]   TCM call completed?  Yes   Wrap up additional comments  Updated numbers in Epic          Patricia Lynn RN    7/26/2020, 12:40      "

## 2020-07-30 ENCOUNTER — TELEPHONE (OUTPATIENT)
Dept: FAMILY MEDICINE CLINIC | Facility: CLINIC | Age: 75
End: 2020-07-30

## 2020-07-30 ENCOUNTER — OFFICE VISIT (OUTPATIENT)
Dept: FAMILY MEDICINE CLINIC | Facility: CLINIC | Age: 75
End: 2020-07-30

## 2020-07-30 VITALS
DIASTOLIC BLOOD PRESSURE: 82 MMHG | TEMPERATURE: 97.2 F | OXYGEN SATURATION: 97 % | SYSTOLIC BLOOD PRESSURE: 130 MMHG | HEIGHT: 65 IN | BODY MASS INDEX: 33.15 KG/M2 | WEIGHT: 199 LBS | HEART RATE: 67 BPM

## 2020-07-30 DIAGNOSIS — M51.16 LUMBAR DISC DISEASE WITH RADICULOPATHY: ICD-10-CM

## 2020-07-30 DIAGNOSIS — J44.9 CHRONIC OBSTRUCTIVE PULMONARY DISEASE, UNSPECIFIED COPD TYPE (HCC): Chronic | ICD-10-CM

## 2020-07-30 DIAGNOSIS — E27.8 ADRENAL NODULE (HCC): ICD-10-CM

## 2020-07-30 DIAGNOSIS — N18.2 STAGE 2 CHRONIC KIDNEY DISEASE: ICD-10-CM

## 2020-07-30 DIAGNOSIS — R26.9 GAIT DISTURBANCE: ICD-10-CM

## 2020-07-30 DIAGNOSIS — I10 ESSENTIAL HYPERTENSION: ICD-10-CM

## 2020-07-30 DIAGNOSIS — K76.9 CHRONIC NONALCOHOLIC LIVER DISEASE: ICD-10-CM

## 2020-07-30 DIAGNOSIS — M25.562 CHRONIC PAIN OF BOTH KNEES: ICD-10-CM

## 2020-07-30 DIAGNOSIS — R91.8 PULMONARY NODULES: ICD-10-CM

## 2020-07-30 DIAGNOSIS — M25.561 CHRONIC PAIN OF BOTH KNEES: ICD-10-CM

## 2020-07-30 DIAGNOSIS — E11.42 TYPE 2 DIABETES MELLITUS WITH DIABETIC POLYNEUROPATHY, WITHOUT LONG-TERM CURRENT USE OF INSULIN (HCC): ICD-10-CM

## 2020-07-30 DIAGNOSIS — E78.5 DYSLIPIDEMIA: Primary | Chronic | ICD-10-CM

## 2020-07-30 DIAGNOSIS — G89.29 CHRONIC PAIN OF BOTH KNEES: ICD-10-CM

## 2020-07-30 PROBLEM — K64.8 INTERNAL HEMORRHOIDS: Status: ACTIVE | Noted: 2020-07-30

## 2020-07-30 PROBLEM — K57.90 DIVERTICULAR DISEASE: Status: ACTIVE | Noted: 2020-07-30

## 2020-07-30 PROBLEM — D12.6 BENIGN NEOPLASM OF COLON: Status: ACTIVE | Noted: 2020-07-30

## 2020-07-30 PROBLEM — I50.42 CHRONIC COMBINED SYSTOLIC AND DIASTOLIC CONGESTIVE HEART FAILURE (HCC): Chronic | Status: ACTIVE | Noted: 2020-07-30

## 2020-07-30 PROBLEM — D64.9 CHRONIC ANEMIA: Status: ACTIVE | Noted: 2020-07-30

## 2020-07-30 PROBLEM — R07.9 CHEST PAIN: Status: RESOLVED | Noted: 2020-07-21 | Resolved: 2020-07-30

## 2020-07-30 PROBLEM — R19.5 FECAL OCCULT BLOOD TEST POSITIVE: Status: ACTIVE | Noted: 2020-07-30

## 2020-07-30 PROBLEM — E11.9 TYPE 2 DIABETES MELLITUS, WITHOUT LONG-TERM CURRENT USE OF INSULIN: Status: ACTIVE | Noted: 2020-07-30

## 2020-07-30 PROBLEM — R13.10 DYSPHAGIA: Status: ACTIVE | Noted: 2020-07-30

## 2020-07-30 PROCEDURE — 99496 TRANSJ CARE MGMT HIGH F2F 7D: CPT | Performed by: STUDENT IN AN ORGANIZED HEALTH CARE EDUCATION/TRAINING PROGRAM

## 2020-07-30 RX ORDER — METFORMIN HYDROCHLORIDE 500 MG/1
500 TABLET, EXTENDED RELEASE ORAL
COMMUNITY
End: 2020-08-24

## 2020-07-30 RX ORDER — VENLAFAXINE HYDROCHLORIDE 37.5 MG/1
37.5 CAPSULE, EXTENDED RELEASE ORAL DAILY
COMMUNITY
End: 2021-07-09

## 2020-07-30 RX ORDER — NEBIVOLOL 20 MG/1
20 TABLET ORAL DAILY
COMMUNITY
End: 2020-08-24

## 2020-07-30 NOTE — TELEPHONE ENCOUNTER
PATIENT CALLED AND WAS JUST SEEN BY DR BAZAN TODAY AND SHE IS NEEDING A HANDICAP STICKER FORM FILLED OUT SO SHE CAN RENEW HER HANDICAP STICKER. IT IS SET TO  ON . HER NUMBER TO CALL BACK [phone].      THANK YOU,      MARIBEL

## 2020-07-30 NOTE — PROGRESS NOTES
Family Medicine Residency  Willem Son MD    Subjective:     Ronda Blair is a 74 y.o. female who history of diabetes mellitus, hypertension, COPD, depressive/anxiety disorder, dyslipidemia, previous CVA with no residual deficits, chronic pain syndrome, CHF, restless leg syndrome presents for hospital follow up for, hypertensive urgency and to establish care.  Patient's medication list and prior surgical history list were scanned into the chart but not yet available.    Patient was admitted 7-21-20 through 7/23/20 chest pain and hypertensive urgency.  Troponins were negative EKG did not show any changes and chest x-ray was clear.  D-dimer was elevated and a CTA was done which showed calcium score of 345, mild stenosis less than 50% LAD, circumflex and RCA (Following up with Dr. Cota) and pulmonary nodules and left adrenal nodule. F/u CT scan needed in 3-6 months.     Chest pain: Troponins were negative, no EKG changes, improved with Motrin. Has not reoccurred since discharge.     CAD: Calcium score of 345, mild stenosis less than 50% LAD, circumflex, RCA.  Following up with Dr. Cota, appointment scheduled.     HTN: Presented to the ER with blood pressure of 222/98.  Had been out of medications for a few days.  Discharged home on amlodipine 10 mg p.o. daily, lisinopril 10 mg twice daily and nebivolol 5 mg p.o. Daily.     HLD:   Lab Results   Component Value Date    LDL 86 07/21/2020     DM2: takes Neurontin and lyrica from outside provider.  Takes metformin 500 mg 4 tablets daily.  Follows with ophthalmologist in [place] and she believes that she had a recent diabetic eye exam.  No diabetic foot exam on file will complete today.  Lab Results   Component Value Date    HGBA1C 6.00 (H) 07/21/2020     COPD: Patient does not follow with a pulmonologist.  She reports that she takes 2 inhalers, however they are not on her medication list and she cannot recall their names.  She states that one  inhaler is a red inhaler and one inhaler is a great inhaler that she should be using twice daily.  She reports that she does not use it twice daily and uses it very infrequently.  She reports that her coughing has gotten worse over the past year.  She also reports that she uses oxygen at home during the night.  She believes that she had PFTs done a few years ago.     Anxiety/depression: Patient is followed by a psychiatrist who prescribes her Xanax and venlafaxine.     Restless leg syndrome: Patient takes Requip    Recent history of falls: She reports that she has had upwards of 6 falls in the past year and did not have an issue with falls before that.  Suspect polypharmacy given her medication list.    The following portions of the patient's history were reviewed and updated as appropriate: allergies, current medications, past family history, past medical history, past social history, past surgical history and problem list.    Past Medical Hx:  Past Medical History:   Diagnosis Date   • Anxiety    • Arthritis    • COPD (chronic obstructive pulmonary disease) (CMS/[facility])    • Depression    • Diabetes mellitus (CMS/[facility])    • History of transfusion    • Hyperlipidemia    • Hypertension    • Stroke (CMS/[facility])     Greater than 5 years ago       Past Surgical Hx:  Past Surgical History:   Procedure Laterality Date   • CERVICAL FUSION  1999   • EYE SURGERY Bilateral 2014    lasix eye surgery   • HEMORRHOIDECTOMY     • HYSTERECTOMY     • INTRATHECAL PUMP REMOVAL      2006   • KNEE ARTHROSCOPY Right 2015   • LUMBAR SPINE SURGERY  1999   • PAIN PUMP INSERTION/REVISION      Removed in 2006       Current Meds:    Current Outpatient Medications:   •  albuterol (PROVENTIL HFA;VENTOLIN HFA) 108 (90 Base) MCG/ACT inhaler, Inhale., Disp: , Rfl:   •  ALPRAZolam (XANAX) 1 MG tablet, Take 1 mg by mouth Every Night., Disp: , Rfl:   •  amLODIPine (NORVASC) 10 MG tablet, Take 1 tablet by mouth Daily., Disp: 30 tablet, Rfl: 1  •   aspirin-dipyridamole (AGGRENOX)  MG per 12 hr capsule, , Disp: , Rfl:   •  famotidine (PEPCID) 20 MG tablet, Take 1 tablet by mouth Daily., Disp: 30 tablet, Rfl: 1  •  folic acid (FOLVITE) 1 MG tablet, Take 1 mg by mouth Daily., Disp: , Rfl:   •  furosemide (LASIX) 40 MG tablet, Take 20 mg by mouth 2 (Two) Times a Day., Disp: , Rfl:   •  gabapentin (NEURONTIN) 300 MG capsule, Take 300 mg by mouth 4 (Four) Times a Day., Disp: , Rfl:   •  isosorbide mononitrate (IMDUR) 30 MG 24 hr tablet, Take 1 tablet by mouth Daily., Disp: 30 tablet, Rfl: 1  •  lisinopril (PRINIVIL,ZESTRIL) 10 MG tablet, Take 1 tablet by mouth Every 12 (Twelve) Hours., Disp: 60 tablet, Rfl: 1  •  metFORMIN ER (GLUCOPHAGE-XR) 500 MG 24 hr tablet, Take 500 mg by mouth Daily With Breakfast., Disp: , Rfl:   •  nebivolol (BYSTOLIC) 20 MG tablet, Take 20 mg by mouth Daily., Disp: , Rfl:   •  nebivolol (BYSTOLIC) 5 MG tablet, Take 1 tablet by mouth Daily., Disp: 30 tablet, Rfl: 1  •  Omega-3 Fatty Acids (FISH OIL) 1200 MG capsule capsule, Take  by mouth., Disp: , Rfl:   •  oxyCODONE-acetaminophen (PERCOCET)  MG per tablet, Take 1 tablet by mouth Every 6 (Six) Hours As Needed for Moderate Pain ., Disp: , Rfl:   •  pregabalin (LYRICA) 75 MG capsule, Take 75 mg by mouth Daily., Disp: , Rfl:   •  rOPINIRole (REQUIP) 1 MG tablet, Take 1 mg by mouth Every Night. Take 1 hour before bedtime., Disp: , Rfl:   •  rosuvastatin (CRESTOR) 10 MG tablet, Take 10 mg by mouth Daily., Disp: , Rfl:   •  Sennosides 8.6 MG capsule, Take 1 capsule by mouth Daily., Disp: , Rfl:   •  venlafaxine XR (EFFEXOR-XR) 37.5 MG 24 hr capsule, Take 37.5 mg by mouth Daily., Disp: , Rfl:     Current outpatient and discharge medications have been reconciled for the patient.  Reviewed by: Willem Son MD     Allergies:  Allergies   Allergen Reactions   • Aspirin GI Intolerance and Other (See Comments)       Family Hx:  History reviewed. No pertinent family history.     Social  "History:  Social History     Socioeconomic History   • Marital status:      Spouse name: Not on file   • Number of children: Not on file   • Years of education: Not on file   • Highest education level: Not on file   Tobacco Use   • Smoking status: Former Smoker   • Smokeless tobacco: Never Used   Substance and Sexual Activity   • Alcohol use: No   • Drug use: No   • Sexual activity: Defer     Review of Systems  Review of Systems   Constitutional: Negative for fatigue and fever.   Respiratory: Negative for chest tightness and shortness of breath.    Cardiovascular: Negative for chest pain, palpitations and leg swelling.   Gastrointestinal: Negative for abdominal distention and abdominal pain.   Genitourinary: Negative for difficulty urinating.   Musculoskeletal: Positive for arthralgias and back pain.   Skin: Negative for rash.   Neurological:        Recent falls   Psychiatric/Behavioral: Positive for agitation.        Anxiety     Objective:     /82   Pulse 67   Temp 97.2 °F (36.2 °C)   Ht 165.1 cm (65\")   Wt 90.3 kg (199 lb)   SpO2 97%   BMI 33.12 kg/m²   Physical Exam   Constitutional: She is oriented to person, place, and time. She appears well-developed and well-nourished. No distress.   HENT:   Head: Normocephalic and atraumatic.   Eyes: Conjunctivae and EOM are normal.   Neck: No JVD present.   Cardiovascular: Normal rate, regular rhythm, normal heart sounds and intact distal pulses. Exam reveals no gallop and no friction rub.   No murmur heard.  Pulmonary/Chest: Effort normal and breath sounds normal. She has no wheezes. She has no rales.   Abdominal: Soft. Bowel sounds are normal. There is no tenderness.   Musculoskeletal: She exhibits edema.   1+ pitting edema   Neurological: She is alert and oriented to person, place, and time.   Skin: Skin is warm and dry. Capillary refill takes less than 2 seconds.   Psychiatric: She has a normal mood and affect.        Assessment/Plan:     Ronda was " seen today for chest pain and anxiety.    Diagnoses and all orders for this visit:    Dyslipidemia: Recent lipid panel shows LDL within acceptable range.    Adrenal nodule (CMS/HCC): Follow-up imaging in 3 6 months    Type 2 diabetes mellitus with diabetic polyneuropathy, without long-term current use of insulin (CMS/HCC): Well-controlled on metformin 2000 mg daily.  Last hemoglobin A1c 6.0.  Follows with ophthalmology.  Foot exam at next visit.    Pulmonary nodules: Follow-up imaging in 3 6 months    Chronic obstructive pulmonary disease, unspecified COPD type (CMS/[facility]): Current inhaler regimen unknown, patient started to bring inhalers and all medications to follow-up visit.     Essential hypertension: Patient managed on amlodipine, lisinopril, nebivolol.  Blood pressure well controlled.  Patient counseled to do home blood pressure monitoring and keep a log and bring to follow-up.    · Rx changes: No changes  · Patient Education: Home blood pressure monitoring  · Compliance at present is estimated to be good.   · Efforts to improve compliance (if necessary) will be directed at Learning her medication names and indications for the medicine.     Follow-up:   Patient instructed to return in 4 weeks with all her medications and over-the-counter medications that she takes including inhalers.  Patient was also instructed to keep a blood pressure log.  Return in about 4 weeks (around 8/27/2020) for Next scheduled follow up.    Preventative:  Health Maintenance   Topic Date Due   • URINE MICROALBUMIN  1945   • ZOSTER VACCINE (1 of 2) 08/11/1994   • Pneumococcal Vaccine Once at 65 Years Old  08/11/2009   • HEPATITIS C SCREENING  08/28/2017   • MEDICARE ANNUAL WELLNESS  08/28/2017   • DIABETIC FOOT EXAM  08/28/2017   • MAMMOGRAM  08/28/2017   • DIABETIC EYE EXAM  08/28/2017   • COLONOSCOPY  08/28/2017   • INFLUENZA VACCINE  08/01/2020   • HEMOGLOBIN A1C  01/21/2021   • LIPID PANEL  07/21/2021   • TDAP/TD VACCINES (2 -  Tdap) 09/25/2023     Female Preventative: We will address at follow-up visit    Weight  -Class: Obese Class I: 30-34.9kg/m2  -Patient's Body mass index is 33.12 kg/m². BMI is above normal parameters. Recommendations include: nutrition counseling.   eat more fruits and vegetables    Alcohol use:  reports that she does not drink alcohol.  Nicotine status  reports that she has quit smoking. She has never used smokeless tobacco.    Goals     • Medication management     • Self-monitor blood pressure           RISK SCORE: 5      This document has been electronically signed by Willem Son MD on July 30, 2020 17:08  \

## 2020-07-31 ENCOUNTER — TELEPHONE (OUTPATIENT)
Dept: FAMILY MEDICINE CLINIC | Facility: CLINIC | Age: 75
End: 2020-07-31

## 2020-07-31 NOTE — TELEPHONE ENCOUNTER
I called the patient and discussed the form.  She does not know exactly what form she needs to fill out.  I believe I found the correct form for handicap sticker and [facility] online.  Patient is coming by in the afternoon today to  the form.    This document has been electronically signed by Willem Son MD on July 31, 2020 09:01

## 2020-07-31 NOTE — TELEPHONE ENCOUNTER
----- Message from Sadia Herrera MA sent at 7/31/2020  8:25 AM CDT -----      ----- Message -----  From: Willem Son MD  Sent: 7/30/2020   5:13 PM CDT  To: Sadia Herrera MA    Could you contact patient to have her sign a record release form for her former PCP Dr. Argueta?  Thank you

## 2020-08-06 PROBLEM — R26.9 GAIT DISTURBANCE: Status: ACTIVE | Noted: 2020-08-06

## 2020-08-24 ENCOUNTER — LAB (OUTPATIENT)
Dept: LAB | Facility: HOSPITAL | Age: 75
End: 2020-08-24

## 2020-08-24 ENCOUNTER — OFFICE VISIT (OUTPATIENT)
Dept: FAMILY MEDICINE CLINIC | Facility: CLINIC | Age: 75
End: 2020-08-24

## 2020-08-24 VITALS
OXYGEN SATURATION: 90 % | HEART RATE: 77 BPM | BODY MASS INDEX: 33.78 KG/M2 | SYSTOLIC BLOOD PRESSURE: 134 MMHG | DIASTOLIC BLOOD PRESSURE: 72 MMHG | WEIGHT: 203 LBS | TEMPERATURE: 96.8 F
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
## 2020-08-24 DIAGNOSIS — R53.83 FATIGUE, UNSPECIFIED TYPE: Primary | ICD-10-CM

## 2020-08-24 DIAGNOSIS — R53.83 FATIGUE, UNSPECIFIED TYPE: ICD-10-CM

## 2020-08-24 PROCEDURE — 85025 COMPLETE CBC W/AUTO DIFF WBC: CPT

## 2020-08-24 PROCEDURE — 99213 OFFICE O/P EST LOW 20 MIN: CPT | Performed by: STUDENT IN AN ORGANIZED HEALTH CARE EDUCATION/TRAINING PROGRAM

## 2020-08-24 PROCEDURE — 82746 ASSAY OF FOLIC ACID SERUM: CPT

## 2020-08-24 PROCEDURE — 84443 ASSAY THYROID STIM HORMONE: CPT

## 2020-08-24 PROCEDURE — 82607 VITAMIN B-12: CPT

## 2020-08-24 PROCEDURE — 36415 COLL VENOUS BLD VENIPUNCTURE: CPT

## 2020-08-24 PROCEDURE — 80053 COMPREHEN METABOLIC PANEL: CPT

## 2020-08-24 RX ORDER — TIZANIDINE 4 MG/1
4 TABLET ORAL NIGHTLY PRN
COMMUNITY
End: 2020-08-31 | Stop reason: SDUPTHER

## 2020-08-24 RX ORDER — CITALOPRAM 40 MG/1
40 TABLET ORAL NIGHTLY
COMMUNITY
End: 2021-07-12 | Stop reason: HOSPADM

## 2020-08-24 RX ORDER — FUROSEMIDE 40 MG/1
20 TABLET ORAL 2 TIMES DAILY
Qty: 30 TABLET | Refills: 0 | Status: SHIPPED | OUTPATIENT
Start: 2020-08-24 | End: 2020-09-02

## 2020-08-24 RX ORDER — LISINOPRIL 10 MG/1
10 TABLET ORAL EVERY 12 HOURS SCHEDULED
Qty: 60 TABLET | Refills: 1 | Status: SHIPPED | OUTPATIENT
Start: 2020-08-24 | End: 2020-11-03

## 2020-08-24 RX ORDER — ROPINIROLE 1 MG/1
1-3 TABLET, FILM COATED ORAL NIGHTLY
Qty: 90 TABLET | Refills: 0 | Status: SHIPPED | OUTPATIENT
Start: 2020-08-24 | End: 2020-09-15

## 2020-08-24 RX ORDER — ROSUVASTATIN CALCIUM 10 MG/1
10 TABLET, COATED ORAL DAILY
Qty: 30 TABLET | Refills: 2 | Status: SHIPPED | OUTPATIENT
Start: 2020-08-24 | End: 2020-11-16

## 2020-08-24 RX ORDER — ALPRAZOLAM 1 MG/1
1 TABLET ORAL DAILY
COMMUNITY
End: 2021-04-30

## 2020-08-24 RX ORDER — AMLODIPINE BESYLATE 10 MG/1
10 TABLET ORAL DAILY
Qty: 30 TABLET | Refills: 1 | Status: SHIPPED | OUTPATIENT
Start: 2020-08-24 | End: 2020-10-30 | Stop reason: SDUPTHER

## 2020-08-24 RX ORDER — METFORMIN HYDROCHLORIDE 500 MG/1
1000 TABLET, EXTENDED RELEASE ORAL 2 TIMES DAILY
COMMUNITY
End: 2020-09-22 | Stop reason: SDUPTHER

## 2020-08-24 RX ORDER — FAMOTIDINE 20 MG/1
20 TABLET, FILM COATED ORAL DAILY
Qty: 30 TABLET | Refills: 1 | Status: SHIPPED | OUTPATIENT
Start: 2020-08-24 | End: 2020-11-05

## 2020-08-24 RX ORDER — ISOSORBIDE MONONITRATE 30 MG/1
30 TABLET, EXTENDED RELEASE ORAL
Qty: 30 TABLET | Refills: 1 | Status: SHIPPED | OUTPATIENT
Start: 2020-08-24 | End: 2020-09-15

## 2020-08-24 RX ORDER — ROPINIROLE 1 MG/1
1-3 TABLET, FILM COATED ORAL NIGHTLY
COMMUNITY
End: 2020-08-24 | Stop reason: SDUPTHER

## 2020-08-24 NOTE — PROGRESS NOTES
Family Medicine Residency  Isabel Donald MD    Subjective:     Ronda Blair is a 75 y.o. female who presents for fatigue.  She is accompanied by her daughter who gives much of the history.  She reports progressively worsening fatigue for the past 2 months.  During this time, she seems to be more tired and drowsy constantly.  She has been observed falling asleep while eating.  She has also had 3-4 falls in the past 2 months.  She most recently had a fall last night as she was walking down some steps and then fell back and hit her head on the house before reaching the ground.  Patient reports that she just slipped on the steps but the daughter is concerned about a pathological cause.  Patient denies any preceding symptoms, loss of consciousness, or urinary/bowel incontinence.  She has a history of previous strokes.    The following portions of the patient's history were reviewed and updated as appropriate: allergies, current medications, past family history, past medical history, past social history, past surgical history and problem list.    Past Medical Hx:  Past Medical History:   Diagnosis Date   • Anxiety    • Arthritis    • COPD (chronic obstructive pulmonary disease) (CMS/[facility])    • Depression    • Diabetes mellitus (CMS/[facility])    • History of transfusion    • Hyperlipidemia    • Hypertension    • Stroke (CMS/[facility])     Greater than 5 years ago       Past Surgical Hx:  Past Surgical History:   Procedure Laterality Date   • CERVICAL FUSION  1999   • EYE SURGERY Bilateral 2014    lasix eye surgery   • HEMORRHOIDECTOMY     • HYSTERECTOMY     • INTRATHECAL PUMP REMOVAL      2006   • KNEE ARTHROSCOPY Right 2015   • LUMBAR SPINE SURGERY  1999   • PAIN PUMP INSERTION/REVISION      Removed in 2006       Current Meds:    Current Outpatient Medications:   •  ALPRAZolam (XANAX) 1 MG tablet, Take 1 mg by mouth Daily. Take 1/2 in the morning and one at bedtime PRN anxiety and insomnia, Disp: , Rfl:   •   amLODIPine (NORVASC) 10 MG tablet, Take 1 tablet by mouth Daily., Disp: 30 tablet, Rfl: 1  •  aspirin-dipyridamole (AGGRENOX)  MG per 12 hr capsule, , Disp: , Rfl:   •  citalopram (CeleXA) 40 MG tablet, Take 40 mg by mouth Every Night., Disp: , Rfl:   •  famotidine (PEPCID) 20 MG tablet, Take 1 tablet by mouth Daily., Disp: 30 tablet, Rfl: 1  •  folic acid (FOLVITE) 1 MG tablet, Take 1 mg by mouth Daily., Disp: , Rfl:   •  furosemide (LASIX) 40 MG tablet, Take 0.5 tablets by mouth 2 (Two) Times a Day for 30 days., Disp: 30 tablet, Rfl: 0  •  gabapentin (NEURONTIN) 300 MG capsule, Take 300 mg by mouth 4 (Four) Times a Day., Disp: , Rfl:   •  isosorbide mononitrate (IMDUR) 30 MG 24 hr tablet, Take 1 tablet by mouth Daily., Disp: 30 tablet, Rfl: 1  •  lisinopril (PRINIVIL,ZESTRIL) 10 MG tablet, Take 1 tablet by mouth Every 12 (Twelve) Hours., Disp: 60 tablet, Rfl: 1  •  metFORMIN ER (GLUCOPHAGE-XR) 500 MG 24 hr tablet, Take 1,000 mg by mouth 2 (two) times a day., Disp: , Rfl:   •  nebivolol (BYSTOLIC) 5 MG tablet, Take 1 tablet by mouth Daily., Disp: 30 tablet, Rfl: 1  •  Omega-3 Fatty Acids (FISH OIL) 1200 MG capsule capsule, Take  by mouth., Disp: , Rfl:   •  oxyCODONE-acetaminophen (PERCOCET)  MG per tablet, Take 1 tablet by mouth Every 6 (Six) Hours As Needed for Moderate Pain ., Disp: , Rfl:   •  pregabalin (LYRICA) 75 MG capsule, Take 75 mg by mouth Daily., Disp: , Rfl:   •  rOPINIRole (REQUIP) 1 MG tablet, Take 1-3 tablets by mouth Every Night. Take 1 hour before bedtime., Disp: 90 tablet, Rfl: 0  •  rosuvastatin (CRESTOR) 10 MG tablet, Take 1 tablet by mouth Daily., Disp: 30 tablet, Rfl: 2  •  tiZANidine (ZANAFLEX) 4 MG tablet, Take 4 mg by mouth At Night As Needed for Muscle Spasms., Disp: , Rfl:   •  venlafaxine XR (EFFEXOR-XR) 37.5 MG 24 hr capsule, Take 37.5 mg by mouth Daily., Disp: , Rfl:   •  albuterol (PROVENTIL HFA;VENTOLIN HFA) 108 (90 Base) MCG/ACT inhaler, Inhale., Disp: , Rfl:   •   Sennosides 8.6 MG capsule, Take 1 capsule by mouth Daily., Disp: , Rfl:     Allergies:  Allergies   Allergen Reactions   • Aspirin GI Intolerance and Other (See Comments)       Family Hx:  History reviewed. No pertinent family history.     Social History:  Social History     Socioeconomic History   • Marital status:      Spouse name: Not on file   • Number of children: Not on file   • Years of education: Not on file   • Highest education level: Not on file   Tobacco Use   • Smoking status: Former Smoker   • Smokeless tobacco: Never Used   Substance and Sexual Activity   • Alcohol use: No   • Drug use: No   • Sexual activity: Defer       Review of Systems  Review of Systems   Constitutional: Positive for fatigue. Negative for activity change, appetite change, chills and fever.   HENT: Negative for congestion, rhinorrhea, sinus pain and sore throat.    Eyes: Negative for pain, redness and visual disturbance.   Respiratory: Negative for cough, shortness of breath and wheezing.    Cardiovascular: Negative for chest pain, palpitations and leg swelling.   Gastrointestinal: Negative for abdominal pain, constipation, diarrhea, nausea and vomiting.   Genitourinary: Negative for dysuria and flank pain.   Musculoskeletal: Negative for arthralgias, back pain, joint swelling and myalgias.   Skin: Negative for color change, pallor and rash.   Neurological: Negative for dizziness, light-headedness and headaches.       Objective:     /72   Pulse 77   Temp 96.8 °F (36 °C)   Wt 92.1 kg (203 lb)   SpO2 90%   BMI 33.78 kg/m²   Physical Exam   Constitutional: She is oriented to person, place, and time. Vital signs are normal. She appears well-developed and well-nourished. No distress.   HENT:   Head: Normocephalic and atraumatic.   Right Ear: Hearing normal.   Left Ear: Hearing normal.   Eyes: Pupils are equal, round, and reactive to light. Conjunctivae, EOM and lids are normal.   Neck: Normal range of motion. Neck  supple.   Cardiovascular: Normal rate, regular rhythm and normal heart sounds.   No murmur heard.  Pulmonary/Chest: Effort normal and breath sounds normal. No respiratory distress. She has no wheezes. She has no rales.   Abdominal: Soft. Normal appearance and bowel sounds are normal. She exhibits no distension. There is no tenderness.   Musculoskeletal: Normal range of motion. She exhibits no edema, tenderness or deformity.   Neurological: She is alert and oriented to person, place, and time. She has normal strength. She is not disoriented.   Skin: Skin is warm, dry and intact. No rash noted. She is not diaphoretic. No erythema. No pallor.   Nursing note and vitals reviewed.    Assessment/Plan:     Ronda was seen today for fatigue.    Diagnoses and all orders for this visit:    Fatigue, unspecified type  -     Comprehensive metabolic panel; Future  -     CBC w AUTO Differential; Future  -     TSH; Future  -     Vitamin B12; Future  -     Folate; Future  -     CT Head Without Contrast; Future  - Unclear etiology at this time. Will order labs and CT head to evaluate further. Could be age-related changes, poor sleep hygiene, polypharmacy, or other disease process. Patient has follow-up on 8/31 with her PCP. Will defer to PCP to reconcile medications as patient is also requesting to reduce her medication list. Will follow-up of results and evaluate further from there.    Fall, initial encounter  -     CT Head Without Contrast; Future  - Accidental fall vs. Underlying pathology. Patient states she slipped on the steps. Will get CT and assess from there.  - Advised patient to present to nearest ER if she has more falls, particularly if she sustains trauma to the head.    Other orders  -     amLODIPine (NORVASC) 10 MG tablet; Take 1 tablet by mouth Daily.  -     famotidine (PEPCID) 20 MG tablet; Take 1 tablet by mouth Daily.  -     furosemide (LASIX) 40 MG tablet; Take 0.5 tablets by mouth 2 (Two) Times a Day for 30  days.  -     isosorbide mononitrate (IMDUR) 30 MG 24 hr tablet; Take 1 tablet by mouth Daily.  -     lisinopril (PRINIVIL,ZESTRIL) 10 MG tablet; Take 1 tablet by mouth Every 12 (Twelve) Hours.  -     rOPINIRole (REQUIP) 1 MG tablet; Take 1-3 tablets by mouth Every Night. Take 1 hour before bedtime.  -     rosuvastatin (CRESTOR) 10 MG tablet; Take 1 tablet by mouth Daily.    · Rx changes: none  · Patient Education: possible causes of fatigue; indicates to present to ER  · Compliance at present is estimated to be good.      Follow-up:     Return in about 1 week (around 8/31/2020) for Recheck.    Preventative:  Health Maintenance   Topic Date Due   • URINE MICROALBUMIN  1945   • ZOSTER VACCINE (1 of 2) 08/11/1994   • Pneumococcal Vaccine Once at 65 Years Old  08/11/2009   • HEPATITIS C SCREENING  08/28/2017   • MEDICARE ANNUAL WELLNESS  08/28/2017   • MAMMOGRAM  08/28/2017   • DIABETIC EYE EXAM  08/28/2017   • COLONOSCOPY  08/28/2017   • INFLUENZA VACCINE  08/01/2020   • HEMOGLOBIN A1C  01/21/2021   • LIPID PANEL  07/21/2021   • TDAP/TD VACCINES (2 - Tdap) 09/25/2023     Female Preventative: Mammogram is due  Colon cancer screening is due  Recommended: Varicella and Pneumovax  Vaccine Counseling: N/A    Weight  -Class: Obese Class I: 30-34.9kg/m2  -Patient's Body mass index is 33.78 kg/m². BMI is above normal parameters. Recommendations include: exercise counseling and nutrition counseling.   eat more fruits and vegetables, decrease soda or juice intake, increase water intake, increase physical activity, reduce screen time and reduce portion size    Alcohol use:  reports that she does not drink alcohol.  Nicotine status  reports that she has quit smoking. She has never used smokeless tobacco.    Goals     • Medication management     • Self-monitor blood pressure           RISK SCORE: 3      Isabel Donald M.D. PGY2  [facility] Medicine [facility]idency  [address] [place]  [address]  Office: [phone]  This document has been electronically signed by Isabel Donald MD on August 24, 2020 18:39      Dictated utilizing Dragon dictation.

## 2020-08-25 LAB
ALBUMIN SERPL-MCNC: 4.1 G/DL (ref 3.5–5.2)
ALBUMIN/GLOB SERPL: 1.8 G/DL
ALP SERPL-CCNC: 104 U/L (ref 39–117)
ALT SERPL W P-5'-P-CCNC: 32 U/L (ref 1–33)
ANION GAP SERPL CALCULATED.3IONS-SCNC: 10.7 MMOL/L (ref 5–15)
AST SERPL-CCNC: 40 U/L (ref 1–32)
BASOPHILS # BLD AUTO: 0.01 10*3/MM3 (ref 0–0.2)
BASOPHILS NFR BLD AUTO: 0.1 % (ref 0–1.5)
BILIRUB SERPL-MCNC: 0.3 MG/DL (ref 0–1.2)
BUN SERPL-MCNC: 62 MG/DL (ref 8–23)
BUN/CREAT SERPL: 20.2 (ref 7–25)
CALCIUM SPEC-SCNC: 8.5 MG/DL (ref 8.6–10.5)
CHLORIDE SERPL-SCNC: 105 MMOL/L (ref 98–107)
CO2 SERPL-SCNC: 21.3 MMOL/L (ref 22–29)
CREAT SERPL-MCNC: 3.07 MG/DL (ref 0.57–1)
DEPRECATED RDW RBC AUTO: 47.9 FL (ref 37–54)
EOSINOPHIL # BLD AUTO: 0.32 10*3/MM3 (ref 0–0.4)
EOSINOPHIL NFR BLD AUTO: 4.6 % (ref 0.3–6.2)
ERYTHROCYTE [DISTWIDTH] IN BLOOD BY AUTOMATED COUNT: 14.7 % (ref 12.3–15.4)
FOLATE SERPL-MCNC: >20 NG/ML (ref 4.78–24.2)
GFR SERPL CREATININE-BSD FRML MDRD: 15 ML/MIN/1.73
GLOBULIN UR ELPH-MCNC: 2.3 GM/DL
GLUCOSE SERPL-MCNC: 120 MG/DL (ref 65–99)
HCT VFR BLD AUTO: 34.1 % (ref 34–46.6)
HGB BLD-MCNC: 10.9 G/DL (ref 12–15.9)
IMM GRANULOCYTES # BLD AUTO: 0.01 10*3/MM3 (ref 0–0.05)
IMM GRANULOCYTES NFR BLD AUTO: 0.1 % (ref 0–0.5)
LYMPHOCYTES # BLD AUTO: 1.17 10*3/MM3 (ref 0.7–3.1)
LYMPHOCYTES NFR BLD AUTO: 17 % (ref 19.6–45.3)
MCH RBC QN AUTO: 28.3 PG (ref 26.6–33)
MCHC RBC AUTO-ENTMCNC: 32 G/DL (ref 31.5–35.7)
MCV RBC AUTO: 88.6 FL (ref 79–97)
MONOCYTES # BLD AUTO: 0.53 10*3/MM3 (ref 0.1–0.9)
MONOCYTES NFR BLD AUTO: 7.7 % (ref 5–12)
NEUTROPHILS NFR BLD AUTO: 4.85 10*3/MM3 (ref 1.7–7)
NEUTROPHILS NFR BLD AUTO: 70.5 % (ref 42.7–76)
NRBC BLD AUTO-RTO: 0 /100 WBC (ref 0–0.2)
PLATELET # BLD AUTO: 135 10*3/MM3 (ref 140–450)
PMV BLD AUTO: 12.7 FL (ref 6–12)
POTASSIUM SERPL-SCNC: 4.8 MMOL/L (ref 3.5–5.2)
PROT SERPL-MCNC: 6.4 G/DL (ref 6–8.5)
RBC # BLD AUTO: 3.85 10*6/MM3 (ref 3.77–5.28)
SODIUM SERPL-SCNC: 137 MMOL/L (ref 136–145)
TSH SERPL DL<=0.05 MIU/L-ACNC: 0.54 UIU/ML (ref 0.27–4.2)
VIT B12 BLD-MCNC: 214 PG/ML (ref 211–946)
WBC # BLD AUTO: 6.89 10*3/MM3 (ref 3.4–10.8)

## 2020-08-28 ENCOUNTER — HOSPITAL ENCOUNTER (OUTPATIENT)
Facility: HOSPITAL | Age: 75
Setting detail: OBSERVATION
Discharge: HOME | End: 2020-08-30
Attending: EMERGENCY MEDICINE | Admitting: HOSPITALIST

## 2020-08-28 ENCOUNTER — APPOINTMENT (OUTPATIENT)
Dept: ULTRASOUND IMAGING | Facility: HOSPITAL | Age: 75
End: 2020-08-28

## 2020-08-28 ENCOUNTER — APPOINTMENT (OUTPATIENT)
Dept: GENERAL RADIOLOGY | Facility: HOSPITAL | Age: 75
End: 2020-08-28

## 2020-08-28 DIAGNOSIS — J44.9 CHRONIC OBSTRUCTIVE PULMONARY DISEASE, UNSPECIFIED COPD TYPE (HCC): Chronic | ICD-10-CM

## 2020-08-28 DIAGNOSIS — R06.09 EXERTIONAL DYSPNEA: Primary | ICD-10-CM

## 2020-08-28 DIAGNOSIS — Z78.9 IMPAIRED MOBILITY AND ADLS: ICD-10-CM

## 2020-08-28 DIAGNOSIS — Z74.09 IMPAIRED MOBILITY AND ADLS: ICD-10-CM

## 2020-08-28 DIAGNOSIS — N17.9 ACUTE KIDNEY INJURY (HCC): ICD-10-CM

## 2020-08-28 DIAGNOSIS — Z74.09 IMPAIRED PHYSICAL MOBILITY: ICD-10-CM

## 2020-08-28 DIAGNOSIS — J44.1 COPD EXACERBATION (HCC): ICD-10-CM

## 2020-08-28 PROBLEM — D64.9 NORMOCYTIC ANEMIA: Status: ACTIVE | Noted: 2020-08-28

## 2020-08-28 PROBLEM — I50.32 CHRONIC HEART FAILURE WITH PRESERVED EJECTION FRACTION: Status: ACTIVE | Noted: 2020-08-28

## 2020-08-28 PROBLEM — J18.9 CAP (COMMUNITY ACQUIRED PNEUMONIA): Status: ACTIVE | Noted: 2020-08-28

## 2020-08-28 PROBLEM — G47.34 NOCTURNAL HYPOXIA: Status: ACTIVE | Noted: 2020-08-28

## 2020-08-28 LAB
ALBUMIN SERPL-MCNC: 4.1 G/DL (ref 3.5–5.2)
ALBUMIN/GLOB SERPL: 1.5 G/DL
ALP SERPL-CCNC: 87 U/L (ref 39–117)
ALT SERPL W P-5'-P-CCNC: 27 U/L (ref 1–33)
ANION GAP SERPL CALCULATED.3IONS-SCNC: 11 MMOL/L (ref 5–15)
AST SERPL-CCNC: 29 U/L (ref 1–32)
BASOPHILS # BLD AUTO: 0.02 10*3/MM3 (ref 0–0.2)
BASOPHILS NFR BLD AUTO: 0.2 % (ref 0–1.5)
BILIRUB SERPL-MCNC: 0.3 MG/DL (ref 0–1.2)
BUN SERPL-MCNC: 31 MG/DL (ref 8–23)
BUN/CREAT SERPL: 23.1 (ref 7–25)
CALCIUM SPEC-SCNC: 9.4 MG/DL (ref 8.6–10.5)
CHLORIDE SERPL-SCNC: 103 MMOL/L (ref 98–107)
CO2 SERPL-SCNC: 28 MMOL/L (ref 22–29)
CREAT SERPL-MCNC: 1.34 MG/DL (ref 0.57–1)
D-DIMER, QUANTITATIVE (MAD,POW, STR): 1652 NG/ML (FEU) (ref 0–470)
DEPRECATED RDW RBC AUTO: 44.4 FL (ref 37–54)
EOSINOPHIL # BLD AUTO: 0.19 10*3/MM3 (ref 0–0.4)
EOSINOPHIL NFR BLD AUTO: 2.2 % (ref 0.3–6.2)
ERYTHROCYTE [DISTWIDTH] IN BLOOD BY AUTOMATED COUNT: 13.6 % (ref 12.3–15.4)
GFR SERPL CREATININE-BSD FRML MDRD: 38 ML/MIN/1.73
GLOBULIN UR ELPH-MCNC: 2.7 GM/DL
GLUCOSE SERPL-MCNC: 173 MG/DL (ref 65–99)
HCT VFR BLD AUTO: 34.2 % (ref 34–46.6)
HGB BLD-MCNC: 10.9 G/DL (ref 12–15.9)
HOLD SPECIMEN: NORMAL
IMM GRANULOCYTES # BLD AUTO: 0.03 10*3/MM3 (ref 0–0.05)
IMM GRANULOCYTES NFR BLD AUTO: 0.4 % (ref 0–0.5)
L PNEUMO1 AG UR QL IA: NEGATIVE
LYMPHOCYTES # BLD AUTO: 0.97 10*3/MM3 (ref 0.7–3.1)
LYMPHOCYTES NFR BLD AUTO: 11.4 % (ref 19.6–45.3)
MCH RBC QN AUTO: 28.5 PG (ref 26.6–33)
MCHC RBC AUTO-ENTMCNC: 31.9 G/DL (ref 31.5–35.7)
MCV RBC AUTO: 89.3 FL (ref 79–97)
MONOCYTES # BLD AUTO: 0.53 10*3/MM3 (ref 0.1–0.9)
MONOCYTES NFR BLD AUTO: 6.2 % (ref 5–12)
NEUTROPHILS NFR BLD AUTO: 6.78 10*3/MM3 (ref 1.7–7)
NEUTROPHILS NFR BLD AUTO: 79.6 % (ref 42.7–76)
NRBC BLD AUTO-RTO: 0 /100 WBC (ref 0–0.2)
NT-PROBNP SERPL-MCNC: 2918 PG/ML (ref 0–1800)
PLATELET # BLD AUTO: 134 10*3/MM3 (ref 140–450)
PMV BLD AUTO: 12.1 FL (ref 6–12)
POTASSIUM SERPL-SCNC: 4.8 MMOL/L (ref 3.5–5.2)
PROCALCITONIN SERPL-MCNC: 0.05 NG/ML (ref 0–0.25)
PROT SERPL-MCNC: 6.8 G/DL (ref 6–8.5)
RBC # BLD AUTO: 3.83 10*6/MM3 (ref 3.77–5.28)
S PNEUM AG SPEC QL LA: NEGATIVE
SARS-COV-2 N GENE RESP QL NAA+PROBE: NOT DETECTED
SODIUM SERPL-SCNC: 142 MMOL/L (ref 136–145)
TROPONIN T SERPL-MCNC: <0.01 NG/ML (ref 0–0.03)
WBC # BLD AUTO: 8.52 10*3/MM3 (ref 3.4–10.8)
WHOLE BLOOD HOLD SPECIMEN: NORMAL
WHOLE BLOOD HOLD SPECIMEN: NORMAL

## 2020-08-28 PROCEDURE — 96368 THER/DIAG CONCURRENT INF: CPT

## 2020-08-28 PROCEDURE — 71045 X-RAY EXAM CHEST 1 VIEW: CPT

## 2020-08-28 PROCEDURE — 87040 BLOOD CULTURE FOR BACTERIA: CPT | Performed by: STUDENT IN AN ORGANIZED HEALTH CARE EDUCATION/TRAINING PROGRAM

## 2020-08-28 PROCEDURE — 94799 UNLISTED PULMONARY SVC/PX: CPT

## 2020-08-28 PROCEDURE — 87899 AGENT NOS ASSAY W/OPTIC: CPT | Performed by: STUDENT IN AN ORGANIZED HEALTH CARE EDUCATION/TRAINING PROGRAM

## 2020-08-28 PROCEDURE — 94640 AIRWAY INHALATION TREATMENT: CPT

## 2020-08-28 PROCEDURE — 25010000002 CEFTRIAXONE PER 250 MG: Performed by: STUDENT IN AN ORGANIZED HEALTH CARE EDUCATION/TRAINING PROGRAM

## 2020-08-28 PROCEDURE — C9803 HOPD COVID-19 SPEC COLLECT: HCPCS

## 2020-08-28 PROCEDURE — 96375 TX/PRO/DX INJ NEW DRUG ADDON: CPT

## 2020-08-28 PROCEDURE — 93005 ELECTROCARDIOGRAM TRACING: CPT | Performed by: EMERGENCY MEDICINE

## 2020-08-28 PROCEDURE — 25010000002 METHYLPREDNISOLONE PER 125 MG: Performed by: EMERGENCY MEDICINE

## 2020-08-28 PROCEDURE — 85025 COMPLETE CBC W/AUTO DIFF WBC: CPT | Performed by: EMERGENCY MEDICINE

## 2020-08-28 PROCEDURE — G0378 HOSPITAL OBSERVATION PER HR: HCPCS

## 2020-08-28 PROCEDURE — 80053 COMPREHEN METABOLIC PANEL: CPT | Performed by: EMERGENCY MEDICINE

## 2020-08-28 PROCEDURE — 93970 EXTREMITY STUDY: CPT

## 2020-08-28 PROCEDURE — 84484 ASSAY OF TROPONIN QUANT: CPT | Performed by: EMERGENCY MEDICINE

## 2020-08-28 PROCEDURE — 63710000001 PREDNISONE PER 1 MG: Performed by: STUDENT IN AN ORGANIZED HEALTH CARE EDUCATION/TRAINING PROGRAM

## 2020-08-28 PROCEDURE — 87635 SARS-COV-2 COVID-19 AMP PRB: CPT | Performed by: EMERGENCY MEDICINE

## 2020-08-28 PROCEDURE — 96365 THER/PROPH/DIAG IV INF INIT: CPT

## 2020-08-28 PROCEDURE — 93010 ELECTROCARDIOGRAM REPORT: CPT | Performed by: INTERNAL MEDICINE

## 2020-08-28 PROCEDURE — 84145 PROCALCITONIN (PCT): CPT | Performed by: STUDENT IN AN ORGANIZED HEALTH CARE EDUCATION/TRAINING PROGRAM

## 2020-08-28 PROCEDURE — 25010000002 AZITHROMYCIN PER 500 MG: Performed by: STUDENT IN AN ORGANIZED HEALTH CARE EDUCATION/TRAINING PROGRAM

## 2020-08-28 PROCEDURE — 94760 N-INVAS EAR/PLS OXIMETRY 1: CPT

## 2020-08-28 PROCEDURE — 83880 ASSAY OF NATRIURETIC PEPTIDE: CPT | Performed by: EMERGENCY MEDICINE

## 2020-08-28 PROCEDURE — 99285 EMERGENCY DEPT VISIT HI MDM: CPT

## 2020-08-28 PROCEDURE — 85379 FIBRIN DEGRADATION QUANT: CPT | Performed by: STUDENT IN AN ORGANIZED HEALTH CARE EDUCATION/TRAINING PROGRAM

## 2020-08-28 PROCEDURE — 99232 SBSQ HOSP IP/OBS MODERATE 35: CPT | Performed by: STUDENT IN AN ORGANIZED HEALTH CARE EDUCATION/TRAINING PROGRAM

## 2020-08-28 RX ORDER — IPRATROPIUM BROMIDE AND ALBUTEROL SULFATE 2.5; .5 MG/3ML; MG/3ML
3 SOLUTION RESPIRATORY (INHALATION) EVERY 4 HOURS PRN
Status: DISCONTINUED | OUTPATIENT
Start: 2020-08-28 | End: 2020-08-29

## 2020-08-28 RX ORDER — LISINOPRIL 10 MG/1
10 TABLET ORAL EVERY 12 HOURS SCHEDULED
Status: DISCONTINUED | OUTPATIENT
Start: 2020-08-28 | End: 2020-08-28

## 2020-08-28 RX ORDER — VENLAFAXINE HYDROCHLORIDE 37.5 MG/1
37.5 CAPSULE, EXTENDED RELEASE ORAL DAILY
Status: DISCONTINUED | OUTPATIENT
Start: 2020-08-28 | End: 2020-08-30 | Stop reason: HOSPADM

## 2020-08-28 RX ORDER — SODIUM CHLORIDE 0.9 % (FLUSH) 0.9 %
10 SYRINGE (ML) INJECTION AS NEEDED
Status: DISCONTINUED | OUTPATIENT
Start: 2020-08-28 | End: 2020-08-30 | Stop reason: HOSPADM

## 2020-08-28 RX ORDER — PREGABALIN 75 MG/1
75 CAPSULE ORAL DAILY
Status: DISCONTINUED | OUTPATIENT
Start: 2020-08-28 | End: 2020-08-30 | Stop reason: HOSPADM

## 2020-08-28 RX ORDER — ALPRAZOLAM 0.5 MG/1
0.5 TABLET ORAL DAILY
Status: DISCONTINUED | OUTPATIENT
Start: 2020-08-28 | End: 2020-08-29

## 2020-08-28 RX ORDER — CITALOPRAM 40 MG/1
40 TABLET ORAL DAILY
Status: DISCONTINUED | OUTPATIENT
Start: 2020-08-28 | End: 2020-08-30 | Stop reason: HOSPADM

## 2020-08-28 RX ORDER — ASPIRIN AND DIPYRIDAMOLE 25; 200 MG/1; MG/1
1 CAPSULE, EXTENDED RELEASE ORAL 2 TIMES DAILY
Status: DISCONTINUED | OUTPATIENT
Start: 2020-08-28 | End: 2020-08-30 | Stop reason: HOSPADM

## 2020-08-28 RX ORDER — SENNA PLUS 8.6 MG/1
1 TABLET ORAL NIGHTLY PRN
Status: DISCONTINUED | OUTPATIENT
Start: 2020-08-28 | End: 2020-08-30 | Stop reason: HOSPADM

## 2020-08-28 RX ORDER — ALBUTEROL SULFATE 90 UG/1
2 AEROSOL, METERED RESPIRATORY (INHALATION)
Status: DISCONTINUED | OUTPATIENT
Start: 2020-08-28 | End: 2020-08-30 | Stop reason: HOSPADM

## 2020-08-28 RX ORDER — GUAIFENESIN 600 MG/1
600 TABLET, EXTENDED RELEASE ORAL EVERY 12 HOURS SCHEDULED
Status: DISCONTINUED | OUTPATIENT
Start: 2020-08-28 | End: 2020-08-30 | Stop reason: HOSPADM

## 2020-08-28 RX ORDER — PREDNISONE 20 MG/1
40 TABLET ORAL
Status: DISCONTINUED | OUTPATIENT
Start: 2020-08-28 | End: 2020-08-30 | Stop reason: HOSPADM

## 2020-08-28 RX ORDER — ROPINIROLE 1 MG/1
1 TABLET, FILM COATED ORAL NIGHTLY
Status: DISCONTINUED | OUTPATIENT
Start: 2020-08-28 | End: 2020-08-30 | Stop reason: HOSPADM

## 2020-08-28 RX ORDER — TIZANIDINE 4 MG/1
4 TABLET ORAL NIGHTLY PRN
Status: DISCONTINUED | OUTPATIENT
Start: 2020-08-28 | End: 2020-08-30 | Stop reason: HOSPADM

## 2020-08-28 RX ORDER — AMLODIPINE BESYLATE 10 MG/1
10 TABLET ORAL DAILY
Status: DISCONTINUED | OUTPATIENT
Start: 2020-08-28 | End: 2020-08-30 | Stop reason: HOSPADM

## 2020-08-28 RX ORDER — FOLIC ACID 1 MG/1
1 TABLET ORAL DAILY
Status: DISCONTINUED | OUTPATIENT
Start: 2020-08-28 | End: 2020-08-30 | Stop reason: HOSPADM

## 2020-08-28 RX ORDER — ISOSORBIDE MONONITRATE 30 MG/1
30 TABLET, EXTENDED RELEASE ORAL
Status: DISCONTINUED | OUTPATIENT
Start: 2020-08-28 | End: 2020-08-30 | Stop reason: HOSPADM

## 2020-08-28 RX ORDER — IPRATROPIUM BROMIDE AND ALBUTEROL SULFATE 2.5; .5 MG/3ML; MG/3ML
3 SOLUTION RESPIRATORY (INHALATION) ONCE
Status: COMPLETED | OUTPATIENT
Start: 2020-08-28 | End: 2020-08-28

## 2020-08-28 RX ORDER — OXYCODONE AND ACETAMINOPHEN 10; 325 MG/1; MG/1
1 TABLET ORAL EVERY 6 HOURS PRN
Status: DISCONTINUED | OUTPATIENT
Start: 2020-08-28 | End: 2020-08-30 | Stop reason: HOSPADM

## 2020-08-28 RX ORDER — PROMETHAZINE HYDROCHLORIDE 12.5 MG/1
12.5 TABLET ORAL EVERY 6 HOURS PRN
Status: DISCONTINUED | OUTPATIENT
Start: 2020-08-28 | End: 2020-08-30 | Stop reason: HOSPADM

## 2020-08-28 RX ORDER — FUROSEMIDE 20 MG/1
20 TABLET ORAL 2 TIMES DAILY
Status: DISCONTINUED | OUTPATIENT
Start: 2020-08-28 | End: 2020-08-30 | Stop reason: HOSPADM

## 2020-08-28 RX ORDER — SODIUM CHLORIDE 0.9 % (FLUSH) 0.9 %
10 SYRINGE (ML) INJECTION EVERY 12 HOURS SCHEDULED
Status: DISCONTINUED | OUTPATIENT
Start: 2020-08-28 | End: 2020-08-30 | Stop reason: HOSPADM

## 2020-08-28 RX ORDER — FAMOTIDINE 20 MG/1
20 TABLET, FILM COATED ORAL DAILY
Status: DISCONTINUED | OUTPATIENT
Start: 2020-08-28 | End: 2020-08-30 | Stop reason: HOSPADM

## 2020-08-28 RX ORDER — GABAPENTIN 300 MG/1
300 CAPSULE ORAL 4 TIMES DAILY
Status: DISCONTINUED | OUTPATIENT
Start: 2020-08-28 | End: 2020-08-30 | Stop reason: HOSPADM

## 2020-08-28 RX ORDER — NEBIVOLOL 5 MG/1
5 TABLET ORAL
Status: DISCONTINUED | OUTPATIENT
Start: 2020-08-28 | End: 2020-08-30 | Stop reason: HOSPADM

## 2020-08-28 RX ORDER — ROSUVASTATIN CALCIUM 10 MG/1
10 TABLET, COATED ORAL DAILY
Status: DISCONTINUED | OUTPATIENT
Start: 2020-08-28 | End: 2020-08-30 | Stop reason: HOSPADM

## 2020-08-28 RX ORDER — METHYLPREDNISOLONE SODIUM SUCCINATE 125 MG/2ML
125 INJECTION, POWDER, LYOPHILIZED, FOR SOLUTION INTRAMUSCULAR; INTRAVENOUS ONCE
Status: COMPLETED | OUTPATIENT
Start: 2020-08-28 | End: 2020-08-28

## 2020-08-28 RX ADMIN — VENLAFAXINE HYDROCHLORIDE 37.5 MG: 37.5 CAPSULE, EXTENDED RELEASE ORAL at 12:59

## 2020-08-28 RX ADMIN — ASPIRIN AND EXTENDED-RELEASE DIPYRIDAMOLE 1 CAPSULE: 25; 200 CAPSULE ORAL at 20:49

## 2020-08-28 RX ADMIN — FOLIC ACID 1 MG: 1 TABLET ORAL at 12:58

## 2020-08-28 RX ADMIN — CEFTRIAXONE SODIUM 1 G: 1 INJECTION, POWDER, FOR SOLUTION INTRAMUSCULAR; INTRAVENOUS at 12:59

## 2020-08-28 RX ADMIN — ALBUTEROL SULFATE 2 PUFF: 90 AEROSOL, METERED RESPIRATORY (INHALATION) at 20:57

## 2020-08-28 RX ADMIN — ROPINIROLE 1 MG: 1 TABLET, FILM COATED ORAL at 20:49

## 2020-08-28 RX ADMIN — FUROSEMIDE 20 MG: 20 TABLET ORAL at 12:58

## 2020-08-28 RX ADMIN — AMLODIPINE BESYLATE 10 MG: 10 TABLET ORAL at 12:57

## 2020-08-28 RX ADMIN — GABAPENTIN 300 MG: 300 CAPSULE ORAL at 20:48

## 2020-08-28 RX ADMIN — SODIUM CHLORIDE, PRESERVATIVE FREE 10 ML: 5 INJECTION INTRAVENOUS at 04:53

## 2020-08-28 RX ADMIN — SODIUM CHLORIDE, PRESERVATIVE FREE 10 ML: 5 INJECTION INTRAVENOUS at 13:00

## 2020-08-28 RX ADMIN — RIVAROXABAN 15 MG: 15 TABLET, FILM COATED ORAL at 20:49

## 2020-08-28 RX ADMIN — PREGABALIN 75 MG: 75 CAPSULE ORAL at 12:59

## 2020-08-28 RX ADMIN — NEBIVOLOL HYDROCHLORIDE 5 MG: 5 TABLET ORAL at 12:59

## 2020-08-28 RX ADMIN — GABAPENTIN 300 MG: 300 CAPSULE ORAL at 17:20

## 2020-08-28 RX ADMIN — ALPRAZOLAM 0.5 MG: 0.5 TABLET ORAL at 12:58

## 2020-08-28 RX ADMIN — ISOSORBIDE MONONITRATE 30 MG: 30 TABLET, EXTENDED RELEASE ORAL at 12:58

## 2020-08-28 RX ADMIN — ROSUVASTATIN CALCIUM 10 MG: 10 TABLET, FILM COATED ORAL at 12:59

## 2020-08-28 RX ADMIN — LISINOPRIL 10 MG: 10 TABLET ORAL at 12:58

## 2020-08-28 RX ADMIN — FUROSEMIDE 20 MG: 20 TABLET ORAL at 20:49

## 2020-08-28 RX ADMIN — METHYLPREDNISOLONE SODIUM SUCCINATE 125 MG: 125 INJECTION, POWDER, FOR SOLUTION INTRAMUSCULAR; INTRAVENOUS at 04:53

## 2020-08-28 RX ADMIN — AZITHROMYCIN MONOHYDRATE 500 MG: 500 INJECTION, POWDER, LYOPHILIZED, FOR SOLUTION INTRAVENOUS at 13:08

## 2020-08-28 RX ADMIN — ASPIRIN AND EXTENDED-RELEASE DIPYRIDAMOLE 1 CAPSULE: 25; 200 CAPSULE ORAL at 12:59

## 2020-08-28 RX ADMIN — OXYCODONE HYDROCHLORIDE AND ACETAMINOPHEN 1 TABLET: 10; 325 TABLET ORAL at 15:09

## 2020-08-28 RX ADMIN — GABAPENTIN 300 MG: 300 CAPSULE ORAL at 12:57

## 2020-08-28 RX ADMIN — FAMOTIDINE 20 MG: 20 TABLET ORAL at 12:58

## 2020-08-28 RX ADMIN — PREDNISONE 40 MG: 20 TABLET ORAL at 12:59

## 2020-08-28 RX ADMIN — GUAIFENESIN 600 MG: 600 TABLET, EXTENDED RELEASE ORAL at 12:58

## 2020-08-28 RX ADMIN — GUAIFENESIN 600 MG: 600 TABLET, EXTENDED RELEASE ORAL at 20:49

## 2020-08-28 RX ADMIN — CITALOPRAM HYDROBROMIDE 40 MG: 40 TABLET ORAL at 12:58

## 2020-08-28 RX ADMIN — IPRATROPIUM BROMIDE AND ALBUTEROL SULFATE 3 ML: .5; 3 SOLUTION RESPIRATORY (INHALATION) at 05:09

## 2020-08-29 ENCOUNTER — APPOINTMENT (OUTPATIENT)
Dept: GENERAL RADIOLOGY | Facility: HOSPITAL | Age: 75
End: 2020-08-29

## 2020-08-29 ENCOUNTER — APPOINTMENT (OUTPATIENT)
Dept: NUCLEAR MEDICINE | Facility: HOSPITAL | Age: 75
End: 2020-08-29

## 2020-08-29 LAB
ALBUMIN SERPL-MCNC: 3.8 G/DL (ref 3.5–5.2)
ALBUMIN/GLOB SERPL: 1.5 G/DL
ALP SERPL-CCNC: 75 U/L (ref 39–117)
ALT SERPL W P-5'-P-CCNC: 22 U/L (ref 1–33)
ANION GAP SERPL CALCULATED.3IONS-SCNC: 11 MMOL/L (ref 5–15)
AST SERPL-CCNC: 19 U/L (ref 1–32)
BASOPHILS # BLD AUTO: 0.01 10*3/MM3 (ref 0–0.2)
BASOPHILS NFR BLD AUTO: 0.1 % (ref 0–1.5)
BILIRUB SERPL-MCNC: 0.3 MG/DL (ref 0–1.2)
BUN SERPL-MCNC: 33 MG/DL (ref 8–23)
BUN/CREAT SERPL: 24.6 (ref 7–25)
CALCIUM SPEC-SCNC: 9.2 MG/DL (ref 8.6–10.5)
CHLORIDE SERPL-SCNC: 105 MMOL/L (ref 98–107)
CO2 SERPL-SCNC: 26 MMOL/L (ref 22–29)
CREAT SERPL-MCNC: 1.34 MG/DL (ref 0.57–1)
DEPRECATED RDW RBC AUTO: 42.9 FL (ref 37–54)
EOSINOPHIL # BLD AUTO: 0 10*3/MM3 (ref 0–0.4)
EOSINOPHIL NFR BLD AUTO: 0 % (ref 0.3–6.2)
ERYTHROCYTE [DISTWIDTH] IN BLOOD BY AUTOMATED COUNT: 13.6 % (ref 12.3–15.4)
FERRITIN SERPL-MCNC: 73.76 NG/ML (ref 13–150)
GFR SERPL CREATININE-BSD FRML MDRD: 38 ML/MIN/1.73
GLOBULIN UR ELPH-MCNC: 2.5 GM/DL
GLUCOSE BLDC GLUCOMTR-MCNC: 148 MG/DL (ref 70–130)
GLUCOSE SERPL-MCNC: 133 MG/DL (ref 65–99)
HCT VFR BLD AUTO: 30.8 % (ref 34–46.6)
HGB BLD-MCNC: 10.1 G/DL (ref 12–15.9)
IMM GRANULOCYTES # BLD AUTO: 0.05 10*3/MM3 (ref 0–0.05)
IMM GRANULOCYTES NFR BLD AUTO: 0.5 % (ref 0–0.5)
IRON 24H UR-MRATE: 29 MCG/DL (ref 37–145)
IRON SATN MFR SERPL: 8 % (ref 20–50)
LYMPHOCYTES # BLD AUTO: 0.96 10*3/MM3 (ref 0.7–3.1)
LYMPHOCYTES NFR BLD AUTO: 9.3 % (ref 19.6–45.3)
MCH RBC QN AUTO: 28.6 PG (ref 26.6–33)
MCHC RBC AUTO-ENTMCNC: 32.8 G/DL (ref 31.5–35.7)
MCV RBC AUTO: 87.3 FL (ref 79–97)
MONOCYTES # BLD AUTO: 0.52 10*3/MM3 (ref 0.1–0.9)
MONOCYTES NFR BLD AUTO: 5 % (ref 5–12)
NEUTROPHILS NFR BLD AUTO: 8.77 10*3/MM3 (ref 1.7–7)
NEUTROPHILS NFR BLD AUTO: 85.1 % (ref 42.7–76)
NRBC BLD AUTO-RTO: 0 /100 WBC (ref 0–0.2)
PLATELET # BLD AUTO: 126 10*3/MM3 (ref 140–450)
PMV BLD AUTO: 12.4 FL (ref 6–12)
POTASSIUM SERPL-SCNC: 4.6 MMOL/L (ref 3.5–5.2)
PROT SERPL-MCNC: 6.3 G/DL (ref 6–8.5)
RBC # BLD AUTO: 3.53 10*6/MM3 (ref 3.77–5.28)
SODIUM SERPL-SCNC: 142 MMOL/L (ref 136–145)
TIBC SERPL-MCNC: 346 MCG/DL (ref 298–536)
TRANSFERRIN SERPL-MCNC: 232 MG/DL (ref 200–360)
WBC # BLD AUTO: 10.31 10*3/MM3 (ref 3.4–10.8)

## 2020-08-29 PROCEDURE — 80053 COMPREHEN METABOLIC PANEL: CPT | Performed by: STUDENT IN AN ORGANIZED HEALTH CARE EDUCATION/TRAINING PROGRAM

## 2020-08-29 PROCEDURE — G0378 HOSPITAL OBSERVATION PER HR: HCPCS

## 2020-08-29 PROCEDURE — 94799 UNLISTED PULMONARY SVC/PX: CPT

## 2020-08-29 PROCEDURE — 0 TECHNETIUM ALBUMIN AGGREGATED: Performed by: STUDENT IN AN ORGANIZED HEALTH CARE EDUCATION/TRAINING PROGRAM

## 2020-08-29 PROCEDURE — 99232 SBSQ HOSP IP/OBS MODERATE 35: CPT | Performed by: STUDENT IN AN ORGANIZED HEALTH CARE EDUCATION/TRAINING PROGRAM

## 2020-08-29 PROCEDURE — 25010000002 CEFTRIAXONE PER 250 MG: Performed by: STUDENT IN AN ORGANIZED HEALTH CARE EDUCATION/TRAINING PROGRAM

## 2020-08-29 PROCEDURE — 78580 LUNG PERFUSION IMAGING: CPT

## 2020-08-29 PROCEDURE — 84466 ASSAY OF TRANSFERRIN: CPT | Performed by: STUDENT IN AN ORGANIZED HEALTH CARE EDUCATION/TRAINING PROGRAM

## 2020-08-29 PROCEDURE — 83540 ASSAY OF IRON: CPT | Performed by: STUDENT IN AN ORGANIZED HEALTH CARE EDUCATION/TRAINING PROGRAM

## 2020-08-29 PROCEDURE — 71046 X-RAY EXAM CHEST 2 VIEWS: CPT

## 2020-08-29 PROCEDURE — 96366 THER/PROPH/DIAG IV INF ADDON: CPT

## 2020-08-29 PROCEDURE — 25010000002 AZITHROMYCIN PER 500 MG: Performed by: STUDENT IN AN ORGANIZED HEALTH CARE EDUCATION/TRAINING PROGRAM

## 2020-08-29 PROCEDURE — A9540 TC99M MAA: HCPCS | Performed by: STUDENT IN AN ORGANIZED HEALTH CARE EDUCATION/TRAINING PROGRAM

## 2020-08-29 PROCEDURE — 82728 ASSAY OF FERRITIN: CPT | Performed by: STUDENT IN AN ORGANIZED HEALTH CARE EDUCATION/TRAINING PROGRAM

## 2020-08-29 PROCEDURE — 82962 GLUCOSE BLOOD TEST: CPT

## 2020-08-29 PROCEDURE — 85025 COMPLETE CBC W/AUTO DIFF WBC: CPT | Performed by: STUDENT IN AN ORGANIZED HEALTH CARE EDUCATION/TRAINING PROGRAM

## 2020-08-29 PROCEDURE — 63710000001 PREDNISONE PER 1 MG: Performed by: STUDENT IN AN ORGANIZED HEALTH CARE EDUCATION/TRAINING PROGRAM

## 2020-08-29 RX ORDER — BUDESONIDE AND FORMOTEROL FUMARATE DIHYDRATE 160; 4.5 UG/1; UG/1
2 AEROSOL RESPIRATORY (INHALATION)
Status: DISCONTINUED | OUTPATIENT
Start: 2020-08-29 | End: 2020-08-30 | Stop reason: HOSPADM

## 2020-08-29 RX ORDER — ALPRAZOLAM 0.5 MG/1
0.5 TABLET ORAL 2 TIMES DAILY PRN
Status: DISCONTINUED | OUTPATIENT
Start: 2020-08-29 | End: 2020-08-30 | Stop reason: HOSPADM

## 2020-08-29 RX ADMIN — GUAIFENESIN 600 MG: 600 TABLET, EXTENDED RELEASE ORAL at 19:35

## 2020-08-29 RX ADMIN — ALBUTEROL SULFATE 2 PUFF: 90 AEROSOL, METERED RESPIRATORY (INHALATION) at 14:31

## 2020-08-29 RX ADMIN — NEBIVOLOL HYDROCHLORIDE 5 MG: 5 TABLET ORAL at 09:14

## 2020-08-29 RX ADMIN — GABAPENTIN 300 MG: 300 CAPSULE ORAL at 09:14

## 2020-08-29 RX ADMIN — OXYCODONE HYDROCHLORIDE AND ACETAMINOPHEN 1 TABLET: 10; 325 TABLET ORAL at 13:07

## 2020-08-29 RX ADMIN — OXYCODONE HYDROCHLORIDE AND ACETAMINOPHEN 1 TABLET: 10; 325 TABLET ORAL at 19:38

## 2020-08-29 RX ADMIN — BUDESONIDE AND FORMOTEROL FUMARATE DIHYDRATE 2 PUFF: 160; 4.5 AEROSOL RESPIRATORY (INHALATION) at 20:09

## 2020-08-29 RX ADMIN — FAMOTIDINE 20 MG: 20 TABLET ORAL at 09:15

## 2020-08-29 RX ADMIN — GABAPENTIN 300 MG: 300 CAPSULE ORAL at 13:07

## 2020-08-29 RX ADMIN — ASPIRIN AND EXTENDED-RELEASE DIPYRIDAMOLE 1 CAPSULE: 25; 200 CAPSULE ORAL at 09:15

## 2020-08-29 RX ADMIN — ISOSORBIDE MONONITRATE 30 MG: 30 TABLET, EXTENDED RELEASE ORAL at 09:14

## 2020-08-29 RX ADMIN — ASPIRIN AND EXTENDED-RELEASE DIPYRIDAMOLE 1 CAPSULE: 25; 200 CAPSULE ORAL at 19:35

## 2020-08-29 RX ADMIN — ALPRAZOLAM 0.5 MG: 0.5 TABLET ORAL at 09:15

## 2020-08-29 RX ADMIN — PREGABALIN 75 MG: 75 CAPSULE ORAL at 09:15

## 2020-08-29 RX ADMIN — AZITHROMYCIN MONOHYDRATE 500 MG: 500 INJECTION, POWDER, LYOPHILIZED, FOR SOLUTION INTRAVENOUS at 11:20

## 2020-08-29 RX ADMIN — Medication 1 DOSE: at 12:39

## 2020-08-29 RX ADMIN — ROPINIROLE 1 MG: 1 TABLET, FILM COATED ORAL at 19:36

## 2020-08-29 RX ADMIN — VENLAFAXINE HYDROCHLORIDE 37.5 MG: 37.5 CAPSULE, EXTENDED RELEASE ORAL at 09:14

## 2020-08-29 RX ADMIN — GUAIFENESIN 600 MG: 600 TABLET, EXTENDED RELEASE ORAL at 09:15

## 2020-08-29 RX ADMIN — FUROSEMIDE 20 MG: 20 TABLET ORAL at 09:14

## 2020-08-29 RX ADMIN — ALBUTEROL SULFATE 2 PUFF: 90 AEROSOL, METERED RESPIRATORY (INHALATION) at 11:30

## 2020-08-29 RX ADMIN — SODIUM CHLORIDE, PRESERVATIVE FREE 10 ML: 5 INJECTION INTRAVENOUS at 09:17

## 2020-08-29 RX ADMIN — CEFTRIAXONE SODIUM 1 G: 1 INJECTION, POWDER, FOR SOLUTION INTRAMUSCULAR; INTRAVENOUS at 13:00

## 2020-08-29 RX ADMIN — CITALOPRAM HYDROBROMIDE 40 MG: 40 TABLET ORAL at 09:15

## 2020-08-29 RX ADMIN — GABAPENTIN 300 MG: 300 CAPSULE ORAL at 19:35

## 2020-08-29 RX ADMIN — RIVAROXABAN 15 MG: 15 TABLET, FILM COATED ORAL at 09:14

## 2020-08-29 RX ADMIN — FOLIC ACID 1 MG: 1 TABLET ORAL at 09:14

## 2020-08-29 RX ADMIN — FUROSEMIDE 20 MG: 20 TABLET ORAL at 19:36

## 2020-08-29 RX ADMIN — GABAPENTIN 300 MG: 300 CAPSULE ORAL at 17:48

## 2020-08-29 RX ADMIN — IPRATROPIUM BROMIDE 0.5 MG: 0.5 SOLUTION RESPIRATORY (INHALATION) at 20:05

## 2020-08-29 RX ADMIN — IPRATROPIUM BROMIDE 0.5 MG: 0.5 SOLUTION RESPIRATORY (INHALATION) at 11:30

## 2020-08-29 RX ADMIN — AMLODIPINE BESYLATE 10 MG: 10 TABLET ORAL at 09:14

## 2020-08-29 RX ADMIN — PREDNISONE 40 MG: 20 TABLET ORAL at 09:15

## 2020-08-29 RX ADMIN — ALPRAZOLAM 0.5 MG: 0.5 TABLET ORAL at 21:03

## 2020-08-29 RX ADMIN — ALBUTEROL SULFATE 2 PUFF: 90 AEROSOL, METERED RESPIRATORY (INHALATION) at 07:20

## 2020-08-29 RX ADMIN — ROSUVASTATIN CALCIUM 10 MG: 10 TABLET, FILM COATED ORAL at 09:15

## 2020-08-30 VITALS
WEIGHT: 200 LBS | HEART RATE: 60 BPM | OXYGEN SATURATION: 95 % | DIASTOLIC BLOOD PRESSURE: 57 MMHG | HEIGHT: 65 IN | BODY MASS INDEX: 33.32 KG/M2 | TEMPERATURE: 97.4 F | SYSTOLIC BLOOD PRESSURE: 115 MMHG | RESPIRATION RATE: 16 BRPM

## 2020-08-30 PROBLEM — R06.09 DYSPNEA ON EXERTION: Status: RESOLVED | Noted: 2020-08-28 | Resolved: 2020-08-30

## 2020-08-30 PROBLEM — M10.9 ACUTE GOUT INVOLVING TOE OF RIGHT FOOT: Status: ACTIVE | Noted: 2020-08-30

## 2020-08-30 LAB
ALBUMIN SERPL-MCNC: 3.6 G/DL (ref 3.5–5.2)
ALBUMIN/GLOB SERPL: 1.6 G/DL
ALP SERPL-CCNC: 72 U/L (ref 39–117)
ALT SERPL W P-5'-P-CCNC: 26 U/L (ref 1–33)
ANION GAP SERPL CALCULATED.3IONS-SCNC: 11 MMOL/L (ref 5–15)
AST SERPL-CCNC: 18 U/L (ref 1–32)
BASOPHILS # BLD AUTO: 0.01 10*3/MM3 (ref 0–0.2)
BASOPHILS NFR BLD AUTO: 0.1 % (ref 0–1.5)
BILIRUB SERPL-MCNC: 0.2 MG/DL (ref 0–1.2)
BUN SERPL-MCNC: 31 MG/DL (ref 8–23)
BUN/CREAT SERPL: 24 (ref 7–25)
CALCIUM SPEC-SCNC: 8.6 MG/DL (ref 8.6–10.5)
CHLORIDE SERPL-SCNC: 106 MMOL/L (ref 98–107)
CO2 SERPL-SCNC: 27 MMOL/L (ref 22–29)
CREAT SERPL-MCNC: 1.29 MG/DL (ref 0.57–1)
DEPRECATED RDW RBC AUTO: 43.1 FL (ref 37–54)
EOSINOPHIL # BLD AUTO: 0.01 10*3/MM3 (ref 0–0.4)
EOSINOPHIL NFR BLD AUTO: 0.1 % (ref 0.3–6.2)
ERYTHROCYTE [DISTWIDTH] IN BLOOD BY AUTOMATED COUNT: 13.7 % (ref 12.3–15.4)
GFR SERPL CREATININE-BSD FRML MDRD: 40 ML/MIN/1.73
GLOBULIN UR ELPH-MCNC: 2.2 GM/DL
GLUCOSE SERPL-MCNC: 88 MG/DL (ref 65–99)
HCT VFR BLD AUTO: 28.4 % (ref 34–46.6)
HGB BLD-MCNC: 9.4 G/DL (ref 12–15.9)
IMM GRANULOCYTES # BLD AUTO: 0.05 10*3/MM3 (ref 0–0.05)
IMM GRANULOCYTES NFR BLD AUTO: 0.5 % (ref 0–0.5)
LYMPHOCYTES # BLD AUTO: 1.49 10*3/MM3 (ref 0.7–3.1)
LYMPHOCYTES NFR BLD AUTO: 15.6 % (ref 19.6–45.3)
MCH RBC QN AUTO: 28.5 PG (ref 26.6–33)
MCHC RBC AUTO-ENTMCNC: 33.1 G/DL (ref 31.5–35.7)
MCV RBC AUTO: 86.1 FL (ref 79–97)
MONOCYTES # BLD AUTO: 0.51 10*3/MM3 (ref 0.1–0.9)
MONOCYTES NFR BLD AUTO: 5.3 % (ref 5–12)
NEUTROPHILS NFR BLD AUTO: 7.5 10*3/MM3 (ref 1.7–7)
NEUTROPHILS NFR BLD AUTO: 78.4 % (ref 42.7–76)
NRBC BLD AUTO-RTO: 0 /100 WBC (ref 0–0.2)
PLATELET # BLD AUTO: 121 10*3/MM3 (ref 140–450)
PMV BLD AUTO: 12.1 FL (ref 6–12)
POTASSIUM SERPL-SCNC: 4.2 MMOL/L (ref 3.5–5.2)
PROT SERPL-MCNC: 5.8 G/DL (ref 6–8.5)
RBC # BLD AUTO: 3.3 10*6/MM3 (ref 3.77–5.28)
SODIUM SERPL-SCNC: 144 MMOL/L (ref 136–145)
WBC # BLD AUTO: 9.57 10*3/MM3 (ref 3.4–10.8)

## 2020-08-30 PROCEDURE — 97166 OT EVAL MOD COMPLEX 45 MIN: CPT

## 2020-08-30 PROCEDURE — 80053 COMPREHEN METABOLIC PANEL: CPT | Performed by: STUDENT IN AN ORGANIZED HEALTH CARE EDUCATION/TRAINING PROGRAM

## 2020-08-30 PROCEDURE — 85025 COMPLETE CBC W/AUTO DIFF WBC: CPT | Performed by: STUDENT IN AN ORGANIZED HEALTH CARE EDUCATION/TRAINING PROGRAM

## 2020-08-30 PROCEDURE — 63710000001 PREDNISONE PER 1 MG: Performed by: STUDENT IN AN ORGANIZED HEALTH CARE EDUCATION/TRAINING PROGRAM

## 2020-08-30 PROCEDURE — 94760 N-INVAS EAR/PLS OXIMETRY 1: CPT

## 2020-08-30 PROCEDURE — 94799 UNLISTED PULMONARY SVC/PX: CPT

## 2020-08-30 PROCEDURE — 97161 PT EVAL LOW COMPLEX 20 MIN: CPT

## 2020-08-30 PROCEDURE — G0378 HOSPITAL OBSERVATION PER HR: HCPCS

## 2020-08-30 PROCEDURE — 99239 HOSP IP/OBS DSCHRG MGMT >30: CPT | Performed by: STUDENT IN AN ORGANIZED HEALTH CARE EDUCATION/TRAINING PROGRAM

## 2020-08-30 RX ORDER — BUDESONIDE AND FORMOTEROL FUMARATE DIHYDRATE 160; 4.5 UG/1; UG/1
2 AEROSOL RESPIRATORY (INHALATION)
Qty: 6 G | Refills: 0 | Status: SHIPPED | OUTPATIENT
Start: 2020-08-30 | End: 2020-09-22

## 2020-08-30 RX ORDER — MORPHINE SULFATE 2 MG/ML
1 INJECTION, SOLUTION INTRAMUSCULAR; INTRAVENOUS EVERY 4 HOURS PRN
Status: DISCONTINUED | OUTPATIENT
Start: 2020-08-30 | End: 2020-08-30 | Stop reason: HOSPADM

## 2020-08-30 RX ORDER — PREDNISONE 20 MG/1
40 TABLET ORAL
Qty: 2 TABLET | Refills: 0 | Status: SHIPPED | OUTPATIENT
Start: 2020-08-31 | End: 2020-09-02

## 2020-08-30 RX ADMIN — IPRATROPIUM BROMIDE 0.5 MG: 0.5 SOLUTION RESPIRATORY (INHALATION) at 06:26

## 2020-08-30 RX ADMIN — FUROSEMIDE 20 MG: 20 TABLET ORAL at 07:40

## 2020-08-30 RX ADMIN — NEBIVOLOL HYDROCHLORIDE 5 MG: 5 TABLET ORAL at 07:40

## 2020-08-30 RX ADMIN — ASPIRIN AND EXTENDED-RELEASE DIPYRIDAMOLE 1 CAPSULE: 25; 200 CAPSULE ORAL at 07:40

## 2020-08-30 RX ADMIN — GABAPENTIN 300 MG: 300 CAPSULE ORAL at 07:39

## 2020-08-30 RX ADMIN — ALBUTEROL SULFATE 2 PUFF: 90 AEROSOL, METERED RESPIRATORY (INHALATION) at 06:32

## 2020-08-30 RX ADMIN — VENLAFAXINE HYDROCHLORIDE 37.5 MG: 37.5 CAPSULE, EXTENDED RELEASE ORAL at 07:40

## 2020-08-30 RX ADMIN — GABAPENTIN 300 MG: 300 CAPSULE ORAL at 11:30

## 2020-08-30 RX ADMIN — FOLIC ACID 1 MG: 1 TABLET ORAL at 07:38

## 2020-08-30 RX ADMIN — GUAIFENESIN 600 MG: 600 TABLET, EXTENDED RELEASE ORAL at 07:38

## 2020-08-30 RX ADMIN — BUDESONIDE AND FORMOTEROL FUMARATE DIHYDRATE 2 PUFF: 160; 4.5 AEROSOL RESPIRATORY (INHALATION) at 06:32

## 2020-08-30 RX ADMIN — AMLODIPINE BESYLATE 10 MG: 10 TABLET ORAL at 07:39

## 2020-08-30 RX ADMIN — ISOSORBIDE MONONITRATE 30 MG: 30 TABLET, EXTENDED RELEASE ORAL at 07:39

## 2020-08-30 RX ADMIN — CITALOPRAM HYDROBROMIDE 40 MG: 40 TABLET ORAL at 07:39

## 2020-08-30 RX ADMIN — ROSUVASTATIN CALCIUM 10 MG: 10 TABLET, FILM COATED ORAL at 07:40

## 2020-08-30 RX ADMIN — OXYCODONE HYDROCHLORIDE AND ACETAMINOPHEN 1 TABLET: 10; 325 TABLET ORAL at 07:49

## 2020-08-30 RX ADMIN — ALBUTEROL SULFATE 2 PUFF: 90 AEROSOL, METERED RESPIRATORY (INHALATION) at 10:11

## 2020-08-30 RX ADMIN — PREDNISONE 40 MG: 20 TABLET ORAL at 07:39

## 2020-08-30 RX ADMIN — FAMOTIDINE 20 MG: 20 TABLET ORAL at 07:39

## 2020-08-30 RX ADMIN — PREGABALIN 75 MG: 75 CAPSULE ORAL at 07:39

## 2020-08-31 ENCOUNTER — READMISSION MANAGEMENT (OUTPATIENT)
Dept: CALL CENTER | Facility: HOSPITAL | Age: 75
End: 2020-08-31

## 2020-08-31 ENCOUNTER — OFFICE VISIT (OUTPATIENT)
Dept: FAMILY MEDICINE CLINIC | Facility: CLINIC | Age: 75
End: 2020-08-31

## 2020-08-31 VITALS
TEMPERATURE: 97 F | HEART RATE: 54 BPM | WEIGHT: 199 LBS | BODY MASS INDEX: 33.15 KG/M2 | OXYGEN SATURATION: 96 % | HEIGHT: 65 IN | SYSTOLIC BLOOD PRESSURE: 130 MMHG | DIASTOLIC BLOOD PRESSURE: 78 MMHG

## 2020-08-31 DIAGNOSIS — I50.32 CHRONIC HEART FAILURE WITH PRESERVED EJECTION FRACTION (HCC): ICD-10-CM

## 2020-08-31 DIAGNOSIS — D64.9 CHRONIC ANEMIA: ICD-10-CM

## 2020-08-31 DIAGNOSIS — M51.16 LUMBAR DISC DISEASE WITH RADICULOPATHY: ICD-10-CM

## 2020-08-31 DIAGNOSIS — M10.072 ACUTE IDIOPATHIC GOUT INVOLVING TOE OF LEFT FOOT: ICD-10-CM

## 2020-08-31 DIAGNOSIS — N18.2 STAGE 2 CHRONIC KIDNEY DISEASE: Primary | ICD-10-CM

## 2020-08-31 DIAGNOSIS — J44.9 CHRONIC OBSTRUCTIVE PULMONARY DISEASE, UNSPECIFIED COPD TYPE (HCC): Chronic | ICD-10-CM

## 2020-08-31 DIAGNOSIS — M50.322 DEGENERATION OF INTERVERTEBRAL DISC AT C5-C6 LEVEL: ICD-10-CM

## 2020-08-31 PROCEDURE — 99496 TRANSJ CARE MGMT HIGH F2F 7D: CPT | Performed by: STUDENT IN AN ORGANIZED HEALTH CARE EDUCATION/TRAINING PROGRAM

## 2020-08-31 RX ORDER — ASPIRIN AND DIPYRIDAMOLE 25; 200 MG/1; MG/1
1 CAPSULE, EXTENDED RELEASE ORAL 2 TIMES DAILY
Qty: 120 CAPSULE | Refills: 2 | Status: SHIPPED | OUTPATIENT
Start: 2020-08-31 | End: 2021-02-18 | Stop reason: SDUPTHER

## 2020-08-31 RX ORDER — NEBIVOLOL 5 MG/1
5 TABLET ORAL
Qty: 90 TABLET | Refills: 1 | Status: SHIPPED | OUTPATIENT
Start: 2020-08-31 | End: 2021-02-18 | Stop reason: SDUPTHER

## 2020-08-31 RX ORDER — TIZANIDINE 4 MG/1
4 TABLET ORAL NIGHTLY PRN
Qty: 90 TABLET | Refills: 2 | Status: SHIPPED | OUTPATIENT
Start: 2020-08-31 | End: 2021-04-30 | Stop reason: SDUPTHER

## 2020-08-31 RX ORDER — COLCHICINE 0.6 MG/1
0.6 TABLET ORAL DAILY
Qty: 3 TABLET | Refills: 0 | Status: SHIPPED | OUTPATIENT
Start: 2020-08-31 | End: 2020-09-02

## 2020-08-31 RX ORDER — PREGABALIN 75 MG/1
75 CAPSULE ORAL DAILY
Qty: 90 CAPSULE | Refills: 2 | Status: CANCELLED | OUTPATIENT
Start: 2020-08-31

## 2020-08-31 RX ORDER — PREGABALIN 75 MG/1
75 CAPSULE ORAL DAILY
Qty: 30 CAPSULE | Refills: 2 | Status: SHIPPED | OUTPATIENT
Start: 2020-08-31 | End: 2020-12-16 | Stop reason: SDUPTHER

## 2020-08-31 RX ORDER — FOLIC ACID 1 MG/1
1 TABLET ORAL DAILY
Qty: 90 TABLET | Refills: 2 | Status: SHIPPED | OUTPATIENT
Start: 2020-08-31 | End: 2020-11-05

## 2020-08-31 NOTE — PROGRESS NOTES
"Transitional Care Follow Up Visit  Subjective     PatriziaVerna Blair is a 75 y.o. female who presents for a transitional care management visit.    Within 48 business hours after discharge our office saw her in our clinic to answer her questions and take care of her needs. She was discharged on 08/30/20.      I reviewed and discussed the details of that call along with the discharge summary, hospital problems, inpatient lab results, inpatient diagnostic studies, and consultation reports with Ronda.     Patient reports that her breathing has been stable since discharge from the hospital. She still reports dyspnea on exertion but denies SOB at rest. She does not report any other symptoms and denies CP, palpitations, leg swelling or dizziness.  We discussed her new inhalers and she reports compliance with them.  We also discussed pulmonary rehab as an option for chronic management of her COPD, however she was not interested at this time.  Patient brought in a list of medications that she needs refills and also requested that we replace referral for nephrology here in [place].  We discussed the patient's history of gout which recently flared up while she was in the hospital.     Current outpatient and discharge medications have been reconciled for the patient.  Reviewed by: Willem Son MD    Date of TCM Phone Call 7/23/2020   [facility]   Date of Admission 7/21/2020   Date of Discharge 7/23/2020   Discharge Disposition Home or Self Care     Risk for Readmission (LACE) Score: 9 (8/30/2020  5:00 AM)    History of Present Illness   Course During Hospital Stay: copied from discharge summary written by Chanda Vu on 8/30/20: \"Patient was admitted with dyspnea on exertion with possible acute on chronic chf vs pulmonary htn vs COPD vs PE. Patient's chest X-ray showed slightly low lung volumes with findings suggestive of bilateral infrahilar atelectasis. She was on antibiotics for " "short period of time which was discontinued after pneumonia was ruled out. PE was ruled out with negative V/Q scan and negative lower extremity dopplers. Patient's dyspnea on exertion significantly improved with Prednisone 40 mg daily, Ipratropium nebulizer and Symbicort and was sent home with prescription for a month until she follows up with her primary care provider. PT/OT was consulted and she tolerated it well. Patient complained of left great toe pain and apparently was diagnosed with gout previously, however she was not on any maintenance therapy.\"     The following portions of the patient's history were reviewed and updated as appropriate: allergies, current medications, past family history, past medical history, past social history, past surgical history and problem list.    Review of Systems   Constitutional: Negative for fever.   HENT: Negative for sore throat.    Eyes: Negative for visual disturbance.   Respiratory: Positive for cough and shortness of breath.         Dyspnea on exertion   Cardiovascular: Negative for chest pain, palpitations and leg swelling.   Gastrointestinal: Negative for abdominal pain.   Endocrine: Negative for polyuria.   Genitourinary: Negative for difficulty urinating.   Musculoskeletal: Positive for gait problem.   Neurological: Negative for dizziness.       Objective    Vitals:    08/31/20 1059   BP: 130/78   Pulse: 54   Temp: 97 °F (36.1 °C)   SpO2: 96%     Physical Exam   Constitutional: She is oriented to person, place, and time. She appears well-developed and well-nourished. No distress.   Walks w/ cane    HENT:   Head: Normocephalic and atraumatic.   Eyes: Conjunctivae and EOM are normal.   Neck: No JVD present.   Cardiovascular: Normal rate, regular rhythm and normal heart sounds. Exam reveals no gallop and no friction rub.   No murmur heard.  Pulmonary/Chest: Effort normal. She has no rales.   Diffuse wheezes present bilaterally    Musculoskeletal: She exhibits no edema. "   Neurological: She is alert and oriented to person, place, and time.   Skin: Skin is warm and dry.       Assessment/Plan   Ronda was seen today for TCM visit for her recent hospitalization for COPD exacerbation.    Diagnoses and all orders for this visit:    Stage 2 chronic kidney disease  -     Ambulatory Referral to Nephrology    Acute idiopathic gout involving toe of left foot  -     colchicine 0.6 MG tablet; Take 1 tablet by mouth Daily. Take 1 tablet by mouth every 8 hours for 1 day    Chronic anemia  -     folic acid (FOLVITE) 1 MG tablet; Take 1 tablet by mouth Daily.    Chronic obstructive pulmonary disease, unspecified COPD type (CMS/HCC)    Chronic heart failure with preserved ejection fraction (CMS/HCC)  -     nebivolol (BYSTOLIC) 5 MG tablet; Take 1 tablet by mouth Daily.  -     aspirin-dipyridamole (AGGRENOX)  MG per 12 hr capsule; Take 1 capsule by mouth 2 (Two) Times a Day.    Lumbar disc disease with radiculopathy    Degeneration of intervertebral disc at C5-C6 level  -     tiZANidine (ZANAFLEX) 4 MG tablet; Take 1 tablet by mouth At Night As Needed for Muscle Spasms.    Patient is to follow-up with me in clinic in 2 to 3 weeks to begin going over her chronic medical conditions and address her adult health maintenance.      This document has been electronically signed by Willem Son MD on August 31, 2020 11:48

## 2020-08-31 NOTE — OUTREACH NOTE
Prep Survey      Responses   [facility] facility patient discharged from?  [place]   Is LACE score < 7 ?  No   Eligibility  Readm Mgmt   Discharge diagnosis  Chronic HFpEF, KIRSTEN, Normocytic anemia, Acute gout toe of left foot, COPD Exac. CAP, DM II, Dyslipidemia, HTN   COVID-19 Test Status  Negative   Does the patient have one of the following disease processes/diagnoses(primary or secondary)?  CHF   Does the patient have Home health ordered?  No   Is there a DME ordered?  Yes   What DME was ordered?  [facility] for [facility] medication   Comments regarding appointments  Pt to schedule F/U with PCP   Medication alerts for this patient  see AVS   Prep survey completed?  Yes          Serina Hilliard RN

## 2020-09-02 ENCOUNTER — TELEPHONE (OUTPATIENT)
Dept: CARDIOLOGY | Facility: CLINIC | Age: 75
End: 2020-09-02

## 2020-09-02 ENCOUNTER — OFFICE VISIT (OUTPATIENT)
Dept: CARDIOLOGY | Facility: CLINIC | Age: 75
End: 2020-09-02

## 2020-09-02 VITALS
OXYGEN SATURATION: 94 % | HEIGHT: 65 IN | WEIGHT: 203 LBS | HEART RATE: 75 BPM | DIASTOLIC BLOOD PRESSURE: 65 MMHG | SYSTOLIC BLOOD PRESSURE: 150 MMHG | BODY MASS INDEX: 33.82 KG/M2

## 2020-09-02 DIAGNOSIS — R06.02 SOB (SHORTNESS OF BREATH): Primary | ICD-10-CM

## 2020-09-02 LAB
BACTERIA SPEC AEROBE CULT: NORMAL
BACTERIA SPEC AEROBE CULT: NORMAL

## 2020-09-02 PROCEDURE — 99204 OFFICE O/P NEW MOD 45 MIN: CPT | Performed by: INTERNAL MEDICINE

## 2020-09-02 RX ORDER — HYDROCHLOROTHIAZIDE 25 MG/1
25 TABLET ORAL DAILY
Qty: 30 TABLET | Refills: 11 | Status: SHIPPED | OUTPATIENT
Start: 2020-09-02 | End: 2021-10-18

## 2020-09-02 RX ORDER — FUROSEMIDE 40 MG/1
20 TABLET ORAL DAILY PRN
Qty: 30 TABLET | Refills: 0 | Status: SHIPPED | OUTPATIENT
Start: 2020-09-02 | End: 2020-09-15

## 2020-09-02 NOTE — TELEPHONE ENCOUNTER
Called pt to go over the medication she is currently taking. She said she is on:      Hydrochlorothiazide 25 mg, daily  Fish oil 1200 mg, 3 times daily  Pregabalin 75 mg, daily  Isosorbide mononitrate 30 mg ,daily  Metformin 500 mg, 4 times daily  Lisinopril 10 mg, twice daily  Xanax 1 mg, daily  Bystolic 5 mg,  daily  Vit d  Folic acid 1 mg,  daily  Pepcid 20 mg, daily  citalopram 40 mg,  daily  Requip 1 mg, 1-3 tabs at bedtime  Rosuvastatin 10 mg, daily  Amlodipine 10 mg, daily  Tizanidine 4 mg, daily  Aggrenox  mg, twice daily  Percocet , 4 times a day as needed  Venlafaxine XR 37.5 mg,  daily  Gabapentin 300 mg, 4 times a day  Albuterol rescue inhaler, as needed  Albuterol nebulizer solution, as needed  Symbicort Inhaler 160-4.5, 2 puffs, twice daily  Lasix 20 mg, as needed    Pt asked for me to print this off for her and leave it at the . She is going to pick it up when she comes for her PFT on 9/10.

## 2020-09-02 NOTE — PROGRESS NOTES
[facility] Cardiology  OFFICE NOTE    Cardiovascular Medicine  Genaro Cota M.D., RPVI         No referring provider defined for this encounter.    Thank you for asking me to see Ronda Blair for chest pain.    History of Present Illness  This is a 75 y.o. female with:    1.  Diabetes  2.  COPD  3.  Congestive heart failure preserved ejection fraction  4.  Coronary artery disease nonobstructive  5.  Hypertension  6.  Hyperlipidemia  7.  CKD  8.  Thrombocytopenia  9.  CVA    Ronda Blair is a 75 y.o. female who presents for consultation today.  Patient went to the hospital last week with COPD exacerbation.  She had a VQ scan at that time which was low probability for PE, ultrasound and was negative for DVT she had an echocardiogram in July 2020 which showed an EF of 56 to 60% % she had grade 2 diastolic dysfunction.  EKG was a sinus rhythm with sinus arrhythmia and PACs in a pattern of bigeminy.  Of note patient had a CTA coronary angiogram in July 2020 showed a calcium score of 345.  However she had mild nonobstructive disease in all her 3 vessels.  Patient is here for establishment of care.    Her shortness of breath is back to baseline.  Denying any chest pain or shortness of breath.    Review of Systems - ROS  Constitution: Negative for weakness, weight gain and weight loss.   HENT: Negative for congestion.    Eyes: Negative for blurred vision.   Cardiovascular: As mentioned above  Respiratory: Negative for cough and hemoptysis.    Endocrine: Negative for polydipsia and polyuria.   Hematologic/Lymphatic: Negative for bleeding problem. Does not bruise/bleed easily.   Skin: Negative for flushing.   Musculoskeletal: Negative for neck pain and stiffness.   Gastrointestinal: Negative for abdominal pain, diarrhea, jaundice, melena, nausea and vomiting.   Genitourinary: Negative for dysuria and hematuria.   Neurological: Negative for dizziness, focal weakness and numbness.    Psychiatric/Behavioral: Negative for altered mental status and depression.          All other systems were reviewed and were negative.    family history includes Diabetes in an other family member; Hypertension in an other family member; Kidney disease in an other family member; Other in an other family member.     reports that she has quit smoking. She has never used smokeless tobacco. She reports that she does not drink alcohol or use drugs.    Allergies   Allergen Reactions   • Aspirin GI Intolerance     Had bleeding ulcer in the past         Current Outpatient Medications:   •  albuterol (PROVENTIL HFA;VENTOLIN HFA) 108 (90 Base) MCG/ACT inhaler, Inhale., Disp: , Rfl:   •  ALPRAZolam (XANAX) 1 MG tablet, Take 1 mg by mouth Daily. Take 1/2 in the morning and one at bedtime PRN anxiety and insomnia, Disp: , Rfl:   •  amLODIPine (NORVASC) 10 MG tablet, Take 1 tablet by mouth Daily., Disp: 30 tablet, Rfl: 1  •  aspirin-dipyridamole (AGGRENOX)  MG per 12 hr capsule, Take 1 capsule by mouth 2 (Two) Times a Day., Disp: 120 capsule, Rfl: 2  •  budesonide-formoterol (SYMBICORT) 160-4.5 MCG/ACT inhaler, Inhale 2 puffs 2 (Two) Times a Day., Disp: 6 g, Rfl: 0  •  citalopram (CeleXA) 40 MG tablet, Take 40 mg by mouth Every Night., Disp: , Rfl:   •  famotidine (PEPCID) 20 MG tablet, Take 1 tablet by mouth Daily., Disp: 30 tablet, Rfl: 1  •  folic acid (FOLVITE) 1 MG tablet, Take 1 tablet by mouth Daily., Disp: 90 tablet, Rfl: 2  •  furosemide (LASIX) 40 MG tablet, Take 0.5 tablets by mouth 2 (Two) Times a Day for 30 days., Disp: 30 tablet, Rfl: 0  •  gabapentin (NEURONTIN) 300 MG capsule, Take 300 mg by mouth 4 (Four) Times a Day., Disp: , Rfl:   •  ipratropium (ATROVENT) 0.02 % nebulizer solution, Take 2.5 mL by nebulization 3 (Three) Times a Day., Disp: 2.5 mL, Rfl: 0  •  isosorbide mononitrate (IMDUR) 30 MG 24 hr tablet, Take 1 tablet by mouth Daily., Disp: 30 tablet, Rfl: 1  •  lisinopril (PRINIVIL,ZESTRIL) 10 MG  "tablet, Take 1 tablet by mouth Every 12 (Twelve) Hours., Disp: 60 tablet, Rfl: 1  •  metFORMIN ER (GLUCOPHAGE-XR) 500 MG 24 hr tablet, Take 1,000 mg by mouth 2 (two) times a day., Disp: , Rfl:   •  nebivolol (BYSTOLIC) 5 MG tablet, Take 1 tablet by mouth Daily., Disp: 90 tablet, Rfl: 1  •  Omega-3 Fatty Acids (FISH OIL) 1200 MG capsule capsule, Take 1,200 mg by mouth 3 (Three) Times a Day With Meals., Disp: , Rfl:   •  oxyCODONE-acetaminophen (PERCOCET)  MG per tablet, Take 1 tablet by mouth Every 6 (Six) Hours As Needed for Moderate Pain ., Disp: , Rfl:   •  pregabalin (LYRICA) 75 MG capsule, Take 1 capsule by mouth Daily., Disp: 30 capsule, Rfl: 2  •  rOPINIRole (REQUIP) 1 MG tablet, Take 1-3 tablets by mouth Every Night. Take 1 hour before bedtime., Disp: 90 tablet, Rfl: 0  •  rosuvastatin (CRESTOR) 10 MG tablet, Take 1 tablet by mouth Daily., Disp: 30 tablet, Rfl: 2  •  Sennosides 8.6 MG capsule, Take 1 capsule by mouth Daily., Disp: , Rfl:   •  tiZANidine (ZANAFLEX) 4 MG tablet, Take 1 tablet by mouth At Night As Needed for Muscle Spasms., Disp: 90 tablet, Rfl: 2  •  venlafaxine XR (EFFEXOR-XR) 37.5 MG 24 hr capsule, Take 37.5 mg by mouth Daily., Disp: , Rfl:   •  colchicine 0.6 MG tablet, Take 1 tablet by mouth Daily. Take 1 tablet by mouth every 8 hours for 1 day, Disp: 3 tablet, Rfl: 0  •  predniSONE (DELTASONE) 20 MG tablet, Take 2 tablets by mouth Daily With Breakfast for 2 days., Disp: 2 tablet, Rfl: 0    Physical Exam:  Vitals:    09/02/20 0946 09/02/20 0948   BP: 155/70 150/65   BP Location: Right arm Left arm   Patient Position: Sitting Sitting   Cuff Size: Adult Adult   Pulse: 75    SpO2: 94%    Weight: 92.1 kg (203 lb)    Height: 165.1 cm (65\")      Current Pain Level: none  Pulse Ox: Normal  on room air  General: alert, appears stated age and cooperative     Body Habitus: well-nourished    HEENT: Head: Normocephalic, no lesions, without obvious abnormality. No arcus senilis, xanthelasma or " xanthomas.    Neuro: alert, oriented x3  Pulses: 2+ and symmetric  JVP: Volume/Pulsation: Normal.  Normal waveforms.   Appropriate inspiratory decrease.  No Kussmaul's. No [place]'s.   Carotid Exam: no bruit normal pulsation bilaterally   Carotid Volume: normal.     Respirations: no increased work of breathing   Chest:  Normal    Pulmonary:Normal   Precordium: Normal impulses. P2 is not palpable.  RV Heave: absent  LV Heave: absent  [place]:  normal size and placement  Palpable S4: absent.  Heart rate: normal    Heart Rhythm: regular     Heart Sounds: S1: normal  S2: normal  S3: absent   S4: absent  Opening Snap: absent    Pericardial Rub:  Absent: .    Abdomen:   Appearance: normal .  Palpation: Soft, non-tender to palpation, bowel sounds positive in all four quadrants; no guarding or rebound tenderness  Extremity: no edema.   LE Skin: no rashes  LE Hair:  normal  LE Pulses: well perfused with normal pulses in the distal extremities  Pallor on elevation: Absent. Rubor on dependency: None      DATA REVIEWED:     EKG. I personally reviewed and interpreted the EKG.  Sinus rhythm with PACs in a pattern of bigeminy    ECG/EMG Results (all)     None        ---------------------------------------------------  TTE/ELVIS:  Results for orders placed during the hospital encounter of 07/21/20   Transthoracic Echo Complete With Contrast if Necessary Per Protocol    Narrative · Estimated EF appears to be in the range of 56 - 60%.  · Left ventricular systolic function is normal.  · Left ventricular diastolic dysfunction (grade II) consistent with   pseudonormalization.  · Left ventricular wall thickness is consistent with borderline concentric   hypertrophy.  · Left atrial cavity size is moderately dilated.  · Mild mitral valve regurgitation is present  · Mild tricuspid valve regurgitation is present.          CT:   Xr Chest 2 View    Result Date: 8/29/2020  No acute abnormality identified.. Electronically signed by:  Faith Gómez  MD  8/29/2020 1:30 PM CDT Workstation: [id number]    Nm Lung Scan Perfusion Particulate    Result Date: 8/29/2020  No perfusion defect to suggest pulmonary embolism identified Electronically signed by:  Faith Gómez MD  8/29/2020 1:11 PM CDT Workstation: [id number]    Xr Chest 1 View    Result Date: 8/28/2020    Slightly low lung volumes with findings suggestive of bilateral infrahilar atelectasis. Electronically signed by:  Huong Weeks MD  8/28/2020 3:37 AM CDT Workstation: [id number]    Us Venous Doppler Lower Extremity Bilateral (duplex)    Result Date: 8/28/2020  No evidence of DVT in the bilateral lower extremities.  Electronically signed by:  Nba Goodman MD  8/28/2020 9:17 PM CDT Workstation: [id number]        --------------------------------------------------------------------------------------------------  LABS:     The CVD Risk score (Jese et al., 2008) failed to calculate for the following reasons:    The 2008 CVD risk score is only valid for ages 30 to 74    The patient has a prior MI, stroke, CHF, or peripheral vascular disease diagnosis         Lab Results   Component Value Date    GLUCOSE 88 08/30/2020    BUN 31 (H) 08/30/2020    CREATININE 1.29 (H) 08/30/2020    EGFRIFNONA 40 (L) 08/30/2020    BCR 24.0 08/30/2020    K 4.2 08/30/2020    CO2 27.0 08/30/2020    CALCIUM 8.6 08/30/2020    ALBUMIN 3.60 08/30/2020    AST 18 08/30/2020    ALT 26 08/30/2020     Lab Results   Component Value Date    WBC 9.57 08/30/2020    HGB 9.4 (L) 08/30/2020    HCT 28.4 (L) 08/30/2020    MCV 86.1 08/30/2020     (L) 08/30/2020     Lab Results   Component Value Date    CHOL 160 07/21/2020    TRIG 167 (H) 07/21/2020    HDL 41 07/21/2020    LDL 86 07/21/2020     Lab Results   Component Value Date    TSH 0.539 08/24/2020     Lab Results   Component Value Date    TROPONINT <0.010 08/28/2020     Lab Results   Component Value Date    HGBA1C 6.00 (H) 07/21/2020     No results found for: DDIMER  Lab Results   Component  Value Date    ALT 26 08/30/2020     Lab Results   Component Value Date    HGBA1C 6.00 (H) 07/21/2020     Lab Results   Component Value Date    CREATININE 1.29 (H) 08/30/2020     Lab Results   Component Value Date    IRON 29 (L) 08/29/2020    TIBC 346 08/29/2020    FERRITIN 73.76 08/29/2020     Lab Results   Component Value Date    INR 0.95 07/21/2020    PROTIME 13.1 07/21/2020       Assessment/Plan     1.  Coronary artery disease:  CTA showed mild nonobstructive disease in all 3 vessels.  Risk factor modification for secondary prevention  She is on Aggrenox, not sure indication for that.  Patient reported she had CVA long time ago.  Cardiology standpoint aspirin should be okay    2.  Congestive heart failure with preserved ejection fraction:  Optimal blood pressure control.      3.  Hypertension:  Currently on amlodipine 10 mg, furosemide 20 mg twice a day, Imdur 30 mg, lisinopril 10 mg, Bystolic 5 mg twice daily  Switching her Lasix to hydrochlorothiazide.  She can use her Lasix as needed    4.  Hyperlipidemia:  Continue Crestor 10 mg    5.  COPD:  No PFTs on file.  Get PFTs.  Consider referral to pulmonary.    Prevention:  Patient's Body mass index is 33.78 kg/m². BMI is above normal parameters. Recommendations include: exercise counseling and nutrition counseling.      Ronda Blair  reports that she has quit smoking. She has never used smokeless tobacco..             This document has been electronically signed by Genaro Cota MD on September 2, 2020 10:04

## 2020-09-03 ENCOUNTER — APPOINTMENT (OUTPATIENT)
Dept: CT IMAGING | Facility: HOSPITAL | Age: 75
End: 2020-09-03

## 2020-09-04 ENCOUNTER — READMISSION MANAGEMENT (OUTPATIENT)
Dept: CALL CENTER | Facility: HOSPITAL | Age: 75
End: 2020-09-04

## 2020-09-04 NOTE — OUTREACH NOTE
CHF Week 1 Survey      Responses   [facility] patient discharged from?  [place]   Does the patient have one of the following disease processes/diagnoses(primary or secondary)?  CHF   CHF Week 1 attempt successful?  Yes   Call start time  1715   Call end time  1718   Discharge diagnosis  Chronic HFpEF, KIRSTEN, Normocytic anemia, Acute gout toe of left foot, COPD Exac. CAP, DM II, Dyslipidemia, HTN   Meds reviewed with patient/caregiver?  Yes   Is the patient having any side effects they believe may be caused by any medication additions or changes?  No   Does the patient have all medications ordered at discharge?  Yes   Is the patient taking all medications as directed (includes completed medication regime)?  Yes   Does the patient have a primary care provider?   Yes   Does the patient have an appointment with their PCP within 7 days of discharge?  Yes   Has the patient kept scheduled appointments due by today?  Yes   Has home health visited the patient within 72 hours of discharge?  N/A   Pulse Ox monitoring  Intermittent   Pulse Ox device source  Patient   Psychosocial issues?  No   Did the patient receive a copy of their discharge instructions?  Yes   Nursing interventions  Reviewed instructions with patient   What is the patient's perception of their health status since discharge?  Improving   Nursing interventions  Nurse provided patient education   Is the patient weighing daily?  No   Does the patient have scales?  Yes   Daily weight interventions  Education provided on importance of daily weight   Is the patient able to teach back Heart Failure diet management?  Yes   Is the patient able to teach back Heart Failure Zones?  Yes   Is the patient able to teach back signs and symptoms of worsening condition? (i.e. weight gain, shortness of air, etc.)  Yes   Is the patient/caregiver able to teach back the hierarchy of who to call/visit for symptoms/problems? PCP, Specialist, Home health nurse, Urgent Care,  ED, 911  Yes   Additional teach back comments  states will weigh more frequently if needed    CHF Week 1 call completed?  Yes          Bina Leon RN

## 2020-09-10 ENCOUNTER — TELEPHONE (OUTPATIENT)
Dept: CARDIOLOGY | Facility: CLINIC | Age: 75
End: 2020-09-10

## 2020-09-10 ENCOUNTER — DOCUMENTATION (OUTPATIENT)
Dept: CARDIOLOGY | Facility: CLINIC | Age: 75
End: 2020-09-10

## 2020-09-10 ENCOUNTER — PROCEDURE VISIT (OUTPATIENT)
Dept: PULMONOLOGY | Facility: CLINIC | Age: 75
End: 2020-09-10

## 2020-09-10 DIAGNOSIS — J44.9 CHRONIC OBSTRUCTIVE PULMONARY DISEASE, UNSPECIFIED COPD TYPE (HCC): Primary | ICD-10-CM

## 2020-09-10 PROCEDURE — 94060 EVALUATION OF WHEEZING: CPT | Performed by: INTERNAL MEDICINE

## 2020-09-10 PROCEDURE — 94727 GAS DIL/WSHOT DETER LNG VOL: CPT | Performed by: INTERNAL MEDICINE

## 2020-09-10 PROCEDURE — 94729 DIFFUSING CAPACITY: CPT | Performed by: INTERNAL MEDICINE

## 2020-09-10 NOTE — PROGRESS NOTES
In regards to pfts    Genaro Cota MD Brown, Karen M RN             Looks like moderate restriction, we can let PCP manage or refer to pulm'      Discussed the above with patient. She has an appt with PCP this month and will discuss with him

## 2020-09-10 NOTE — TELEPHONE ENCOUNTER
In regards to pfts    Genaro Cota MD Brown, Karen M, RN             Looks like moderate restriction, we can let PCP manage or refer to pulm'      Left message for patient to call me

## 2020-09-10 NOTE — PROCEDURES
Pulmonary Function Test  Performed by: Samira Rizzo MD  Authorized by: Genaro Cota MD      Pre Drug    FVC: 66%   FEV1: 59%   FEV1/FVC: 68%   T%   DLCO: 59%     Post Drug    FVC: 65%   FEV1: 63%   FEV1/FVC: 74%      Change in % (calculated):    FVC: -1   FEV1: 5    Interpretation   Spirometry   Spirometry shows moderate restriction. The patient's response to bronchodilators has insignificant change.   Review of FVL curve   Effort is normal.   Lung Volume Measurements  Measurements show: reduced lung volumes consistent with restriction.   Diffusion Capacity  The patient's diffusion capacity is moderately reduced.

## 2020-09-15 ENCOUNTER — READMISSION MANAGEMENT (OUTPATIENT)
Dept: CALL CENTER | Facility: HOSPITAL | Age: 75
End: 2020-09-15

## 2020-09-15 RX ORDER — ISOSORBIDE MONONITRATE 30 MG/1
TABLET, EXTENDED RELEASE ORAL
Qty: 30 TABLET | Refills: 1 | Status: SHIPPED | OUTPATIENT
Start: 2020-09-15 | End: 2020-10-12 | Stop reason: SDUPTHER

## 2020-09-15 RX ORDER — FUROSEMIDE 40 MG/1
TABLET ORAL
Qty: 30 TABLET | Refills: 0 | Status: SHIPPED | OUTPATIENT
Start: 2020-09-15 | End: 2020-09-22

## 2020-09-15 RX ORDER — ROPINIROLE 1 MG/1
TABLET, FILM COATED ORAL
Qty: 90 TABLET | Refills: 0 | Status: SHIPPED | OUTPATIENT
Start: 2020-09-15 | End: 2020-10-12 | Stop reason: SDUPTHER

## 2020-09-15 NOTE — OUTREACH NOTE
CHF Week 2 Survey      Responses   [facility] patient discharged from?  [place]   Does the patient have one of the following disease processes/diagnoses(primary or secondary)?  CHF   Week 2 attempt successful?  Yes   Call start time  1731   Call end time  1735   Discharge diagnosis  Chronic HFpEF, KIRSTEN, Normocytic anemia, Acute gout toe of left foot, COPD Exac. CAP, DM II, Dyslipidemia, HTN   Meds reviewed with patient/caregiver?  Yes   Is the patient having any side effects they believe may be caused by any medication additions or changes?  No   Does the patient have all medications ordered at discharge?  Yes   Is the patient taking all medications as directed (includes completed medication regime)?  Yes   Does the patient have a primary care provider?   Yes   Does the patient have an appointment with their PCP within 7 days of discharge?  Yes   Has the patient kept scheduled appointments due by today?  Yes   Has home health visited the patient within 72 hours of discharge?  N/A   Pulse Ox monitoring  Intermittent   Pulse Ox device source  Patient   Psychosocial issues?  No   Did the patient receive a copy of their discharge instructions?  Yes   Nursing interventions  Reviewed instructions with patient   What is the patient's perception of their health status since discharge?  Improving   Nursing interventions  Nurse provided patient education   Is the patient weighing daily?  Yes   Does the patient have scales?  Yes   Is the patient able to teach back Heart Failure diet management?  Yes   Is the patient able to teach back Heart Failure Zones?  Yes   Is the patient able to teach back signs and symptoms of worsening condition? (i.e. weight gain, shortness of air, etc.)  Yes   Is the patient/caregiver able to teach back the hierarchy of who to call/visit for symptoms/problems? PCP, Specialist, Home health nurse, Urgent Care, ED, 911  Yes   CHF Week 2 call completed?  Yes          Bina Leon RN

## 2020-09-21 ENCOUNTER — TELEPHONE (OUTPATIENT)
Dept: FAMILY MEDICINE CLINIC | Facility: CLINIC | Age: 75
End: 2020-09-21

## 2020-09-21 NOTE — TELEPHONE ENCOUNTER
PATIENTS DAUGHTER CALLED REGARDING THE [facility] PAPERS THAT SHE DROPPED OFF LAST WEEK. PAPERWORK WAS GIVEN TO [facility]) AND SHE INFORMED US THAT PCP IS NOT IN OFFICE AND WILL NOT BE IN UNTIL TOMORROW.     CALLED PATIENTS DAUGHTER TO INFORM HER OF THAT INFORMATION. SHE STATED THAT SHE WILL CALL AND CHECK ON THE PAPERWORK TOMORROW.    CALL BACK NUMBER FOR HER [phone].    THANKS,  MACRINA

## 2020-09-21 NOTE — TELEPHONE ENCOUNTER
Martha Ho called asking status of her [place] papers.    Please call her at [phone]      Thank you!    Serene

## 2020-09-22 ENCOUNTER — OFFICE VISIT (OUTPATIENT)
Dept: FAMILY MEDICINE CLINIC | Facility: CLINIC | Age: 75
End: 2020-09-22

## 2020-09-22 VITALS
TEMPERATURE: 97.4 F | HEART RATE: 64 BPM | WEIGHT: 198.9 LBS | SYSTOLIC BLOOD PRESSURE: 122 MMHG | BODY MASS INDEX: 33.1 KG/M2 | DIASTOLIC BLOOD PRESSURE: 74 MMHG | OXYGEN SATURATION: 97 %

## 2020-09-22 DIAGNOSIS — J44.9 CHRONIC OBSTRUCTIVE PULMONARY DISEASE, UNSPECIFIED COPD TYPE (HCC): Chronic | ICD-10-CM

## 2020-09-22 DIAGNOSIS — G47.33 OSA (OBSTRUCTIVE SLEEP APNEA): ICD-10-CM

## 2020-09-22 DIAGNOSIS — I10 ESSENTIAL HYPERTENSION: ICD-10-CM

## 2020-09-22 DIAGNOSIS — Z00.00 HEALTHCARE MAINTENANCE: ICD-10-CM

## 2020-09-22 DIAGNOSIS — E11.42 TYPE 2 DIABETES MELLITUS WITH DIABETIC POLYNEUROPATHY, WITHOUT LONG-TERM CURRENT USE OF INSULIN (HCC): Primary | ICD-10-CM

## 2020-09-22 PROCEDURE — 99213 OFFICE O/P EST LOW 20 MIN: CPT | Performed by: STUDENT IN AN ORGANIZED HEALTH CARE EDUCATION/TRAINING PROGRAM

## 2020-09-22 RX ORDER — FUROSEMIDE 40 MG/1
TABLET ORAL
Qty: 30 TABLET | Refills: 0 | Status: CANCELLED | OUTPATIENT
Start: 2020-09-22

## 2020-09-22 RX ORDER — METFORMIN HYDROCHLORIDE 500 MG/1
1000 TABLET, EXTENDED RELEASE ORAL 2 TIMES DAILY
Qty: 60 TABLET | Refills: 6 | Status: SHIPPED | OUTPATIENT
Start: 2020-09-22 | End: 2020-12-16

## 2020-09-22 RX ORDER — BUDESONIDE AND FORMOTEROL FUMARATE DIHYDRATE 160; 4.5 UG/1; UG/1
AEROSOL RESPIRATORY (INHALATION)
Qty: 10.2 INHALER | Refills: 6 | Status: SHIPPED | OUTPATIENT
Start: 2020-09-22 | End: 2020-11-05

## 2020-09-22 NOTE — TELEPHONE ENCOUNTER
Called daughter regarding FMLA paperwork. She stated that her employer told her that it was not specific enough but she is unsure what exactly the specifics are that they are looking for.  We will look at it this afternoon when I see her mother in clinic

## 2020-09-22 NOTE — PROGRESS NOTES
[facility]  Willem Son MD    Subjective:     Ronda Blair is a 75 y.o. female who presents for HTN, HFpEF, COPD, DM2, CKD stage 2, anemia follow up.     HTN: does not check at home.   -hydrochlorothiazide 25 mg daily  -Lisinopril 10 mg twice daily  -Nebivolol 5 mg daily    HFpEF: on statin, beta blocker, ACEi, aspirin-aggrenox, imdur.   -last echo on 7/21/2020  -Follows with cardiology. Removed lasix from her medication list as it was originally ordered PRN for LE swelling and this has not been an issue for her. She does not monitor her daily weights. She does not follow a fluid restriction.     COPD: PFTs on 9/10/20. Reports that her COPD is well controlled and denied dyspnea on exertion (although she can minimally exert herself due to other medical issues).   -Albuterol inhaler x2 daily   -Ipratropium nebulizer  -Symbicort 160-4.5    DM2: Last A1c 6.0 on 7/21/2020. On metformin 500 mg which she takes 4 times per day. Last eye exam w/ Dr. Toledo at [facility] (due 1/2021). UTD on foot exam. Occasionally checks her glucose when she feels symptomatic.     Anemia: Last hemoglobin on record was 9.4 on 8/30/2020.  Iron panel on 8/29/2020 showed low iron with normal ferritin. On PO iron.     Depression: Extensive conversation was had about the patient's current living conditions and her relationships with her family.  Patient is currently living alone in a mobile home.  She reports that one daughter does help her significantly with bringing her to appointments and shopping for her.  Other family members contact her intermittently but she still feels very lonely.    Health maintenance:  -Pneumococcal vaccine: previously had it per her report   -Mammogram: deferring at this time   -Diabetic eye exam: UTD  -Colonoscopy: had it at [facility] a few years ago  -Zoster vaccine: yes; at [facility]  -Hepatitis C screening: deferred  Flu shot at [facility]    The following portions of the patient's  history were reviewed and updated as appropriate: allergies, current medications, past family history, past medical history, past social history, past surgical history and problem list.    Past Medical Hx:  Past Medical History:   Diagnosis Date   • Anxiety    • Arthritis    • COPD (chronic obstructive pulmonary disease) (CMS/HCC)    • Depression    • Diabetes mellitus (CMS/HCC)    • History of transfusion    • Hyperlipidemia    • Hypertension    • Stroke (CMS/HCC)     Greater than 5 years ago       Past Surgical Hx:  Past Surgical History:   Procedure Laterality Date   • CERVICAL FUSION  1999   • EYE SURGERY Bilateral 2014    lasix eye surgery   • HEMORRHOIDECTOMY     • HYSTERECTOMY     • INTRATHECAL PUMP REMOVAL      2006   • KNEE ARTHROSCOPY Right 2015   • LUMBAR SPINE SURGERY  1999   • PAIN PUMP INSERTION/REVISION      Removed in 2006       Current Meds:    Current Outpatient Medications:   •  ALPRAZolam (XANAX) 1 MG tablet, Take 1 mg by mouth Daily. Take 1/2 in the morning and one at bedtime PRN anxiety and insomnia, Disp: , Rfl:   •  aspirin-dipyridamole (AGGRENOX)  MG per 12 hr capsule, Take 1 capsule by mouth 2 (Two) Times a Day., Disp: 120 capsule, Rfl: 2  •  citalopram (CeleXA) 40 MG tablet, Take 40 mg by mouth Every Night., Disp: , Rfl:   •  famotidine (PEPCID) 20 MG tablet, Take 1 tablet by mouth Daily., Disp: 30 tablet, Rfl: 1  •  folic acid (FOLVITE) 1 MG tablet, Take 1 tablet by mouth Daily., Disp: 90 tablet, Rfl: 2  •  furosemide (LASIX) 40 MG tablet, TAKE 1/2 TABLET BY MOUTH 2 (TWO) TIMES A DAY FOR 30 DAYS., Disp: 30 tablet, Rfl: 0  •  hydroCHLOROthiazide (HYDRODIURIL) 25 MG tablet, Take 1 tablet by mouth Daily., Disp: 30 tablet, Rfl: 11  •  ipratropium (ATROVENT) 0.02 % nebulizer solution, Take 2.5 mL by nebulization 3 (Three) Times a Day., Disp: 2.5 mL, Rfl: 0  •  isosorbide mononitrate (IMDUR) 30 MG 24 hr tablet, TAKE 1 TABLET BY MOUTH EVERY DAY, Disp: 30 tablet, Rfl: 1  •  lisinopril  (PRINIVIL,ZESTRIL) 10 MG tablet, Take 1 tablet by mouth Every 12 (Twelve) Hours., Disp: 60 tablet, Rfl: 1  •  metFORMIN ER (GLUCOPHAGE-XR) 500 MG 24 hr tablet, Take 1,000 mg by mouth 2 (two) times a day., Disp: , Rfl:   •  nebivolol (BYSTOLIC) 5 MG tablet, Take 1 tablet by mouth Daily., Disp: 90 tablet, Rfl: 1  •  pregabalin (LYRICA) 75 MG capsule, Take 1 capsule by mouth Daily., Disp: 30 capsule, Rfl: 2  •  rOPINIRole (REQUIP) 1 MG tablet, TAKE 1 TO 3 TABLETS BY MOUTH EVERY NIGHT. TAKE 1 HOUR BEFORE BEDTIME., Disp: 90 tablet, Rfl: 0  •  rosuvastatin (CRESTOR) 10 MG tablet, Take 1 tablet by mouth Daily., Disp: 30 tablet, Rfl: 2  •  Symbicort 160-4.5 MCG/ACT inhaler, TAKE 2 PUFFS BY MOUTH TWICE A DAY, Disp: 10.2 inhaler, Rfl: 6  •  tiZANidine (ZANAFLEX) 4 MG tablet, Take 1 tablet by mouth At Night As Needed for Muscle Spasms., Disp: 90 tablet, Rfl: 2  •  venlafaxine XR (EFFEXOR-XR) 37.5 MG 24 hr capsule, Take 37.5 mg by mouth Daily., Disp: , Rfl:   •  albuterol (PROVENTIL HFA;VENTOLIN HFA) 108 (90 Base) MCG/ACT inhaler, Inhale., Disp: , Rfl:   •  amLODIPine (NORVASC) 10 MG tablet, Take 1 tablet by mouth Daily., Disp: 30 tablet, Rfl: 1  •  gabapentin (NEURONTIN) 300 MG capsule, Take 300 mg by mouth 4 (Four) Times a Day., Disp: , Rfl:   •  Omega-3 Fatty Acids (FISH OIL) 1200 MG capsule capsule, Take 1,200 mg by mouth 3 (Three) Times a Day With Meals., Disp: , Rfl:   •  oxyCODONE-acetaminophen (PERCOCET)  MG per tablet, Take 1 tablet by mouth Every 6 (Six) Hours As Needed for Moderate Pain ., Disp: , Rfl:     Allergies:  Allergies   Allergen Reactions   • Aspirin GI Intolerance     Had bleeding ulcer in the past       Family Hx:  Family History   Problem Relation Age of Onset   • Diabetes Other    • Hypertension Other    • Other Other    • Kidney disease Other         Social History:  Social History     Socioeconomic History   • Marital status:      Spouse name: Not on file   • Number of children: Not on  file   • Years of education: Not on file   • Highest education level: Not on file   Tobacco Use   • Smoking status: Former Smoker   • Smokeless tobacco: Never Used   Substance and Sexual Activity   • Alcohol use: No   • Drug use: No   • Sexual activity: Defer       Review of Systems  Review of Systems   Constitutional: Negative for chills.   Respiratory: Negative for shortness of breath.    Cardiovascular: Negative for chest pain, palpitations and leg swelling.   Gastrointestinal: Negative for abdominal pain and blood in stool.   Genitourinary: Negative for hematuria.   Neurological: Negative for dizziness.       Objective:     /74   Pulse 64   Temp 97.4 °F (36.3 °C)   Wt 90.2 kg (198 lb 14.4 oz)   SpO2 97%   BMI 33.10 kg/m²   Physical Exam  Vitals signs and nursing note reviewed.   Constitutional:       Comments: Ambulates with a shuffling gait with cane; diffuse tremor   Cardiovascular:      Rate and Rhythm: Normal rate and regular rhythm.   Pulmonary:      Effort: Pulmonary effort is normal.      Breath sounds: Normal breath sounds. No wheezing or rhonchi.   Abdominal:      General: Bowel sounds are normal.      Palpations: Abdomen is soft.   Musculoskeletal:      Right lower leg: No edema.      Left lower leg: No edema.   Skin:     General: Skin is warm and dry.      Comments: Lower extremities still mildly erythematous from prior cellulitis-does not look like active cellulitis   Neurological:      Mental Status: She is alert.          Assessment/Plan:     Ronda was seen today for diabetes.    Diagnoses and all orders for this visit:    Type 2 diabetes mellitus with diabetic polyneuropathy, without long-term current use of insulin (CMS/[facility], located within [facility])  -     metFORMIN ER (GLUCOPHAGE-XR) 500 MG 24 hr tablet; Take 2 tablets by mouth 2 (two) times a day.    Healthcare maintenance    MARISOL (obstructive sleep apnea)  -     Ambulatory Referral to Sleep Medicine    Chronic obstructive pulmonary disease, unspecified COPD  type (CMS/HCC)    Essential hypertension      · Rx changes: The only medication change from this appointment was a discontinuation of furosemide from the patient medication list as this was originally ordered as a 1 month supply by Dr. Cota for as needed swelling and patient reports swelling has not been an issue for her.  · Patient Education: Home BP monitoring  · Compliance at present is estimated to be good.   · Efforts to improve compliance (if necessary) will be directed at increased exercise.     Follow-up:     No follow-ups on file.    Preventative:  Health Maintenance   Topic Date Due   • URINE MICROALBUMIN  1945   • BH AMB Hepatitis B for at Risk Adult Patients (1 of 3 - Risk 3-dose series) 08/11/1963   • ZOSTER VACCINE (1 of 2) 08/11/1994   • Pneumococcal Vaccine Once at 65 Years Old  08/11/2009   • HEPATITIS C SCREENING  08/28/2017   • MEDICARE ANNUAL WELLNESS  08/28/2017   • MAMMOGRAM  08/28/2017   • DIABETIC EYE EXAM  08/28/2017   • COLONOSCOPY  08/28/2017   • INFLUENZA VACCINE  08/01/2020   • HEMOGLOBIN A1C  01/21/2021   • LIPID PANEL  07/21/2021   • TDAP/TD VACCINES (2 - Tdap) 09/25/2023     Female Preventative: Exercises regularly  Recommended: none  Vaccine Counseling: N/A    Weight  -Class: Obese Class I: 30-34.9kg/m2  -Patient's There is no height or weight on file to calculate BMI. BMI is above normal parameters. Recommendations include: exercise counseling.   eat more fruits and vegetables    Alcohol use:  reports no history of alcohol use.  Nicotine status  reports that she has quit smoking. She has never used smokeless tobacco.    Goals     • Medication management     • Self-monitor blood pressure           RISK SCORE: 4      This document has been electronically signed by Willem Son MD on September 22, 2020 17:20 CDT

## 2020-09-23 ENCOUNTER — TELEPHONE (OUTPATIENT)
Dept: FAMILY MEDICINE CLINIC | Facility: CLINIC | Age: 75
End: 2020-09-23

## 2020-09-23 ENCOUNTER — READMISSION MANAGEMENT (OUTPATIENT)
Dept: CALL CENTER | Facility: HOSPITAL | Age: 75
End: 2020-09-23

## 2020-09-23 NOTE — OUTREACH NOTE
CHF Week 3 Survey      Responses   [facility] patient discharged from?  [place]   Does the patient have one of the following disease processes/diagnoses(primary or secondary)?  CHF   Week 3 attempt successful?  Yes   Call start time  [phone]   Call end time  [phone]   Discharge diagnosis  Chronic HF, KIRSTEN, Normocytic anemia, Acute gout toe of left foot, COPD Exac. CAP, DM II, Dyslipidemia, HTN   Is patient permission given to speak with other caregiver?  No   Meds reviewed with patient/caregiver?  Yes   Is the patient taking all medications as directed (includes completed medication regime)?  Yes   Does the patient have a primary care provider?   Yes   Comments regarding PCP  PCP Dr Son. Patient has seen since discharge    Has the patient kept scheduled appointments due by today?  Yes   Comments  Patient has seen cardiology, Dr Cota on 9/2/20   Has home health visited the patient within 72 hours of discharge?  N/A   Pulse Ox monitoring  Intermittent   Pulse Ox device source  Patient   O2 Sat comments  Patient O2 sat 97%   O2 Sat: education provided  Sat levels, Monitoring frequency, When to seek care   Psychosocial issues?  No   Comments  Patient wears O2 for sleep.    Did the patient receive a copy of their discharge instructions?  Yes   Nursing interventions  Reviewed instructions with patient   What is the patient's perception of their health status since discharge?  Improving   Is the patient weighing daily?  Yes   Does the patient have scales?  Yes   Daily weight interventions  Education provided on importance of daily weight   Is the patient able to teach back Heart Failure diet management?  Yes   Is the patient able to teach back signs and symptoms of worsening condition? (i.e. weight gain, shortness of air, etc.)  Yes   Is the patient/caregiver able to teach back the hierarchy of who to call/visit for symptoms/problems? PCP, Specialist, Home health nurse, Urgent Care, ED, 911  Yes   CHF Week 3 call  completed?  Yes          Serina Diane RN

## 2020-09-24 NOTE — PROGRESS NOTES
"Ronda Blair  9/22/20  Subjective:     Ronda Blair is a 75 y.o. female who presents for   Chief Complaint   Patient presents with   • Diabetes     follow up                  75-year-old female with history of hypertension congestive heart failure preserved ejection fraction COPD diabetes stage II CKD and anemia presents for scheduled follow-up visit.  She had a TCM visit 2 or 3 weeks ago after admission for exertional dyspnea reportedly had pulmonary function studies which did show findings consistent with obstructive lung disease she currently is using albuterol inhaler or nebulizer up to twice a day along with Symbicort once or twice a day.  Patient currently is on home oxygen primarily at night and whenever she takes a nap.  She relates \"I had that their sleep apnea machine\" however she currently is not using CPAP patient does have a history of knee replacement and states that it hurt more after the surgery than prior to having it done she adds \"I live in a little bit a camper\" her blood sugars reportedly are stable most recent A1c was 6.0 she currently is on metformin 500 mg 4 times a day currently is followed by ophthalmology and gets annual diabetic eye exams patient is reportedly up-to-date on her colorectal cancer screening but does not recall when her last mammogram was she is up-to-date on her flu vaccine as well as shingles currently is complaining of right knee pain as alluded to above she states that the pain radiates to her big toe also complains of chronic back pain dating back to surgery in 1998 patient reportedly is living to her daughter but does not or is reluctant to interact too much with him she states \"I am lonely\" please see Dr. Son's note for more detailed information regarding today's encounter        Past Medical Hx:  Past Medical History:   Diagnosis Date   • Anxiety    • Arthritis    • COPD (chronic obstructive pulmonary disease) (CMS/[facility])    • Depression    • " Diabetes mellitus (CMS/HCC)    • History of transfusion    • Hyperlipidemia    • Hypertension    • Stroke (CMS/HCC)     Greater than 5 years ago       Past Surgical Hx:  Past Surgical History:   Procedure Laterality Date   • CERVICAL FUSION  1999   • EYE SURGERY Bilateral 2014    lasix eye surgery   • HEMORRHOIDECTOMY     • HYSTERECTOMY     • INTRATHECAL PUMP REMOVAL      2006   • KNEE ARTHROSCOPY Right 2015   • LUMBAR SPINE SURGERY  1999   • PAIN PUMP INSERTION/REVISION      Removed in 2006       Health Maintenance:  Health Maintenance   Topic Date Due   • URINE MICROALBUMIN  1945   • COLONOSCOPY  1945   • Hepatitis B (1 of 3 - Risk 3-dose series) 08/11/1963   • ZOSTER VACCINE (1 of 2) 08/11/1994   • HEPATITIS C SCREENING  08/28/2017   • MEDICARE ANNUAL WELLNESS  08/28/2017   • MAMMOGRAM  08/28/2017   • DIABETIC EYE EXAM  08/28/2017   • INFLUENZA VACCINE  08/01/2020   • HEMOGLOBIN A1C  01/21/2021   • LIPID PANEL  07/21/2021   • TDAP/TD VACCINES (2 - Tdap) 09/25/2023       Current Meds:    Current Outpatient Medications:   •  albuterol (PROVENTIL HFA;VENTOLIN HFA) 108 (90 Base) MCG/ACT inhaler, Inhale., Disp: , Rfl:   •  ALPRAZolam (XANAX) 1 MG tablet, Take 1 mg by mouth Daily. Take 1/2 in the morning and one at bedtime PRN anxiety and insomnia, Disp: , Rfl:   •  amLODIPine (NORVASC) 10 MG tablet, Take 1 tablet by mouth Daily., Disp: 30 tablet, Rfl: 1  •  aspirin-dipyridamole (AGGRENOX)  MG per 12 hr capsule, Take 1 capsule by mouth 2 (Two) Times a Day., Disp: 120 capsule, Rfl: 2  •  citalopram (CeleXA) 40 MG tablet, Take 40 mg by mouth Every Night., Disp: , Rfl:   •  famotidine (PEPCID) 20 MG tablet, Take 1 tablet by mouth Daily., Disp: 30 tablet, Rfl: 1  •  folic acid (FOLVITE) 1 MG tablet, Take 1 tablet by mouth Daily., Disp: 90 tablet, Rfl: 2  •  gabapentin (NEURONTIN) 300 MG capsule, Take 300 mg by mouth 4 (Four) Times a Day., Disp: , Rfl:   •  hydroCHLOROthiazide (HYDRODIURIL) 25 MG tablet,  Take 1 tablet by mouth Daily., Disp: 30 tablet, Rfl: 11  •  ipratropium (ATROVENT) 0.02 % nebulizer solution, Take 2.5 mL by nebulization 3 (Three) Times a Day., Disp: 2.5 mL, Rfl: 0  •  isosorbide mononitrate (IMDUR) 30 MG 24 hr tablet, TAKE 1 TABLET BY MOUTH EVERY DAY, Disp: 30 tablet, Rfl: 1  •  lisinopril (PRINIVIL,ZESTRIL) 10 MG tablet, Take 1 tablet by mouth Every 12 (Twelve) Hours., Disp: 60 tablet, Rfl: 1  •  metFORMIN ER (GLUCOPHAGE-XR) 500 MG 24 hr tablet, Take 2 tablets by mouth 2 (two) times a day., Disp: 60 tablet, Rfl: 6  •  nebivolol (BYSTOLIC) 5 MG tablet, Take 1 tablet by mouth Daily., Disp: 90 tablet, Rfl: 1  •  Omega-3 Fatty Acids (FISH OIL) 1200 MG capsule capsule, Take 1,200 mg by mouth 3 (Three) Times a Day With Meals., Disp: , Rfl:   •  oxyCODONE-acetaminophen (PERCOCET)  MG per tablet, Take 1 tablet by mouth Every 6 (Six) Hours As Needed for Moderate Pain ., Disp: , Rfl:   •  pregabalin (LYRICA) 75 MG capsule, Take 1 capsule by mouth Daily., Disp: 30 capsule, Rfl: 2  •  rOPINIRole (REQUIP) 1 MG tablet, TAKE 1 TO 3 TABLETS BY MOUTH EVERY NIGHT. TAKE 1 HOUR BEFORE BEDTIME., Disp: 90 tablet, Rfl: 0  •  rosuvastatin (CRESTOR) 10 MG tablet, Take 1 tablet by mouth Daily., Disp: 30 tablet, Rfl: 2  •  Symbicort 160-4.5 MCG/ACT inhaler, TAKE 2 PUFFS BY MOUTH TWICE A DAY, Disp: 10.2 inhaler, Rfl: 6  •  tiZANidine (ZANAFLEX) 4 MG tablet, Take 1 tablet by mouth At Night As Needed for Muscle Spasms., Disp: 90 tablet, Rfl: 2  •  venlafaxine XR (EFFEXOR-XR) 37.5 MG 24 hr capsule, Take 37.5 mg by mouth Daily., Disp: , Rfl:     Allergies:  Aspirin    Family Hx:  Family History   Problem Relation Age of Onset   • Diabetes Other    • Hypertension Other    • Other Other    • Kidney disease Other         Social History:  Social History     Socioeconomic History   • Marital status:      Spouse name: Not on file   • Number of children: Not on file   • Years of education: Not on file   • Highest  education level: Not on file   Tobacco Use   • Smoking status: Former Smoker   • Smokeless tobacco: Never Used   Substance and Sexual Activity   • Alcohol use: No   • Drug use: No   • Sexual activity: Defer       Review of Systems  Review of Systems as per HPI otherwise -12 systems review he currently denies chest pain she does have exertional dyspnea she denies orthopnea  Objective:     /74   Pulse 64   Temp 97.4 °F (36.3 °C)   Wt 90.2 kg (198 lb 14.4 oz)   SpO2 97%   BMI 33.10 kg/m²   Physical Exam  General appearance is that of a frail appearing adult female no distress oriented x3 HEENT grossly normal without asymmetry neck supple without JVD breath sounds are diminished lungs are otherwise fairly clear no wheezes or rhonchi no tactile fremitus or pleural friction rub bilateral crackles noted heart regular controlled rate PMI diffuse no extrasystole no murmur abdomen soft nontender patient has a reducible ventral hernia extremities show trace pedal and ankle edema with moderate stasis changes ulcerations seen patient also appears to have a slight intention tremor neuro nonfocal no ataxia please see Dr. Son's note for more detailed information on physical exam  Lab Review  Results for orders placed or performed during the hospital encounter of 08/28/20   COVID-19, [facility] IN-HOUSE, NP SWAB IN TRANSPORT MEDIA 8-10 HR TAT - Swab, Nasopharynx    Specimen: Nasopharynx; Swab   Result Value Ref Range    COVID19 Not Detected Not Detected - Ref. Range   Blood Culture - Blood, Arm, Left    Specimen: Arm, Left; Blood   Result Value Ref Range    Blood Culture No growth at 5 days    Blood Culture - Blood, Arm, Right    Specimen: Arm, Right; Blood   Result Value Ref Range    Blood Culture No growth at 5 days    Legionella Antigen, Urine - Urine, Urine, Clean Catch    Specimen: Urine, Clean Catch   Result Value Ref Range    LEGIONELLA ANTIGEN, URINE Negative Negative   S. Pneumo Ag Urine or CSF - Urine, Urine,  Clean Catch    Specimen: Urine, Clean Catch   Result Value Ref Range    Strep Pneumo Ag Negative Negative   Comprehensive Metabolic Panel    Specimen: Blood   Result Value Ref Range    Glucose 173 (H) 65 - 99 mg/dL    BUN 31 (H) 8 - 23 mg/dL    Creatinine 1.34 (H) 0.57 - 1.00 mg/dL    Sodium 142 136 - 145 mmol/L    Potassium 4.8 3.5 - 5.2 mmol/L    Chloride 103 98 - 107 mmol/L    CO2 28.0 22.0 - 29.0 mmol/L    Calcium 9.4 8.6 - 10.5 mg/dL    Total Protein 6.8 6.0 - 8.5 g/dL    Albumin 4.10 3.50 - 5.20 g/dL    ALT (SGPT) 27 1 - 33 U/L    AST (SGOT) 29 1 - 32 U/L    Alkaline Phosphatase 87 39 - 117 U/L    Total Bilirubin 0.3 0.0 - 1.2 mg/dL    eGFR Non African Amer 38 (L) >60 mL/min/1.73    Globulin 2.7 gm/dL    A/G Ratio 1.5 g/dL    BUN/Creatinine Ratio 23.1 7.0 - 25.0    Anion Gap 11.0 5.0 - 15.0 mmol/L   BNP    Specimen: Blood   Result Value Ref Range    proBNP 2,918.0 (H) 0.0-1,800.0 pg/mL   Troponin    Specimen: Blood   Result Value Ref Range    Troponin T <0.010 0.000 - 0.030 ng/mL   CBC Auto Differential    Specimen: Blood   Result Value Ref Range    WBC 8.52 3.40 - 10.80 10*3/mm3    RBC 3.83 3.77 - 5.28 10*6/mm3    Hemoglobin 10.9 (L) 12.0 - 15.9 g/dL    Hematocrit 34.2 34.0 - 46.6 %    MCV 89.3 79.0 - 97.0 fL    MCH 28.5 26.6 - 33.0 pg    MCHC 31.9 31.5 - 35.7 g/dL    RDW 13.6 12.3 - 15.4 %    RDW-SD 44.4 37.0 - 54.0 fl    MPV 12.1 (H) 6.0 - 12.0 fL    Platelets 134 (L) 140 - 450 10*3/mm3    Neutrophil % 79.6 (H) 42.7 - 76.0 %    Lymphocyte % 11.4 (L) 19.6 - 45.3 %    Monocyte % 6.2 5.0 - 12.0 %    Eosinophil % 2.2 0.3 - 6.2 %    Basophil % 0.2 0.0 - 1.5 %    Immature Grans % 0.4 0.0 - 0.5 %    Neutrophils, Absolute 6.78 1.70 - 7.00 10*3/mm3    Lymphocytes, Absolute 0.97 0.70 - 3.10 10*3/mm3    Monocytes, Absolute 0.53 0.10 - 0.90 10*3/mm3    Eosinophils, Absolute 0.19 0.00 - 0.40 10*3/mm3    Basophils, Absolute 0.02 0.00 - 0.20 10*3/mm3    Immature Grans, Absolute 0.03 0.00 - 0.05 10*3/mm3    nRBC 0.0 0.0 -  0.2 /100 WBC   Procalcitonin    Specimen: Blood   Result Value Ref Range    Procalcitonin 0.05 0.00 - 0.25 ng/mL   D-dimer, Quantitative    Specimen: Blood   Result Value Ref Range    D-Dimer, Quantitative 1,652 (H) 0 - 470 ng/mL (FEU)   Comprehensive Metabolic Panel    Specimen: Blood   Result Value Ref Range    Glucose 133 (H) 65 - 99 mg/dL    BUN 33 (H) 8 - 23 mg/dL    Creatinine 1.34 (H) 0.57 - 1.00 mg/dL    Sodium 142 136 - 145 mmol/L    Potassium 4.6 3.5 - 5.2 mmol/L    Chloride 105 98 - 107 mmol/L    CO2 26.0 22.0 - 29.0 mmol/L    Calcium 9.2 8.6 - 10.5 mg/dL    Total Protein 6.3 6.0 - 8.5 g/dL    Albumin 3.80 3.50 - 5.20 g/dL    ALT (SGPT) 22 1 - 33 U/L    AST (SGOT) 19 1 - 32 U/L    Alkaline Phosphatase 75 39 - 117 U/L    Total Bilirubin 0.3 0.0 - 1.2 mg/dL    eGFR Non African Amer 38 (L) >60 mL/min/1.73    Globulin 2.5 gm/dL    A/G Ratio 1.5 g/dL    BUN/Creatinine Ratio 24.6 7.0 - 25.0    Anion Gap 11.0 5.0 - 15.0 mmol/L   Iron Profile    Specimen: Blood   Result Value Ref Range    Iron 29 (L) 37 - 145 mcg/dL    Iron Saturation 8 (L) 20 - 50 %    Transferrin 232 200 - 360 mg/dL    TIBC 346 298 - 536 mcg/dL   Ferritin    Specimen: Blood   Result Value Ref Range    Ferritin 73.76 13.00 - 150.00 ng/mL   CBC Auto Differential    Specimen: Blood   Result Value Ref Range    WBC 10.31 3.40 - 10.80 10*3/mm3    RBC 3.53 (L) 3.77 - 5.28 10*6/mm3    Hemoglobin 10.1 (L) 12.0 - 15.9 g/dL    Hematocrit 30.8 (L) 34.0 - 46.6 %    MCV 87.3 79.0 - 97.0 fL    MCH 28.6 26.6 - 33.0 pg    MCHC 32.8 31.5 - 35.7 g/dL    RDW 13.6 12.3 - 15.4 %    RDW-SD 42.9 37.0 - 54.0 fl    MPV 12.4 (H) 6.0 - 12.0 fL    Platelets 126 (L) 140 - 450 10*3/mm3    Neutrophil % 85.1 (H) 42.7 - 76.0 %    Lymphocyte % 9.3 (L) 19.6 - 45.3 %    Monocyte % 5.0 5.0 - 12.0 %    Eosinophil % 0.0 (L) 0.3 - 6.2 %    Basophil % 0.1 0.0 - 1.5 %    Immature Grans % 0.5 0.0 - 0.5 %    Neutrophils, Absolute 8.77 (H) 1.70 - 7.00 10*3/mm3    Lymphocytes, Absolute 0.96  0.70 - 3.10 10*3/mm3    Monocytes, Absolute 0.52 0.10 - 0.90 10*3/mm3    Eosinophils, Absolute 0.00 0.00 - 0.40 10*3/mm3    Basophils, Absolute 0.01 0.00 - 0.20 10*3/mm3    Immature Grans, Absolute 0.05 0.00 - 0.05 10*3/mm3    nRBC 0.0 0.0 - 0.2 /100 WBC   Comprehensive Metabolic Panel    Specimen: Blood   Result Value Ref Range    Glucose 88 65 - 99 mg/dL    BUN 31 (H) 8 - 23 mg/dL    Creatinine 1.29 (H) 0.57 - 1.00 mg/dL    Sodium 144 136 - 145 mmol/L    Potassium 4.2 3.5 - 5.2 mmol/L    Chloride 106 98 - 107 mmol/L    CO2 27.0 22.0 - 29.0 mmol/L    Calcium 8.6 8.6 - 10.5 mg/dL    Total Protein 5.8 (L) 6.0 - 8.5 g/dL    Albumin 3.60 3.50 - 5.20 g/dL    ALT (SGPT) 26 1 - 33 U/L    AST (SGOT) 18 1 - 32 U/L    Alkaline Phosphatase 72 39 - 117 U/L    Total Bilirubin 0.2 0.0 - 1.2 mg/dL    eGFR Non African Amer 40 (L) >60 mL/min/1.73    Globulin 2.2 gm/dL    A/G Ratio 1.6 g/dL    BUN/Creatinine Ratio 24.0 7.0 - 25.0    Anion Gap 11.0 5.0 - 15.0 mmol/L   CBC Auto Differential    Specimen: Blood   Result Value Ref Range    WBC 9.57 3.40 - 10.80 10*3/mm3    RBC 3.30 (L) 3.77 - 5.28 10*6/mm3    Hemoglobin 9.4 (L) 12.0 - 15.9 g/dL    Hematocrit 28.4 (L) 34.0 - 46.6 %    MCV 86.1 79.0 - 97.0 fL    MCH 28.5 26.6 - 33.0 pg    MCHC 33.1 31.5 - 35.7 g/dL    RDW 13.7 12.3 - 15.4 %    RDW-SD 43.1 37.0 - 54.0 fl    MPV 12.1 (H) 6.0 - 12.0 fL    Platelets 121 (L) 140 - 450 10*3/mm3    Neutrophil % 78.4 (H) 42.7 - 76.0 %    Lymphocyte % 15.6 (L) 19.6 - 45.3 %    Monocyte % 5.3 5.0 - 12.0 %    Eosinophil % 0.1 (L) 0.3 - 6.2 %    Basophil % 0.1 0.0 - 1.5 %    Immature Grans % 0.5 0.0 - 0.5 %    Neutrophils, Absolute 7.50 (H) 1.70 - 7.00 10*3/mm3    Lymphocytes, Absolute 1.49 0.70 - 3.10 10*3/mm3    Monocytes, Absolute 0.51 0.10 - 0.90 10*3/mm3    Eosinophils, Absolute 0.01 0.00 - 0.40 10*3/mm3    Basophils, Absolute 0.01 0.00 - 0.20 10*3/mm3    Immature Grans, Absolute 0.05 0.00 - 0.05 10*3/mm3    nRBC 0.0 0.0 - 0.2 /100 WBC   POC  Glucose Once    Specimen: Blood   Result Value Ref Range    Glucose 148 (H) 70 - 130 mg/dL   Light Blue Top   Result Value Ref Range    Extra Tube hold for add-on    Green Top (Gel)   Result Value Ref Range    Extra Tube Hold for add-ons.    Lavender Top   Result Value Ref Range    Extra Tube hold for add-on    Gold Top - SST   Result Value Ref Range    Extra Tube Hold for add-ons.    Gray Top   Result Value Ref Range    Extra Tube Hold for add-ons.    Gold Top - SST   Result Value Ref Range    Extra Tube Hold for add-ons.             Assessment:     Ronda was seen today for diabetes.    Diagnoses and all orders for this visit:    Type 2 diabetes mellitus with diabetic polyneuropathy, without long-term current use of insulin (CMS/[facility])  -     metFORMIN ER (GLUCOPHAGE-XR) 500 MG 24 hr tablet; Take 2 tablets by mouth 2 (two) times a day.    Healthcare maintenance    MARISOL (obstructive sleep apnea)  -     Ambulatory Referral to Sleep Medicine    Chronic obstructive pulmonary disease, unspecified COPD type (CMS/[facility])    Essential hypertension        Plan:   I was present throughout the encounter and discussed the patient's plan of care extensively with Dr. Son will change her Lasix order to PRN patient currently is on a self restriction of fluids to 2000 cc continue this for the time being encourage the patient to weigh herself daily and to let us know if she gains weight or if she has orthopnea or worsening shortness of breath I did advise the patient that she could take 2 of the metformin's twice a day rather than 1 4 times a day which ever she is most comfortable with we will see the patient back in a month otherwise recheck as needed  I have seen and examined the patient.  I have reviewed the notes, assessments, and/or procedures performed by Willem Son MD  , I concur with her/his documentation and assessment and plan for Ronda Blair.            This document has been electronically signed by  Homer Herrera MD on September 24, 2020 11:27 CDT

## 2020-09-30 ENCOUNTER — HOSPITAL ENCOUNTER (OUTPATIENT)
Dept: ULTRASOUND IMAGING | Facility: HOSPITAL | Age: 75
Discharge: HOME | End: 2020-09-30
Admitting: NURSE PRACTITIONER

## 2020-09-30 DIAGNOSIS — N18.30 CHRONIC KIDNEY DISEASE, STAGE III (MODERATE) (HCC): ICD-10-CM

## 2020-09-30 DIAGNOSIS — N17.9 ACUTE RENAL FAILURE, UNSPECIFIED ACUTE RENAL FAILURE TYPE (HCC): ICD-10-CM

## 2020-09-30 PROCEDURE — 76775 US EXAM ABDO BACK WALL LIM: CPT

## 2020-10-01 ENCOUNTER — READMISSION MANAGEMENT (OUTPATIENT)
Dept: CALL CENTER | Facility: HOSPITAL | Age: 75
End: 2020-10-01

## 2020-10-01 NOTE — OUTREACH NOTE
CHF Week 4 Survey      Responses   [facility] patient discharged from?  [place]   Does the patient have one of the following disease processes/diagnoses(primary or secondary)?  CHF   Week 4 attempt successful?  No          Mercedez Red RN

## 2020-10-12 RX ORDER — ISOSORBIDE MONONITRATE 30 MG/1
30 TABLET, EXTENDED RELEASE ORAL DAILY
Qty: 30 TABLET | Refills: 1 | Status: SHIPPED | OUTPATIENT
Start: 2020-10-12 | End: 2021-01-19 | Stop reason: SDUPTHER

## 2020-10-12 RX ORDER — ROPINIROLE 1 MG/1
TABLET, FILM COATED ORAL
Qty: 90 TABLET | Refills: 0 | Status: SHIPPED | OUTPATIENT
Start: 2020-10-12 | End: 2021-01-04 | Stop reason: SDUPTHER

## 2020-10-13 ENCOUNTER — TELEPHONE (OUTPATIENT)
Dept: FAMILY MEDICINE CLINIC | Facility: CLINIC | Age: 75
End: 2020-10-13

## 2020-10-13 NOTE — TELEPHONE ENCOUNTER
PATIENT CALLED AND IS NEEDING A LETTER REGARDING HER OXYGEN. SHE GETS IT IN [place] BUT IF SHE DOESN'T GET THIS LETTER FOR HER OXYGEN, SHE WILL HAVE TO PAY OUT OF POCKET FOR THIS. HER NUMBER TO CALL BACK -[phone] AND SHE IS A DR BAZAN PT.        THANK YOU,      MARIBEL

## 2020-10-13 NOTE — TELEPHONE ENCOUNTER
Spoke with Mrs. Bautista today at 16.14hrs by telephone regarding a letter that she requires for her insurance company in relation to her requirement for home oxygen therapy.  Mrs. Bautista is happy to discuss this with Dr. Son at her appointment at the end of October.    Signature  [place] [address]  Office: [phone]      This document has been electronically signed by Reggie Jurado MD on October 13, 2020 16:13 CDT

## 2020-10-13 NOTE — TELEPHONE ENCOUNTER
PT sent in a refill for Furosemide (Lasix) 40mg but that medication was discontinued by Dr. Son on 9/22/2020  I spoke with Pt and she said she didn't need it, she was unaware that she sent in a refill for it.

## 2020-10-21 ENCOUNTER — LAB (OUTPATIENT)
Dept: LAB | Facility: HOSPITAL | Age: 75
End: 2020-10-21

## 2020-10-21 ENCOUNTER — TRANSCRIBE ORDERS (OUTPATIENT)
Dept: LAB | Facility: HOSPITAL | Age: 75
End: 2020-10-21

## 2020-10-21 DIAGNOSIS — N18.30 STAGE 3 CHRONIC KIDNEY DISEASE, UNSPECIFIED WHETHER STAGE 3A OR 3B CKD (HCC): ICD-10-CM

## 2020-10-21 DIAGNOSIS — I10 HYPERTENSION, ESSENTIAL: ICD-10-CM

## 2020-10-21 DIAGNOSIS — N17.9 ACUTE RENAL FAILURE, UNSPECIFIED ACUTE RENAL FAILURE TYPE (HCC): Primary | ICD-10-CM

## 2020-10-21 DIAGNOSIS — J44.9 CHRONIC OBSTRUCTIVE BRONCHITIS (HCC): ICD-10-CM

## 2020-10-21 DIAGNOSIS — M10.9 GOUT, UNSPECIFIED CAUSE, UNSPECIFIED CHRONICITY, UNSPECIFIED SITE: ICD-10-CM

## 2020-10-21 DIAGNOSIS — N17.9 ACUTE RENAL FAILURE, UNSPECIFIED ACUTE RENAL FAILURE TYPE (HCC): ICD-10-CM

## 2020-10-21 DIAGNOSIS — E11.9 DIABETES MELLITUS WITHOUT COMPLICATION (HCC): ICD-10-CM

## 2020-10-21 PROCEDURE — 81001 URINALYSIS AUTO W/SCOPE: CPT

## 2020-10-21 PROCEDURE — 84156 ASSAY OF PROTEIN URINE: CPT

## 2020-10-21 PROCEDURE — 83735 ASSAY OF MAGNESIUM: CPT

## 2020-10-21 PROCEDURE — 87147 CULTURE TYPE IMMUNOLOGIC: CPT

## 2020-10-21 PROCEDURE — 80069 RENAL FUNCTION PANEL: CPT

## 2020-10-21 PROCEDURE — 87086 URINE CULTURE/COLONY COUNT: CPT

## 2020-10-21 PROCEDURE — 83970 ASSAY OF PARATHORMONE: CPT

## 2020-10-21 PROCEDURE — 86160 COMPLEMENT ANTIGEN: CPT

## 2020-10-21 PROCEDURE — 36415 COLL VENOUS BLD VENIPUNCTURE: CPT

## 2020-10-21 PROCEDURE — 87340 HEPATITIS B SURFACE AG IA: CPT

## 2020-10-21 PROCEDURE — 82570 ASSAY OF URINE CREATININE: CPT

## 2020-10-21 PROCEDURE — 86038 ANTINUCLEAR ANTIBODIES: CPT

## 2020-10-21 PROCEDURE — 84550 ASSAY OF BLOOD/URIC ACID: CPT

## 2020-10-21 PROCEDURE — 87522 HEPATITIS C REVRS TRNSCRPJ: CPT

## 2020-10-21 PROCEDURE — 86431 RHEUMATOID FACTOR QUANT: CPT

## 2020-10-21 PROCEDURE — 82306 VITAMIN D 25 HYDROXY: CPT

## 2020-10-22 LAB
25(OH)D3 SERPL-MCNC: 52.9 NG/ML (ref 30–100)
ALBUMIN SERPL-MCNC: 4.2 G/DL (ref 3.5–5.2)
ANION GAP SERPL CALCULATED.3IONS-SCNC: 11 MMOL/L (ref 5–15)
BACTERIA UR QL AUTO: ABNORMAL /HPF
BILIRUB UR QL STRIP: NEGATIVE
BUN SERPL-MCNC: 21 MG/DL (ref 8–23)
BUN/CREAT SERPL: 15.4 (ref 7–25)
CALCIUM SPEC-SCNC: 9.5 MG/DL (ref 8.6–10.5)
CHLORIDE SERPL-SCNC: 103 MMOL/L (ref 98–107)
CLARITY UR: CLEAR
CO2 SERPL-SCNC: 28 MMOL/L (ref 22–29)
COLOR UR: YELLOW
CREAT SERPL-MCNC: 1.36 MG/DL (ref 0.57–1)
CREAT UR-MCNC: 119.9 MG/DL
GFR SERPL CREATININE-BSD FRML MDRD: 38 ML/MIN/1.73
GLUCOSE SERPL-MCNC: 112 MG/DL (ref 65–99)
GLUCOSE UR STRIP-MCNC: NEGATIVE MG/DL
HBV SURFACE AG SERPL QL IA: NORMAL
HGB UR QL STRIP.AUTO: NEGATIVE
HYALINE CASTS UR QL AUTO: ABNORMAL /LPF
KETONES UR QL STRIP: NEGATIVE
LEUKOCYTE ESTERASE UR QL STRIP.AUTO: ABNORMAL
MAGNESIUM SERPL-MCNC: 2 MG/DL (ref 1.6–2.4)
NITRITE UR QL STRIP: NEGATIVE
PH UR STRIP.AUTO: 5.5 [PH] (ref 5–8)
PHOSPHATE SERPL-MCNC: 3.7 MG/DL (ref 2.5–4.5)
POTASSIUM SERPL-SCNC: 4 MMOL/L (ref 3.5–5.2)
PROT UR QL STRIP: ABNORMAL
PROT UR-MCNC: 60 MG/DL
PROT/CREAT UR: 500.4 MG/G CREA (ref 0–200)
PTH-INTACT SERPL-MCNC: 72.8 PG/ML (ref 15–65)
RBC # UR: ABNORMAL /HPF
REF LAB TEST METHOD: ABNORMAL
SODIUM SERPL-SCNC: 142 MMOL/L (ref 136–145)
SP GR UR STRIP: 1.02 (ref 1–1.03)
SQUAMOUS #/AREA URNS HPF: ABNORMAL /HPF
URATE SERPL-MCNC: 9.8 MG/DL (ref 2.4–5.7)
UROBILINOGEN UR QL STRIP: ABNORMAL
WBC UR QL AUTO: ABNORMAL /HPF

## 2020-10-23 LAB
ANA SER QL: NEGATIVE
ANA SER QL: NEGATIVE
BACTERIA SPEC AEROBE CULT: ABNORMAL
BACTERIA SPEC AEROBE CULT: ABNORMAL
C3 SERPL-MCNC: 133 MG/DL (ref 82–167)
C4 SERPL-MCNC: 34 MG/DL (ref 14–44)
HCV RNA SERPL NAA+PROBE-ACNC: NORMAL IU/ML
RHEUMATOID FACT SERPL-ACNC: <10 IU/ML (ref 0–13.9)
STREP GROUPING: ABNORMAL
TEST INFORMATION: NORMAL

## 2020-11-03 RX ORDER — LISINOPRIL 10 MG/1
TABLET ORAL
Qty: 60 TABLET | Refills: 1 | Status: SHIPPED | OUTPATIENT
Start: 2020-11-03 | End: 2020-11-30 | Stop reason: SDUPTHER

## 2020-11-03 RX ORDER — AMLODIPINE BESYLATE 10 MG/1
10 TABLET ORAL DAILY
Qty: 30 TABLET | Refills: 1 | Status: SHIPPED | OUTPATIENT
Start: 2020-11-03 | End: 2020-11-30 | Stop reason: SDUPTHER

## 2020-11-05 ENCOUNTER — OFFICE VISIT (OUTPATIENT)
Dept: FAMILY MEDICINE CLINIC | Facility: CLINIC | Age: 75
End: 2020-11-05

## 2020-11-05 ENCOUNTER — LAB (OUTPATIENT)
Dept: LAB | Facility: HOSPITAL | Age: 75
End: 2020-11-05

## 2020-11-05 VITALS
TEMPERATURE: 97.3 F | DIASTOLIC BLOOD PRESSURE: 68 MMHG | SYSTOLIC BLOOD PRESSURE: 122 MMHG | HEART RATE: 70 BPM | WEIGHT: 198.2 LBS | OXYGEN SATURATION: 96 % | BODY MASS INDEX: 32.98 KG/M2

## 2020-11-05 DIAGNOSIS — E61.1 IRON DEFICIENCY: ICD-10-CM

## 2020-11-05 DIAGNOSIS — E11.42 TYPE 2 DIABETES MELLITUS WITH DIABETIC POLYNEUROPATHY, WITHOUT LONG-TERM CURRENT USE OF INSULIN (HCC): ICD-10-CM

## 2020-11-05 DIAGNOSIS — Z79.899 ENCOUNTER FOR MEDICATION REVIEW: Primary | ICD-10-CM

## 2020-11-05 PROCEDURE — 85025 COMPLETE CBC W/AUTO DIFF WBC: CPT

## 2020-11-05 PROCEDURE — 83036 HEMOGLOBIN GLYCOSYLATED A1C: CPT

## 2020-11-05 PROCEDURE — 83540 ASSAY OF IRON: CPT

## 2020-11-05 PROCEDURE — 99213 OFFICE O/P EST LOW 20 MIN: CPT | Performed by: STUDENT IN AN ORGANIZED HEALTH CARE EDUCATION/TRAINING PROGRAM

## 2020-11-05 PROCEDURE — 36415 COLL VENOUS BLD VENIPUNCTURE: CPT

## 2020-11-05 PROCEDURE — 84466 ASSAY OF TRANSFERRIN: CPT

## 2020-11-05 PROCEDURE — 82728 ASSAY OF FERRITIN: CPT

## 2020-11-05 RX ORDER — ALBUTEROL SULFATE 90 UG/1
2 AEROSOL, METERED RESPIRATORY (INHALATION) EVERY 6 HOURS PRN
Qty: 18 G | Refills: 9 | Status: SHIPPED | OUTPATIENT
Start: 2020-11-05 | End: 2022-07-12 | Stop reason: SDUPTHER

## 2020-11-05 RX ORDER — LORATADINE 10 MG/1
10 CAPSULE, LIQUID FILLED ORAL DAILY
COMMUNITY
End: 2022-02-14 | Stop reason: SDUPTHER

## 2020-11-05 RX ORDER — MELATONIN
1000 DAILY
COMMUNITY

## 2020-11-06 ENCOUNTER — TELEPHONE (OUTPATIENT)
Dept: FAMILY MEDICINE CLINIC | Facility: CLINIC | Age: 75
End: 2020-11-06

## 2020-11-06 LAB
BASOPHILS # BLD AUTO: 0.02 10*3/MM3 (ref 0–0.2)
BASOPHILS NFR BLD AUTO: 0.3 % (ref 0–1.5)
DEPRECATED RDW RBC AUTO: 46 FL (ref 37–54)
EOSINOPHIL # BLD AUTO: 0.21 10*3/MM3 (ref 0–0.4)
EOSINOPHIL NFR BLD AUTO: 3.1 % (ref 0.3–6.2)
ERYTHROCYTE [DISTWIDTH] IN BLOOD BY AUTOMATED COUNT: 14 % (ref 12.3–15.4)
FERRITIN SERPL-MCNC: 29.3 NG/ML (ref 13–150)
HBA1C MFR BLD: 5.9 % (ref 4.8–5.6)
HCT VFR BLD AUTO: 35.4 % (ref 34–46.6)
HGB BLD-MCNC: 11.4 G/DL (ref 12–15.9)
IMM GRANULOCYTES # BLD AUTO: 0.02 10*3/MM3 (ref 0–0.05)
IMM GRANULOCYTES NFR BLD AUTO: 0.3 % (ref 0–0.5)
IRON 24H UR-MRATE: 86 MCG/DL (ref 37–145)
IRON SATN MFR SERPL: 19 % (ref 20–50)
LYMPHOCYTES # BLD AUTO: 2.11 10*3/MM3 (ref 0.7–3.1)
LYMPHOCYTES NFR BLD AUTO: 30.9 % (ref 19.6–45.3)
MCH RBC QN AUTO: 28.5 PG (ref 26.6–33)
MCHC RBC AUTO-ENTMCNC: 32.2 G/DL (ref 31.5–35.7)
MCV RBC AUTO: 88.5 FL (ref 79–97)
MONOCYTES # BLD AUTO: 0.53 10*3/MM3 (ref 0.1–0.9)
MONOCYTES NFR BLD AUTO: 7.8 % (ref 5–12)
NEUTROPHILS NFR BLD AUTO: 3.94 10*3/MM3 (ref 1.7–7)
NEUTROPHILS NFR BLD AUTO: 57.6 % (ref 42.7–76)
NRBC BLD AUTO-RTO: 0 /100 WBC (ref 0–0.2)
PLATELET # BLD AUTO: 169 10*3/MM3 (ref 140–450)
PMV BLD AUTO: 12.3 FL (ref 6–12)
RBC # BLD AUTO: 4 10*6/MM3 (ref 3.77–5.28)
TIBC SERPL-MCNC: 448 MCG/DL (ref 298–536)
TRANSFERRIN SERPL-MCNC: 301 MG/DL (ref 200–360)
WBC # BLD AUTO: 6.83 10*3/MM3 (ref 3.4–10.8)

## 2020-11-06 NOTE — PROGRESS NOTES
Family Medicine Residency  Vinod Blanton MD    Subjective:     Ronda Blair is a 75 y.o. female who presents for medication review.  She would like to reduce the number of medications she takes.  She has been placed on his medications over the through multiple providers.  Her Lyrica, gabapentin, Xanax, and Percocet are given to her by her pain management doctor.  Her Norvasc, Bystolic, HCTZ, Imdur, and lisinopril are given to her by her cardiologist and she has good control of her blood pressure.  She takes Metformin extended release 1000 mg twice daily.  She opens up to 500 mg capsules every morning and every evening as she is unable to swallow the entire pill.  She is unsure why she is on the Aggrenox but believes it is due to CVA 5 years ago.  Her breathing she states can be difficult with cough at times.    The following portions of the patient's history were reviewed and updated as appropriate: allergies, current medications, past family history, past medical history, past social history, past surgical history and problem list.    Past Medical Hx:  Past Medical History:   Diagnosis Date   • Anxiety    • Arthritis    • COPD (chronic obstructive pulmonary disease) (CMS/HCC)    • Depression    • Diabetes mellitus (CMS/HCC)    • History of transfusion    • Hyperlipidemia    • Hypertension    • Stroke (CMS/HCC)     Greater than 5 years ago       Past Surgical Hx:  Past Surgical History:   Procedure Laterality Date   • CERVICAL FUSION  1999   • EYE SURGERY Bilateral 2014    lasix eye surgery   • HEMORRHOIDECTOMY     • HYSTERECTOMY     • INTRATHECAL PUMP REMOVAL      2006   • KNEE ARTHROSCOPY Right 2015   • LUMBAR SPINE SURGERY  1999   • PAIN PUMP INSERTION/REVISION      Removed in 2006       Current Meds:    Current Outpatient Medications:   •  ALPRAZolam (XANAX) 1 MG tablet, Take 1 mg by mouth Daily. Take 1/2 in the morning and one at bedtime PRN anxiety and insomnia , Disp: , Rfl:   •   amLODIPine (NORVASC) 10 MG tablet, Take 1 tablet by mouth Daily., Disp: 30 tablet, Rfl: 1  •  aspirin-dipyridamole (AGGRENOX)  MG per 12 hr capsule, Take 1 capsule by mouth 2 (Two) Times a Day., Disp: 120 capsule, Rfl: 2  •  cholecalciferol (VITAMIN D3) 25 MCG (1000 UT) tablet, Take 1,000 Units by mouth Daily., Disp: , Rfl:   •  citalopram (CeleXA) 40 MG tablet, Take 40 mg by mouth Every Night., Disp: , Rfl:   •  hydroCHLOROthiazide (HYDRODIURIL) 25 MG tablet, Take 1 tablet by mouth Daily., Disp: 30 tablet, Rfl: 11  •  isosorbide mononitrate (IMDUR) 30 MG 24 hr tablet, Take 1 tablet by mouth Daily., Disp: 30 tablet, Rfl: 1  •  lisinopril (PRINIVIL,ZESTRIL) 10 MG tablet, TAKE 1 TABLET BY MOUTH EVERY 12 HOURS, Disp: 60 tablet, Rfl: 1  •  Loratadine 10 MG capsule, Take 10 mg by mouth Daily., Disp: , Rfl:   •  metFORMIN ER (GLUCOPHAGE-XR) 500 MG 24 hr tablet, Take 2 tablets by mouth 2 (two) times a day., Disp: 60 tablet, Rfl: 6  •  nebivolol (BYSTOLIC) 5 MG tablet, Take 1 tablet by mouth Daily., Disp: 90 tablet, Rfl: 1  •  Omega-3 Fatty Acids (FISH OIL) 1200 MG capsule capsule, Take 1,200 mg by mouth 3 (Three) Times a Day With Meals., Disp: , Rfl:   •  oxyCODONE-acetaminophen (PERCOCET)  MG per tablet, Take 1 tablet by mouth Every 6 (Six) Hours As Needed for Moderate Pain ., Disp: , Rfl:   •  pregabalin (LYRICA) 75 MG capsule, Take 1 capsule by mouth Daily., Disp: 30 capsule, Rfl: 2  •  rOPINIRole (REQUIP) 1 MG tablet, Take 1 hour before bedtime., Disp: 90 tablet, Rfl: 0  •  rosuvastatin (CRESTOR) 10 MG tablet, Take 1 tablet by mouth Daily., Disp: 30 tablet, Rfl: 2  •  tiZANidine (ZANAFLEX) 4 MG tablet, Take 1 tablet by mouth At Night As Needed for Muscle Spasms., Disp: 90 tablet, Rfl: 2  •  venlafaxine XR (EFFEXOR-XR) 37.5 MG 24 hr capsule, Take 37.5 mg by mouth Daily., Disp: , Rfl:   •  albuterol sulfate  (90 Base) MCG/ACT inhaler, Inhale 2 puffs Every 6 (Six) Hours As Needed for Wheezing or  Shortness of Air., Disp: 18 g, Rfl: 9  •  Fluticasone-Umeclidin-Vilant (Trelegy Ellipta) 100-62.5-25 MCG/INH aerosol powder , Inhale 1 puff Daily., Disp: 60 each, Rfl: 2  •  gabapentin (NEURONTIN) 300 MG capsule, Take 300 mg by mouth 4 (Four) Times a Day., Disp: , Rfl:   •  ipratropium (ATROVENT) 0.02 % nebulizer solution, Take 2.5 mL by nebulization 3 (Three) Times a Day., Disp: 2.5 mL, Rfl: 0    Allergies:  Allergies   Allergen Reactions   • Aspirin GI Intolerance     Had bleeding ulcer in the past       Family Hx:  Family History   Problem Relation Age of Onset   • Diabetes Other    • Hypertension Other    • Other Other    • Kidney disease Other         Social History:  Social History     Socioeconomic History   • Marital status:      Spouse name: Not on file   • Number of children: Not on file   • Years of education: Not on file   • Highest education level: Not on file   Tobacco Use   • Smoking status: Former Smoker   • Smokeless tobacco: Never Used   Substance and Sexual Activity   • Alcohol use: No   • Drug use: No   • Sexual activity: Defer       Review of Systems  Review of Systems   Constitutional: Negative for activity change and appetite change.   HENT: Negative for congestion and trouble swallowing.    Respiratory: Negative for chest tightness and shortness of breath.    Cardiovascular: Negative for chest pain and palpitations.   Gastrointestinal: Negative for abdominal distention and abdominal pain.   Genitourinary: Negative for difficulty urinating and dysuria.   Musculoskeletal: Negative for arthralgias and myalgias.   Skin: Negative for color change and pallor.   Neurological: Negative for dizziness, light-headedness and headaches.   Psychiatric/Behavioral: Negative for agitation and behavioral problems.       Objective:     /68   Pulse 70   Temp 97.3 °F (36.3 °C)   Wt 89.9 kg (198 lb 3.2 oz)   SpO2 96%   BMI 32.98 kg/m²   Physical Exam  Constitutional:       General: She is not in  acute distress.     Appearance: She is well-developed.   HENT:      Head: Normocephalic and atraumatic.      Right Ear: External ear normal.      Left Ear: External ear normal.      Nose: Nose normal.   Eyes:      Extraocular Movements: Extraocular movements intact.      Pupils: Pupils are equal, round, and reactive to light.   Neck:      Musculoskeletal: Normal range of motion and neck supple.   Cardiovascular:      Rate and Rhythm: Normal rate and regular rhythm.      Heart sounds: Normal heart sounds. No murmur. No friction rub. No gallop.    Pulmonary:      Effort: Pulmonary effort is normal. No respiratory distress.      Breath sounds: Normal breath sounds. No wheezing or rales.   Abdominal:      General: Bowel sounds are normal. There is no distension.      Palpations: Abdomen is soft.      Tenderness: There is no abdominal tenderness.   Musculoskeletal: Normal range of motion.      Right lower leg: No edema.      Left lower leg: No edema.   Skin:     General: Skin is warm.      Findings: No erythema.   Neurological:      Mental Status: She is alert and oriented to person, place, and time. Mental status is at baseline.   Psychiatric:         Mood and Affect: Mood normal.         Behavior: Behavior normal.          Assessment/Plan:     Diagnoses and all orders for this visit:    1. Encounter for medication review (Primary)        - Will change Symbicort to Trelegy due to uncontrolled respiratory symptoms.  Will obtain iron studies due to previous low iron and currently being on folate.  Will obtain hemoglobin A1c as 4 months ago it was 6 and depending on the new value can consider decreasing or discontinuing Metformin.  Will evaluate to discontinue Aggrenox and start baby aspirin alone which was also recommended by cardiology.    2. Iron deficiency  -     Iron and TIBC; Future  -     Ferritin; Future  -     CBC w AUTO Differential; Future    3. Type 2 diabetes mellitus with diabetic polyneuropathy, without  long-term current use of insulin (CMS/[facility])  -     Hemoglobin A1c; Future    Other orders  -     albuterol sulfate  (90 Base) MCG/ACT inhaler; Inhale 2 puffs Every 6 (Six) Hours As Needed for Wheezing or Shortness of Air.  Dispense: 18 g; Refill: 9  -     Fluticasone-Umeclidin-Vilant (Trelegy Ellipta) 100-62.5-25 MCG/INH aerosol powder ; Inhale 1 puff Daily.  Dispense: 60 each; Refill: 2        · Rx changes: Discontinue Symbicort.  Started Trelegy  · Patient Education: Continue current medication regimen aside from changes made on top I will work to reduce polypharmacy.  · Compliance at present is estimated to be excellent.   · Efforts to improve compliance (if necessary) will be directed at unnecessary at this time.     Follow-up:     Return in about 4 weeks (around 12/3/2020) for Labs and medication.    Preventative:  Health Maintenance   Topic Date Due   • COLONOSCOPY  1945   • Hepatitis B (1 of 3 - Risk 3-dose series) 08/11/1963   • ZOSTER VACCINE (1 of 2) 08/11/1994   • Pneumococcal Vaccine 65+ (1 of 1 - PPSV23) 08/11/2009   • ANNUAL WELLNESS VISIT  08/28/2017   • MAMMOGRAM  08/28/2017   • DIABETIC EYE EXAM  08/28/2017   • INFLUENZA VACCINE  08/01/2020   • HEMOGLOBIN A1C  01/21/2021   • LIPID PANEL  07/21/2021   • URINE MICROALBUMIN  10/21/2021   • TDAP/TD VACCINES (2 - Tdap) 09/25/2023   • HEPATITIS C SCREENING  Completed       Recommended: none  Vaccine Counseling: N/A    Weight  -Class: Obese Class I: 30-34.9kg/m2  -Patient's Body mass index is 32.98 kg/m². BMI is above normal parameters. Recommendations include: exercise counseling and nutrition counseling.   eat more fruits and vegetables, decrease soda or juice intake, increase water intake and increase physical activity    Alcohol use:  reports no history of alcohol use.  Nicotine status  reports that she has quit smoking. She has never used smokeless tobacco.    Goals     • Medication management     • Self-monitor blood pressure            RISK SCORE: 3          This document has been electronically signed by Vinod Blanton MD on November 5, 2020 21:16 CST

## 2020-11-06 NOTE — TELEPHONE ENCOUNTER
MADAN FROM [facility] CALLED AND IS NEEDING A CALL BACK FROM DR BAZAN REGARDING A PHYSICAL THERAPY ORDER FOR THE PATIENT. HER NUMBER -[phone].        THANK YOU,      MARIBEL

## 2020-11-06 NOTE — PROGRESS NOTES
I have reviewed the notes, assessments, and/or procedures performed by Dr. Blanton, I concur with her/his documentation and assessment and plan for PatriziaVerna Blair.    Ms Blair is to cont her home O2 for her COPD            This document has been electronically signed by Homer Herrera MD on November 6, 2020 09:58 CST

## 2020-11-09 NOTE — PROGRESS NOTES
Patient wants to reduce medication burden.  Due to A1c decreasing from 6-5.9 recommended instead of taking Metformin 1000 mg twice daily she can take 1000 mg daily and will reassess A1c in 3 months.          This document has been electronically signed by Vinod Blanton MD on November 9, 2020 10:47 CST

## 2020-11-10 ENCOUNTER — TELEPHONE (OUTPATIENT)
Dept: FAMILY MEDICINE CLINIC | Facility: CLINIC | Age: 75
End: 2020-11-10

## 2020-11-10 NOTE — TELEPHONE ENCOUNTER
[facility] CALLED ASKING FOR PATIENT LAST OFFICE NOTE FROM THE 5TH TO BE SENT TO THEIR OFFICE, THEY ARE ALSO NEEDING TH CMN SENT TO FAX NUMBER [phone].    CALL BACK NUMBER FOR THEM -[phone].    THANKS,  MACRINA

## 2020-11-16 RX ORDER — ROSUVASTATIN CALCIUM 10 MG/1
TABLET, COATED ORAL
Qty: 90 TABLET | Refills: 11 | Status: SHIPPED | OUTPATIENT
Start: 2020-11-16 | End: 2021-11-29

## 2020-11-23 ENCOUNTER — HOSPITAL ENCOUNTER (OUTPATIENT)
Dept: MRI IMAGING | Facility: HOSPITAL | Age: 75
Discharge: HOME | End: 2020-11-23
Admitting: NURSE PRACTITIONER

## 2020-11-23 DIAGNOSIS — M54.6 PAIN IN THORACIC SPINE: ICD-10-CM

## 2020-11-23 DIAGNOSIS — M54.2 CERVICALGIA: ICD-10-CM

## 2020-11-23 DIAGNOSIS — M47.817 SPONDYLOSIS WITHOUT MYELOPATHY OR RADICULOPATHY, LUMBOSACRAL REGION: ICD-10-CM

## 2020-11-23 PROCEDURE — 72148 MRI LUMBAR SPINE W/O DYE: CPT

## 2020-11-30 RX ORDER — AMLODIPINE BESYLATE 10 MG/1
10 TABLET ORAL DAILY
Qty: 30 TABLET | Refills: 5 | Status: SHIPPED | OUTPATIENT
Start: 2020-11-30 | End: 2021-05-21

## 2020-11-30 RX ORDER — LISINOPRIL 10 MG/1
10 TABLET ORAL DAILY
Qty: 30 TABLET | Refills: 5 | Status: SHIPPED | OUTPATIENT
Start: 2020-11-30 | End: 2021-05-21

## 2020-12-02 ENCOUNTER — TELEPHONE (OUTPATIENT)
Dept: FAMILY MEDICINE CLINIC | Facility: CLINIC | Age: 75
End: 2020-12-02

## 2020-12-02 NOTE — TELEPHONE ENCOUNTER
FRANKO FROM [facility] CALLED ASKING FOR PATIENTS CERTIFICATE OF MEDICAL NECESSITY AND CHART NOTES FROM PATIENTS LAST OFFICE VISIT WITH OUR OFFICE ON THE 5TH FOR HER O2.    PATIENTS INSURANCE STOPPED PAYING FOR HER O2 BACK IN October, IF THEY DO NOT GET THIS INFORMATION IN WITHIN THE NEXT TWO WEEK THEY ARE GOING TO HAVE TO  PATIENTS OXYGEN.    CALL BACK NUMBER FOR THEM -[phone]    PATIENTS

## 2020-12-15 DIAGNOSIS — E11.42 TYPE 2 DIABETES MELLITUS WITH DIABETIC POLYNEUROPATHY, WITHOUT LONG-TERM CURRENT USE OF INSULIN (HCC): ICD-10-CM

## 2020-12-16 ENCOUNTER — OFFICE VISIT (OUTPATIENT)
Dept: FAMILY MEDICINE CLINIC | Facility: CLINIC | Age: 75
End: 2020-12-16

## 2020-12-16 VITALS
SYSTOLIC BLOOD PRESSURE: 158 MMHG | DIASTOLIC BLOOD PRESSURE: 88 MMHG | WEIGHT: 203 LBS | HEART RATE: 70 BPM | OXYGEN SATURATION: 92 % | BODY MASS INDEX: 33.78 KG/M2

## 2020-12-16 DIAGNOSIS — M50.322 DEGENERATION OF INTERVERTEBRAL DISC AT C5-C6 LEVEL: ICD-10-CM

## 2020-12-16 DIAGNOSIS — M51.16 LUMBAR DISC DISEASE WITH RADICULOPATHY: ICD-10-CM

## 2020-12-16 DIAGNOSIS — I10 ESSENTIAL HYPERTENSION: Primary | ICD-10-CM

## 2020-12-16 DIAGNOSIS — R09.82 POSTNASAL DRIP: ICD-10-CM

## 2020-12-16 DIAGNOSIS — J44.9 CHRONIC OBSTRUCTIVE PULMONARY DISEASE, UNSPECIFIED COPD TYPE (HCC): ICD-10-CM

## 2020-12-16 DIAGNOSIS — Z76.89 ENCOUNTER TO ESTABLISH CARE: ICD-10-CM

## 2020-12-16 DIAGNOSIS — E11.42 TYPE 2 DIABETES MELLITUS WITH DIABETIC POLYNEUROPATHY, WITHOUT LONG-TERM CURRENT USE OF INSULIN (HCC): ICD-10-CM

## 2020-12-16 DIAGNOSIS — Z12.31 ENCOUNTER FOR SCREENING MAMMOGRAM FOR MALIGNANT NEOPLASM OF BREAST: ICD-10-CM

## 2020-12-16 PROCEDURE — 99214 OFFICE O/P EST MOD 30 MIN: CPT | Performed by: FAMILY MEDICINE

## 2020-12-16 RX ORDER — CITALOPRAM 40 MG/1
40 TABLET ORAL
COMMUNITY
Start: 2020-09-24 | End: 2020-12-16

## 2020-12-16 RX ORDER — FAMOTIDINE 20 MG/1
20 TABLET, FILM COATED ORAL DAILY
COMMUNITY
Start: 2020-08-24 | End: 2021-08-03

## 2020-12-16 RX ORDER — METFORMIN HYDROCHLORIDE 500 MG/1
TABLET, EXTENDED RELEASE ORAL
Qty: 360 TABLET | Refills: 1 | Status: SHIPPED | OUTPATIENT
Start: 2020-12-16 | End: 2021-06-04

## 2020-12-16 RX ORDER — TRIAMCINOLONE ACETONIDE 55 UG/1
1 SPRAY, METERED NASAL EVERY OTHER DAY
Qty: 16.5 G | Refills: 2 | Status: SHIPPED | OUTPATIENT
Start: 2020-12-16 | End: 2021-05-21

## 2020-12-16 RX ORDER — FOLIC ACID 1 MG/1
1000 TABLET ORAL DAILY
COMMUNITY
Start: 2020-08-31 | End: 2021-05-17

## 2020-12-16 RX ORDER — PREGABALIN 75 MG/1
75 CAPSULE ORAL DAILY
Qty: 30 CAPSULE | Refills: 0 | Status: SHIPPED | OUTPATIENT
Start: 2020-12-25 | End: 2021-03-04 | Stop reason: SDUPTHER

## 2021-01-04 RX ORDER — ROPINIROLE 1 MG/1
TABLET, FILM COATED ORAL
Qty: 90 TABLET | Refills: 0 | Status: SHIPPED | OUTPATIENT
Start: 2021-01-04 | End: 2021-01-29 | Stop reason: SDUPTHER

## 2021-01-14 ENCOUNTER — CONSULT (OUTPATIENT)
Dept: SLEEP MEDICINE | Facility: HOSPITAL | Age: 76
End: 2021-01-14

## 2021-01-14 VITALS
HEIGHT: 65 IN | DIASTOLIC BLOOD PRESSURE: 72 MMHG | HEART RATE: 71 BPM | OXYGEN SATURATION: 98 % | WEIGHT: 203 LBS | SYSTOLIC BLOOD PRESSURE: 150 MMHG | BODY MASS INDEX: 33.82 KG/M2

## 2021-01-14 DIAGNOSIS — Z72.821 INADEQUATE SLEEP HYGIENE: ICD-10-CM

## 2021-01-14 DIAGNOSIS — G47.34 NOCTURNAL HYPOXIA: ICD-10-CM

## 2021-01-14 DIAGNOSIS — G47.33 OBSTRUCTIVE SLEEP APNEA, ADULT: Primary | ICD-10-CM

## 2021-01-14 DIAGNOSIS — G25.81 RESTLESS LEGS SYNDROME (RLS): ICD-10-CM

## 2021-01-14 PROCEDURE — 99204 OFFICE O/P NEW MOD 45 MIN: CPT | Performed by: NURSE PRACTITIONER

## 2021-01-14 RX ORDER — FUROSEMIDE 40 MG/1
TABLET ORAL
COMMUNITY
Start: 2020-12-16 | End: 2021-04-30

## 2021-01-14 RX ORDER — ALLOPURINOL 100 MG/1
50 TABLET ORAL DAILY
COMMUNITY
Start: 2020-10-29 | End: 2022-03-28 | Stop reason: SDUPTHER

## 2021-01-14 RX ORDER — BUDESONIDE AND FORMOTEROL FUMARATE DIHYDRATE 160; 4.5 UG/1; UG/1
2 AEROSOL RESPIRATORY (INHALATION)
COMMUNITY
Start: 2021-01-12 | End: 2021-11-19

## 2021-01-14 NOTE — PROGRESS NOTES
New Patient Sleep Medicine Consultation    Encounter Date: 2021         Patient's Primary Care Provider: Brook Arreola MD  Referring Provider: Willem Son MD  Reason for consultation/chief complaint: known MARISOL, not on CPAP therapy; nocturnal hypoxia - on nocturnal O2 @ 2.5 L    Ronda Blair is a 75 y.o. female who admits to snoring, unrestful sleep, High blod pressure, excessive daytime sleepiness, sleeping less than 6 hours per night, Disturbed or restless sleep, memory loss, abnormal or violent dreams and restless legs at night.     She denies cataplexy, sleep paralysis, or hypnagogic hallucinations. Her bedtime is ~ 0230. She  falls asleep after 5-10 minutes, and is up 1-2 times per night. She wakes up ~ 0730. She endorses 4-5 hours of sleep. She drinks 0-1 cups of coffee, 0 teas, and 1 sodas per day. She drinks 0 alcoholic beverages per week. She is not a current smoker. She does not take sedatives or hypnotics. She has no sleepiness with driving. She naps every day. Takes Requip 1 mg QHS for restless legs.     Of note, patient does have moderate COPD and HFpEf.  Recent PFTs 09/10/2020:  1. SOB (shortness of breath) [R06.02]      Pulmonary Function Test  Performed by: Samira Rizzo MD  Authorized by: Genaro Cota MD       Pre Drug    FVC: 66%   FEV1: 59%   FEV1/FVC: 68%   T%   DLCO: 59%      Post Drug    FVC: 65%   FEV1: 63%   FEV1/FVC: 74%      Change in % (calculated):    FVC: -1   FEV1: 5     Interpretation   Spirometry   Spirometry shows moderate restriction. The patient's response to bronchodilators has insignificant change.   Review of FVL curve   Effort is normal.   Lung Volume Measurements  Measurements show: reduced lung volumes consistent with restriction.   Diffusion Capacity  The patient's diffusion capacity is moderately reduced.          Recent echocardiogram 2020:  TTE/ELVIS:       Results for orders placed during the hospital encounter of 20    Transthoracic Echo Complete With Contrast if Necessary Per Protocol     Narrative · Estimated EF appears to be in the range of 56 - 60%.  · Left ventricular systolic function is normal.  · Left ventricular diastolic dysfunction (grade II) consistent with   pseudonormalization.  · Left ventricular wall thickness is consistent with borderline concentric   hypertrophy.  · Left atrial cavity size is moderately dilated.  · Mild mitral valve regurgitation is present  · Mild tricuspid valve regurgitation is present.          DME for O2: [facility]    [place] - [address]    Prior Sleep Testin. PSG in ~, AHI of ?   2. PSG at least 10 years ago in [place]  3. Currently on no therapy - not on CPAP for at least 8 years    Past Medical History:   Diagnosis Date   • Anxiety    • Arthritis    • COPD (chronic obstructive pulmonary disease) (CMS/[facility])    • Depression    • Diabetes mellitus (CMS/[facility])    • History of transfusion    • Hyperlipidemia    • Hypertension    • Stroke (CMS/[facility])     Greater than 5 years ago     Social History     Socioeconomic History   • Marital status:      Spouse name: Not on file   • Number of children: Not on file   • Years of education: Not on file   • Highest education level: Not on file   Tobacco Use   • Smoking status: Former Smoker     Packs/day: 1.00     Years: 40.00     Pack years: 40.00     Quit date:      Years since quittin.0   • Smokeless tobacco: Never Used   Substance and Sexual Activity   • Alcohol use: No   • Drug use: No   • Sexual activity: Defer     Family History   Problem Relation Age of Onset   • Diabetes Other    • Hypertension Other    • Other Other    • Kidney disease Other    • Heart attack Father 75   • Cancer Sister    • Lung cancer Brother    • Hypertension Sister      Prior T&A, UPPP, maxillofacial, or bariatric surgery: none  Family history of sleep disorders: none  Other family history + for: as above  Occupation: Disabled, former  " at school  Marital status: Unmarried  Children: 2  Has 1 brothers and 3 sisters  Smoking history: smoked 1.5 ppds from age 12 until 55      Review of Systems:  Constitutional: positive for fatigue  Eyes: negative  Ears, nose, mouth, throat, and face: negative  Respiratory: positive for chronic bronchitis, cough and dyspnea on exertion  Cardiovascular: negative  Gastrointestinal: negative  Genitourinary:negative  Integument/breast: negative  Hematologic/lymphatic: negative  Musculoskeletal:positive for muscle weakness  Neurological: negative  Behavioral/Psych: negative  Endocrine: negative  Allergic/Immunologic: positive for allergic rhinitis   Patient advised to discuss any positive ROS with PCP.      Vitals:    01/14/21 1125   BP: 150/72   Pulse: 71   SpO2: 98%           01/14/21  1125   Weight: 92.1 kg (203 lb)       Body mass index is 33.78 kg/m². Patient's Body mass index is 33.78 kg/m². BMI is above normal parameters. Recommendations include: referral to primary care.      Physical Exam:        General: Alert. Cooperative. Well developed. No acute distress.   Head/Neck:  Normocephalic. Symmetrical. Atraumatic.     Neck circumference: 17\"             Eyes: Sclera clear. No icterus. PERRLA. Normal EOM.             Ears: No deformities. Normal hearing.             Nose: No septal deviation. No drainage.          Throat: No oral lesions. No thrush. Moist mucous membranes. Trachea midline    Tongue is normal     Dentition is upper and lower dentures       Pharynx: Posterior pharyngeal pillars are narrow    Mallampati score of IV (only hard palate visible)    Pharynx is normal with unrermarkable tonsils   Chest Wall:  Normal shape. Symmetric expansion with respiration. No tenderness.          Lungs:  Mild wheezes right lower lobe, otherwise clear to auscultation. No rhonchi. No rales. Respirations regular, even and unlabored.            Heart:  Regular rhythm and normal rate. Normal S1 and S2. No murmur.     " Abdomen:  Soft, non-tender and non-distended. Normal bowel sounds. No masses.  Extremities:  Moves all extremities well. No edema.           Pulses: Pulses palpable and equal bilaterally.               Skin: Dry. Intact. No bleeding. No rash.           Neuro: Moves all 4 extremities and cranial nerves grossly intact.  Psychiatric: Normal mood and affect.      Current Outpatient Medications:   •  albuterol sulfate  (90 Base) MCG/ACT inhaler, Inhale 2 puffs Every 6 (Six) Hours As Needed for Wheezing or Shortness of Air., Disp: 18 g, Rfl: 9  •  allopurinol (ZYLOPRIM) 100 MG tablet, TAKE 1/2 TABLET BY MOUTH 1 (ONE) TIME EACH DAY, Disp: , Rfl:   •  ALPRAZolam (XANAX) 1 MG tablet, Take 1 mg by mouth Daily. Take 1/2 in the morning and one at bedtime PRN anxiety and insomnia , Disp: , Rfl:   •  amLODIPine (NORVASC) 10 MG tablet, Take 1 tablet by mouth Daily., Disp: 30 tablet, Rfl: 5  •  aspirin-dipyridamole (AGGRENOX)  MG per 12 hr capsule, Take 1 capsule by mouth 2 (Two) Times a Day., Disp: 120 capsule, Rfl: 2  •  cholecalciferol (VITAMIN D3) 25 MCG (1000 UT) tablet, Take 1,000 Units by mouth Daily., Disp: , Rfl:   •  citalopram (CeleXA) 40 MG tablet, Take 40 mg by mouth Every Night., Disp: , Rfl:   •  famotidine (PEPCID) 20 MG tablet, Take 20 mg by mouth Daily., Disp: , Rfl:   •  Fluticasone-Umeclidin-Vilant (Trelegy Ellipta) 100-62.5-25 MCG/INH aerosol powder , Inhale 1 puff Daily., Disp: 60 each, Rfl: 2  •  folic acid (FOLVITE) 1 MG tablet, Take 1,000 mcg by mouth Daily., Disp: , Rfl:   •  furosemide (LASIX) 40 MG tablet, TAKE 1/2 TABLETS BY MOUTH DAILY AS NEEDED (LEG SWELLING) FOR UP TO 30 DAYS., Disp: , Rfl:   •  hydroCHLOROthiazide (HYDRODIURIL) 25 MG tablet, Take 1 tablet by mouth Daily., Disp: 30 tablet, Rfl: 11  •  ipratropium (ATROVENT) 0.02 % nebulizer solution, Take 2.5 mL by nebulization 3 (Three) Times a Day., Disp: 2.5 mL, Rfl: 0  •  isosorbide mononitrate (IMDUR) 30 MG 24 hr tablet, Take 1  tablet by mouth Daily., Disp: 30 tablet, Rfl: 1  •  lisinopril (PRINIVIL,ZESTRIL) 10 MG tablet, Take 1 tablet by mouth Daily., Disp: 30 tablet, Rfl: 5  •  Loratadine 10 MG capsule, Take 10 mg by mouth Daily., Disp: , Rfl:   •  metFORMIN ER (GLUCOPHAGE-XR) 500 MG 24 hr tablet, TAKE 2 TABLETS BY MOUTH TWICE A DAY, Disp: 360 tablet, Rfl: 1  •  nebivolol (BYSTOLIC) 5 MG tablet, Take 1 tablet by mouth Daily., Disp: 90 tablet, Rfl: 1  •  Omega-3 Fatty Acids (FISH OIL) 1200 MG capsule capsule, Take 1,200 mg by mouth 3 (Three) Times a Day With Meals., Disp: , Rfl:   •  oxyCODONE-acetaminophen (PERCOCET)  MG per tablet, Take 1 tablet by mouth Every 6 (Six) Hours As Needed for Moderate Pain ., Disp: , Rfl:   •  pregabalin (LYRICA) 75 MG capsule, Take 1 capsule by mouth Daily., Disp: 30 capsule, Rfl: 0  •  rOPINIRole (REQUIP) 1 MG tablet, Take 1 hour before bedtime., Disp: 90 tablet, Rfl: 0  •  rosuvastatin (CRESTOR) 10 MG tablet, TAKE 1 TABLET BY MOUTH EVERY DAY, Disp: 90 tablet, Rfl: 11  •  Symbicort 160-4.5 MCG/ACT inhaler, , Disp: , Rfl:   •  tiZANidine (ZANAFLEX) 4 MG tablet, Take 1 tablet by mouth At Night As Needed for Muscle Spasms., Disp: 90 tablet, Rfl: 2  •  Triamcinolone Acetonide (NASACORT) 55 MCG/ACT nasal inhaler, 1 spray into the nostril(s) as directed by provider Every Other Day., Disp: 16.5 g, Rfl: 2  •  venlafaxine XR (EFFEXOR-XR) 37.5 MG 24 hr capsule, Take 37.5 mg by mouth Daily., Disp: , Rfl:     WBC   Date Value Ref Range Status   11/05/2020 6.83 3.40 - 10.80 10*3/mm3 Final     RBC   Date Value Ref Range Status   11/05/2020 4.00 3.77 - 5.28 10*6/mm3 Final     Hemoglobin   Date Value Ref Range Status   11/05/2020 11.4 (L) 12.0 - 15.9 g/dL Final     Hematocrit   Date Value Ref Range Status   11/05/2020 35.4 34.0 - 46.6 % Final     MCV   Date Value Ref Range Status   11/05/2020 88.5 79.0 - 97.0 fL Final     MCH   Date Value Ref Range Status   11/05/2020 28.5 26.6 - 33.0 pg Final     MCHC   Date Value  Ref Range Status   11/05/2020 32.2 31.5 - 35.7 g/dL Final     RDW   Date Value Ref Range Status   11/05/2020 14.0 12.3 - 15.4 % Final     RDW-SD   Date Value Ref Range Status   11/05/2020 46.0 37.0 - 54.0 fl Final     MPV   Date Value Ref Range Status   11/05/2020 12.3 (H) 6.0 - 12.0 fL Final     Platelets   Date Value Ref Range Status   11/05/2020 169 140 - 450 10*3/mm3 Final     Neutrophil %   Date Value Ref Range Status   11/05/2020 57.6 42.7 - 76.0 % Final     Lymphocyte %   Date Value Ref Range Status   11/05/2020 30.9 19.6 - 45.3 % Final     Monocyte %   Date Value Ref Range Status   11/05/2020 7.8 5.0 - 12.0 % Final     Eosinophil %   Date Value Ref Range Status   11/05/2020 3.1 0.3 - 6.2 % Final     Basophil %   Date Value Ref Range Status   11/05/2020 0.3 0.0 - 1.5 % Final     Immature Grans %   Date Value Ref Range Status   11/05/2020 0.3 0.0 - 0.5 % Final     Neutrophils, Absolute   Date Value Ref Range Status   11/05/2020 3.94 1.70 - 7.00 10*3/mm3 Final     Lymphocytes, Absolute   Date Value Ref Range Status   11/05/2020 2.11 0.70 - 3.10 10*3/mm3 Final     Monocytes, Absolute   Date Value Ref Range Status   11/05/2020 0.53 0.10 - 0.90 10*3/mm3 Final     Eosinophils, Absolute   Date Value Ref Range Status   11/05/2020 0.21 0.00 - 0.40 10*3/mm3 Final     Basophils, Absolute   Date Value Ref Range Status   11/05/2020 0.02 0.00 - 0.20 10*3/mm3 Final     Immature Grans, Absolute   Date Value Ref Range Status   11/05/2020 0.02 0.00 - 0.05 10*3/mm3 Final     nRBC   Date Value Ref Range Status   11/05/2020 0.0 0.0 - 0.2 /100 WBC Final     Iron   Date Value Ref Range Status   11/05/2020 86 37 - 145 mcg/dL Final     Ferritin   Date Value Ref Range Status   11/05/2020 29.30 13.00 - 150.00 ng/mL Final     Lab Results   Component Value Date    GLUCOSE 112 (H) 10/21/2020    BUN 21 10/21/2020    CREATININE 1.36 (H) 10/21/2020    EGFRIFNONA 38 (L) 10/21/2020    BCR 15.4 10/21/2020    K 4.0 10/21/2020    CO2 28.0  10/21/2020    CALCIUM 9.5 10/21/2020    ALBUMIN 4.20 10/21/2020    AST 18 08/30/2020    ALT 26 08/30/2020       Contraindications to home sleep test: Moderate or severe COPD, Moderate or severe congestive heart failure, please note class _ _, Unable to follow simple instructions, Sleep related movement disorder like periodic limb movement disorder, Patient has a history of obstructive sleep apnea, and uncontrolled on current settings and Patient is oxygen dependent    ASSESSMENT:  1. Obstructive sleep apnea - New (to me), additional work-up planned (4)  1. Check split night polysomnography   2. Restless leg syndrome/ Periodic limb movement disorder - (RLS/PLMD) New (to me), additional work-up planned (4)  1. Address after PSG  2. Consider iron/ferritin level, continue  3. Insomnia - sleep onset/maintenance - New (to me), additional work-up planned (4)  4. Poor sleep hygiene - New (to me), additional work-up planned (4)  5. O2 dependent COPD - New (to me), additional work-up planned (4)    1.   Continue nocturnal O2 @ 2.5 LPM      I spent 45 minutes caring for Ronda on this date of service. This time includes time spent by me in the following activities: preparing for the visit, reviewing tests, obtaining and/or reviewing a separately obtained history, performing a medically appropriate examination and/or evaluation , counseling and educating the patient/family/caregiver, ordering medications, tests, or procedures and documenting information in the medical record; discussing PAP therapy, Sleep hygiene and Further testing    RTC 2 weeks after testing. Patient agrees to return sooner if changes in symptoms.           This document has been electronically signed by CIARA Steele on January 14, 2021 11:34 CST          CC: Brook Arreola MD Weber, Alexander B, MD

## 2021-01-19 RX ORDER — ISOSORBIDE MONONITRATE 30 MG/1
30 TABLET, EXTENDED RELEASE ORAL DAILY
Qty: 30 TABLET | Refills: 1 | Status: SHIPPED | OUTPATIENT
Start: 2021-01-19 | End: 2021-02-15 | Stop reason: SDUPTHER

## 2021-01-25 ENCOUNTER — IMMUNIZATION (OUTPATIENT)
Dept: VACCINE CLINIC | Facility: HOSPITAL | Age: 76
End: 2021-01-25

## 2021-01-25 ENCOUNTER — LAB (OUTPATIENT)
Dept: LAB | Facility: HOSPITAL | Age: 76
End: 2021-01-25

## 2021-01-25 ENCOUNTER — TRANSCRIBE ORDERS (OUTPATIENT)
Dept: LAB | Facility: HOSPITAL | Age: 76
End: 2021-01-25

## 2021-01-25 ENCOUNTER — HOSPITAL ENCOUNTER (EMERGENCY)
Facility: HOSPITAL | Age: 76
Discharge: HOME | End: 2021-01-25
Attending: EMERGENCY MEDICINE | Admitting: EMERGENCY MEDICINE

## 2021-01-25 VITALS
SYSTOLIC BLOOD PRESSURE: 180 MMHG | HEIGHT: 65 IN | DIASTOLIC BLOOD PRESSURE: 83 MMHG | RESPIRATION RATE: 20 BRPM | BODY MASS INDEX: 33.82 KG/M2 | OXYGEN SATURATION: 96 % | HEART RATE: 60 BPM | WEIGHT: 203 LBS | TEMPERATURE: 98.1 F

## 2021-01-25 DIAGNOSIS — Z20.822 ENCOUNTER FOR PREOPERATIVE SCREENING LABORATORY TESTING FOR COVID-19 VIRUS: Primary | ICD-10-CM

## 2021-01-25 DIAGNOSIS — N18.30 STAGE 3 CHRONIC KIDNEY DISEASE, UNSPECIFIED WHETHER STAGE 3A OR 3B CKD (HCC): Primary | ICD-10-CM

## 2021-01-25 DIAGNOSIS — N18.30 STAGE 3 CHRONIC KIDNEY DISEASE, UNSPECIFIED WHETHER STAGE 3A OR 3B CKD (HCC): ICD-10-CM

## 2021-01-25 DIAGNOSIS — Z01.812 ENCOUNTER FOR PREOPERATIVE SCREENING LABORATORY TESTING FOR COVID-19 VIRUS: Primary | ICD-10-CM

## 2021-01-25 DIAGNOSIS — N18.32 CHRONIC KIDNEY DISEASE (CKD) STAGE G3B/A1, MODERATELY DECREASED GLOMERULAR FILTRATION RATE (GFR) BETWEEN 30-44 ML/MIN/1.73 SQUARE METER AND ALBUMINURIA CREATININE RATIO LESS THAN 30 MG/G (CMS/H* (HCC): Primary | ICD-10-CM

## 2021-01-25 LAB — SARS-COV-2 N GENE RESP QL NAA+PROBE: NOT DETECTED

## 2021-01-25 PROCEDURE — C9803 HOPD COVID-19 SPEC COLLECT: HCPCS | Performed by: EMERGENCY MEDICINE

## 2021-01-25 PROCEDURE — 91300 HC SARSCOV02 VAC 30MCG/0.3ML IM: CPT | Performed by: THORACIC SURGERY (CARDIOTHORACIC VASCULAR SURGERY)

## 2021-01-25 PROCEDURE — 0001A: CPT | Performed by: THORACIC SURGERY (CARDIOTHORACIC VASCULAR SURGERY)

## 2021-01-25 PROCEDURE — 99283 EMERGENCY DEPT VISIT LOW MDM: CPT

## 2021-01-25 PROCEDURE — 36415 COLL VENOUS BLD VENIPUNCTURE: CPT

## 2021-01-25 PROCEDURE — 87635 SARS-COV-2 COVID-19 AMP PRB: CPT | Performed by: EMERGENCY MEDICINE

## 2021-01-25 PROCEDURE — 80069 RENAL FUNCTION PANEL: CPT

## 2021-01-25 PROCEDURE — 84550 ASSAY OF BLOOD/URIC ACID: CPT

## 2021-01-26 LAB
ALBUMIN SERPL-MCNC: 4.2 G/DL (ref 3.5–5.2)
ANION GAP SERPL CALCULATED.3IONS-SCNC: 7.4 MMOL/L (ref 5–15)
BUN SERPL-MCNC: 20 MG/DL (ref 8–23)
BUN/CREAT SERPL: 17.9 (ref 7–25)
CALCIUM SPEC-SCNC: 9.4 MG/DL (ref 8.6–10.5)
CHLORIDE SERPL-SCNC: 102 MMOL/L (ref 98–107)
CO2 SERPL-SCNC: 32.6 MMOL/L (ref 22–29)
CREAT SERPL-MCNC: 1.12 MG/DL (ref 0.57–1)
GFR SERPL CREATININE-BSD FRML MDRD: 47 ML/MIN/1.73
GLUCOSE SERPL-MCNC: 94 MG/DL (ref 65–99)
PHOSPHATE SERPL-MCNC: 3.6 MG/DL (ref 2.5–4.5)
POTASSIUM SERPL-SCNC: 4.3 MMOL/L (ref 3.5–5.2)
SODIUM SERPL-SCNC: 142 MMOL/L (ref 136–145)
URATE SERPL-MCNC: 7.6 MG/DL (ref 2.4–5.7)

## 2021-01-27 ENCOUNTER — HOSPITAL ENCOUNTER (OUTPATIENT)
Dept: SLEEP MEDICINE | Facility: HOSPITAL | Age: 76
Discharge: HOME | End: 2021-01-27
Admitting: NURSE PRACTITIONER

## 2021-01-27 VITALS — BODY MASS INDEX: 33.82 KG/M2 | WEIGHT: 203 LBS | HEIGHT: 65 IN

## 2021-01-27 DIAGNOSIS — G47.33 OBSTRUCTIVE SLEEP APNEA, ADULT: ICD-10-CM

## 2021-01-27 DIAGNOSIS — G47.34 NOCTURNAL HYPOXIA: ICD-10-CM

## 2021-01-27 DIAGNOSIS — G25.81 RESTLESS LEGS SYNDROME (RLS): ICD-10-CM

## 2021-01-27 PROCEDURE — 95811 POLYSOM 6/>YRS CPAP 4/> PARM: CPT | Performed by: PSYCHIATRY & NEUROLOGY

## 2021-01-27 PROCEDURE — 95811 POLYSOM 6/>YRS CPAP 4/> PARM: CPT

## 2021-01-29 ENCOUNTER — OFFICE VISIT (OUTPATIENT)
Dept: FAMILY MEDICINE CLINIC | Facility: CLINIC | Age: 76
End: 2021-01-29

## 2021-01-29 VITALS
BODY MASS INDEX: 34.49 KG/M2 | HEART RATE: 62 BPM | WEIGHT: 207 LBS | OXYGEN SATURATION: 98 % | DIASTOLIC BLOOD PRESSURE: 78 MMHG | HEIGHT: 65 IN | SYSTOLIC BLOOD PRESSURE: 140 MMHG

## 2021-01-29 DIAGNOSIS — Z00.00 MEDICARE ANNUAL WELLNESS VISIT, SUBSEQUENT: Primary | ICD-10-CM

## 2021-01-29 PROCEDURE — G0439 PPPS, SUBSEQ VISIT: HCPCS | Performed by: FAMILY MEDICINE

## 2021-01-29 RX ORDER — ROPINIROLE 1 MG/1
2 TABLET, FILM COATED ORAL NIGHTLY
Qty: 60 TABLET | Refills: 0 | Status: SHIPPED | OUTPATIENT
Start: 2021-01-29 | End: 2021-03-01 | Stop reason: SDUPTHER

## 2021-02-05 ENCOUNTER — TELEPHONE (OUTPATIENT)
Dept: FAMILY MEDICINE CLINIC | Facility: CLINIC | Age: 76
End: 2021-02-05

## 2021-02-05 NOTE — TELEPHONE ENCOUNTER
Please call and let patient know that screening mammogram was normal.  Will plan to repeat in 1 year.  ThanksJEFFERSON

## 2021-02-05 NOTE — TELEPHONE ENCOUNTER
Per Dr. Arreola Ms. Blair has been called with recent Bilateral Screening 3-D Mammogram results and recommendations.

## 2021-02-09 DIAGNOSIS — G47.33 OSA (OBSTRUCTIVE SLEEP APNEA): Primary | ICD-10-CM

## 2021-02-10 ENCOUNTER — TELEPHONE (OUTPATIENT)
Dept: SLEEP MEDICINE | Facility: HOSPITAL | Age: 76
End: 2021-02-10

## 2021-02-10 NOTE — TELEPHONE ENCOUNTER
Patient called and was given results of sleep study.  Patient understood and agreed to proceed with treatment of Cpap as ordered by the physician.  Order for Cpap was sent to [facility] per patient request.  Follow up appointment scheduled.

## 2021-02-15 ENCOUNTER — IMMUNIZATION (OUTPATIENT)
Dept: VACCINE CLINIC | Facility: HOSPITAL | Age: 76
End: 2021-02-15

## 2021-02-15 PROCEDURE — 0002A: CPT | Performed by: THORACIC SURGERY (CARDIOTHORACIC VASCULAR SURGERY)

## 2021-02-15 PROCEDURE — 91300 HC SARSCOV02 VAC 30MCG/0.3ML IM: CPT | Performed by: THORACIC SURGERY (CARDIOTHORACIC VASCULAR SURGERY)

## 2021-02-15 RX ORDER — ISOSORBIDE MONONITRATE 30 MG/1
30 TABLET, EXTENDED RELEASE ORAL DAILY
Qty: 30 TABLET | Refills: 1 | Status: SHIPPED | OUTPATIENT
Start: 2021-02-15 | End: 2021-03-09 | Stop reason: SDUPTHER

## 2021-02-18 DIAGNOSIS — I50.32 CHRONIC HEART FAILURE WITH PRESERVED EJECTION FRACTION (HCC): ICD-10-CM

## 2021-02-18 RX ORDER — NEBIVOLOL 5 MG/1
5 TABLET ORAL
Qty: 90 TABLET | Refills: 1 | Status: SHIPPED | OUTPATIENT
Start: 2021-02-18 | End: 2021-08-16

## 2021-02-18 RX ORDER — ASPIRIN AND DIPYRIDAMOLE 25; 200 MG/1; MG/1
1 CAPSULE, EXTENDED RELEASE ORAL 2 TIMES DAILY
Qty: 120 CAPSULE | Refills: 2 | Status: SHIPPED | OUTPATIENT
Start: 2021-02-18 | End: 2021-08-27

## 2021-02-18 RX ORDER — ASPIRIN AND DIPYRIDAMOLE 25; 200 MG/1; MG/1
CAPSULE, EXTENDED RELEASE ORAL
Qty: 180 CAPSULE | Refills: 1 | OUTPATIENT
Start: 2021-02-18

## 2021-02-18 RX ORDER — NEBIVOLOL HYDROCHLORIDE 5 MG/1
TABLET ORAL
Qty: 90 TABLET | Refills: 1 | OUTPATIENT
Start: 2021-02-18

## 2021-03-01 RX ORDER — ROPINIROLE 1 MG/1
2 TABLET, FILM COATED ORAL NIGHTLY
Qty: 60 TABLET | Refills: 0 | Status: SHIPPED | OUTPATIENT
Start: 2021-03-01 | End: 2021-03-29

## 2021-03-04 DIAGNOSIS — M50.322 DEGENERATION OF INTERVERTEBRAL DISC AT C5-C6 LEVEL: ICD-10-CM

## 2021-03-04 DIAGNOSIS — E11.42 TYPE 2 DIABETES MELLITUS WITH DIABETIC POLYNEUROPATHY, WITHOUT LONG-TERM CURRENT USE OF INSULIN (HCC): ICD-10-CM

## 2021-03-04 DIAGNOSIS — M51.16 LUMBAR DISC DISEASE WITH RADICULOPATHY: ICD-10-CM

## 2021-03-04 RX ORDER — PREGABALIN 75 MG/1
75 CAPSULE ORAL DAILY
Qty: 30 CAPSULE | Refills: 0 | Status: SHIPPED | OUTPATIENT
Start: 2021-03-04 | End: 2021-07-09

## 2021-03-09 RX ORDER — ISOSORBIDE MONONITRATE 30 MG/1
30 TABLET, EXTENDED RELEASE ORAL DAILY
Qty: 30 TABLET | Refills: 1 | Status: SHIPPED | OUTPATIENT
Start: 2021-03-09 | End: 2021-04-05

## 2021-03-29 RX ORDER — ROPINIROLE 1 MG/1
TABLET, FILM COATED ORAL
Qty: 60 TABLET | Refills: 0 | Status: SHIPPED | OUTPATIENT
Start: 2021-03-29 | End: 2021-04-22

## 2021-04-05 RX ORDER — ISOSORBIDE MONONITRATE 30 MG/1
TABLET, EXTENDED RELEASE ORAL
Qty: 30 TABLET | Refills: 1 | Status: SHIPPED | OUTPATIENT
Start: 2021-04-05 | End: 2021-04-28

## 2021-04-12 ENCOUNTER — OFFICE VISIT (OUTPATIENT)
Dept: SLEEP MEDICINE | Facility: HOSPITAL | Age: 76
End: 2021-04-12

## 2021-04-12 DIAGNOSIS — G47.33 OSA AND COPD OVERLAP SYNDROME (HCC): ICD-10-CM

## 2021-04-12 DIAGNOSIS — G47.33 OBSTRUCTIVE SLEEP APNEA, ADULT: Primary | ICD-10-CM

## 2021-04-12 DIAGNOSIS — G25.81 RESTLESS LEGS SYNDROME (RLS): ICD-10-CM

## 2021-04-12 DIAGNOSIS — J44.9 OSA AND COPD OVERLAP SYNDROME (HCC): ICD-10-CM

## 2021-04-12 PROCEDURE — 99442 PR PHYS/QHP TELEPHONE EVALUATION 11-20 MIN: CPT | Performed by: NURSE PRACTITIONER

## 2021-04-12 NOTE — PROGRESS NOTES
Sleep Clinic Follow Up - Telephone Visit      You have chosen to receive care through a telephone visit. Do you consent to use a telephone visit for your medical care today? Yes    Date: 2021  Primary Care Provider: Brook Arreola MD    Last office visit: 2021 (I reviewed this note)    CC: Follow up: MARISOL on started on BiPAP      Interim History:  Since the last visit:    1) moderate MARISOL -  Ronda Blair has remained compliant with CPAP. She denies mask and machine issues, dry mouth, headaches, or pressures intolerance. She denies abnormal dreams, sleep paralysis, nasal congestion, URI sx. Since starting PAP therapy, patient reports more refreshing sleep and less daytime sleepiness. She has still only been sleeping ~5 hours at at time. She does admit to napping daily in her recliner or on the couch for 1-2 hours at a time without her BiPAP.     2) Patient reports occasional RLS symptoms. She described more leg cramping than true restless legs. Stable on Requip 1-2 mg QHS.    Sleep Testin. PSG in ~, AHI of ?  2. PSG in ~, AHI of ? ([place])  3. Split night PSG on 2021, AHI of 24  4. CPAP titration on same day, recommended BiPAP 16/8 cm H2O   5. Currently on 16/8 cm H2O    PAP Data:    Time frame: 2021-2021   Compliance: 97.8 %  Average use on days used: 4 hrs 57 min  Percent of days with usage greater than or equal to 4 hours: 71.1%  PAP range : 16/8 cm H2O  Average 90% pressure: 16/8 cmH2O  Leak: 0 minutes  Average AHI: 8.8 events/hr  Mask type: Full face mask  DME: [facility]    Bed time: 0230  Sleep latency: 5-10 minutes  Number of times awakens during the night: 1   Wake time: 8829-1881 then back to sleep sometimes  Estimated total sleep time at night: 5-6 hours  Caffeine intake: 0-1 cups of coffee, 0 cups of tea, and 1 sodas per day  Alcohol intake: 0 drinks per week  Nap time: daily   Sleepiness with Driving: none     [place] - 6    [place]  Sleepiness Scale    How likely are you to doze off or fall asleep in the following situation, in contrast to feeling just tired?     Use the following scale to choose the most appropriate number for each situation:    0 = would never doze  1 = slight chance of dozing   2 = moderate chance of dozing   3 = high chance of dozing    It is important that you answer each question as best you can.      Situation       Chance of Dozing (0-3)     Sitting and reading            ___0____    Watching TV          ___1____    Sitting, inactive in a public place (e.g. a theatre or meeting)   ___0____    As a passenger in a car for an hour without break    ___0____    Lying down to rest in the afternoon, when circumstances permit  ___3____    Sitting and talking to someone      ___0____    Sitting quietly after a lunch without alcohol     ___2____    In a car, while stopped for a few minutes in traffic    ___0____        PMHx, FH, SH reviewed and pertinent changes are: Reportedly unchanged from last office visit      REVIEW OF SYSTEMS:   Negative for chest pain, SOA, fever, chills, cough, N/V/D, abdominal pain.    Smoking:none    PatriziaVerna Blair  reports that she quit smoking about 22 years ago. She has a 40.00 pack-year smoking history. She has never used smokeless tobacco.      Exam:  Unable to perform physical exam due to conducting telephone visit    Patient's There is no height or weight on file to calculate BMI. BMI is above normal parameters. Recommendations include: referral to primary care.        Past Medical History:   Diagnosis Date   • Anxiety    • Arthritis    • COPD (chronic obstructive pulmonary disease) (CMS/HCC)    • Depression    • Diabetes mellitus (CMS/HCC)    • History of transfusion    • Hyperlipidemia    • Hypertension    • Stroke (CMS/HCC)     Greater than 5 years ago       Current Outpatient Medications:   •  albuterol sulfate  (90 Base) MCG/ACT inhaler, Inhale 2 puffs Every 6 (Six) Hours  As Needed for Wheezing or Shortness of Air., Disp: 18 g, Rfl: 9  •  allopurinol (ZYLOPRIM) 100 MG tablet, TAKE 1/2 TABLET BY MOUTH 1 (ONE) TIME EACH DAY, Disp: , Rfl:   •  ALPRAZolam (XANAX) 1 MG tablet, Take 1 mg by mouth Daily. Take 1/2 in the morning and one at bedtime PRN anxiety and insomnia , Disp: , Rfl:   •  amLODIPine (NORVASC) 10 MG tablet, Take 1 tablet by mouth Daily., Disp: 30 tablet, Rfl: 5  •  aspirin-dipyridamole (AGGRENOX)  MG per 12 hr capsule, Take 1 capsule by mouth 2 (Two) Times a Day., Disp: 120 capsule, Rfl: 2  •  cholecalciferol (VITAMIN D3) 25 MCG (1000 UT) tablet, Take 1,000 Units by mouth Daily., Disp: , Rfl:   •  citalopram (CeleXA) 40 MG tablet, Take 40 mg by mouth Every Night., Disp: , Rfl:   •  famotidine (PEPCID) 20 MG tablet, Take 20 mg by mouth Daily., Disp: , Rfl:   •  Fluticasone-Umeclidin-Vilant (Trelegy Ellipta) 100-62.5-25 MCG/INH aerosol powder , Inhale 1 puff Daily., Disp: 60 each, Rfl: 2  •  folic acid (FOLVITE) 1 MG tablet, Take 1,000 mcg by mouth Daily., Disp: , Rfl:   •  furosemide (LASIX) 40 MG tablet, TAKE 1/2 TABLETS BY MOUTH DAILY AS NEEDED (LEG SWELLING) FOR UP TO 30 DAYS., Disp: , Rfl:   •  hydroCHLOROthiazide (HYDRODIURIL) 25 MG tablet, Take 1 tablet by mouth Daily., Disp: 30 tablet, Rfl: 11  •  ipratropium (ATROVENT) 0.02 % nebulizer solution, Take 2.5 mL by nebulization 3 (Three) Times a Day., Disp: 2.5 mL, Rfl: 0  •  isosorbide mononitrate (IMDUR) 30 MG 24 hr tablet, TAKE 1 TABLET BY MOUTH EVERY DAY, Disp: 30 tablet, Rfl: 1  •  lisinopril (PRINIVIL,ZESTRIL) 10 MG tablet, Take 1 tablet by mouth Daily., Disp: 30 tablet, Rfl: 5  •  Loratadine 10 MG capsule, Take 10 mg by mouth Daily., Disp: , Rfl:   •  metFORMIN ER (GLUCOPHAGE-XR) 500 MG 24 hr tablet, TAKE 2 TABLETS BY MOUTH TWICE A DAY, Disp: 360 tablet, Rfl: 1  •  nebivolol (BYSTOLIC) 5 MG tablet, Take 1 tablet by mouth Daily., Disp: 90 tablet, Rfl: 1  •  Omega-3 Fatty Acids (FISH OIL) 1200 MG capsule  capsule, Take 1,200 mg by mouth 3 (Three) Times a Day With Meals., Disp: , Rfl:   •  oxyCODONE-acetaminophen (PERCOCET)  MG per tablet, Take 1 tablet by mouth Every 6 (Six) Hours As Needed for Moderate Pain ., Disp: , Rfl:   •  pregabalin (LYRICA) 75 MG capsule, Take 1 capsule by mouth Daily., Disp: 30 capsule, Rfl: 0  •  rOPINIRole (REQUIP) 1 MG tablet, TAKE 2 TABLETS BY MOUTH 1 HOUR BEFORE BEDTIME., Disp: 60 tablet, Rfl: 0  •  rosuvastatin (CRESTOR) 10 MG tablet, TAKE 1 TABLET BY MOUTH EVERY DAY, Disp: 90 tablet, Rfl: 11  •  Symbicort 160-4.5 MCG/ACT inhaler, , Disp: , Rfl:   •  tiZANidine (ZANAFLEX) 4 MG tablet, Take 1 tablet by mouth At Night As Needed for Muscle Spasms., Disp: 90 tablet, Rfl: 2  •  Triamcinolone Acetonide (NASACORT) 55 MCG/ACT nasal inhaler, 1 spray into the nostril(s) as directed by provider Every Other Day., Disp: 16.5 g, Rfl: 2  •  venlafaxine XR (EFFEXOR-XR) 37.5 MG 24 hr capsule, Take 37.5 mg by mouth Daily., Disp: , Rfl:      WBC   Date Value Ref Range Status   11/05/2020 6.83 3.40 - 10.80 10*3/mm3 Final     RBC   Date Value Ref Range Status   11/05/2020 4.00 3.77 - 5.28 10*6/mm3 Final     Hemoglobin   Date Value Ref Range Status   11/05/2020 11.4 (L) 12.0 - 15.9 g/dL Final     Hematocrit   Date Value Ref Range Status   11/05/2020 35.4 34.0 - 46.6 % Final     MCV   Date Value Ref Range Status   11/05/2020 88.5 79.0 - 97.0 fL Final     MCH   Date Value Ref Range Status   11/05/2020 28.5 26.6 - 33.0 pg Final     MCHC   Date Value Ref Range Status   11/05/2020 32.2 31.5 - 35.7 g/dL Final     RDW   Date Value Ref Range Status   11/05/2020 14.0 12.3 - 15.4 % Final     RDW-SD   Date Value Ref Range Status   11/05/2020 46.0 37.0 - 54.0 fl Final     MPV   Date Value Ref Range Status   11/05/2020 12.3 (H) 6.0 - 12.0 fL Final     Platelets   Date Value Ref Range Status   11/05/2020 169 140 - 450 10*3/mm3 Final     Neutrophil %   Date Value Ref Range Status   11/05/2020 57.6 42.7 - 76.0 %  Final     Lymphocyte %   Date Value Ref Range Status   11/05/2020 30.9 19.6 - 45.3 % Final     Monocyte %   Date Value Ref Range Status   11/05/2020 7.8 5.0 - 12.0 % Final     Eosinophil %   Date Value Ref Range Status   11/05/2020 3.1 0.3 - 6.2 % Final     Basophil %   Date Value Ref Range Status   11/05/2020 0.3 0.0 - 1.5 % Final     Immature Grans %   Date Value Ref Range Status   11/05/2020 0.3 0.0 - 0.5 % Final     Neutrophils, Absolute   Date Value Ref Range Status   11/05/2020 3.94 1.70 - 7.00 10*3/mm3 Final     Lymphocytes, Absolute   Date Value Ref Range Status   11/05/2020 2.11 0.70 - 3.10 10*3/mm3 Final     Monocytes, Absolute   Date Value Ref Range Status   11/05/2020 0.53 0.10 - 0.90 10*3/mm3 Final     Eosinophils, Absolute   Date Value Ref Range Status   11/05/2020 0.21 0.00 - 0.40 10*3/mm3 Final     Basophils, Absolute   Date Value Ref Range Status   11/05/2020 0.02 0.00 - 0.20 10*3/mm3 Final     Immature Grans, Absolute   Date Value Ref Range Status   11/05/2020 0.02 0.00 - 0.05 10*3/mm3 Final     nRBC   Date Value Ref Range Status   11/05/2020 0.0 0.0 - 0.2 /100 WBC Final       Lab Results   Component Value Date    GLUCOSE 94 01/25/2021    BUN 20 01/25/2021    CREATININE 1.12 (H) 01/25/2021    EGFRIFNONA 47 (L) 01/25/2021    BCR 17.9 01/25/2021    K 4.3 01/25/2021    CO2 32.6 (H) 01/25/2021    CALCIUM 9.4 01/25/2021    ALBUMIN 4.20 01/25/2021    AST 18 08/30/2020    ALT 26 08/30/2020         Assessment and Plan:    1. Obstructive sleep apnea - Established, stable (1)  1. Compliant with PAP therapy  2. Continue PAP as prescribed - adjust pressure to 18/10 cm H2O  3. Script for PAP supplies  4. Advised to decrease daytime napping and to use BiPAP if taking a nap during the day  5. Return to clinic in 1 month with compliance report unless change in symptoms in interim period  2. Restless leg syndrome/Periodic limb movement disorder (RLS/PLMD) - Established, stable (1)   1. Continue Requip 1-2 mg  QHS  3. COPD - Established, stable (1)   1. Continue O2 as prescribed - has not been using    This visit has been rescheduled as a phone visit to comply with patient safety concerns in accordance with CDC recommendations. Total time of discussion was 11 minutes.    I spent 13 minutes caring for Ronda on this date of service. This time includes time spent by me in the following activities: preparing for the visit, reviewing tests, obtaining and/or reviewing a separately obtained history, counseling and educating the patient/family/caregiver and documenting information in the medical record; discussing PAP therapy, PAP compliance, PAP maintenance and Sleep hygiene      RTC in 1 month. Patient agrees to return sooner if changes in symptoms.      This document has been electronically signed by CIARA Steele on April 12, 2021 14:32 CDT          CC: Brook Arreola MD          No ref. provider found

## 2021-04-22 RX ORDER — ROPINIROLE 1 MG/1
TABLET, FILM COATED ORAL
Qty: 60 TABLET | Refills: 2 | Status: SHIPPED | OUTPATIENT
Start: 2021-04-22 | End: 2021-07-19

## 2021-04-28 RX ORDER — ISOSORBIDE MONONITRATE 30 MG/1
TABLET, EXTENDED RELEASE ORAL
Qty: 30 TABLET | Refills: 1 | Status: SHIPPED | OUTPATIENT
Start: 2021-04-28 | End: 2021-05-20

## 2021-04-30 ENCOUNTER — LAB (OUTPATIENT)
Dept: LAB | Facility: HOSPITAL | Age: 76
End: 2021-04-30

## 2021-04-30 ENCOUNTER — OFFICE VISIT (OUTPATIENT)
Dept: FAMILY MEDICINE CLINIC | Facility: CLINIC | Age: 76
End: 2021-04-30

## 2021-04-30 VITALS
SYSTOLIC BLOOD PRESSURE: 114 MMHG | WEIGHT: 208 LBS | DIASTOLIC BLOOD PRESSURE: 70 MMHG | BODY MASS INDEX: 34.66 KG/M2 | OXYGEN SATURATION: 99 % | HEIGHT: 65 IN | HEART RATE: 75 BPM

## 2021-04-30 DIAGNOSIS — I10 ESSENTIAL HYPERTENSION: ICD-10-CM

## 2021-04-30 DIAGNOSIS — E11.42 TYPE 2 DIABETES MELLITUS WITH DIABETIC POLYNEUROPATHY, WITHOUT LONG-TERM CURRENT USE OF INSULIN (HCC): ICD-10-CM

## 2021-04-30 DIAGNOSIS — M50.322 DEGENERATION OF INTERVERTEBRAL DISC AT C5-C6 LEVEL: ICD-10-CM

## 2021-04-30 DIAGNOSIS — I50.32 CHRONIC HEART FAILURE WITH PRESERVED EJECTION FRACTION (HCC): ICD-10-CM

## 2021-04-30 DIAGNOSIS — I10 ESSENTIAL HYPERTENSION: Primary | ICD-10-CM

## 2021-04-30 LAB
ALBUMIN SERPL-MCNC: 4.6 G/DL (ref 3.5–5.2)
ALBUMIN/GLOB SERPL: 1.8 G/DL
ALP SERPL-CCNC: 82 U/L (ref 39–117)
ALT SERPL W P-5'-P-CCNC: 17 U/L (ref 1–33)
ANION GAP SERPL CALCULATED.3IONS-SCNC: 9.9 MMOL/L (ref 5–15)
AST SERPL-CCNC: 18 U/L (ref 1–32)
BILIRUB SERPL-MCNC: 0.2 MG/DL (ref 0–1.2)
BUN SERPL-MCNC: 25 MG/DL (ref 8–23)
BUN/CREAT SERPL: 21.9 (ref 7–25)
CALCIUM SPEC-SCNC: 9.7 MG/DL (ref 8.6–10.5)
CHLORIDE SERPL-SCNC: 104 MMOL/L (ref 98–107)
CO2 SERPL-SCNC: 30.1 MMOL/L (ref 22–29)
CREAT SERPL-MCNC: 1.14 MG/DL (ref 0.57–1)
GFR SERPL CREATININE-BSD FRML MDRD: 46 ML/MIN/1.73
GLOBULIN UR ELPH-MCNC: 2.6 GM/DL
GLUCOSE SERPL-MCNC: 54 MG/DL (ref 65–99)
HBA1C MFR BLD: 6.04 % (ref 4.8–5.6)
POTASSIUM SERPL-SCNC: 4.2 MMOL/L (ref 3.5–5.2)
PROT SERPL-MCNC: 7.2 G/DL (ref 6–8.5)
SODIUM SERPL-SCNC: 144 MMOL/L (ref 136–145)

## 2021-04-30 PROCEDURE — 80053 COMPREHEN METABOLIC PANEL: CPT

## 2021-04-30 PROCEDURE — 83036 HEMOGLOBIN GLYCOSYLATED A1C: CPT

## 2021-04-30 PROCEDURE — 99213 OFFICE O/P EST LOW 20 MIN: CPT | Performed by: FAMILY MEDICINE

## 2021-04-30 PROCEDURE — 36415 COLL VENOUS BLD VENIPUNCTURE: CPT

## 2021-04-30 RX ORDER — FUROSEMIDE 40 MG/1
20 TABLET ORAL DAILY PRN
Qty: 45 TABLET | Refills: 3 | Status: SHIPPED | OUTPATIENT
Start: 2021-04-30 | End: 2022-03-29

## 2021-04-30 RX ORDER — TIZANIDINE 4 MG/1
4 TABLET ORAL EVERY 8 HOURS PRN
Qty: 90 TABLET | Refills: 5 | Status: SHIPPED | OUTPATIENT
Start: 2021-04-30 | End: 2021-10-20

## 2021-05-06 ENCOUNTER — TELEPHONE (OUTPATIENT)
Dept: FAMILY MEDICINE CLINIC | Facility: CLINIC | Age: 76
End: 2021-05-06

## 2021-05-06 NOTE — TELEPHONE ENCOUNTER
Please call and let patient know the following labs:    -CMP okay.  Kidney function is stable.  -Hemoglobin A1c is 6.04%.  Glucose was a little low at 54.  Please confirm that patient is only taking Metformin for her diabetes.  If this is the case, she should cut tablet in half, as her blood sugar is getting too low at times.    ThanksJEFFERSON

## 2021-05-13 ENCOUNTER — OFFICE VISIT (OUTPATIENT)
Dept: SLEEP MEDICINE | Facility: HOSPITAL | Age: 76
End: 2021-05-13

## 2021-05-13 VITALS — BODY MASS INDEX: 34.66 KG/M2 | WEIGHT: 208 LBS | HEIGHT: 65 IN

## 2021-05-13 DIAGNOSIS — G47.33 OSA AND COPD OVERLAP SYNDROME (HCC): ICD-10-CM

## 2021-05-13 DIAGNOSIS — G47.33 OBSTRUCTIVE SLEEP APNEA, ADULT: Primary | ICD-10-CM

## 2021-05-13 DIAGNOSIS — J44.9 OSA AND COPD OVERLAP SYNDROME (HCC): ICD-10-CM

## 2021-05-13 DIAGNOSIS — G25.81 RESTLESS LEGS SYNDROME (RLS): ICD-10-CM

## 2021-05-13 PROCEDURE — 99442 PR PHYS/QHP TELEPHONE EVALUATION 11-20 MIN: CPT | Performed by: NURSE PRACTITIONER

## 2021-05-13 NOTE — PROGRESS NOTES
Sleep Clinic Follow Up - Telephone Visit      You have chosen to receive care through a telephone visit. Do you consent to use a telephone visit for your medical care today? Yes    Date: 2021  Primary Care Provider: Brook Arreola MD    Last office visit: 2021 (telephone) (I reviewed this note)    CC: Follow up: MARISOL on BiPAP, previously high residual AHI      Interim History:  Since the last visit:    1) moderate MARISOL -  Ronda Blair has remained compliant with BiPAP. She denies mask and machine issues, dry mouth, headaches, or pressures intolerance. She denies abnormal dreams, sleep paralysis, nasal congestion, URI sx.    2) Patient reports rare RLS symptoms. Stable on requip 1-2 mg QHS.    Sleep Testin. PSG in ~, AHI of ?  2. PSG in ~, AHI of ? ([place])  3. Split night PSG on 2021, AHI of 24  4. CPAP titration on same day, recommended BiPAP 16/8 cm H2O   5. Currently on 16/8 cm H2O    PAP Data:    Time frame: 2021-2021   Compliance: 100 %  Average use on days used: 5 hrs 16 min  Percent of days with usage greater than or equal to 4 hours: 87.1%  PAP range : 18/10 cm H2O  Average 90% pressure: 18/10 cmH2O  Leak: 2 minutes  Average AHI: 5.3 events/hr (improving from 8.8)  Mask type: Full face mask  [facility]    Bed time: 0230  Sleep latency: 5-10 minutes  Number of times awakens during the night: 1  Wake time: 4079-3020  Estimated total sleep time at night: 5-6 hours  Caffeine intake: 0-1 cups of coffee, 0 cups of tea, and 1 sodas per day  Alcohol intake: 0 drinks per week  Nap time: daily   Sleepiness with Driving: none     [place] - 6    PMHx, FH, SH reviewed and pertinent changes are: Reportedly unchanged from last office visit       REVIEW OF SYSTEMS:   Negative for chest pain, SOA, fever, chills, cough, N/V/D, abdominal pain.    Smoking:none    Ronda Blair  reports that she quit smoking about 22 years ago. She has a 40.00  pack-year smoking history. She has never used smokeless tobacco.      Exam:  Unable to perform physical exam due to conducting telephone visit    Patient's Body mass index is 34.61 kg/m². indicating that she is obese (BMI >30). Obesity-related health conditions include the following: obstructive sleep apnea, hypertension, diabetes mellitus and dyslipidemias. Obesity is unchanged. BMI is is above average; BMI management plan is completed. We recommend portion control and increasing exercise..        Past Medical History:   Diagnosis Date   • Anxiety    • Arthritis    • COPD (chronic obstructive pulmonary disease) (CMS/HCC)    • Depression    • Diabetes mellitus (CMS/HCC)    • History of transfusion    • Hyperlipidemia    • Hypertension    • Stroke (CMS/[facility])     Greater than 5 years ago       Current Outpatient Medications:   •  albuterol sulfate  (90 Base) MCG/ACT inhaler, Inhale 2 puffs Every 6 (Six) Hours As Needed for Wheezing or Shortness of Air., Disp: 18 g, Rfl: 9  •  allopurinol (ZYLOPRIM) 100 MG tablet, TAKE 1/2 TABLET BY MOUTH 1 (ONE) TIME EACH DAY, Disp: , Rfl:   •  amLODIPine (NORVASC) 10 MG tablet, Take 1 tablet by mouth Daily., Disp: 30 tablet, Rfl: 5  •  aspirin-dipyridamole (AGGRENOX)  MG per 12 hr capsule, Take 1 capsule by mouth 2 (Two) Times a Day., Disp: 120 capsule, Rfl: 2  •  cholecalciferol (VITAMIN D3) 25 MCG (1000 UT) tablet, Take 1,000 Units by mouth Daily., Disp: , Rfl:   •  citalopram (CeleXA) 40 MG tablet, Take 40 mg by mouth Every Night., Disp: , Rfl:   •  famotidine (PEPCID) 20 MG tablet, Take 20 mg by mouth Daily., Disp: , Rfl:   •  Fluticasone-Umeclidin-Vilant (Trelegy Ellipta) 100-62.5-25 MCG/INH aerosol powder , Inhale 1 puff Daily., Disp: 60 each, Rfl: 2  •  folic acid (FOLVITE) 1 MG tablet, Take 1,000 mcg by mouth Daily., Disp: , Rfl:   •  furosemide (LASIX) 40 MG tablet, Take 0.5 tablets by mouth Daily As Needed (lower extremity edema)., Disp: 45 tablet, Rfl: 3  •   hydroCHLOROthiazide (HYDRODIURIL) 25 MG tablet, Take 1 tablet by mouth Daily., Disp: 30 tablet, Rfl: 11  •  ipratropium (ATROVENT) 0.02 % nebulizer solution, Take 2.5 mL by nebulization 3 (Three) Times a Day., Disp: 2.5 mL, Rfl: 0  •  isosorbide mononitrate (IMDUR) 30 MG 24 hr tablet, TAKE 1 TABLET BY MOUTH EVERY DAY, Disp: 30 tablet, Rfl: 1  •  lisinopril (PRINIVIL,ZESTRIL) 10 MG tablet, Take 1 tablet by mouth Daily., Disp: 30 tablet, Rfl: 5  •  Loratadine 10 MG capsule, Take 10 mg by mouth Daily., Disp: , Rfl:   •  metFORMIN ER (GLUCOPHAGE-XR) 500 MG 24 hr tablet, TAKE 2 TABLETS BY MOUTH TWICE A DAY, Disp: 360 tablet, Rfl: 1  •  nebivolol (BYSTOLIC) 5 MG tablet, Take 1 tablet by mouth Daily., Disp: 90 tablet, Rfl: 1  •  Omega-3 Fatty Acids (FISH OIL) 1200 MG capsule capsule, Take 1,200 mg by mouth 3 (Three) Times a Day With Meals., Disp: , Rfl:   •  oxyCODONE-acetaminophen (PERCOCET)  MG per tablet, Take 1 tablet by mouth Every 6 (Six) Hours As Needed for Moderate Pain ., Disp: , Rfl:   •  pregabalin (LYRICA) 75 MG capsule, Take 1 capsule by mouth Daily., Disp: 30 capsule, Rfl: 0  •  rOPINIRole (REQUIP) 1 MG tablet, TAKE 2 TABLETS BY MOUTH 1 HOUR BEFORE BEDTIME., Disp: 60 tablet, Rfl: 2  •  rosuvastatin (CRESTOR) 10 MG tablet, TAKE 1 TABLET BY MOUTH EVERY DAY, Disp: 90 tablet, Rfl: 11  •  Symbicort 160-4.5 MCG/ACT inhaler, , Disp: , Rfl:   •  tiZANidine (ZANAFLEX) 4 MG tablet, Take 1 tablet by mouth Every 8 (Eight) Hours As Needed for Muscle Spasms. Take at lunch and bedtime as needed., Disp: 90 tablet, Rfl: 5  •  Triamcinolone Acetonide (NASACORT) 55 MCG/ACT nasal inhaler, 1 spray into the nostril(s) as directed by provider Every Other Day., Disp: 16.5 g, Rfl: 2  •  venlafaxine XR (EFFEXOR-XR) 37.5 MG 24 hr capsule, Take 37.5 mg by mouth Daily., Disp: , Rfl:      WBC   Date Value Ref Range Status   11/05/2020 6.83 3.40 - 10.80 10*3/mm3 Final     RBC   Date Value Ref Range Status   11/05/2020 4.00 3.77 -  5.28 10*6/mm3 Final     Hemoglobin   Date Value Ref Range Status   11/05/2020 11.4 (L) 12.0 - 15.9 g/dL Final     Hematocrit   Date Value Ref Range Status   11/05/2020 35.4 34.0 - 46.6 % Final     MCV   Date Value Ref Range Status   11/05/2020 88.5 79.0 - 97.0 fL Final     MCH   Date Value Ref Range Status   11/05/2020 28.5 26.6 - 33.0 pg Final     MCHC   Date Value Ref Range Status   11/05/2020 32.2 31.5 - 35.7 g/dL Final     RDW   Date Value Ref Range Status   11/05/2020 14.0 12.3 - 15.4 % Final     RDW-SD   Date Value Ref Range Status   11/05/2020 46.0 37.0 - 54.0 fl Final     MPV   Date Value Ref Range Status   11/05/2020 12.3 (H) 6.0 - 12.0 fL Final     Platelets   Date Value Ref Range Status   11/05/2020 169 140 - 450 10*3/mm3 Final     Neutrophil %   Date Value Ref Range Status   11/05/2020 57.6 42.7 - 76.0 % Final     Lymphocyte %   Date Value Ref Range Status   11/05/2020 30.9 19.6 - 45.3 % Final     Monocyte %   Date Value Ref Range Status   11/05/2020 7.8 5.0 - 12.0 % Final     Eosinophil %   Date Value Ref Range Status   11/05/2020 3.1 0.3 - 6.2 % Final     Basophil %   Date Value Ref Range Status   11/05/2020 0.3 0.0 - 1.5 % Final     Immature Grans %   Date Value Ref Range Status   11/05/2020 0.3 0.0 - 0.5 % Final     Neutrophils, Absolute   Date Value Ref Range Status   11/05/2020 3.94 1.70 - 7.00 10*3/mm3 Final     Lymphocytes, Absolute   Date Value Ref Range Status   11/05/2020 2.11 0.70 - 3.10 10*3/mm3 Final     Monocytes, Absolute   Date Value Ref Range Status   11/05/2020 0.53 0.10 - 0.90 10*3/mm3 Final     Eosinophils, Absolute   Date Value Ref Range Status   11/05/2020 0.21 0.00 - 0.40 10*3/mm3 Final     Basophils, Absolute   Date Value Ref Range Status   11/05/2020 0.02 0.00 - 0.20 10*3/mm3 Final     Immature Grans, Absolute   Date Value Ref Range Status   11/05/2020 0.02 0.00 - 0.05 10*3/mm3 Final     nRBC   Date Value Ref Range Status   11/05/2020 0.0 0.0 - 0.2 /100 WBC Final       Lab  Results   Component Value Date    GLUCOSE 54 (L) 04/30/2021    BUN 25 (H) 04/30/2021    CREATININE 1.14 (H) 04/30/2021    EGFRIFNONA 46 (L) 04/30/2021    BCR 21.9 04/30/2021    K 4.2 04/30/2021    CO2 30.1 (H) 04/30/2021    CALCIUM 9.7 04/30/2021    ALBUMIN 4.60 04/30/2021    AST 18 04/30/2021    ALT 17 04/30/2021         Assessment and Plan:    1. Obstructive sleep apnea - Established, stable (1)  1. Compliant with PAP therapy  2. Continue PAP as prescribed  3. Script for PAP supplies  4. Recommend mask liners for comfort  5. Return to clinic in 6 months with compliance report unless change in symptoms in interim period  2. Restless leg syndrome/Periodic limb movement disorder (RLS/PLMD) - Established, stable (1)   1. Continue Requip 1-2 mg QHS  3. MARISOL/COPD overlap - Established, stable (1)      This visit has been rescheduled as a phone visit to comply with patient safety concerns in accordance with CDC recommendations. Total time of discussion was 11 minutes.    I spent 15 minutes caring for Ronda on this date of service. This time includes time spent by me in the following activities: preparing for the visit, obtaining and/or reviewing a separately obtained history, counseling and educating the patient/family/caregiver and documenting information in the medical record; discussing PAP therapy, PAP compliance and PAP maintenance      RTC in 6 months. Patient agrees to return sooner if changes in symptoms.      This document has been electronically signed by CIARA Steele on May 13, 2021 11:18 CDT          CC: Brook Arreola MD          No ref. provider found

## 2021-05-16 DIAGNOSIS — D64.9 ANEMIA, UNSPECIFIED: ICD-10-CM

## 2021-05-17 RX ORDER — FOLIC ACID 1 MG/1
TABLET ORAL
Qty: 90 TABLET | Refills: 2 | Status: SHIPPED | OUTPATIENT
Start: 2021-05-17 | End: 2021-11-29

## 2021-05-20 RX ORDER — ISOSORBIDE MONONITRATE 30 MG/1
TABLET, EXTENDED RELEASE ORAL
Qty: 30 TABLET | Refills: 1 | Status: SHIPPED | OUTPATIENT
Start: 2021-05-20 | End: 2021-06-14

## 2021-05-21 DIAGNOSIS — R09.82 POSTNASAL DRIP: ICD-10-CM

## 2021-05-21 RX ORDER — TRIAMCINOLONE ACETONIDE 55 UG/1
SPRAY, METERED NASAL
Qty: 16.5 G | Refills: 2 | Status: SHIPPED | OUTPATIENT
Start: 2021-05-21

## 2021-05-21 RX ORDER — AMLODIPINE BESYLATE 10 MG/1
TABLET ORAL
Qty: 90 TABLET | Refills: 1 | Status: SHIPPED | OUTPATIENT
Start: 2021-05-21 | End: 2021-11-12

## 2021-05-21 RX ORDER — LISINOPRIL 10 MG/1
TABLET ORAL
Qty: 90 TABLET | Refills: 1 | Status: SHIPPED | OUTPATIENT
Start: 2021-05-21 | End: 2021-11-12

## 2021-06-04 DIAGNOSIS — E11.42 TYPE 2 DIABETES MELLITUS WITH DIABETIC POLYNEUROPATHY, WITHOUT LONG-TERM CURRENT USE OF INSULIN (HCC): ICD-10-CM

## 2021-06-04 RX ORDER — METFORMIN HYDROCHLORIDE 500 MG/1
TABLET, EXTENDED RELEASE ORAL
Qty: 360 TABLET | Refills: 1 | Status: SHIPPED | OUTPATIENT
Start: 2021-06-04 | End: 2021-11-24

## 2021-06-14 RX ORDER — ISOSORBIDE MONONITRATE 30 MG/1
TABLET, EXTENDED RELEASE ORAL
Qty: 30 TABLET | Refills: 1 | Status: ON HOLD | OUTPATIENT
Start: 2021-06-14 | End: 2021-07-12

## 2021-07-06 ENCOUNTER — HOSPITAL ENCOUNTER (OUTPATIENT)
Dept: GENERAL RADIOLOGY | Facility: HOSPITAL | Age: 76
Discharge: HOME | End: 2021-07-06

## 2021-07-06 DIAGNOSIS — M54.6 PAIN IN THORACIC SPINE: ICD-10-CM

## 2021-07-06 DIAGNOSIS — M54.2 CERVICALGIA: ICD-10-CM

## 2021-07-06 DIAGNOSIS — M25.512 LEFT SHOULDER PAIN, UNSPECIFIED CHRONICITY: ICD-10-CM

## 2021-07-06 PROCEDURE — 72040 X-RAY EXAM NECK SPINE 2-3 VW: CPT

## 2021-07-06 PROCEDURE — 72072 X-RAY EXAM THORAC SPINE 3VWS: CPT

## 2021-07-09 ENCOUNTER — APPOINTMENT (OUTPATIENT)
Dept: GENERAL RADIOLOGY | Facility: HOSPITAL | Age: 76
End: 2021-07-09

## 2021-07-09 ENCOUNTER — HOSPITAL ENCOUNTER (OUTPATIENT)
Facility: HOSPITAL | Age: 76
Setting detail: OBSERVATION
Discharge: HOME | End: 2021-07-12
Attending: FAMILY MEDICINE | Admitting: FAMILY MEDICINE

## 2021-07-09 DIAGNOSIS — R07.2 PRECORDIAL PAIN: ICD-10-CM

## 2021-07-09 DIAGNOSIS — Z74.09 IMPAIRED FUNCTIONAL MOBILITY, BALANCE, GAIT, AND ENDURANCE: ICD-10-CM

## 2021-07-09 DIAGNOSIS — Z74.09 IMPAIRED MOBILITY AND ACTIVITIES OF DAILY LIVING: ICD-10-CM

## 2021-07-09 DIAGNOSIS — J96.01 ACUTE RESPIRATORY FAILURE WITH HYPOXIA (HCC): ICD-10-CM

## 2021-07-09 DIAGNOSIS — I95.9 HYPOTENSION, UNSPECIFIED HYPOTENSION TYPE: ICD-10-CM

## 2021-07-09 DIAGNOSIS — Z78.9 IMPAIRED MOBILITY AND ACTIVITIES OF DAILY LIVING: ICD-10-CM

## 2021-07-09 DIAGNOSIS — J44.1 COPD EXACERBATION (HCC): Primary | ICD-10-CM

## 2021-07-09 LAB
ALBUMIN SERPL-MCNC: 4 G/DL (ref 3.5–5.2)
ALBUMIN/GLOB SERPL: 1.5 G/DL
ALP SERPL-CCNC: 93 U/L (ref 39–117)
ALT SERPL W P-5'-P-CCNC: 20 U/L (ref 1–33)
ANION GAP SERPL CALCULATED.3IONS-SCNC: 12 MMOL/L (ref 5–15)
AST SERPL-CCNC: 24 U/L (ref 1–32)
BASOPHILS # BLD AUTO: 0.02 10*3/MM3 (ref 0–0.2)
BASOPHILS NFR BLD AUTO: 0.3 % (ref 0–1.5)
BILIRUB SERPL-MCNC: 0.2 MG/DL (ref 0–1.2)
BUN SERPL-MCNC: 36 MG/DL (ref 8–23)
BUN/CREAT SERPL: 22.2 (ref 7–25)
CALCIUM SPEC-SCNC: 8.8 MG/DL (ref 8.6–10.5)
CHLORIDE SERPL-SCNC: 102 MMOL/L (ref 98–107)
CK SERPL-CCNC: 137 U/L (ref 20–180)
CO2 SERPL-SCNC: 26 MMOL/L (ref 22–29)
CREAT SERPL-MCNC: 1.62 MG/DL (ref 0.57–1)
D-DIMER, QUANTITATIVE (MAD,POW, STR): 843 NG/ML (FEU) (ref 0–470)
D-LACTATE SERPL-SCNC: 1.6 MMOL/L (ref 0.5–2)
DEPRECATED RDW RBC AUTO: 47.2 FL (ref 37–54)
EOSINOPHIL # BLD AUTO: 0.34 10*3/MM3 (ref 0–0.4)
EOSINOPHIL NFR BLD AUTO: 5.4 % (ref 0.3–6.2)
ERYTHROCYTE [DISTWIDTH] IN BLOOD BY AUTOMATED COUNT: 13.8 % (ref 12.3–15.4)
FLUAV RNA RESP QL NAA+PROBE: NOT DETECTED
FLUBV RNA RESP QL NAA+PROBE: NOT DETECTED
GFR SERPL CREATININE-BSD FRML MDRD: 31 ML/MIN/1.73
GLOBULIN UR ELPH-MCNC: 2.6 GM/DL
GLUCOSE SERPL-MCNC: 151 MG/DL (ref 65–99)
HCT VFR BLD AUTO: 33 % (ref 34–46.6)
HGB BLD-MCNC: 11 G/DL (ref 12–15.9)
HOLD SPECIMEN: NORMAL
HOLD SPECIMEN: NORMAL
IMM GRANULOCYTES # BLD AUTO: 0.02 10*3/MM3 (ref 0–0.05)
IMM GRANULOCYTES NFR BLD AUTO: 0.3 % (ref 0–0.5)
LIPASE SERPL-CCNC: 20 U/L (ref 13–60)
LYMPHOCYTES # BLD AUTO: 1.01 10*3/MM3 (ref 0.7–3.1)
LYMPHOCYTES NFR BLD AUTO: 16.1 % (ref 19.6–45.3)
MAGNESIUM SERPL-MCNC: 1.5 MG/DL (ref 1.6–2.4)
MCH RBC QN AUTO: 31.3 PG (ref 26.6–33)
MCHC RBC AUTO-ENTMCNC: 33.3 G/DL (ref 31.5–35.7)
MCV RBC AUTO: 93.8 FL (ref 79–97)
MONOCYTES # BLD AUTO: 0.53 10*3/MM3 (ref 0.1–0.9)
MONOCYTES NFR BLD AUTO: 8.5 % (ref 5–12)
NEUTROPHILS NFR BLD AUTO: 4.35 10*3/MM3 (ref 1.7–7)
NEUTROPHILS NFR BLD AUTO: 69.4 % (ref 42.7–76)
NRBC BLD AUTO-RTO: 0 /100 WBC (ref 0–0.2)
NT-PROBNP SERPL-MCNC: 680.5 PG/ML (ref 0–1800)
PLATELET # BLD AUTO: 113 10*3/MM3 (ref 140–450)
PMV BLD AUTO: 11.7 FL (ref 6–12)
POTASSIUM SERPL-SCNC: 3.8 MMOL/L (ref 3.5–5.2)
PROT SERPL-MCNC: 6.6 G/DL (ref 6–8.5)
QT INTERVAL: 402 MS
QTC INTERVAL: 436 MS
RBC # BLD AUTO: 3.52 10*6/MM3 (ref 3.77–5.28)
SARS-COV-2 RNA RESP QL NAA+PROBE: NOT DETECTED
SODIUM SERPL-SCNC: 140 MMOL/L (ref 136–145)
TROPONIN T SERPL-MCNC: <0.01 NG/ML (ref 0–0.03)
WBC # BLD AUTO: 6.27 10*3/MM3 (ref 3.4–10.8)
WHOLE BLOOD HOLD SPECIMEN: NORMAL

## 2021-07-09 PROCEDURE — 93010 ELECTROCARDIOGRAM REPORT: CPT | Performed by: INTERNAL MEDICINE

## 2021-07-09 PROCEDURE — 94760 N-INVAS EAR/PLS OXIMETRY 1: CPT

## 2021-07-09 PROCEDURE — 85025 COMPLETE CBC W/AUTO DIFF WBC: CPT | Performed by: FAMILY MEDICINE

## 2021-07-09 PROCEDURE — 96367 TX/PROPH/DG ADDL SEQ IV INF: CPT

## 2021-07-09 PROCEDURE — 99285 EMERGENCY DEPT VISIT HI MDM: CPT

## 2021-07-09 PROCEDURE — 96366 THER/PROPH/DIAG IV INF ADDON: CPT

## 2021-07-09 PROCEDURE — 83690 ASSAY OF LIPASE: CPT | Performed by: FAMILY MEDICINE

## 2021-07-09 PROCEDURE — 83605 ASSAY OF LACTIC ACID: CPT | Performed by: FAMILY MEDICINE

## 2021-07-09 PROCEDURE — 83735 ASSAY OF MAGNESIUM: CPT | Performed by: FAMILY MEDICINE

## 2021-07-09 PROCEDURE — G0378 HOSPITAL OBSERVATION PER HR: HCPCS

## 2021-07-09 PROCEDURE — 93005 ELECTROCARDIOGRAM TRACING: CPT | Performed by: FAMILY MEDICINE

## 2021-07-09 PROCEDURE — 94799 UNLISTED PULMONARY SVC/PX: CPT

## 2021-07-09 PROCEDURE — 80053 COMPREHEN METABOLIC PANEL: CPT | Performed by: FAMILY MEDICINE

## 2021-07-09 PROCEDURE — 96372 THER/PROPH/DIAG INJ SC/IM: CPT

## 2021-07-09 PROCEDURE — 96365 THER/PROPH/DIAG IV INF INIT: CPT

## 2021-07-09 PROCEDURE — 25010000002 METHYLPREDNISOLONE PER 125 MG: Performed by: HOSPITALIST

## 2021-07-09 PROCEDURE — 84484 ASSAY OF TROPONIN QUANT: CPT | Performed by: FAMILY MEDICINE

## 2021-07-09 PROCEDURE — 71045 X-RAY EXAM CHEST 1 VIEW: CPT

## 2021-07-09 PROCEDURE — 25010000002 HEPARIN (PORCINE) PER 1000 UNITS: Performed by: HOSPITALIST

## 2021-07-09 PROCEDURE — 82550 ASSAY OF CK (CPK): CPT | Performed by: FAMILY MEDICINE

## 2021-07-09 PROCEDURE — C9803 HOPD COVID-19 SPEC COLLECT: HCPCS

## 2021-07-09 PROCEDURE — 25010000002 CEFTRIAXONE PER 250 MG: Performed by: FAMILY MEDICINE

## 2021-07-09 PROCEDURE — 94640 AIRWAY INHALATION TREATMENT: CPT

## 2021-07-09 PROCEDURE — 25010000002 AZITHROMYCIN PER 500 MG: Performed by: FAMILY MEDICINE

## 2021-07-09 PROCEDURE — 85379 FIBRIN DEGRADATION QUANT: CPT | Performed by: FAMILY MEDICINE

## 2021-07-09 PROCEDURE — 96375 TX/PRO/DX INJ NEW DRUG ADDON: CPT

## 2021-07-09 PROCEDURE — 83880 ASSAY OF NATRIURETIC PEPTIDE: CPT | Performed by: FAMILY MEDICINE

## 2021-07-09 PROCEDURE — 36415 COLL VENOUS BLD VENIPUNCTURE: CPT

## 2021-07-09 PROCEDURE — 87636 SARSCOV2 & INF A&B AMP PRB: CPT | Performed by: FAMILY MEDICINE

## 2021-07-09 PROCEDURE — 84484 ASSAY OF TROPONIN QUANT: CPT | Performed by: HOSPITALIST

## 2021-07-09 PROCEDURE — 87040 BLOOD CULTURE FOR BACTERIA: CPT | Performed by: FAMILY MEDICINE

## 2021-07-09 RX ORDER — TIZANIDINE 4 MG/1
4 TABLET ORAL EVERY 8 HOURS PRN
Status: DISCONTINUED | OUTPATIENT
Start: 2021-07-09 | End: 2021-07-12 | Stop reason: HOSPADM

## 2021-07-09 RX ORDER — SODIUM CHLORIDE 0.9 % (FLUSH) 0.9 %
10 SYRINGE (ML) INJECTION AS NEEDED
Status: DISCONTINUED | OUTPATIENT
Start: 2021-07-09 | End: 2021-07-12 | Stop reason: HOSPADM

## 2021-07-09 RX ORDER — OXYCODONE AND ACETAMINOPHEN 7.5; 325 MG/1; MG/1
1 TABLET ORAL EVERY 6 HOURS PRN
Status: DISCONTINUED | OUTPATIENT
Start: 2021-07-09 | End: 2021-07-12 | Stop reason: HOSPADM

## 2021-07-09 RX ORDER — METHYLPREDNISOLONE SODIUM SUCCINATE 125 MG/2ML
60 INJECTION, POWDER, LYOPHILIZED, FOR SOLUTION INTRAMUSCULAR; INTRAVENOUS EVERY 6 HOURS
Status: DISCONTINUED | OUTPATIENT
Start: 2021-07-09 | End: 2021-07-10

## 2021-07-09 RX ORDER — MELATONIN
1000 DAILY
Status: DISCONTINUED | OUTPATIENT
Start: 2021-07-09 | End: 2021-07-12 | Stop reason: HOSPADM

## 2021-07-09 RX ORDER — VENLAFAXINE 37.5 MG/1
37.5 TABLET ORAL EVERY MORNING
Status: DISCONTINUED | OUTPATIENT
Start: 2021-07-10 | End: 2021-07-12 | Stop reason: HOSPADM

## 2021-07-09 RX ORDER — VENLAFAXINE 37.5 MG/1
75 TABLET ORAL EVERY MORNING
COMMUNITY
End: 2023-03-01 | Stop reason: DRUGHIGH

## 2021-07-09 RX ORDER — ALBUTEROL SULFATE 2.5 MG/3ML
2.5 SOLUTION RESPIRATORY (INHALATION) EVERY 6 HOURS PRN
Status: DISCONTINUED | OUTPATIENT
Start: 2021-07-09 | End: 2021-07-11

## 2021-07-09 RX ORDER — HEPARIN SODIUM 5000 [USP'U]/ML
5000 INJECTION, SOLUTION INTRAVENOUS; SUBCUTANEOUS EVERY 8 HOURS SCHEDULED
Status: DISCONTINUED | OUTPATIENT
Start: 2021-07-09 | End: 2021-07-12 | Stop reason: HOSPADM

## 2021-07-09 RX ORDER — NEBIVOLOL 5 MG/1
5 TABLET ORAL
Status: DISCONTINUED | OUTPATIENT
Start: 2021-07-09 | End: 2021-07-12 | Stop reason: HOSPADM

## 2021-07-09 RX ORDER — OXYCODONE AND ACETAMINOPHEN 7.5; 325 MG/1; MG/1
1 TABLET ORAL EVERY 6 HOURS PRN
COMMUNITY
End: 2022-06-28

## 2021-07-09 RX ORDER — FOLIC ACID 1 MG/1
1000 TABLET ORAL DAILY
Status: DISCONTINUED | OUTPATIENT
Start: 2021-07-09 | End: 2021-07-12 | Stop reason: HOSPADM

## 2021-07-09 RX ORDER — FAMOTIDINE 20 MG/1
20 TABLET, FILM COATED ORAL DAILY
Status: DISCONTINUED | OUTPATIENT
Start: 2021-07-09 | End: 2021-07-12 | Stop reason: HOSPADM

## 2021-07-09 RX ORDER — IPRATROPIUM BROMIDE AND ALBUTEROL SULFATE 2.5; .5 MG/3ML; MG/3ML
3 SOLUTION RESPIRATORY (INHALATION)
Status: DISCONTINUED | OUTPATIENT
Start: 2021-07-10 | End: 2021-07-11

## 2021-07-09 RX ORDER — CITALOPRAM 40 MG/1
40 TABLET ORAL NIGHTLY
Status: DISCONTINUED | OUTPATIENT
Start: 2021-07-09 | End: 2021-07-12 | Stop reason: HOSPADM

## 2021-07-09 RX ORDER — LISINOPRIL 10 MG/1
10 TABLET ORAL DAILY
Status: DISCONTINUED | OUTPATIENT
Start: 2021-07-09 | End: 2021-07-12 | Stop reason: HOSPADM

## 2021-07-09 RX ORDER — GABAPENTIN 300 MG/1
300 CAPSULE ORAL EVERY 12 HOURS SCHEDULED
Status: DISCONTINUED | OUTPATIENT
Start: 2021-07-09 | End: 2021-07-12 | Stop reason: HOSPADM

## 2021-07-09 RX ORDER — FUROSEMIDE 20 MG/1
20 TABLET ORAL DAILY PRN
Status: DISCONTINUED | OUTPATIENT
Start: 2021-07-09 | End: 2021-07-12 | Stop reason: HOSPADM

## 2021-07-09 RX ORDER — ISOSORBIDE MONONITRATE 30 MG/1
30 TABLET, EXTENDED RELEASE ORAL DAILY
Status: DISCONTINUED | OUTPATIENT
Start: 2021-07-09 | End: 2021-07-12 | Stop reason: HOSPADM

## 2021-07-09 RX ORDER — ASPIRIN AND DIPYRIDAMOLE 25; 200 MG/1; MG/1
1 CAPSULE, EXTENDED RELEASE ORAL 2 TIMES DAILY
Status: DISCONTINUED | OUTPATIENT
Start: 2021-07-09 | End: 2021-07-12 | Stop reason: HOSPADM

## 2021-07-09 RX ORDER — GUAIFENESIN/DEXTROMETHORPHAN 100-10MG/5
5 SYRUP ORAL EVERY 4 HOURS PRN
Status: DISCONTINUED | OUTPATIENT
Start: 2021-07-09 | End: 2021-07-12 | Stop reason: HOSPADM

## 2021-07-09 RX ORDER — ROPINIROLE 1 MG/1
2 TABLET, FILM COATED ORAL NIGHTLY
Status: DISCONTINUED | OUTPATIENT
Start: 2021-07-09 | End: 2021-07-12 | Stop reason: HOSPADM

## 2021-07-09 RX ORDER — ROSUVASTATIN CALCIUM 10 MG/1
10 TABLET, COATED ORAL DAILY
Status: DISCONTINUED | OUTPATIENT
Start: 2021-07-09 | End: 2021-07-12 | Stop reason: HOSPADM

## 2021-07-09 RX ORDER — BUDESONIDE AND FORMOTEROL FUMARATE DIHYDRATE 160; 4.5 UG/1; UG/1
2 AEROSOL RESPIRATORY (INHALATION)
Status: DISCONTINUED | OUTPATIENT
Start: 2021-07-09 | End: 2021-07-12 | Stop reason: HOSPADM

## 2021-07-09 RX ORDER — HYDROCHLOROTHIAZIDE 25 MG/1
25 TABLET ORAL DAILY
Status: DISCONTINUED | OUTPATIENT
Start: 2021-07-09 | End: 2021-07-12 | Stop reason: HOSPADM

## 2021-07-09 RX ORDER — ASPIRIN 81 MG/1
324 TABLET, CHEWABLE ORAL ONCE
Status: COMPLETED | OUTPATIENT
Start: 2021-07-09 | End: 2021-07-09

## 2021-07-09 RX ORDER — ALLOPURINOL 100 MG/1
50 TABLET ORAL DAILY
Status: DISCONTINUED | OUTPATIENT
Start: 2021-07-09 | End: 2021-07-12 | Stop reason: HOSPADM

## 2021-07-09 RX ORDER — GABAPENTIN 300 MG/1
300 CAPSULE ORAL 2 TIMES DAILY
COMMUNITY
End: 2022-02-14

## 2021-07-09 RX ORDER — SODIUM CHLORIDE 0.9 % (FLUSH) 0.9 %
10 SYRINGE (ML) INJECTION EVERY 12 HOURS SCHEDULED
Status: DISCONTINUED | OUTPATIENT
Start: 2021-07-09 | End: 2021-07-12 | Stop reason: HOSPADM

## 2021-07-09 RX ORDER — AMLODIPINE BESYLATE 10 MG/1
10 TABLET ORAL DAILY
Status: DISCONTINUED | OUTPATIENT
Start: 2021-07-09 | End: 2021-07-12 | Stop reason: HOSPADM

## 2021-07-09 RX ADMIN — CEFTRIAXONE SODIUM 2 G: 2 INJECTION, POWDER, FOR SOLUTION INTRAMUSCULAR; INTRAVENOUS at 12:52

## 2021-07-09 RX ADMIN — HEPARIN SODIUM 5000 UNITS: 5000 INJECTION INTRAVENOUS; SUBCUTANEOUS at 20:36

## 2021-07-09 RX ADMIN — AZITHROMYCIN MONOHYDRATE 500 MG: 500 INJECTION, POWDER, LYOPHILIZED, FOR SOLUTION INTRAVENOUS at 13:21

## 2021-07-09 RX ADMIN — ASPIRIN AND EXTENDED-RELEASE DIPYRIDAMOLE 1 CAPSULE: 25; 200 CAPSULE ORAL at 20:35

## 2021-07-09 RX ADMIN — METHYLPREDNISOLONE SODIUM SUCCINATE 60 MG: 125 INJECTION, POWDER, FOR SOLUTION INTRAMUSCULAR; INTRAVENOUS at 18:35

## 2021-07-09 RX ADMIN — OXYCODONE HYDROCHLORIDE AND ACETAMINOPHEN 1 TABLET: 7.5; 325 TABLET ORAL at 23:11

## 2021-07-09 RX ADMIN — ALBUTEROL SULFATE 2.5 MG: 2.5 SOLUTION RESPIRATORY (INHALATION) at 22:29

## 2021-07-09 RX ADMIN — GABAPENTIN 300 MG: 300 CAPSULE ORAL at 20:34

## 2021-07-09 RX ADMIN — CITALOPRAM 40 MG: 40 TABLET, FILM COATED ORAL at 20:35

## 2021-07-09 RX ADMIN — GUAIFENESIN AND DEXTROMETHORPHAN 5 ML: 100; 10 SYRUP ORAL at 23:11

## 2021-07-09 RX ADMIN — OXYCODONE HYDROCHLORIDE AND ACETAMINOPHEN 1 TABLET: 7.5; 325 TABLET ORAL at 17:28

## 2021-07-09 RX ADMIN — BUDESONIDE AND FORMOTEROL FUMARATE DIHYDRATE 2 PUFF: 160; 4.5 AEROSOL RESPIRATORY (INHALATION) at 19:47

## 2021-07-09 RX ADMIN — SODIUM CHLORIDE 1710 ML: 9 INJECTION, SOLUTION INTRAVENOUS at 12:51

## 2021-07-09 RX ADMIN — ROPINIROLE 2 MG: 1 TABLET, FILM COATED ORAL at 20:35

## 2021-07-09 RX ADMIN — SODIUM CHLORIDE, PRESERVATIVE FREE 10 ML: 5 INJECTION INTRAVENOUS at 22:01

## 2021-07-09 RX ADMIN — ASPIRIN 324 MG: 81 TABLET, CHEWABLE ORAL at 09:26

## 2021-07-09 NOTE — PLAN OF CARE
Goal Outcome Evaluation:  Plan of Care Reviewed With: patient        Progress: no change  Outcome Summary: patient states that she is hungry at this time.

## 2021-07-09 NOTE — H&P
[facility] Medicine Admission      Date of Admission: 7/9/2021      Primary Care Physician: Brook Arreola MD      Chief Complaint: Shortness of breath    HPI: Patient is a 75-year-old female past medical history of COPD, diabetes, sleep apnea, and hypertension who presented to the emergency department 3 to 4 days of worsening shortness of breath.  Patient states that shortness of breath has been worsening, specifically with exertion.  She has had increased wheezing.  She states all morning of admission when she woke up her oxygen saturation on her home pulse ox was 79%.  She did not have supplemental oxygen at home.  She states that her oxygen level improved once she got up and moved around.  When EMS arrived her oxygen level was again in the mid 80s.  She denies fever or chills.  She has had cough with significant greenish-yellow sputum production.  No nausea or vomiting.  She has had decreased appetite due to her significant shortness of breath.  No alleviating or exacerbating factors.    Concurrent Medical History:  has a past medical history of Anxiety, Arthritis, COPD (chronic obstructive pulmonary disease) (CMS/[facility]), Depression, Diabetes mellitus (CMS/[facility]), History of transfusion, Hyperlipidemia, Hypertension, and Stroke (CMS/[facility]).    Past Surgical History:  has a past surgical history that includes Cervical fusion (1999); Lumbar spine surgery (1999); Hemorrhoid surgery; Hysterectomy; Knee arthroscopy (Right, 2015); Pain Pump Insertion/Revision; Intrathecal pump removal; and Eye surgery (Bilateral, 2014).    Family History: family history includes Cancer in her sister; Diabetes in an other family member; Heart attack (age of onset: 75) in her father; Hypertension in her sister and another family member; Kidney disease in an other family member; Lung cancer in her brother; Other in an other family member.     Social History:  reports that she quit smoking about  22 years ago. She has a 40.00 pack-year smoking history. She has never used smokeless tobacco. She reports that she does not drink alcohol and does not use drugs.    Allergies:   Allergies   Allergen Reactions   • Aspirin GI Intolerance     Had bleeding ulcer in the past       Medications:   Prior to Admission medications    Medication Sig Start Date End Date Taking? Authorizing Provider   allopurinol (ZYLOPRIM) 100 MG tablet Take 50 mg by mouth Daily. Take one-half tablet by mouth daily 10/29/20  Yes Samina Leggett MD   amLODIPine (NORVASC) 10 MG tablet TAKE 1 TABLET BY MOUTH EVERY DAY 5/21/21  Yes Brook Arreola MD   aspirin-dipyridamole (AGGRENOX)  MG per 12 hr capsule Take 1 capsule by mouth 2 (Two) Times a Day. 2/18/21  Yes Brook Arreola MD   citalopram (CeleXA) 40 MG tablet Take 40 mg by mouth Every Night.   Yes Samina Leggett MD   folic acid (FOLVITE) 1 MG tablet TAKE 1 TABLET BY MOUTH EVERY DAY 5/17/21  Yes Brook Arreola MD   furosemide (LASIX) 40 MG tablet Take 0.5 tablets by mouth Daily As Needed (lower extremity edema). 4/30/21  Yes Brook Arreola MD   gabapentin (NEURONTIN) 300 MG capsule Take 300 mg by mouth 2 (two) times a day.   Yes Samina Leggett MD   hydroCHLOROthiazide (HYDRODIURIL) 25 MG tablet Take 1 tablet by mouth Daily. 9/2/20  Yes Genaro Cota MD   isosorbide mononitrate (IMDUR) 30 MG 24 hr tablet TAKE 1 TABLET BY MOUTH EVERY DAY 6/14/21  Yes Brook Arreola MD   lisinopril (PRINIVIL,ZESTRIL) 10 MG tablet TAKE 1 TABLET BY MOUTH EVERY DAY 5/21/21  Yes Brook Arreola MD   metFORMIN ER (GLUCOPHAGE-XR) 500 MG 24 hr tablet TAKE 2 TABLETS BY MOUTH TWICE A DAY 6/4/21  Yes Brook Arreola MD   nebivolol (BYSTOLIC) 5 MG tablet Take 1 tablet by mouth Daily. 2/18/21  Yes Brook Arreola MD   oxyCODONE-acetaminophen (PERCOCET) 7.5-325 MG per tablet Take 1 tablet by mouth Every 6 (Six) Hours As Needed for Moderate Pain . Take 1 tablet by  mouth every 6-8 hours as needed   Yes Samina Leggett MD   rOPINIRole (REQUIP) 1 MG tablet TAKE 2 TABLETS BY MOUTH 1 HOUR BEFORE BEDTIME. 4/22/21  Yes Brook Arreola MD   rosuvastatin (CRESTOR) 10 MG tablet TAKE 1 TABLET BY MOUTH EVERY DAY 11/16/20  Yes Isabel Donald MD   tiZANidine (ZANAFLEX) 4 MG tablet Take 1 tablet by mouth Every 8 (Eight) Hours As Needed for Muscle Spasms. Take at lunch and bedtime as needed. 4/30/21  Yes Brook Arreola MD   Triamcinolone Acetonide (NASACORT) 55 MCG/ACT nasal inhaler INSTILL 1 SPRAY INTO THE NOSTRIL(S) AS DIRECTED BY PROVIDER EVERY OTHER DAY. *NOT COVERED* 5/21/21  Yes Brook Arreola MD   venlafaxine (EFFEXOR) 37.5 MG tablet Take 37.5 mg by mouth Every Morning.   Yes Samina Leggett MD   venlafaxine XR (EFFEXOR-XR) 37.5 MG 24 hr capsule Take 37.5 mg by mouth Daily.  7/9/21 Yes Samina Leggett MD   albuterol sulfate  (90 Base) MCG/ACT inhaler Inhale 2 puffs Every 6 (Six) Hours As Needed for Wheezing or Shortness of Air. 11/5/20   Vinod Blanton MD   cholecalciferol (VITAMIN D3) 25 MCG (1000 UT) tablet Take 1,000 Units by mouth Daily.    Samina Leggett MD   famotidine (PEPCID) 20 MG tablet Take 20 mg by mouth Daily. 8/24/20   Samina Leggett MD   ipratropium (ATROVENT) 0.02 % nebulizer solution Take 2.5 mL by nebulization 3 (Three) Times a Day. 8/30/20   Juan Antonio Thompson MD   Loratadine 10 MG capsule Take 10 mg by mouth Daily.    Samina Leggett MD   Omega-3 Fatty Acids (FISH OIL) 1200 MG capsule capsule Take 1,200 mg by mouth 3 (Three) Times a Day With Meals.    Samina Leggett MD   Symbicort 160-4.5 MCG/ACT inhaler Inhale 2 puffs 2 (Two) Times a Day. 1/12/21   Samina Leggett MD   Fluticasone-Umeclidin-Vilant (Trelegy Ellipta) 100-62.5-25 MCG/INH aerosol powder  Inhale 1 puff Daily. 11/5/20 7/9/21  Vinod Blanton MD   oxyCODONE-acetaminophen (PERCOCET)  MG per tablet Take 1  tablet by mouth Every 6 (Six) Hours As Needed for Moderate Pain .  7/9/21  Provider, MD Samina   pregabalin (LYRICA) 75 MG capsule Take 1 capsule by mouth Daily. 3/4/21 7/9/21  Brook Arreola MD       Review of Systems:  Review of Systems   Constitutional: Positive for activity change. Negative for appetite change, chills, fatigue, fever and unexpected weight change.   HENT: Negative for congestion, facial swelling, hearing loss, nosebleeds, rhinorrhea, sneezing, trouble swallowing and voice change.    Eyes: Negative for photophobia and visual disturbance.   Respiratory: Positive for shortness of breath and wheezing. Negative for apnea, cough, choking, chest tightness and stridor.    Cardiovascular: Negative for chest pain, palpitations and leg swelling.   Gastrointestinal: Negative for abdominal pain, blood in stool, constipation, diarrhea, nausea and vomiting.   Endocrine: Negative for cold intolerance, heat intolerance, polydipsia, polyphagia and polyuria.   Genitourinary: Negative for dysuria, flank pain and hematuria.   Musculoskeletal: Negative for arthralgias, back pain, myalgias and neck pain.   Skin: Negative for rash and wound.   Allergic/Immunologic: Negative for immunocompromised state.   Neurological: Negative for dizziness, seizures, syncope, speech difficulty, weakness, light-headedness, numbness and headaches.   Hematological: Does not bruise/bleed easily.   Psychiatric/Behavioral: Negative for agitation, behavioral problems, confusion, decreased concentration, hallucinations, self-injury and suicidal ideas. The patient is not nervous/anxious.       Otherwise complete ROS is negative except as mentioned above.    Physical Exam:   Temp:  [96 °F (35.6 °C)-97.5 °F (36.4 °C)] 96 °F (35.6 °C)  Heart Rate:  [50-77] 60  Resp:  [17-22] 18  BP: ()/(51-63) 136/60     Physical Exam  Constitutional:       Appearance: She is well-developed.   HENT:      Head: Normocephalic and atraumatic.       Nose: Nose normal.   Eyes:      General: Lids are normal. No scleral icterus.     Conjunctiva/sclera: Conjunctivae normal.      Pupils: Pupils are equal, round, and reactive to light.   Neck:      Vascular: No JVD.      Trachea: No tracheal tenderness or tracheal deviation.   Cardiovascular:      Rate and Rhythm: Normal rate and regular rhythm.      Pulses: Normal pulses.      Heart sounds: Normal heart sounds, S1 normal and S2 normal. No murmur heard.   No friction rub. No gallop.    Pulmonary:      Effort: Pulmonary effort is normal. No accessory muscle usage or respiratory distress.      Breath sounds: Decreased breath sounds and wheezing present. No rales.   Chest:      Chest wall: No tenderness.   Abdominal:      General: Bowel sounds are normal. There is no distension.      Palpations: Abdomen is soft. There is no mass.      Tenderness: There is no abdominal tenderness. There is no guarding or rebound.   Musculoskeletal:         General: No tenderness.      Cervical back: Normal range of motion and neck supple. No spinous process tenderness.   Skin:     General: Skin is warm.      Coloration: Skin is not pale.      Findings: No rash.   Neurological:      Mental Status: She is alert and oriented to person, place, and time.      Cranial Nerves: No cranial nerve deficit.      Sensory: No sensory deficit.      Motor: No atrophy, abnormal muscle tone or seizure activity.      Coordination: Coordination normal.      Deep Tendon Reflexes: Reflexes are normal and symmetric. Reflexes normal.   Psychiatric:         Behavior: Behavior normal.         Thought Content: Thought content normal.         Judgment: Judgment normal.         Results Reviewed:  I have personally reviewed current lab, radiology, and data and agree with results.  Lab Results (last 24 hours)     Procedure Component Value Units Date/Time    Troponin [[id number]]  (Normal) Collected: 07/09/21 1713    Specimen: Blood Updated: 07/09/21 1730     Troponin T  <0.010 ng/mL     Narrative:      Troponin T Reference Range:  <= 0.03 ng/mL-   Negative for AMI  >0.03 ng/mL-     Abnormal for myocardial necrosis.  Clinicians would have to utilize clinical acumen, EKG, Troponin and serial changes to determine if it is an Acute Myocardial Infarction or myocardial injury due to an underlying chronic condition.       Results may be falsely decreased if patient taking Biotin.      Lactic Acid, Plasma [[id number]]  (Normal) Collected: 07/09/21 1224    Specimen: Blood Updated: 07/09/21 1245     Lactate 1.6 mmol/L     Blood Culture - Blood, Arm, Left [[id number]] Collected: 07/09/21 1239    Specimen: Blood from Arm, Left Updated: 07/09/21 1242    Blood Culture - Blood, Arm, Right [[id number]] Collected: 07/09/21 1224    Specimen: Blood from Arm, Right Updated: 07/09/21 1228    Troponin [[id number]]  (Normal) Collected: 07/09/21 1058    Specimen: Blood Updated: 07/09/21 1121     Troponin T <0.010 ng/mL     Narrative:      Troponin T Reference Range:  <= 0.03 ng/mL-   Negative for AMI  >0.03 ng/mL-     Abnormal for myocardial necrosis.  Clinicians would have to utilize clinical acumen, EKG, Troponin and serial changes to determine if it is an Acute Myocardial Infarction or myocardial injury due to an underlying chronic condition.       Results may be falsely decreased if patient taking Biotin.      D-dimer, Quantitative [[id number]]  (Abnormal) Collected: 07/09/21 1058    Specimen: Blood Updated: 07/09/21 1107     D-Dimer, Quantitative 843 ng/mL (FEU)     Narrative:      Dimer values <500 ng/ml FEU are FDA approved as aid in diagnosis of deep venous thrombosis and pulmonary embolism.  This test should not be used in an exclusion strategy with pretest probability alone.    A recent guideline regarding diagnosis for pulmonary thromboembolism recommends an adjusted exclusion criterion of age x 10 ng/ml FEU for patients >50 years of age ( Intern Med 2015; 163: 701-711).      Victor Ruth Ann  [[id number]] Collected: 07/09/21 0857    Specimen: Blood Updated: 07/09/21 1000    Narrative:      The following orders were created for panel order [facility].  Procedure                               Abnormality         Status                     ---------                               -----------         ------                     Green Top (Gel)[[id number]]                                  Final result               Lavender Top[[id number]]                                     Final result               Gold Top - SST[[id number]]                                   Final result                 Please view results for these tests on the individual orders.    Lavender Top [[id number]] Collected: 07/09/21 0857    Specimen: Blood Updated: 07/09/21 1000     Extra Tube hold for add-on     Comment: Auto resulted       Gold Top - SST [[id number]] Collected: 07/09/21 0857    Specimen: Blood Updated: 07/09/21 1000     Extra Tube Hold for add-ons.     Comment: Auto resulted.       Green Top (Gel) [[id number]] Collected: 07/09/21 0857    Specimen: Blood Updated: 07/09/21 1000     Extra Tube Hold for add-ons.     Comment: Auto resulted.       COVID-19 and FLU A/B PCR - Swab, Nasopharynx [[id number]]  (Normal) Collected: 07/09/21 0927    Specimen: Swab from Nasopharynx Updated: 07/09/21 0958     COVID19 Not Detected     Influenza A PCR Not Detected     Influenza B PCR Not Detected    Narrative:      Fact sheet for providers: https://www.fda.gov/media/606521/download    Fact sheet for patients: https://www.fda.gov/media/984148/download    Test performed by PCR.    Comprehensive Metabolic Panel [[id number]]  (Abnormal) Collected: 07/09/21 0857    Specimen: Blood Updated: 07/09/21 0923     Glucose 151 mg/dL      BUN 36 mg/dL      Creatinine 1.62 mg/dL      Sodium 140 mmol/L      Potassium 3.8 mmol/L      Chloride 102 mmol/L      CO2 26.0 mmol/L      Calcium 8.8 mg/dL      Total Protein 6.6 g/dL      Albumin 4.00 g/dL      ALT (SGPT) 20  U/L      AST (SGOT) 24 U/L      Alkaline Phosphatase 93 U/L      Total Bilirubin 0.2 mg/dL      eGFR Non African Amer 31 mL/min/1.73      Globulin 2.6 gm/dL      A/G Ratio 1.5 g/dL      BUN/Creatinine Ratio 22.2     Anion Gap 12.0 mmol/L     Narrative:      GFR Normal >60  Chronic Kidney Disease <60  Kidney Failure <15      CK [[id number]]  (Normal) Collected: 07/09/21 0857    Specimen: Blood Updated: 07/09/21 0923     Creatine Kinase 137 U/L     Lipase [[id number]]  (Normal) Collected: 07/09/21 0857    Specimen: Blood Updated: 07/09/21 0923     Lipase 20 U/L     Magnesium [[id number]]  (Abnormal) Collected: 07/09/21 0857    Specimen: Blood Updated: 07/09/21 0923     Magnesium 1.5 mg/dL     Troponin [[id number]]  (Normal) Collected: 07/09/21 0857    Specimen: Blood Updated: 07/09/21 0920     Troponin T <0.010 ng/mL     Narrative:      Troponin T Reference Range:  <= 0.03 ng/mL-   Negative for AMI  >0.03 ng/mL-     Abnormal for myocardial necrosis.  Clinicians would have to utilize clinical acumen, EKG, Troponin and serial changes to determine if it is an Acute Myocardial Infarction or myocardial injury due to an underlying chronic condition.       Results may be falsely decreased if patient taking Biotin.      BNP [[id number]]  (Normal) Collected: 07/09/21 0857    Specimen: Blood Updated: 07/09/21 0920     proBNP 680.5 pg/mL     Narrative:      Among patients with dyspnea, NT-proBNP is highly sensitive for the detection of acute congestive heart failure. In addition NT-proBNP of <300 pg/ml effectively rules out acute congestive heart failure with 99% negative predictive value.    Results may be falsely decreased if patient taking Biotin.      CBC & Differential [[id number]]  (Abnormal) Collected: 07/09/21 0857    Specimen: Blood Updated: 07/09/21 0903    Narrative:      The following orders were created for panel order CBC & Differential.  Procedure                               Abnormality         Status                      ---------                               -----------         ------                     CBC Auto Differential[[id number]]        Abnormal            Final result                 Please view results for these tests on the individual orders.    CBC Auto Differential [[id number]]  (Abnormal) Collected: 07/09/21 0857    Specimen: Blood Updated: 07/09/21 0903     WBC 6.27 10*3/mm3      RBC 3.52 10*6/mm3      Hemoglobin 11.0 g/dL      Hematocrit 33.0 %      MCV 93.8 fL      MCH 31.3 pg      MCHC 33.3 g/dL      RDW 13.8 %      RDW-SD 47.2 fl      MPV 11.7 fL      Platelets 113 10*3/mm3      Neutrophil % 69.4 %      Lymphocyte % 16.1 %      Monocyte % 8.5 %      Eosinophil % 5.4 %      Basophil % 0.3 %      Immature Grans % 0.3 %      Neutrophils, Absolute 4.35 10*3/mm3      Lymphocytes, Absolute 1.01 10*3/mm3      Monocytes, Absolute 0.53 10*3/mm3      Eosinophils, Absolute 0.34 10*3/mm3      Basophils, Absolute 0.02 10*3/mm3      Immature Grans, Absolute 0.02 10*3/mm3      nRBC 0.0 /100 WBC         Imaging Results (Last 24 Hours)     Procedure Component Value Units Date/Time    XR Chest 1 View [[id number]] Collected: 07/09/21 0913     Updated: 07/09/21 0934    Narrative:      Chest x-ray single view.       CLINICAL INDICATION: Shortness of breath . Chest pain protocol.    COMPARISON: August 29, 2020.    FINDINGS: Cardiac silhouette is normal in size. Pulmonary  vascularity is unremarkable.     No focal infiltrate or consolidation.  No pleural effusion.  No  pneumothorax.  Benign calcified granulomata both hilar regions. Old right rib  fractures.        Impression:      No evidence of active disease.    Electronically signed by:  Sammy Mccann MD  7/9/2021 9:33 AM CDT  Workstation: [id number]            Assessment:    Active Hospital Problems    Diagnosis    • Precordial pain    • MARISOL (obstructive sleep apnea)    • COPD (chronic obstructive pulmonary disease) (CMS/[facility])    • Type 2 diabetes mellitus, without long-term current  use of insulin (CMS/[facility])    • Essential hypertension            Plan:  1.  Acute hypoxic respiratory failure: Patient states that her oxygen saturation was 79% when measured on pulse oximetry at home.  Patient states that she has been on home oxygen in the past, but currently is not.  2.  COPD exacerbation: Patient with significant wheezing.  Will start on steroid and empiric antibiotics.  Bronchodilators per respiratory protocol.  3.  Obstructive sleep apnea: On CPAP at home.  We will continue.  4.  Chest pain: Most likely secondary to COPD, but we will rule out myocardial infarction.  5.  DVT prophylaxis: Heparin.    I confirmed that the patient's Advance Care Plan is present, code status is documented, or surrogate decision maker is listed in the patient's medical record.     I have utilized all available immediate resources to obtain, update, or review the patient's current medications.     I discussed the patient's findings and my recommendations with: Patient        This document has been electronically signed by Caleb Arredondo MD on July 9, 2021 17:53 CDT

## 2021-07-09 NOTE — ED PROVIDER NOTES
Subjective   Patient presents emergency department with chest pain that is been worsening over the past 3 days, associated with the shortness of breath dizziness and near syncope.  Patient quit smoking in 2001.  She is not on home oxygen, but does sleep with a CPAP machine secondary to sleep apnea.  She states that the pulse oximeter this morning at home read 79%.  She denies any past history of coronary artery disease, but does have a history of congestive heart failure.        Chest Pain  Pain location:  L chest  Pain quality: sharp    Pain radiates to:  Does not radiate  Pain severity:  Moderate  Duration:  3 days  Timing:  Constant  Progression:  Worsening  Relieved by:  Rest  Worsened by:  Coughing, exertion and movement  Associated symptoms: cough, fever and shortness of breath    Associated symptoms: no abdominal pain, no diaphoresis, no dizziness, no dysphagia, no fatigue, no headache, no nausea, no vomiting and no weakness    Risk factors: diabetes mellitus, high cholesterol, hypertension and obesity    Risk factors: no smoking    Shortness of Breath  Associated symptoms: chest pain, cough, fever and wheezing    Associated symptoms: no abdominal pain, no diaphoresis, no ear pain, no headaches, no neck pain, no rash, no sore throat and no vomiting        Review of Systems   Constitutional: Positive for activity change and fever. Negative for appetite change, chills, diaphoresis and fatigue.   HENT: Negative for congestion, ear discharge, ear pain, nosebleeds, rhinorrhea, sinus pressure, sore throat and trouble swallowing.    Eyes: Negative for discharge and redness.   Respiratory: Positive for cough, shortness of breath and wheezing. Negative for apnea and chest tightness.    Cardiovascular: Positive for chest pain.   Gastrointestinal: Negative for abdominal pain, diarrhea, nausea and vomiting.   Endocrine: Negative for polyuria.   Genitourinary: Negative for dysuria, frequency and urgency.    Musculoskeletal: Negative for myalgias and neck pain.   Skin: Negative for color change and rash.   Allergic/Immunologic: Negative for immunocompromised state.   Neurological: Negative for dizziness, seizures, syncope, weakness, light-headedness and headaches.   Hematological: Negative for adenopathy. Does not bruise/bleed easily.   Psychiatric/Behavioral: Negative for behavioral problems and confusion.   All other systems reviewed and are negative.      Past Medical History:   Diagnosis Date   • Anxiety    • Arthritis    • COPD (chronic obstructive pulmonary disease) (CMS/HCC)    • Depression    • Diabetes mellitus (CMS/HCC)    • History of transfusion    • Hyperlipidemia    • Hypertension    • Stroke (CMS/HCC)     Greater than 5 years ago       Allergies   Allergen Reactions   • Aspirin GI Intolerance     Had bleeding ulcer in the past       Past Surgical History:   Procedure Laterality Date   • CERVICAL FUSION     • EYE SURGERY Bilateral     lasix eye surgery   • HEMORRHOIDECTOMY     • HYSTERECTOMY     • INTRATHECAL PUMP REMOVAL         • KNEE ARTHROSCOPY Right 2015   • LUMBAR SPINE SURGERY     • PAIN PUMP INSERTION/REVISION      Removed in        Family History   Problem Relation Age of Onset   • Diabetes Other    • Hypertension Other    • Other Other    • Kidney disease Other    • Heart attack Father 75   • Cancer Sister    • Lung cancer Brother    • Hypertension Sister        Social History     Socioeconomic History   • Marital status:      Spouse name: Not on file   • Number of children: Not on file   • Years of education: Not on file   • Highest education level: Not on file   Tobacco Use   • Smoking status: Former Smoker     Packs/day: 1.00     Years: 40.00     Pack years: 40.00     Quit date:      Years since quittin.5   • Smokeless tobacco: Never Used   Substance and Sexual Activity   • Alcohol use: No   • Drug use: No   • Sexual activity: Defer           Objective    Physical Exam  Vitals and nursing note reviewed.   Constitutional:       Appearance: She is well-developed.   HENT:      Head: Normocephalic and atraumatic.      Nose: Nose normal.   Eyes:      General: No scleral icterus.        Right eye: No discharge.         Left eye: No discharge.      Conjunctiva/sclera: Conjunctivae normal.      Pupils: Pupils are equal, round, and reactive to light.   Neck:      Trachea: No tracheal deviation.   Cardiovascular:      Rate and Rhythm: Normal rate and regular rhythm.      Heart sounds: Normal heart sounds. No murmur heard.     Pulmonary:      Effort: Pulmonary effort is normal. No respiratory distress.      Breath sounds: No stridor. Wheezing present. No rales.   Abdominal:      General: Bowel sounds are normal. There is no distension.      Palpations: Abdomen is soft. There is no mass.      Tenderness: There is no abdominal tenderness. There is no guarding or rebound.   Musculoskeletal:      Cervical back: Normal range of motion and neck supple.   Skin:     General: Skin is warm and dry.      Findings: No erythema or rash.   Neurological:      Mental Status: She is alert and oriented to person, place, and time.      Coordination: Coordination normal.   Psychiatric:         Behavior: Behavior normal.         Thought Content: Thought content normal.         ECG 12 Lead      Date/Time: 7/9/2021 12:05 PM  Performed by: Berlin Aceves MD  Authorized by: Berlin Aceves MD   Interpreted by physician  Rhythm: sinus rhythm  Rate: normal  BPM: 71  ST Segments: ST segments normal                   ED Course              Labs Reviewed   COMPREHENSIVE METABOLIC PANEL - Abnormal; Notable for the following components:       Result Value    Glucose 151 (*)     BUN 36 (*)     Creatinine 1.62 (*)     eGFR Non  Amer 31 (*)     All other components within normal limits    Narrative:     GFR Normal >60  Chronic Kidney Disease <60  Kidney Failure <15     MAGNESIUM - Abnormal;  Notable for the following components:    Magnesium 1.5 (*)     All other components within normal limits   CBC WITH AUTO DIFFERENTIAL - Abnormal; Notable for the following components:    RBC 3.52 (*)     Hemoglobin 11.0 (*)     Hematocrit 33.0 (*)     Platelets 113 (*)     Lymphocyte % 16.1 (*)     All other components within normal limits   D-DIMER, QUANTITATIVE - Abnormal; Notable for the following components:    D-Dimer, Quantitative 843 (*)     All other components within normal limits    Narrative:     Dimer values <500 ng/ml FEU are FDA approved as aid in diagnosis of deep venous thrombosis and pulmonary embolism.  This test should not be used in an exclusion strategy with pretest probability alone.    A recent guideline regarding diagnosis for pulmonary thromboembolism recommends an adjusted exclusion criterion of age x 10 ng/ml FEU for patients >50 years of age ( Intern Med 2015; 163: 701-711).     COVID-19 AND FLU A/B, NP SWAB IN TRANSPORT MEDIA 8-12 HR TAT - Normal    Narrative:     Fact sheet for providers: https://www.fda.gov/media/614841/download    Fact sheet for patients: https://www.fda.gov/media/730463/download    Test performed by PCR.   TROPONIN (IN-HOUSE) - Normal    Narrative:     Troponin T Reference Range:  <= 0.03 ng/mL-   Negative for AMI  >0.03 ng/mL-     Abnormal for myocardial necrosis.  Clinicians would have to utilize clinical acumen, EKG, Troponin and serial changes to determine if it is an Acute Myocardial Infarction or myocardial injury due to an underlying chronic condition.       Results may be falsely decreased if patient taking Biotin.     TROPONIN (IN-HOUSE) - Normal    Narrative:     Troponin T Reference Range:  <= 0.03 ng/mL-   Negative for AMI  >0.03 ng/mL-     Abnormal for myocardial necrosis.  Clinicians would have to utilize clinical acumen, EKG, Troponin and serial changes to determine if it is an Acute Myocardial Infarction or myocardial injury due to an underlying  chronic condition.       Results may be falsely decreased if patient taking Biotin.     BNP (IN-HOUSE) - Normal    Narrative:     Among patients with dyspnea, NT-proBNP is highly sensitive for the detection of acute congestive heart failure. In addition NT-proBNP of <300 pg/ml effectively rules out acute congestive heart failure with 99% negative predictive value.    Results may be falsely decreased if patient taking Biotin.     CK - Normal   LIPASE - Normal   BLOOD CULTURE   BLOOD CULTURE   RAINBOW DRAW    Narrative:     The following orders were created for panel order [place] Draw.  Procedure                               Abnormality         Status                     ---------                               -----------         ------                     Green Top (Gel)[[id number]]                                  Final result               Lavender Top[[id number]]                                     Final result               Gold Top - SST[[id number]]                                   Final result                 Please view results for these tests on the individual orders.   LACTIC ACID, PLASMA   CBC AND DIFFERENTIAL    Narrative:     The following orders were created for panel order CBC & Differential.  Procedure                               Abnormality         Status                     ---------                               -----------         ------                     CBC Auto Differential[[id number]]        Abnormal            Final result                 Please view results for these tests on the individual orders.   GREEN TOP   LAVENDER TOP   GOLD TOP - SST       XR Chest 1 View   Final Result   No evidence of active disease.      Electronically signed by:  Sammy Mccann MD  7/9/2021 9:33 AM CDT   Workstation: [id number]                                            [facility]    Final diagnoses:   Precordial pain   COPD exacerbation (CMS/HCC)   Hypotension, unspecified hypotension type       ED Disposition  ED  Disposition     ED Disposition Condition Comment    Decision to Admit  Level of Care: Telemetry [5]   Diagnosis: Precordial pain [786.51.ICD-9-CM]   Admitting Physician: MIRTHA WALKER [[id number]]   Attending Physician: MIRTHA WALKER [[id number]]            No follow-up provider specified.       Medication List      No changes were made to your prescriptions during this visit.          Berlin Aceves MD  07/09/21 1218

## 2021-07-10 LAB
ANION GAP SERPL CALCULATED.3IONS-SCNC: 13 MMOL/L (ref 5–15)
BASOPHILS # BLD AUTO: 0.01 10*3/MM3 (ref 0–0.2)
BASOPHILS NFR BLD AUTO: 0.3 % (ref 0–1.5)
BUN SERPL-MCNC: 28 MG/DL (ref 8–23)
BUN/CREAT SERPL: 19.7 (ref 7–25)
CALCIUM SPEC-SCNC: 9.2 MG/DL (ref 8.6–10.5)
CHLORIDE SERPL-SCNC: 96 MMOL/L (ref 98–107)
CO2 SERPL-SCNC: 23 MMOL/L (ref 22–29)
CREAT SERPL-MCNC: 1.42 MG/DL (ref 0.57–1)
DEPRECATED RDW RBC AUTO: 46.7 FL (ref 37–54)
EOSINOPHIL # BLD AUTO: 0 10*3/MM3 (ref 0–0.4)
EOSINOPHIL NFR BLD AUTO: 0 % (ref 0.3–6.2)
ERYTHROCYTE [DISTWIDTH] IN BLOOD BY AUTOMATED COUNT: 14 % (ref 12.3–15.4)
GFR SERPL CREATININE-BSD FRML MDRD: 36 ML/MIN/1.73
GLUCOSE SERPL-MCNC: 268 MG/DL (ref 65–99)
HCT VFR BLD AUTO: 33 % (ref 34–46.6)
HGB BLD-MCNC: 11.4 G/DL (ref 12–15.9)
IMM GRANULOCYTES # BLD AUTO: 0.03 10*3/MM3 (ref 0–0.05)
IMM GRANULOCYTES NFR BLD AUTO: 0.8 % (ref 0–0.5)
LYMPHOCYTES # BLD AUTO: 0.73 10*3/MM3 (ref 0.7–3.1)
LYMPHOCYTES NFR BLD AUTO: 20.3 % (ref 19.6–45.3)
MCH RBC QN AUTO: 32.4 PG (ref 26.6–33)
MCHC RBC AUTO-ENTMCNC: 34.5 G/DL (ref 31.5–35.7)
MCV RBC AUTO: 93.8 FL (ref 79–97)
MONOCYTES # BLD AUTO: 0.08 10*3/MM3 (ref 0.1–0.9)
MONOCYTES NFR BLD AUTO: 2.2 % (ref 5–12)
NEUTROPHILS NFR BLD AUTO: 2.74 10*3/MM3 (ref 1.7–7)
NEUTROPHILS NFR BLD AUTO: 76.4 % (ref 42.7–76)
NRBC BLD AUTO-RTO: 0 /100 WBC (ref 0–0.2)
PLATELET # BLD AUTO: 96 10*3/MM3 (ref 140–450)
PMV BLD AUTO: 12 FL (ref 6–12)
POTASSIUM SERPL-SCNC: 3.7 MMOL/L (ref 3.5–5.2)
RBC # BLD AUTO: 3.52 10*6/MM3 (ref 3.77–5.28)
RBC MORPH BLD: NORMAL
SMALL PLATELETS BLD QL SMEAR: NORMAL
SODIUM SERPL-SCNC: 132 MMOL/L (ref 136–145)
WBC # BLD AUTO: 3.59 10*3/MM3 (ref 3.4–10.8)
WBC MORPH BLD: NORMAL

## 2021-07-10 PROCEDURE — 85025 COMPLETE CBC W/AUTO DIFF WBC: CPT | Performed by: HOSPITALIST

## 2021-07-10 PROCEDURE — 94760 N-INVAS EAR/PLS OXIMETRY 1: CPT

## 2021-07-10 PROCEDURE — G0378 HOSPITAL OBSERVATION PER HR: HCPCS

## 2021-07-10 PROCEDURE — 97162 PT EVAL MOD COMPLEX 30 MIN: CPT

## 2021-07-10 PROCEDURE — 25010000002 METHYLPREDNISOLONE PER 125 MG: Performed by: HOSPITALIST

## 2021-07-10 PROCEDURE — 97535 SELF CARE MNGMENT TRAINING: CPT

## 2021-07-10 PROCEDURE — 94799 UNLISTED PULMONARY SVC/PX: CPT

## 2021-07-10 PROCEDURE — 25010000002 METHYLPREDNISOLONE PER 125 MG: Performed by: FAMILY MEDICINE

## 2021-07-10 PROCEDURE — 96367 TX/PROPH/DG ADDL SEQ IV INF: CPT

## 2021-07-10 PROCEDURE — 80048 BASIC METABOLIC PNL TOTAL CA: CPT | Performed by: HOSPITALIST

## 2021-07-10 PROCEDURE — 96372 THER/PROPH/DIAG INJ SC/IM: CPT

## 2021-07-10 PROCEDURE — 96366 THER/PROPH/DIAG IV INF ADDON: CPT

## 2021-07-10 PROCEDURE — 85007 BL SMEAR W/DIFF WBC COUNT: CPT | Performed by: HOSPITALIST

## 2021-07-10 PROCEDURE — 96376 TX/PRO/DX INJ SAME DRUG ADON: CPT

## 2021-07-10 PROCEDURE — 25010000002 HEPARIN (PORCINE) PER 1000 UNITS: Performed by: HOSPITALIST

## 2021-07-10 PROCEDURE — 97165 OT EVAL LOW COMPLEX 30 MIN: CPT

## 2021-07-10 RX ORDER — CLONIDINE HYDROCHLORIDE 0.1 MG/1
0.1 TABLET ORAL EVERY 6 HOURS PRN
Status: DISCONTINUED | OUTPATIENT
Start: 2021-07-10 | End: 2021-07-12 | Stop reason: HOSPADM

## 2021-07-10 RX ORDER — LORAZEPAM 0.5 MG/1
0.5 TABLET ORAL NIGHTLY PRN
Status: DISCONTINUED | OUTPATIENT
Start: 2021-07-10 | End: 2021-07-12 | Stop reason: HOSPADM

## 2021-07-10 RX ORDER — METHYLPREDNISOLONE SODIUM SUCCINATE 125 MG/2ML
60 INJECTION, POWDER, LYOPHILIZED, FOR SOLUTION INTRAMUSCULAR; INTRAVENOUS EVERY 8 HOURS
Status: DISCONTINUED | OUTPATIENT
Start: 2021-07-10 | End: 2021-07-11

## 2021-07-10 RX ADMIN — HYDROCHLOROTHIAZIDE 25 MG: 25 TABLET ORAL at 09:08

## 2021-07-10 RX ADMIN — DOXYCYCLINE 100 MG: 100 INJECTION, POWDER, LYOPHILIZED, FOR SOLUTION INTRAVENOUS at 22:49

## 2021-07-10 RX ADMIN — TIZANIDINE 4 MG: 4 TABLET ORAL at 21:13

## 2021-07-10 RX ADMIN — AMLODIPINE BESYLATE 10 MG: 10 TABLET ORAL at 09:07

## 2021-07-10 RX ADMIN — IPRATROPIUM BROMIDE AND ALBUTEROL SULFATE 3 ML: 2.5; .5 SOLUTION RESPIRATORY (INHALATION) at 07:31

## 2021-07-10 RX ADMIN — METHYLPREDNISOLONE SODIUM SUCCINATE 60 MG: 125 INJECTION, POWDER, FOR SOLUTION INTRAMUSCULAR; INTRAVENOUS at 12:55

## 2021-07-10 RX ADMIN — DOXYCYCLINE 100 MG: 100 INJECTION, POWDER, LYOPHILIZED, FOR SOLUTION INTRAVENOUS at 12:55

## 2021-07-10 RX ADMIN — LORAZEPAM 0.5 MG: 0.5 TABLET ORAL at 22:28

## 2021-07-10 RX ADMIN — LISINOPRIL 10 MG: 10 TABLET ORAL at 09:08

## 2021-07-10 RX ADMIN — VENLAFAXINE 37.5 MG: 37.5 TABLET ORAL at 06:27

## 2021-07-10 RX ADMIN — HEPARIN SODIUM 5000 UNITS: 5000 INJECTION INTRAVENOUS; SUBCUTANEOUS at 12:58

## 2021-07-10 RX ADMIN — HEPARIN SODIUM 5000 UNITS: 5000 INJECTION INTRAVENOUS; SUBCUTANEOUS at 21:13

## 2021-07-10 RX ADMIN — OXYCODONE HYDROCHLORIDE AND ACETAMINOPHEN 1 TABLET: 7.5; 325 TABLET ORAL at 15:24

## 2021-07-10 RX ADMIN — FAMOTIDINE 20 MG: 20 TABLET ORAL at 09:07

## 2021-07-10 RX ADMIN — METHYLPREDNISOLONE SODIUM SUCCINATE 60 MG: 125 INJECTION, POWDER, FOR SOLUTION INTRAMUSCULAR; INTRAVENOUS at 00:48

## 2021-07-10 RX ADMIN — IPRATROPIUM BROMIDE AND ALBUTEROL SULFATE 3 ML: 2.5; .5 SOLUTION RESPIRATORY (INHALATION) at 20:03

## 2021-07-10 RX ADMIN — GABAPENTIN 300 MG: 300 CAPSULE ORAL at 09:07

## 2021-07-10 RX ADMIN — ROSUVASTATIN CALCIUM 10 MG: 10 TABLET, FILM COATED ORAL at 09:08

## 2021-07-10 RX ADMIN — ISOSORBIDE MONONITRATE 30 MG: 30 TABLET, EXTENDED RELEASE ORAL at 09:07

## 2021-07-10 RX ADMIN — GABAPENTIN 300 MG: 300 CAPSULE ORAL at 21:13

## 2021-07-10 RX ADMIN — Medication 1000 UNITS: at 09:07

## 2021-07-10 RX ADMIN — BUDESONIDE AND FORMOTEROL FUMARATE DIHYDRATE 2 PUFF: 160; 4.5 AEROSOL RESPIRATORY (INHALATION) at 20:03

## 2021-07-10 RX ADMIN — ALLOPURINOL 50 MG: 100 TABLET ORAL at 09:06

## 2021-07-10 RX ADMIN — METHYLPREDNISOLONE SODIUM SUCCINATE 60 MG: 125 INJECTION, POWDER, FOR SOLUTION INTRAMUSCULAR; INTRAVENOUS at 21:14

## 2021-07-10 RX ADMIN — FOLIC ACID 1000 MCG: 1 TABLET ORAL at 09:07

## 2021-07-10 RX ADMIN — TIZANIDINE 4 MG: 4 TABLET ORAL at 09:06

## 2021-07-10 RX ADMIN — OXYCODONE HYDROCHLORIDE AND ACETAMINOPHEN 1 TABLET: 7.5; 325 TABLET ORAL at 09:06

## 2021-07-10 RX ADMIN — NEBIVOLOL HYDROCHLORIDE 5 MG: 5 TABLET ORAL at 09:07

## 2021-07-10 RX ADMIN — ROPINIROLE 2 MG: 1 TABLET, FILM COATED ORAL at 21:13

## 2021-07-10 RX ADMIN — METHYLPREDNISOLONE SODIUM SUCCINATE 60 MG: 125 INJECTION, POWDER, FOR SOLUTION INTRAMUSCULAR; INTRAVENOUS at 05:58

## 2021-07-10 RX ADMIN — SODIUM CHLORIDE, PRESERVATIVE FREE 10 ML: 5 INJECTION INTRAVENOUS at 21:14

## 2021-07-10 RX ADMIN — BUDESONIDE AND FORMOTEROL FUMARATE DIHYDRATE 2 PUFF: 160; 4.5 AEROSOL RESPIRATORY (INHALATION) at 07:31

## 2021-07-10 RX ADMIN — HEPARIN SODIUM 5000 UNITS: 5000 INJECTION INTRAVENOUS; SUBCUTANEOUS at 05:59

## 2021-07-10 RX ADMIN — OXYCODONE HYDROCHLORIDE AND ACETAMINOPHEN 1 TABLET: 7.5; 325 TABLET ORAL at 21:13

## 2021-07-10 RX ADMIN — ASPIRIN AND EXTENDED-RELEASE DIPYRIDAMOLE 1 CAPSULE: 25; 200 CAPSULE ORAL at 21:13

## 2021-07-10 RX ADMIN — CITALOPRAM 40 MG: 40 TABLET, FILM COATED ORAL at 21:13

## 2021-07-10 RX ADMIN — ASPIRIN AND EXTENDED-RELEASE DIPYRIDAMOLE 1 CAPSULE: 25; 200 CAPSULE ORAL at 09:06

## 2021-07-10 NOTE — PLAN OF CARE
Goal Outcome Evaluation:  Plan of Care Reviewed With: patient, daughter           Outcome Summary: OT eval on this date as co-eval with PT.  Pt was modified independent with bed mobility.  Pt demo independence with LB dressing with SOB after bending over.  Pt was SBA for toilet transfer and independent with toileting skills.  OTR issued home safety/energy conservation.  No problems identified which require skilled OT intervention @ this time.

## 2021-07-10 NOTE — PROGRESS NOTES
Item 0 Points 1 Point 2 Points 3 Points 4 Points Subtotal   Mental Status Alert, oriented, cooperative Lethargic, follows commands Confused, not following commands Obtunded or Somnolent Comatose 0   Respiratory Pattern Regular RR 8-16 breaths/minute Increased RR 18-25 breaths/minute Dyspnea on exertion, irregular RR 26-30 breaths/minute Shortness of breath,  RR 31-35 breaths/minute Accessory muscle use, severe SOB  RR > 35 breaths/minute 0   Breath Sounds Clear Decreased unilaterally Decreased bilaterally Basilar crackles Wheezing and/or rhonchi 2   Cough Strong, spontaneous, non-productive Strong productive Weak, non-productive Weak, productive or weak with rhonchi Absent or may require suctioning 0   Pulmonary Status Nonsmoker, no previous history, >1 year quit < 1 PPD  <1 year quit > or = 1 PPD Diagnosed pulmonary disease (severe or chronic) Severe or chronic pulmonary disease with exacerbation 3   Surgical Status None General surgery (non-abdominal or non-thoracic) Lower abdominal Thoracic or upper abdominal Thoracic with pulmonary disease 0   Chest X-ray Clear Chronic changes Infiltrates, atelectasis or pleural effusion Infiltrates in > 1 lobe Diffuse infiltrates and atelectasis and/or effusions 0   Activity   Level Ambulatory Ambulatory with assistance Non-ambulatory Paraplegic Quadriplegic 0        Total Score   5   Score    Drug Therapy Frequency 20 or >    Q4 Duoneb with Q2 Albuterol PRN 15-19    Q6 Duoneb with Q4 Albuterol PRN 10-14    QID Duoneb with Q4 Albuterol PRN 5-9    TID Duoneb with Q6 Albuterol PRN 0-4    Q4 PRN Duoneb                  Lung Expansion Therapy (PEP) Bronchopulmonary Hygiene (CPT)   Q4 & PRN - Severe atelectasis, poor oxygenation Q4 - Copious secretions, dyspnea, unable to sleep   QID - High risk for persistent atelectasis, existence of atelectasis QID & Q4 PRN - Moderate secretion production   TID - At risk for developing atelectasis TID - Small amounts of secretions with poor cough    BID - Prevention of atelectasis BID - Unable to breathe deeply and cough spontaneously     RT Comments / Recommendations:        Reassess in 48 hrs

## 2021-07-10 NOTE — PLAN OF CARE
Goal Outcome Evaluation:  Plan of Care Reviewed With: patient        Progress: no change  Outcome Summary: PT/OT evals as co eval. She is indep mobile in bed and gait to/from bathroom w/out LOB or distress She does have distress /SOA w/ attempting lower body dressing; Wanted to be tested for gait w/out 02 which was done but she sounded & felt SOA post gait of 158 ft outside room , sats upper 90s throughout gait and OOB activity; 02 replaced post gait.  Gait w/ Tinetti is 26/28 low fall risk using IV pole as sub for cane;  Pt states she has support at home when needed tho she lives in [facility] outside her dtrs home; Pt to be followed for gait/ther ex to attempt to increase ex debbie for functional mobiltiy . Rec home w/ assist of family, staying w/her as needed for safety.

## 2021-07-10 NOTE — THERAPY EVALUATION
Patient Name: Ronda Blair  : 1945    MRN: [id number]                              Today's Date: 7/10/2021     PT Eval   Admit Date: 2021    Visit Dx:     ICD-10-CM ICD-9-CM   1. Precordial pain  R07.2 786.51   2. COPD exacerbation (CMS/HCC)  J44.1 491.21   3. Hypotension, unspecified hypotension type  I95.9 458.9   4. Impaired functional mobility, balance, gait, and endurance  Z74.09 V49.89     Patient Active Problem List   Diagnosis   • Degeneration of intervertebral disc of mid-cervical region   • Morbid obesity due to excess calories (CMS/HCC)   • Former smoker   • Type 2 diabetes mellitus, without long-term current use of insulin (CMS/HCC)   • COPD (chronic obstructive pulmonary disease) (CMS/HCC)   • Dyslipidemia   • Pulmonary nodules   • Adrenal nodule (CMS/HCC)   • Fecal occult blood test positive   • Benign neoplasm of colon   • Chronic nonalcoholic liver disease   • Diverticular disease   • Dysphagia   • Essential hypertension   • Internal hemorrhoids   • Knee pain, bilateral   • Lumbar disc disease with radiculopathy   • Neuralgia, geniculate   • Stage 2 chronic kidney disease   • Gait disturbance   • Nocturnal hypoxia   • Chronic heart failure with preserved ejection fraction (CMS/HCC)   • Normocytic anemia   • Acute gout involving toe of left foot   • MARISOL (obstructive sleep apnea)   • MARISOL and COPD overlap syndrome (CMS/HCC)   • Precordial pain     Past Medical History:   Diagnosis Date   • Anxiety    • Arthritis    • COPD (chronic obstructive pulmonary disease) (CMS/HCC)    • Depression    • Diabetes mellitus (CMS/HCC)    • History of transfusion    • Hyperlipidemia    • Hypertension    • Stroke (CMS/HCC)     Greater than 5 years ago     Past Surgical History:   Procedure Laterality Date   • CERVICAL FUSION     • EYE SURGERY Bilateral 2014    lasix eye surgery   • HEMORRHOIDECTOMY     • HYSTERECTOMY     • INTRATHECAL PUMP REMOVAL         • KNEE ARTHROSCOPY Right 2015   •  LUMBAR SPINE SURGERY  1999   • PAIN PUMP INSERTION/REVISION      Removed in 2006     General Information     [facility] Name 07/10/21 1346          Physical Therapy Time and Intention    Document Type  evaluation  -     Mode of Treatment  co-treatment;physical therapy;occupational therapy  -[facility] Name 07/10/21 1346          General Information    Prior Level of Function  independent:;gait;ADL's;bed mobility;using stairs  -     Existing Precautions/Restrictions  fall  -[facility] Name 07/10/21 1346          Living Environment    Lives With  child(lalo), adult  -[facility] Name 07/10/21 1346          Home Main Entrance    Number of Stairs, Main Entrance  four  -GB     Stair Railings, Main Entrance  railing on left side (ascending)  -[facility] Name 07/10/21 1346          Stairs Within Home, Primary    Number of Stairs, Within Home, Primary  none  -[facility] Name 07/10/21 1346          Cognition    Orientation Status (Cognition)  oriented x 4  -[facility] Name 07/10/21 1346          Safety Issues, Functional Mobility    Impairments Affecting Function (Mobility)  endurance/activity tolerance  -       User Key  (r) = Recorded By, (t) = Taken By, (c) = Cosigned By    Initials Name Provider Type    GB Mile Eid, PT Physical Therapist        Mobility     Row Name 07/10/21 1414          Bed Mobility    Bed Mobility  bed mobility (all) activities  -     All Activities, [place] (Bed Mobility)  modified independence  -     Supine-Sit [place] (Bed Mobility)  modified independence  -     Sit-Supine [place] (Bed Mobility)  independent  -     Assistive Device (Bed Mobility)  bed rails;head of bed elevated  -[facility] Name 07/10/21 1414          Bed-Chair Transfer    Bed-Chair [place] (Transfers)  modified independence;supervision  -[facility] Name 07/10/21 1414          Sit-Stand Transfer    Sit-Stand [place] (Transfers)  supervision  -[facility] Name 07/10/21 1414           Gait/Stairs (Locomotion)    [place] Level (Gait)  independent;supervision;standby assist  -     Distance in Feet (Gait)  6,6, 158; c/o SOA w/ gait but sats in upper 90s; pt asked to walk w/out 02; 02 replaced post gait/OOB;  -       User Key  (r) = Recorded By, (t) = Taken By, (c) = Cosigned By    Initials Name Provider Type    Mile Diego PT Physical Therapist        Obj/Interventions     Row Name 07/10/21 1414          Range of Motion Comprehensive    General Range of Motion  bilateral lower extremity ROM WNL  -     Row Name 07/10/21 1414          Strength Comprehensive (MMT)    General Manual Muscle Testing (MMT) Assessment  lower extremity strength deficits identified;no strength deficits identified  -     Comment, General Manual Muscle Testing (MMT) Assessment  5/5 hip knee, ankle flx/ext; hx R TKR that affects sensation at knee but AROM WFL and aware of light touch  -     Row Name 07/10/21 1414          Balance    Balance Assessment  sitting static balance;sitting dynamic balance;standing static balance;standing dynamic balance  -     Static Sitting Balance  WFL  -     Dynamic Sitting Balance  WFL  -     Row Name 07/10/21 1414          Sensory Assessment (Somatosensory)    Sensory Assessment (Somatosensory)  sensation intact;LE sensation intact  -       User Key  (r) = Recorded By, (t) = Taken By, (c) = Cosigned By    Initials Name Provider Type    Mile Diego PT Physical Therapist        Goals/Plan     Row Name 07/10/21 1432          Bed Mobility Goal 1 (PT)    Activity/Assistive Device (Bed Mobility Goal 1, PT)  bed mobility activities, all  -GB     [place] Level/Cues Needed (Bed Mobility Goal 1, PT)  independent  -     Progress/Outcomes (Bed Mobility Goal 1, PT)  goal partially met  -     Row Name 07/10/21 1432          Transfer Goal 1 (PT)    Activity/Assistive Device (Transfer Goal 1, PT)  bed-to-chair/chair-to-bed  -     [place]  Level/Cues Needed (Transfer Goal 1, PT)  independent  -GB     Progress/Outcome (Transfer Goal 1, PT)  goal partially met  -GB     Row Name 07/10/21 1432          Gait Training Goal 1 (PT)    Activity/Assistive Device (Gait Training Goal 1, PT)  gait (walking locomotion);assistive device use;other (see comments) pushed IV Pole at her insistance  -GB     [place] Level (Gait Training Goal 1, PT)  modified independence;independent  -GB     Distance (Gait Training Goal 1, PT)  300 ft or more per trip w/ VSS and no c/o SOA, with pt managing her actiivyt to tolerance  -GB     Time Frame (Gait Training Goal 1, PT)  by discharge  -GB     Progress/Outcome (Gait Training Goal 1, PT)  goal not met  -GB     Row Name 07/10/21 1432          Stairs Goal 1 (PT)    Activity/Assistive Device (Stairs Goal 1, PT)  ascending stairs;descending stairs  -GB     [place] Level/Cues Needed (Stairs Goal 1, PT)  modified independence;independent  -GB     Number of Stairs (Stairs Goal 1, PT)  4 or more w/ HR Left ascending  -GB     Time Frame (Stairs Goal 1, PT)  by discharge  -GB     Progress/Outcome (Stairs Goal 1, PT)  goal not met  -GB       User Key  (r) = Recorded By, (t) = Taken By, (c) = Cosigned By    Initials Name Provider Type    Mile Diego, PT Physical Therapist        Clinical Impression     Row Name 07/10/21 1356          Pain    Additional Documentation  Pain Scale: Numbers Pre/Post-Treatment (Group)  -GB     Row Name 07/10/21 1356          Pain Scale: Numbers Pre/Post-Treatment    Pretreatment Pain Rating  8/10  -GB     Posttreatment Pain Rating  8/10  -GB     Pain Location  back  -GB     Row Name 07/10/21 1356          Plan of Care Review    Plan of Care Reviewed With  patient  -GB     Progress  no change  -GB     Outcome Summary  PT/OT evals as co elaineal. She is indep mobile in bed and gait to/from bathroom w/out LOB or distress She does have distress /SOA w/ attempting lower body dressing; Wanted to be  tested for gait w/out 02 which was done but she sounded & felt SOA post gait of 158 ft outside room , sats upper 90s throughout gait and OOB activity; 02 replaced post gait.  Gait w/ Tinetti is 26/28 low fall risk using IV pole as sub for cane;  Pt states she has support at home when needed tho she lives in [place] outside her dtrs home; Pt to be followed for gait/ther ex to attempt to increase ex debbie for functional mobiltiy . Rec home w/ assist of family, staying w/her as needed for safety.  -GB     Row Name 07/10/21 2779          Therapy Assessment/Plan (PT)    Patient/Family Therapy Goals Statement (PT)  home w/ family assist  -GB     Rehab Potential (PT)  good, to achieve stated therapy goals  -GB     Criteria for Skilled Interventions Met (PT)  yes  -GB     Row Name 07/10/21 6636          Vital Signs    Pre Systolic BP Rehab  137  -GB     Pre Treatment Diastolic BP  81  -GB     Intra Systolic BP Rehab  142  -GB     Intra Treatment Diastolic BP  58  -GB     Post Systolic BP Rehab  162  -GB     Post Treatment Diastolic BP  58  -GB     Pretreatment Heart Rate (beats/min)  60  -GB     Intratreatment Heart Rate (beats/min)  78  -GB     Posttreatment Heart Rate (beats/min)  61  -GB     O2 Delivery Pre Treatment  supplemental O2  -GB     Intra SpO2 (%)  95 gait 158 ft  -GB     O2 Delivery Intra Treatment  room air  -GB     Post SpO2 (%)  97  -GB     O2 Delivery Post Treatment  room air  -GB     Pre Patient Position  Supine  -GB     Intra Patient Position  Standing  -GB     Post Patient Position  Sitting  -GB     Recovery Time  less than 2 min sitting EOB post gait on RA  -GB     Row Name 07/10/21 6466          Positioning and Restraints    Pre-Treatment Position  in bed  -GB     Post Treatment Position  bed  -GB     In Bed  with family/caregiver;fowlers;call light within reach;encouraged to call for assist;side rails up x2 pt reports and demo indep gait to/from bathroom and indep toiletting  -GB       User Key  (r) =  "Recorded By, (t) = Taken By, (c) = Cosigned By    Initials Name Provider Type    GB Mile Eid, PT Physical Therapist        Outcome Measures     Row Name 07/10/21 1438          How much help from another person do you currently need...    Turning from your back to your side while in flat bed without using bedrails?  4  -GB     Moving from lying on back to sitting on the side of a flat bed without bedrails?  4  -GB     Moving to and from a bed to a chair (including a wheelchair)?  4  -GB     Standing up from a chair using your arms (e.g., wheelchair, bedside chair)?  4  -GB     Climbing 3-5 steps with a railing?  2  -GB     To walk in hospital room?  3  -GB     AM-PAC 6 Clicks Score (PT)  21  -GB     Row Name 07/10/21 1438          Tinetti Assessment    Tinetti Assessment  yes  -GB     Sitting Balance  1  -GB     Arises  2  -GB     Attempts to Rise  2  -GB     Immediate Standing Balance (first 5 sec)  2  -GB     Standing Balance  2  -GB     Sternal Nudge (feet close together)  2  -GB     Eyes Closed (feet close together)  1  -GB     Turning 360 Degrees- Steps  1  -GB     Turning 360 Degrees- Steadiness  1  -GB     Sitting Down  2  -GB     Tinetti Balance Score  16  -GB     Gait Initiation (immediate after told \"go\")  1  -GB     Step Length- Right Swing  1  -GB     Step Length- Left Swing  1  -GB     Foot Clearance- Right Foot  1  -GB     Foot Clearance- Left Foot  1  -GB     Step Symmetry  1  -GB     Step Continuity  1  -GB     Path (excursion)  1  -GB     Trunk  2  -GB     Base of Support  0  -GB     Gait Score  10  -GB     Tinetti Total Score  26  -GB     Tinetti Assistive Device  -- used IV pole for gait; usually uses QC  -GB     Tinetti Assessment Comments  26/28  -GB     Row Name 07/10/21 1438 07/10/21 1345       Functional Assessment    Outcome Measure Options  AM-PAC 6 Clicks Basic Mobility (PT);Tinetti  -GB  AM-PAC 6 Clicks Daily Activity (OT)  -RB      User Key  (r) = Recorded By, (t) = Taken " By, (c) = Cosigned By    Initials Name Provider Type    RB Patrice Chaney, OT Occupational Therapist    Mile Diego, PT Physical Therapist        Physical Therapy Education                 Title: PT OT SLP Therapies (In Progress)     Topic: Physical Therapy (Done)     Point: Mobility training (Done)     Learning Progress Summary           Patient Acceptance, E,D, VU,DU by JUAN CARLOS at 7/10/2021 1519    Comment: POC; mobility                   Point: Home exercise program (Done)     Learning Progress Summary           Patient Acceptance, E,D, VU,DU by JUAN CARLOS at 7/10/2021 1519    Comment: POC; mobility                   Point: Body mechanics (Done)     Learning Progress Summary           Patient Acceptance, E,D, VU,DU by JUAN CARLOS at 7/10/2021 1519    Comment: POC; mobility                   Point: Precautions (Done)     Learning Progress Summary           Patient Acceptance, E,D, VU,DU by JUAN CARLOS at 7/10/2021 1519    Comment: POC; mobility                               User Key     Initials Effective Dates Name Provider Type Discipline     06/16/21 -  Mile Eid, PT Physical Therapist PT              PT Recommendation and Plan  Planned Therapy Interventions (PT): bed mobility training, gait training, home exercise program, stair training, strengthening, transfer training, balance training, patient/family education  Plan of Care Reviewed With: patient  Progress: no change  Outcome Summary: PT/OT elaineals as co rosa. She is indep mobile in bed and gait to/from bathroom w/out LOB or distress She does have distress /SOA w/ attempting lower body dressing; Wanted to be tested for gait w/out 02 which was done but she sounded & felt SOA post gait of 158 ft outside room , sats upper 90s throughout gait and OOB activity; 02 replaced post gait.  Gait w/ [place] is 26/28 low fall risk using IV pole as sub for cane;  Pt states she has support at home when needed tho she lives in [facility] outside her rs home; Pt to be  followed for gait/ther ex to attempt to increase ex debbie for functional mobiltiy . Rec home w/ assist of family, staying w/her as needed for safety.     Time Calculation:   PT Charges     Row Name 07/10/21 1453             Time Calculation    Start Time  1345  -GB      Stop Time  1453  -GB      Time Calculation (min)  68 min  -GB      PT Received On  07/10/21  -GB      PT Goal Re-Cert Due Date  07/19/21  -GB         Untimed Charges    PT Eval/Re-eval Minutes  68  -GB         Total Minutes    Untimed Charges Total Minutes  68  -GB       Total Minutes  68  -GB        User Key  (r) = Recorded By, (t) = Taken By, (c) = Cosigned By    Initials Name Provider Type    Mile Diego, PT Physical Therapist        Therapy Charges for Today     Code Description Service Date Service Provider Modifiers Qty    [id number] HC-PT EVAL MOD COMPLEXITY 5 7/10/2021 Mile Eid PT  1          PT G-Codes  Outcome Measure Options: AM-PAC 6 Clicks Basic Mobility (PT), Tinetti  AM-PAC 6 Clicks Score (PT): 21  AM-PAC 6 Clicks Score (OT): 24  Tinetti Total Score: 26    Mile Eid PT  7/10/2021

## 2021-07-10 NOTE — PROGRESS NOTES
"    [facility] Medicine Services  INPATIENT PROGRESS NOTE    Length of Stay: 0  Date of Admission: 7/9/2021  Primary Care Physician: Brook Arreola MD    Subjective   Chief Complaint: Shortness of breath  HPI:  Feels like her breathing is some better today but still has exertional dyspnea.  Cough with some sputum production. No fever or chills. No CP. Also notes insomnia and feeling \"jittery\" from the steroids.     Review of Systems   Constitutional: Negative for chills and fever.   HENT: Negative for congestion.    Respiratory: Positive for cough, shortness of breath and wheezing.    Cardiovascular: Negative for chest pain and palpitations.   Gastrointestinal: Negative for abdominal pain, constipation, diarrhea, nausea and vomiting.   Genitourinary: Negative.    Musculoskeletal: Negative.    Skin: Negative.    Neurological: Negative.    Psychiatric/Behavioral: Negative.    All other systems reviewed and are negative.     All pertinent negatives and positives are as above. All other systems have been reviewed and are negative unless otherwise stated.     Objective    Temp:  [96 °F (35.6 °C)-97.1 °F (36.2 °C)] 97.1 °F (36.2 °C)  Heart Rate:  [50-88] 83  Resp:  [16-20] 18  BP: (104-180)/(51-70) 178/64    Physical Exam  Constitutional:       General: She is not in acute distress.     Appearance: She is not toxic-appearing.   HENT:      Head: Normocephalic and atraumatic.      Right Ear: External ear normal.      Left Ear: External ear normal.      Nose: Nose normal.      Mouth/Throat:      Mouth: Mucous membranes are moist.      Pharynx: Oropharynx is clear.   Eyes:      Conjunctiva/sclera: Conjunctivae normal.   Cardiovascular:      Rate and Rhythm: Normal rate and regular rhythm.      Pulses: Normal pulses.      Heart sounds: Normal heart sounds.   Pulmonary:      Comments: Coarse diminished breath sounds bilaterally with scattered wheeze.   Abdominal:      General: Bowel " sounds are normal.      Palpations: Abdomen is soft.      Tenderness: There is no abdominal tenderness.   Musculoskeletal:         General: No swelling.      Cervical back: Neck supple.   Skin:     General: Skin is warm and dry.      Capillary Refill: Capillary refill takes less than 2 seconds.   Neurological:      General: No focal deficit present.      Mental Status: She is alert and oriented to person, place, and time. Mental status is at baseline.      Coordination: Coordination normal.   Psychiatric:         Mood and Affect: Mood normal.         Behavior: Behavior normal.       Results Review:  I have reviewed the labs, radiology results, and diagnostic studies.    Laboratory Data:   Results from last 7 days   Lab Units 07/10/21  0641 07/09/21  0857   SODIUM mmol/L 132* 140   POTASSIUM mmol/L 3.7 3.8   CHLORIDE mmol/L 96* 102   CO2 mmol/L 23.0 26.0   BUN mg/dL 28* 36*   CREATININE mg/dL 1.42* 1.62*   GLUCOSE mg/dL 268* 151*   CALCIUM mg/dL 9.2 8.8   BILIRUBIN mg/dL  --  0.2   ALK PHOS U/L  --  93   ALT (SGPT) U/L  --  20   AST (SGOT) U/L  --  24   ANION GAP mmol/L 13.0 12.0     Estimated Creatinine Clearance: 38.5 mL/min (A) (by C-G formula based on SCr of 1.42 mg/dL (H)).  Results from last 7 days   Lab Units 07/09/21  0857   MAGNESIUM mg/dL 1.5*         Results from last 7 days   Lab Units 07/10/21  0642 07/09/21  0857   WBC 10*3/mm3 3.59 6.27   HEMOGLOBIN g/dL 11.4* 11.0*   HEMATOCRIT % 33.0* 33.0*   PLATELETS 10*3/mm3 96* 113*           Culture Data:   No results found for: BLOODCX  No results found for: URINECX  No results found for: RESPCX  No results found for: WOUNDCX  No results found for: STOOLCX  No components found for: BODYFLD    Radiology Data:   Imaging Results (Last 24 Hours)     ** No results found for the last 24 hours. **          I have reviewed the patient's current medications.     Assessment/Plan     Active Problems:    Type 2 diabetes mellitus, without long-term current use of insulin  (CMS/[facility])    COPD (chronic obstructive pulmonary disease) (CMS/[facility])    Essential hypertension    MARISOL (obstructive sleep apnea)    Precordial pain    Plan:  -Continue supplemental oxygen wean as tolerated, may need to go home with O2 at discharge.  -Continue IV steroids but will decrease the dose from every 6 hours to every 8 hours for now.  -Continue with dilators, RT to initiate breathing protocol.  -Continue with antibiotics but changed to doxycycline for anti-inflammatory effects  -Continue home antihypertensive therapy, add as needed clonidine pressures.  -Expect appears to improve as her steroid dosing is decreased.  -Add as needed Ativan at night to help with sleep, discussed with patient that she likely will have difficulties with sleeping due to the doses of steroids she is receiving.  -PT and OT have been ordered  -Serial troponins negative so doubt chest pain was cardiac in origin likely related to her current respiratory failure.  -DVT prophylaxis with heparin  -CODE STATUS: Full    I confirmed that the patient's Advance Care Plan is present, code status is documented, or surrogate decision maker is listed in the patient's medical record.     Discharge Planning: In process    Shant Maradiaga MD

## 2021-07-10 NOTE — NURSING NOTE
Dr. Powers notified about patients blood pressure beingn 180/64. Patient is not symptomatic at this time

## 2021-07-10 NOTE — THERAPY EVALUATION
Acute Care - Occupational Therapy Initial Evaluation  [facility]     Patient Name: Ronda Blair  : 1945  MRN: [id number]  Today's Date: 7/10/2021  Onset of Illness/Injury or Date of Surgery: 21  Date of Referral to OT: 07/10/21  Referring Physician: JAYMIE Graves MD    Admit Date: 2021       ICD-10-CM ICD-9-CM   1. Precordial pain  R07.2 786.51   2. COPD exacerbation (CMS/HCC)  J44.1 491.21   3. Hypotension, unspecified hypotension type  I95.9 458.9   4. Impaired functional mobility, balance, gait, and endurance  Z74.09 V49.89   5. Impaired mobility and activities of daily living  Z74.09 V49.89    Z78.9      Patient Active Problem List   Diagnosis   • Degeneration of intervertebral disc of mid-cervical region   • Morbid obesity due to excess calories (CMS/HCC)   • Former smoker   • Type 2 diabetes mellitus, without long-term current use of insulin (CMS/HCC)   • COPD (chronic obstructive pulmonary disease) (CMS/HCC)   • Dyslipidemia   • Pulmonary nodules   • Adrenal nodule (CMS/HCC)   • Fecal occult blood test positive   • Benign neoplasm of colon   • Chronic nonalcoholic liver disease   • Diverticular disease   • Dysphagia   • Essential hypertension   • Internal hemorrhoids   • Knee pain, bilateral   • Lumbar disc disease with radiculopathy   • Neuralgia, geniculate   • Stage 2 chronic kidney disease   • Gait disturbance   • Nocturnal hypoxia   • Chronic heart failure with preserved ejection fraction (CMS/HCC)   • Normocytic anemia   • Acute gout involving toe of left foot   • MARISOL (obstructive sleep apnea)   • MARISOL and COPD overlap syndrome (CMS/HCC)   • Precordial pain     Past Medical History:   Diagnosis Date   • Anxiety    • Arthritis    • COPD (chronic obstructive pulmonary disease) (CMS/HCC)    • Depression    • Diabetes mellitus (CMS/HCC)    • History of transfusion    • Hyperlipidemia    • Hypertension    • Stroke (CMS/HCC)     Greater than 5 years ago     Past Surgical  History:   Procedure Laterality Date   • CERVICAL FUSION  1999   • EYE SURGERY Bilateral 2014    lasix eye surgery   • HEMORRHOIDECTOMY     • HYSTERECTOMY     • INTRATHECAL PUMP REMOVAL      2006   • KNEE ARTHROSCOPY Right 2015   • LUMBAR SPINE SURGERY  1999   • PAIN PUMP INSERTION/REVISION      Removed in 2006            OT ASSESSMENT FLOWSHEET (last 12 hours)      OT Evaluation and Treatment     Row Name 07/10/21 1414 07/10/21 1356 07/10/21 1345 07/10/21 0906          OT Time and Intention    Subjective Information  --  -RB  --  complains of;pain  -RB  --     Document Type  --  --  evaluation  -RB  --     Mode of Treatment  --  --  co-treatment;physical therapy;occupational therapy  -RB  --     Patient Effort  --  --  good  -RB  --        General Information    Patient Profile Reviewed  --  --  yes  -RB  --     Onset of Illness/Injury or Date of Surgery  --  --  07/09/21  -RB  --     Referring Physician  --  --  JAYMIE Graves MD  -RB  --     Prior Level of Function  --  --  independent:;gait;transfer;ADL's;bed mobility;driving;cooking;using stairs  -RB  --     Equipment Currently Used at Home  --  --  rollator;cane, quad;shower chair;commode, bedside  -RB  --     Existing Precautions/Restrictions  --  --  fall;oxygen therapy device and L/min  -RB  --     Equipment Issued to Patient  --  --  gait belt  -RB  --        Living Environment    Current Living Arrangements  --  --  other (see comments) [place]  -RB  --     Home Accessibility  --  --  stairs to enter home  -RB  --        Home Main Entrance    Number of Stairs, Main Entrance  --  --  four  -RB  --     Stair Railings, Main Entrance  --  --  railing on left side (ascending)  -RB  --        Cognition    Affect/Mental Status (Cognitive)  --  --  WNL  -RB  --     Orientation Status (Cognition)  --  --  oriented x 4  -RB  --        Pain Assessment    Additional Documentation  --  --  Pain Scale: Numbers Pre/Post-Treatment (Group)  -RB  --        Pain Scale: Numbers  Pre/Post-Treatment    Pretreatment Pain Rating  --  --  8/10  -RB  --     Posttreatment Pain Rating  --  --  8/10  -RB  --     Pain Location - Orientation  --  --  lower  -RB  --     Pain Location  --  --  back  -RB  --     Pain Intervention(s)  --  --  Medication (See MAR)  -RB  --        Strength (Manual Muscle Testing)    Strength (Manual Muscle Testing)  --  --  strength is WNL  -RB  --        Strength Comprehensive (MMT)    General Manual Muscle Testing (MMT) Assessment  --  --  other (see comments)  -RB  --     Comment, General Manual Muscle Testing (MMT) Assessment  --  --  5/5 for B UE strength  -RB  --        Bed Mobility    Bed Mobility  --  --  supine-sit;sit-supine  -RB  --     Supine-Sit [place] (Bed Mobility)  --  --  modified independence  -RB  --     Sit-Supine [place] (Bed Mobility)  --  --  modified independence  -RB  --        Functional Mobility    Functional Mobility- Ind. Level  --  -RB  --  supervision required  -RB  --     Functional Mobility-Distance (Feet)  --  -RB  --  170  -RB  --     Functional Mobility- Comment  --  -RB  --  Took nasal canula off while ambulating 158' and SATS only dropped from 98% to 95%.   -RB  --        Transfer Assessment/Treatment    Transfers  sit-stand transfer;stand-sit transfer  -RB  --  sit-stand transfer;stand-sit transfer;toilet transfer  -RB  --        Transfers    Bed-Chair [place] (Transfers)  --  --  modified independence  -RB  --     Sit-Stand [place] (Transfers)  --  --  supervision  -RB  --     Stand-Sit [place] (Transfers)  --  --  supervision  -RB  --     [place] Level (Toilet Transfer)  --  --  supervision;modified independence  -RB  --     Assistive Device (Toilet Transfer)  --  --  raised toilet seat;grab bars/safety frame  -RB  --        Sit-Stand Transfer    Assistive Device (Sit-Stand Transfers)  --  --  other (see comments) none  -RB  --        Stand-Sit Transfer    Assistive Device (Stand-Sit Transfers)  --  --  none  -RB  --  --        Toilet Transfer    Type (Toilet Transfer)  --  --  sit-stand;stand-sit  -RB  --        Safety Issues, Functional Mobility    Impairments Affecting Function (Mobility)  --  --  endurance/activity tolerance  -RB  --        Balance    Balance Assessment  --  --  sitting static balance;sitting dynamic balance  -RB  --     Static Sitting Balance  --  --  WNL  -RB  --     Dynamic Sitting Balance  --  --  WNL  -RB  --        Activities of Daily Living    BADL Assessment/Intervention  --  --  lower body dressing;grooming  -RB  --        Lower Body Dressing Assessment/Training    [place] Level (Lower Body Dressing)  --  --  lower body dressing skills;doff;don;socks;modified independence  -RB  --     Position (Lower Body Dressing)  --  --  edge of bed sitting  -RB  --        Grooming Assessment/Training    [place] Level (Grooming)  --  --  grooming skills;hair care, combing/brushing;modified independence  -RB  --     Position (Grooming)  --  --  edge of bed sitting  -RB  --        Coping    Observed Emotional State  --  --  cooperative  -RB  --        Vital Signs    Pre Systolic BP Rehab  --  --  137  -RB  --     Pre Treatment Diastolic BP  --  --  81  -RB  --     Intra Systolic BP Rehab  --  --  142  -RB  --     Intra Treatment Diastolic BP  --  --  58  -RB  --     Post Systolic BP Rehab  --  --  162  -RB  --     Post Treatment Diastolic BP  --  --  58  -RB  --     Pretreatment Heart Rate (beats/min)  --  --  60  -RB  --     Intratreatment Heart Rate (beats/min)  --  --  78  -RB  --     Posttreatment Heart Rate (beats/min)  --  --  61  -RB  --     Pre SpO2 (%)  --  --  98  -RB  --     O2 Delivery Pre Treatment  --  --  supplemental O2 2L  -RB  --     Intra SpO2 (%)  --  --  95  -RB  --     O2 Delivery Intra Treatment  --  --  supplemental O2  -RB  --     Post SpO2 (%)  --  --  97  -RB  --     O2 Delivery Post Treatment  --  --  supplemental O2  -RB  --     Pre Patient Position  --  --   Supine  -RB  --     Intra Patient Position  --  --  Standing  -RB  --     Post Patient Position  --  --  Supine  -RB  --        Positioning and Restraints    Pre-Treatment Position  --  --  in bed  -RB  --     Post Treatment Position  --  --  bed  -RB  --     In Bed  --  --  supine;call light within reach  -RB  --        Therapy Assessment/Plan (OT)    Date of Referral to OT  --  --  07/10/21  -RB  --     Functional Level at Time of Evaluation (OT)  --  --  Pt able to dress LB with mod I, toilet transfer with supervision/mod I.    -RB  --     Criteria for Skilled Therapeutic Interventions Met (OT)  --  --  no problems identified which require skilled intervention  -RB  --        Therapy Plan Review/Discharge Plan (OT)    Anticipated Discharge Disposition (OT)  --  --  home  -RB  --  -RB       User Key  (r) = Recorded By, (t) = Taken By, (c) = Cosigned By    Initials Name Effective Dates    RB Patrice Chaney, OT 06/16/21 -          Occupational Therapy Education                 Title: PT OT SLP Therapies (In Progress)     Topic: Occupational Therapy (In Progress)     Point: ADL training (Not Started)     Description:   Instruct learner(s) on proper safety adaptation and remediation techniques during self care or transfers.   Instruct in proper use of assistive devices.              Learner Progress:  Not documented in this visit.          Point: Home exercise program (Not Started)     Description:   Instruct learner(s) on appropriate technique for monitoring, assisting and/or progressing therapeutic exercises/activities.              Learner Progress:  Not documented in this visit.          Point: Precautions (Resolved)     Description:   Instruct learner(s) on prescribed precautions during self-care and functional transfers.              Learning Progress Summary           Patient Acceptance, E VU by RB at 7/10/2021 1526    Comment: Edu dtr on home safety hand out due to pt inability to read and issued handout for  dtr to [facility] mother on.   Family Acceptance, E, VU by RB at 7/10/2021 9116    Comment: [facility] dtr on home safety hand out due to pt inability to read and issued handout for dtr to [facility] mother on.                   Point: Body mechanics (Not Started)     Description:   Instruct learner(s) on proper positioning and spine alignment during self-care, functional mobility activities and/or exercises.              Learner Progress:  Not documented in this visit.                      User Key     Initials Effective Dates Name Provider Type Discipline     06/16/21 -  Patrice Chaney, OT Occupational Therapist OT                  OT Recommendation and Plan     Plan of Care Review  Plan of Care Reviewed With: patient, daughter  Outcome Summary: OT eval on this date as co-eval with PT.  Pt was modified independent with bed mobility.  Pt demo independence with LB dressing with SOB after bending over.  Pt was SBA for toilet transfer and independent with toileting skills.  OTR issued home safety/energy conservation.  No problems identified which require skilled OT intervention @ this time.  Plan of Care Reviewed With: patient, daughter  Outcome Summary: OT eval on this date as co-eval with PT.  Pt was modified independent with bed mobility.  Pt demo independence with LB dressing with SOB after bending over.  Pt was SBA for toilet transfer and independent with toileting skills.  OTR issued home safety/energy conservation.  No problems identified which require skilled OT intervention @ this time.    Outcome Measures     Row Name 07/10/21 6989             How much help from another is currently needed...    Putting on and taking off regular lower body clothing?  4  -RB      Bathing (including washing, rinsing, and drying)  4  -RB      Toileting (which includes using toilet bed pan or urinal)  4  -RB      Putting on and taking off regular upper body clothing  4  -RB      Taking care of personal grooming (such as brushing teeth)  4  -RB       Eating meals  4  -RB      AM-PAC 6 Clicks Score (OT)  24  -RB         Functional Assessment    Outcome Measure Options  AM-PAC 6 Clicks Daily Activity (OT)  -RB        User Key  (r) = Recorded By, (t) = Taken By, (c) = Cosigned By    Initials Name Provider Type    Patrice Cedillo OT Occupational Therapist          Time Calculation:   Time Calculation- OT     Row Name 07/10/21 1538             Time Calculation- OT    OT Start Time  1345  -RB      OT Stop Time  1453  -RB      OT Time Calculation (min)  68 min  -RB      OT Received On  07/10/21  -RB        User Key  (r) = Recorded By, (t) = Taken By, (c) = Cosigned By    Initials Name Provider Type    Patrice Cedillo OT Occupational Therapist        Therapy Charges for Today     Code Description Service Date Service Provider Modifiers Qty    [id number]  OT EVAL LOW COMPLEXITY 4 7/10/2021 Patrice Chaney OT GO 1    [id number]  OT SELF CARE/MGMT/TRAIN EA 15 MIN 7/10/2021 Patrice Chaney OT GO 1               Patrice Chaney OT  7/10/2021

## 2021-07-11 LAB
ANION GAP SERPL CALCULATED.3IONS-SCNC: 11 MMOL/L (ref 5–15)
BASOPHILS # BLD AUTO: 0.01 10*3/MM3 (ref 0–0.2)
BASOPHILS NFR BLD AUTO: 0.1 % (ref 0–1.5)
BUN SERPL-MCNC: 35 MG/DL (ref 8–23)
BUN/CREAT SERPL: 27.1 (ref 7–25)
CALCIUM SPEC-SCNC: 9.3 MG/DL (ref 8.6–10.5)
CHLORIDE SERPL-SCNC: 101 MMOL/L (ref 98–107)
CO2 SERPL-SCNC: 25 MMOL/L (ref 22–29)
CREAT SERPL-MCNC: 1.29 MG/DL (ref 0.57–1)
DEPRECATED RDW RBC AUTO: 46.8 FL (ref 37–54)
EOSINOPHIL # BLD AUTO: 0 10*3/MM3 (ref 0–0.4)
EOSINOPHIL NFR BLD AUTO: 0 % (ref 0.3–6.2)
ERYTHROCYTE [DISTWIDTH] IN BLOOD BY AUTOMATED COUNT: 13.8 % (ref 12.3–15.4)
GFR SERPL CREATININE-BSD FRML MDRD: 40 ML/MIN/1.73
GLUCOSE BLDC GLUCOMTR-MCNC: 212 MG/DL (ref 70–130)
GLUCOSE BLDC GLUCOMTR-MCNC: 278 MG/DL (ref 70–130)
GLUCOSE SERPL-MCNC: 231 MG/DL (ref 65–99)
HCT VFR BLD AUTO: 30.8 % (ref 34–46.6)
HGB BLD-MCNC: 10.3 G/DL (ref 12–15.9)
IMM GRANULOCYTES # BLD AUTO: 0.07 10*3/MM3 (ref 0–0.05)
IMM GRANULOCYTES NFR BLD AUTO: 0.9 % (ref 0–0.5)
LYMPHOCYTES # BLD AUTO: 1.03 10*3/MM3 (ref 0.7–3.1)
LYMPHOCYTES NFR BLD AUTO: 12.9 % (ref 19.6–45.3)
MCH RBC QN AUTO: 31.2 PG (ref 26.6–33)
MCHC RBC AUTO-ENTMCNC: 33.4 G/DL (ref 31.5–35.7)
MCV RBC AUTO: 93.3 FL (ref 79–97)
MONOCYTES # BLD AUTO: 0.36 10*3/MM3 (ref 0.1–0.9)
MONOCYTES NFR BLD AUTO: 4.5 % (ref 5–12)
NEUTROPHILS NFR BLD AUTO: 6.54 10*3/MM3 (ref 1.7–7)
NEUTROPHILS NFR BLD AUTO: 81.6 % (ref 42.7–76)
NRBC BLD AUTO-RTO: 0 /100 WBC (ref 0–0.2)
PLATELET # BLD AUTO: 98 10*3/MM3 (ref 140–450)
PMV BLD AUTO: 12 FL (ref 6–12)
POTASSIUM SERPL-SCNC: 4.3 MMOL/L (ref 3.5–5.2)
RBC # BLD AUTO: 3.3 10*6/MM3 (ref 3.77–5.28)
SODIUM SERPL-SCNC: 137 MMOL/L (ref 136–145)
WBC # BLD AUTO: 8.01 10*3/MM3 (ref 3.4–10.8)

## 2021-07-11 PROCEDURE — 96366 THER/PROPH/DIAG IV INF ADDON: CPT

## 2021-07-11 PROCEDURE — 96376 TX/PRO/DX INJ SAME DRUG ADON: CPT

## 2021-07-11 PROCEDURE — 63710000001 INSULIN ASPART PER 5 UNITS: Performed by: FAMILY MEDICINE

## 2021-07-11 PROCEDURE — 25010000002 METHYLPREDNISOLONE PER 125 MG: Performed by: FAMILY MEDICINE

## 2021-07-11 PROCEDURE — G0378 HOSPITAL OBSERVATION PER HR: HCPCS

## 2021-07-11 PROCEDURE — 85025 COMPLETE CBC W/AUTO DIFF WBC: CPT | Performed by: HOSPITALIST

## 2021-07-11 PROCEDURE — 25010000002 METHYLPREDNISOLONE PER 40 MG: Performed by: FAMILY MEDICINE

## 2021-07-11 PROCEDURE — 80048 BASIC METABOLIC PNL TOTAL CA: CPT | Performed by: HOSPITALIST

## 2021-07-11 PROCEDURE — 94799 UNLISTED PULMONARY SVC/PX: CPT

## 2021-07-11 PROCEDURE — 96372 THER/PROPH/DIAG INJ SC/IM: CPT

## 2021-07-11 PROCEDURE — 82962 GLUCOSE BLOOD TEST: CPT

## 2021-07-11 PROCEDURE — 97535 SELF CARE MNGMENT TRAINING: CPT

## 2021-07-11 PROCEDURE — 97116 GAIT TRAINING THERAPY: CPT

## 2021-07-11 PROCEDURE — 94760 N-INVAS EAR/PLS OXIMETRY 1: CPT

## 2021-07-11 PROCEDURE — 25010000002 HEPARIN (PORCINE) PER 1000 UNITS: Performed by: HOSPITALIST

## 2021-07-11 PROCEDURE — 97530 THERAPEUTIC ACTIVITIES: CPT

## 2021-07-11 RX ORDER — IPRATROPIUM BROMIDE AND ALBUTEROL SULFATE 2.5; .5 MG/3ML; MG/3ML
3 SOLUTION RESPIRATORY (INHALATION) EVERY 4 HOURS PRN
Status: DISCONTINUED | OUTPATIENT
Start: 2021-07-11 | End: 2021-07-12 | Stop reason: HOSPADM

## 2021-07-11 RX ORDER — METHYLPREDNISOLONE SODIUM SUCCINATE 40 MG/ML
40 INJECTION, POWDER, LYOPHILIZED, FOR SOLUTION INTRAMUSCULAR; INTRAVENOUS EVERY 8 HOURS
Status: DISCONTINUED | OUTPATIENT
Start: 2021-07-11 | End: 2021-07-12 | Stop reason: HOSPADM

## 2021-07-11 RX ORDER — DEXTROSE MONOHYDRATE 25 G/50ML
25 INJECTION, SOLUTION INTRAVENOUS
Status: DISCONTINUED | OUTPATIENT
Start: 2021-07-11 | End: 2021-07-12 | Stop reason: HOSPADM

## 2021-07-11 RX ORDER — NICOTINE POLACRILEX 4 MG
15 LOZENGE BUCCAL
Status: DISCONTINUED | OUTPATIENT
Start: 2021-07-11 | End: 2021-07-12 | Stop reason: HOSPADM

## 2021-07-11 RX ORDER — NYSTATIN 100000 [USP'U]/G
POWDER TOPICAL EVERY 12 HOURS PRN
Status: DISCONTINUED | OUTPATIENT
Start: 2021-07-11 | End: 2021-07-12 | Stop reason: HOSPADM

## 2021-07-11 RX ADMIN — DOXYCYCLINE 100 MG: 100 INJECTION, POWDER, LYOPHILIZED, FOR SOLUTION INTRAVENOUS at 11:46

## 2021-07-11 RX ADMIN — ROSUVASTATIN CALCIUM 10 MG: 10 TABLET, FILM COATED ORAL at 09:35

## 2021-07-11 RX ADMIN — HYDROCHLOROTHIAZIDE 25 MG: 25 TABLET ORAL at 09:35

## 2021-07-11 RX ADMIN — Medication 1000 UNITS: at 09:35

## 2021-07-11 RX ADMIN — BUDESONIDE AND FORMOTEROL FUMARATE DIHYDRATE 2 PUFF: 160; 4.5 AEROSOL RESPIRATORY (INHALATION) at 06:54

## 2021-07-11 RX ADMIN — AMLODIPINE BESYLATE 10 MG: 10 TABLET ORAL at 09:35

## 2021-07-11 RX ADMIN — METHYLPREDNISOLONE SODIUM SUCCINATE 40 MG: 40 INJECTION, POWDER, FOR SOLUTION INTRAMUSCULAR; INTRAVENOUS at 14:41

## 2021-07-11 RX ADMIN — SODIUM CHLORIDE, PRESERVATIVE FREE 10 ML: 5 INJECTION INTRAVENOUS at 20:46

## 2021-07-11 RX ADMIN — LORAZEPAM 0.5 MG: 0.5 TABLET ORAL at 22:35

## 2021-07-11 RX ADMIN — ROPINIROLE 2 MG: 1 TABLET, FILM COATED ORAL at 20:45

## 2021-07-11 RX ADMIN — NEBIVOLOL HYDROCHLORIDE 5 MG: 5 TABLET ORAL at 09:37

## 2021-07-11 RX ADMIN — ASPIRIN AND EXTENDED-RELEASE DIPYRIDAMOLE 1 CAPSULE: 25; 200 CAPSULE ORAL at 09:35

## 2021-07-11 RX ADMIN — METHYLPREDNISOLONE SODIUM SUCCINATE 60 MG: 125 INJECTION, POWDER, FOR SOLUTION INTRAMUSCULAR; INTRAVENOUS at 06:24

## 2021-07-11 RX ADMIN — VENLAFAXINE 37.5 MG: 37.5 TABLET ORAL at 06:24

## 2021-07-11 RX ADMIN — HEPARIN SODIUM 5000 UNITS: 5000 INJECTION INTRAVENOUS; SUBCUTANEOUS at 14:42

## 2021-07-11 RX ADMIN — OXYCODONE HYDROCHLORIDE AND ACETAMINOPHEN 1 TABLET: 7.5; 325 TABLET ORAL at 04:22

## 2021-07-11 RX ADMIN — ASPIRIN AND EXTENDED-RELEASE DIPYRIDAMOLE 1 CAPSULE: 25; 200 CAPSULE ORAL at 20:45

## 2021-07-11 RX ADMIN — GABAPENTIN 300 MG: 300 CAPSULE ORAL at 09:37

## 2021-07-11 RX ADMIN — GABAPENTIN 300 MG: 300 CAPSULE ORAL at 20:41

## 2021-07-11 RX ADMIN — METHYLPREDNISOLONE SODIUM SUCCINATE 40 MG: 40 INJECTION, POWDER, FOR SOLUTION INTRAMUSCULAR; INTRAVENOUS at 22:35

## 2021-07-11 RX ADMIN — OXYCODONE HYDROCHLORIDE AND ACETAMINOPHEN 1 TABLET: 7.5; 325 TABLET ORAL at 20:41

## 2021-07-11 RX ADMIN — INSULIN ASPART 4 UNITS: 100 INJECTION, SOLUTION INTRAVENOUS; SUBCUTANEOUS at 11:46

## 2021-07-11 RX ADMIN — INSULIN ASPART 3 UNITS: 100 INJECTION, SOLUTION INTRAVENOUS; SUBCUTANEOUS at 17:25

## 2021-07-11 RX ADMIN — TIZANIDINE 4 MG: 4 TABLET ORAL at 20:46

## 2021-07-11 RX ADMIN — CITALOPRAM 40 MG: 40 TABLET, FILM COATED ORAL at 20:41

## 2021-07-11 RX ADMIN — NYSTATIN: 100000 POWDER TOPICAL at 14:52

## 2021-07-11 RX ADMIN — HEPARIN SODIUM 5000 UNITS: 5000 INJECTION INTRAVENOUS; SUBCUTANEOUS at 06:25

## 2021-07-11 RX ADMIN — ALLOPURINOL 50 MG: 100 TABLET ORAL at 09:36

## 2021-07-11 RX ADMIN — IPRATROPIUM BROMIDE AND ALBUTEROL SULFATE 3 ML: 2.5; .5 SOLUTION RESPIRATORY (INHALATION) at 06:54

## 2021-07-11 RX ADMIN — LISINOPRIL 10 MG: 10 TABLET ORAL at 09:36

## 2021-07-11 RX ADMIN — BUDESONIDE AND FORMOTEROL FUMARATE DIHYDRATE 2 PUFF: 160; 4.5 AEROSOL RESPIRATORY (INHALATION) at 20:21

## 2021-07-11 RX ADMIN — HEPARIN SODIUM 5000 UNITS: 5000 INJECTION INTRAVENOUS; SUBCUTANEOUS at 22:36

## 2021-07-11 RX ADMIN — OXYCODONE HYDROCHLORIDE AND ACETAMINOPHEN 1 TABLET: 7.5; 325 TABLET ORAL at 09:39

## 2021-07-11 RX ADMIN — FOLIC ACID 1000 MCG: 1 TABLET ORAL at 09:37

## 2021-07-11 RX ADMIN — INSULIN ASPART 3 UNITS: 100 INJECTION, SOLUTION INTRAVENOUS; SUBCUTANEOUS at 09:38

## 2021-07-11 RX ADMIN — FAMOTIDINE 20 MG: 20 TABLET ORAL at 09:37

## 2021-07-11 RX ADMIN — ISOSORBIDE MONONITRATE 30 MG: 30 TABLET, EXTENDED RELEASE ORAL at 09:35

## 2021-07-11 RX ADMIN — OXYCODONE HYDROCHLORIDE AND ACETAMINOPHEN 1 TABLET: 7.5; 325 TABLET ORAL at 14:41

## 2021-07-11 NOTE — PLAN OF CARE
Goal Outcome Evaluation:  Plan of Care Reviewed With: patient           Outcome Summary: sup-sit-sup,sit-stand-sit ind;amb 125'x2 w/o ad and sba/ind-decreased heel strike bob;up/down 5 steps with hr ind

## 2021-07-11 NOTE — PROGRESS NOTES
[facility]  INPATIENT PROGRESS NOTE    Length of Stay: 0  Date of Admission: 7/9/2021  Primary Care Physician: Brook Arreola MD    Subjective   Chief Complaint: Shortness of breath  HPI: Feels like her breathing is improved.  Does not feel as jittery today as she did yesterday.  Also notes some lower back discomfort.  She reports feeling like this is related to her increased activity working with therapy.    Review of Systems   Constitutional: Negative for chills and fever.   HENT: Negative for congestion.    Respiratory: Positive for cough, shortness of breath and wheezing.    Cardiovascular: Negative for chest pain and palpitations.   Gastrointestinal: Negative for abdominal pain, constipation, diarrhea, nausea and vomiting.   Genitourinary: Negative.    Musculoskeletal: Positive for back pain.   Skin: Negative.    Neurological: Negative.    Psychiatric/Behavioral: Negative.    All other systems reviewed and are negative.     All pertinent negatives and positives are as above. All other systems have been reviewed and are negative unless otherwise stated.     Objective    Temp:  [96.8 °F (36 °C)-97.2 °F (36.2 °C)] 97.1 °F (36.2 °C)  Heart Rate:  [51-69] 69  Resp:  [18-24] 20  BP: (122-158)/(54-70) 152/54    Physical Exam  Constitutional:       General: She is not in acute distress.     Appearance: She is not toxic-appearing.   HENT:      Head: Normocephalic and atraumatic.      Right Ear: External ear normal.      Left Ear: External ear normal.      Nose: Nose normal.      Mouth/Throat:      Mouth: Mucous membranes are moist.      Pharynx: Oropharynx is clear.   Eyes:      Conjunctiva/sclera: Conjunctivae normal.   Cardiovascular:      Rate and Rhythm: Normal rate and regular rhythm.      Pulses: Normal pulses.      Heart sounds: Normal heart sounds.   Pulmonary:      Comments: Improved aeration bilaterally but still coarse with scattered  wheeze.  Abdominal:      General: Bowel sounds are normal.      Palpations: Abdomen is soft.      Tenderness: There is no abdominal tenderness.   Musculoskeletal:         General: No swelling.      Cervical back: Neck supple.   Skin:     General: Skin is warm and dry.      Capillary Refill: Capillary refill takes less than 2 seconds.   Neurological:      General: No focal deficit present.      Mental Status: She is alert and oriented to person, place, and time. Mental status is at baseline.      Coordination: Coordination normal.   Psychiatric:         Mood and Affect: Mood normal.         Behavior: Behavior normal.       Results Review:  I have reviewed the labs, radiology results, and diagnostic studies.    Laboratory Data:   Results from last 7 days   Lab Units 07/11/21  0530 07/10/21  0641 07/09/21  0857   SODIUM mmol/L 137 132* 140   POTASSIUM mmol/L 4.3 3.7 3.8   CHLORIDE mmol/L 101 96* 102   CO2 mmol/L 25.0 23.0 26.0   BUN mg/dL 35* 28* 36*   CREATININE mg/dL 1.29* 1.42* 1.62*   GLUCOSE mg/dL 231* 268* 151*   CALCIUM mg/dL 9.3 9.2 8.8   BILIRUBIN mg/dL  --   --  0.2   ALK PHOS U/L  --   --  93   ALT (SGPT) U/L  --   --  20   AST (SGOT) U/L  --   --  24   ANION GAP mmol/L 11.0 13.0 12.0     Estimated Creatinine Clearance: 42.6 mL/min (A) (by C-G formula based on SCr of 1.29 mg/dL (H)).  Results from last 7 days   Lab Units 07/09/21  0857   MAGNESIUM mg/dL 1.5*         Results from last 7 days   Lab Units 07/11/21  0530 07/10/21  0642 07/09/21  0857   WBC 10*3/mm3 8.01 3.59 6.27   HEMOGLOBIN g/dL 10.3* 11.4* 11.0*   HEMATOCRIT % 30.8* 33.0* 33.0*   PLATELETS 10*3/mm3 98* 96* 113*           Culture Data:   Blood Culture   Date Value Ref Range Status   07/09/2021 No growth at 24 hours  Preliminary   07/09/2021 No growth at 24 hours  Preliminary     No results found for: URINECX  No results found for: RESPCX  No results found for: WOUNDCX  No results found for: STOOLCX  No components found for:  BODYFLD    Radiology Data:   Imaging Results (Last 24 Hours)     ** No results found for the last 24 hours. **          I have reviewed the patient's current medications.     Assessment/Plan     Active Problems:    Type 2 diabetes mellitus, without long-term current use of insulin (CMS/[facility])    COPD (chronic obstructive pulmonary disease) (CMS/[facility])    Essential hypertension    MARISOL (obstructive sleep apnea)    Precordial pain    Plan:  -Continue supplemental oxygen wean as tolerated, may need to go home with O2 at discharge.  -Continue IV steroids and decrease the dose further to 40 mg every 8 hours.  -Continue with bronchodilators, RT to initiate breathing protocol.  -Continue doxycycline  -Continue home antihypertensive therapy, add as needed clonidine pressures.  -Ativan at night to help with sleep and anxiety.  -PT and OT have been ordered  -Serial troponins negative so doubt chest pain was cardiac in origin likely related to her current respiratory failure.  -DVT prophylaxis with heparin  -CODE STATUS: Full    I confirmed that the patient's Advance Care Plan is present, code status is documented, or surrogate decision maker is listed in the patient's medical record.     Discharge Planning: In process    Shant Maradiaga MD

## 2021-07-11 NOTE — PROGRESS NOTES
Item 0 Points 1 Point 2 Points 3 Points 4 Points Subtotal   Mental Status Alert, oriented, cooperative Lethargic, follows commands Confused, not following commands Obtunded or Somnolent Comatose 0   Respiratory Pattern Regular RR 8-16 breaths/minute Increased RR 18-25 breaths/minute Dyspnea on exertion, irregular RR 26-30 breaths/minute Shortness of breath,  RR 31-35 breaths/minute Accessory muscle use, severe SOB  RR > 35 breaths/minute 1   Breath Sounds Clear Decreased unilaterally Decreased bilaterally Basilar crackles Wheezing and/or rhonchi 0   Cough Strong, spontaneous, non-productive Strong productive Weak, non-productive Weak, productive or weak with rhonchi Absent or may require suctioning 0   Pulmonary Status Nonsmoker, no previous history, >1 year quit < 1 PPD  <1 year quit > or = 1 PPD Diagnosed pulmonary disease (severe or chronic) Severe or chronic pulmonary disease with exacerbation 3   Surgical Status None General surgery (non-abdominal or non-thoracic) Lower abdominal Thoracic or upper abdominal Thoracic with pulmonary disease 0   Chest X-ray Clear Chronic changes Infiltrates, atelectasis or pleural effusion Infiltrates in > 1 lobe Diffuse infiltrates and atelectasis and/or effusions 0   Activity   Level Ambulatory Ambulatory with assistance Non-ambulatory Paraplegic Quadriplegic 0        Total Score   4   Score    Drug Therapy Frequency 20 or >    Q4 Duoneb with Q2 Albuterol PRN 15-19    Q6 Duoneb with Q4 Albuterol PRN 10-14    QID Duoneb with Q4 Albuterol PRN 5-9    TID Duoneb with Q6 Albuterol PRN 0-4    Q4 PRN Duoneb                  Lung Expansion Therapy (PEP) Bronchopulmonary Hygiene (CPT)   Q4 & PRN - Severe atelectasis, poor oxygenation Q4 - Copious secretions, dyspnea, unable to sleep   QID - High risk for persistent atelectasis, existence of atelectasis QID & Q4 PRN - Moderate secretion production   TID - At risk for developing atelectasis TID - Small amounts of secretions with poor cough    BID - Prevention of atelectasis BID - Unable to breathe deeply and cough spontaneously     RT Comments / Recommendations:  Score of 4- will change tx to PRNQ4 DUONEB

## 2021-07-11 NOTE — PLAN OF CARE
Problem: Adult Inpatient Plan of Care  Goal: Plan of Care Review  Outcome: Ongoing, Progressing  Flowsheets (Taken 7/11/2021 1614)  Progress: improving (Pended)  Plan of Care Reviewed With: patient (Pended)  Outcome Summary: Pt creatinine has gone down from 1.42 to 1.29. Pt taken off of oxygen and is now on room air. (Pended)   Goal Outcome Evaluation:  Plan of Care Reviewed With: (P) patient        Progress: (P) improving  Outcome Summary: (P) Pt creatinine has gone down from 1.42 to 1.29. Pt taken off of oxygen and is now on room air.

## 2021-07-11 NOTE — THERAPY TREATMENT NOTE
Acute Care - Physical Therapy Treatment Note  [facility]     Patient Name: Ronda Blair  : 1945  MRN: [id number]  Today's Date: 2021   Onset of Illness/Injury or Date of Surgery: 21       PT Assessment (last 12 hours)      PT Evaluation and Treatment     Row Name 21 1500          Physical Therapy Time and Intention    Subjective Information  complains of;pain;dyspnea  -LN     Document Type  therapy note (daily note)  -LN     Mode of Treatment  physical therapy  -LN     Comment  several standing/sitting rest breaks due to soa although sat above 93%  -LN     Row Name 21 1500          General Information    Existing Precautions/Restrictions  fall  -LN     Row Name 21 1500          Cognition    Orientation Status (Cognition)  oriented x 4  -LN     Row Name 21 1500          Pain Scale: Numbers Pre/Post-Treatment    Pretreatment Pain Rating  9/10  -LN     Posttreatment Pain Rating  8/10  -LN     Pain Location  back  -LN     Pre/Posttreatment Pain Comment  meds given already  -LN     Row Name 21 1500          Bed Mobility    Supine-Sit Pitt (Bed Mobility)  independent  -LN     Sit-Supine [place] (Bed Mobility)  independent  -LN     Comment (Bed Mobility)  no hr  -LN     Row Name 21 1500          Transfers    Sit-Stand Pitt (Transfers)  independent  -LN     Stand-Sit Pitt (Transfers)  independent  -LN     Row Name 21 1500          Gait/Stairs (Locomotion)    Pitt Level (Gait)  standby assist;independent  -LN     Assistive Device (Gait)  -- no ad  -LN     Distance in Feet (Gait)  125x2  -LN     Deviations/Abnormal Patterns (Gait)  bilateral deviations;antalgic;stride length decreased;weight shifting decreased  -LN     Bilateral Gait Deviations  heel strike decreased  -LN     Pitt Level (Stairs)  independent  -LN     Handrail Location (Stairs)  left side (ascending)  -LN     Number of Steps (Stairs)  5x2   -LN     Ascending Technique (Stairs)  step-over-step  -LN     Descending Technique (Stairs)  step-over-step  -LN     Row Name 07/11/21 1500          Safety Issues, Functional Mobility    Impairments Affecting Function (Mobility)  endurance/activity tolerance  -LN     Row Name 07/11/21 1500          Plan of Care Review    Plan of Care Reviewed With  patient  -LN     Outcome Summary  sup-sit-sup,sit-stand-sit ind;amb 125'x2 w/o ad and sba/ind-decreased heel strike bob;up/down 5 steps with hr ind  -LN     Row Name 07/11/21 1500          Vital Signs    Pre Systolic BP Rehab  150  -LN     Pre Treatment Diastolic BP  70 manual bp  -LN     Post Systolic BP Rehab  160  -LN     Post Treatment Diastolic BP  70  -LN     Pretreatment Heart Rate (beats/min)  62  -LN     Intratreatment Heart Rate (beats/min)  73  -LN     Posttreatment Heart Rate (beats/min)  63  -LN     Pre SpO2 (%)  94  -LN     O2 Delivery Pre Treatment  room air  -LN     Intra SpO2 (%)  96  -LN     Post SpO2 (%)  97  -LN     Pre Patient Position  Sitting fowlers  -LN     Intra Patient Position  Standing  -LN     Post Patient Position  Sitting fowlers  -LN     Row Name 07/11/21 1500          Positioning and Restraints    Post Treatment Position  bed  -LN     In Bed  fowlers;call light within reach;encouraged to call for assist  -LN       User Key  (r) = Recorded By, (t) = Taken By, (c) = Cosigned By    Initials Name Provider Type    Marta Segovia PTA Physical Therapy Assistant        Physical Therapy Education                 Title: PT OT SLP Therapies (In Progress)     Topic: Physical Therapy (Done)     Point: Mobility training (Done)     Learning Progress Summary           Patient Acceptance, E, VU,NR by LN at 7/11/2021 1602    Comment: energy conservation    Acceptance, E,D, VU,DU by GB at 7/10/2021 1519    Comment: POC; mobility                   Point: Home exercise program (Done)     Learning Progress Summary           Patient Acceptance, E,D, VU,DU by  GB at 7/10/2021 1519    Comment: POC; mobility                   Point: Body mechanics (Done)     Learning Progress Summary           Patient Acceptance, E,D, VU,DU by  at 7/10/2021 1519    Comment: POC; mobility                   Point: Precautions (Done)     Learning Progress Summary           Patient Acceptance, E, VU,NR by LN at 7/11/2021 1602    Comment: energy conservation    Acceptance, E,D, VU,DU by  at 7/10/2021 1519    Comment: POC; mobility                               User Key     Initials Effective Dates Name Provider Type Discipline     06/16/21 -  Mile Eid, PT Physical Therapist PT    GARY 06/16/21 -  Marta Calabrese, PTA Physical Therapy Assistant PT              PT Recommendation and Plan     Plan of Care Reviewed With: patient  Outcome Summary: sup-sit-sup,sit-stand-sit ind;amb 125'x2 w/o ad and sba/ind-decreased heel strike bob;up/down 5 steps with hr ind  Outcome Measures     Row Name 07/10/21 1345             How much help from another is currently needed...    Putting on and taking off regular lower body clothing?  4  -RB      Bathing (including washing, rinsing, and drying)  4  -RB      Toileting (which includes using toilet bed pan or urinal)  4  -RB      Putting on and taking off regular upper body clothing  4  -RB      Taking care of personal grooming (such as brushing teeth)  4  -RB      Eating meals  4  -RB      AM-PAC 6 Clicks Score (OT)  24  -RB         Functional Assessment    Outcome Measure Options  AM-PAC 6 Clicks Daily Activity (OT)  -RB        User Key  (r) = Recorded By, (t) = Taken By, (c) = Cosigned By    Initials Name Provider Type    RB Patrice Chaney, OT Occupational Therapist           Time Calculation:   PT Charges     Row Name 07/11/21 1603             Time Calculation    Start Time  1500  -LN      Stop Time  1540  -LN      Time Calculation (min)  40 min  -LN      PT Received On  07/11/21  -LN         Time Calculation- PT    Total Timed Code Minutes- PT   40 minute(s)  -LN        User Key  (r) = Recorded By, (t) = Taken By, (c) = Cosigned By    Initials Name Provider Type    Marta Segovia PTA Physical Therapy Assistant        Therapy Charges for Today     Code Description Service Date Service Provider Modifiers Qty    28423436340 HC PT THERAPEUTIC ACT EA 15 MIN 7/11/2021 Marta Calabrese, PTA GP 1    84095029425 HC GAIT TRAINING EA 15 MIN 7/11/2021 Marta Calabrese PTA GP 1    63655681302 HC PT SELF CARE/MGMT/TRAIN EA 15 MIN 7/11/2021 Marta Calabrese, AMADOU GP 1          PT G-Codes  Outcome Measure Options: AM-PAC 6 Clicks Basic Mobility (PT), Tinetti  AM-PAC 6 Clicks Score (PT): 21  AM-PAC 6 Clicks Score (OT): 24  Tinetti Total Score: 26    Marta Calabrese PTA  7/11/2021

## 2021-07-12 ENCOUNTER — READMISSION MANAGEMENT (OUTPATIENT)
Dept: CALL CENTER | Facility: HOSPITAL | Age: 76
End: 2021-07-12

## 2021-07-12 VITALS
WEIGHT: 211.4 LBS | OXYGEN SATURATION: 96 % | BODY MASS INDEX: 35.22 KG/M2 | SYSTOLIC BLOOD PRESSURE: 152 MMHG | RESPIRATION RATE: 18 BRPM | HEIGHT: 65 IN | HEART RATE: 56 BPM | DIASTOLIC BLOOD PRESSURE: 70 MMHG | TEMPERATURE: 96.7 F

## 2021-07-12 PROBLEM — N18.30 CKD (CHRONIC KIDNEY DISEASE) STAGE 3, GFR 30-59 ML/MIN (HCC): Chronic | Status: ACTIVE | Noted: 2021-07-12

## 2021-07-12 PROBLEM — J44.1 COPD WITH ACUTE EXACERBATION: Chronic | Status: ACTIVE | Noted: 2020-07-30

## 2021-07-12 PROBLEM — G47.33 OSA (OBSTRUCTIVE SLEEP APNEA): Chronic | Status: ACTIVE | Noted: 2021-02-09

## 2021-07-12 PROBLEM — I10 HYPERTENSION: Chronic | Status: ACTIVE | Noted: 2020-07-30

## 2021-07-12 PROBLEM — J96.01 ACUTE RESPIRATORY FAILURE WITH HYPOXIA (HCC): Chronic | Status: ACTIVE | Noted: 2021-07-12

## 2021-07-12 PROBLEM — E11.9 DIABETES MELLITUS: Chronic | Status: ACTIVE | Noted: 2020-07-30

## 2021-07-12 PROBLEM — J44.1 COPD WITH ACUTE EXACERBATION: Status: ACTIVE | Noted: 2020-07-30

## 2021-07-12 PROBLEM — E66.9 OBESITY (BMI 30-39.9): Chronic | Status: ACTIVE | Noted: 2021-07-12

## 2021-07-12 LAB
ANION GAP SERPL CALCULATED.3IONS-SCNC: 11 MMOL/L (ref 5–15)
BASOPHILS # BLD AUTO: 0.01 10*3/MM3 (ref 0–0.2)
BASOPHILS NFR BLD AUTO: 0.1 % (ref 0–1.5)
BUN SERPL-MCNC: 40 MG/DL (ref 8–23)
BUN/CREAT SERPL: 28 (ref 7–25)
CALCIUM SPEC-SCNC: 9 MG/DL (ref 8.6–10.5)
CHLORIDE SERPL-SCNC: 101 MMOL/L (ref 98–107)
CO2 SERPL-SCNC: 26 MMOL/L (ref 22–29)
CREAT SERPL-MCNC: 1.43 MG/DL (ref 0.57–1)
DEPRECATED RDW RBC AUTO: 47 FL (ref 37–54)
EOSINOPHIL # BLD AUTO: 0 10*3/MM3 (ref 0–0.4)
EOSINOPHIL NFR BLD AUTO: 0 % (ref 0.3–6.2)
ERYTHROCYTE [DISTWIDTH] IN BLOOD BY AUTOMATED COUNT: 13.9 % (ref 12.3–15.4)
GFR SERPL CREATININE-BSD FRML MDRD: 36 ML/MIN/1.73
GLUCOSE BLDC GLUCOMTR-MCNC: 199 MG/DL (ref 70–130)
GLUCOSE BLDC GLUCOMTR-MCNC: 263 MG/DL (ref 70–130)
GLUCOSE SERPL-MCNC: 201 MG/DL (ref 65–99)
HCT VFR BLD AUTO: 31.9 % (ref 34–46.6)
HGB BLD-MCNC: 10.8 G/DL (ref 12–15.9)
IMM GRANULOCYTES # BLD AUTO: 0.18 10*3/MM3 (ref 0–0.05)
IMM GRANULOCYTES NFR BLD AUTO: 1.5 % (ref 0–0.5)
LYMPHOCYTES # BLD AUTO: 1 10*3/MM3 (ref 0.7–3.1)
LYMPHOCYTES NFR BLD AUTO: 8.5 % (ref 19.6–45.3)
MCH RBC QN AUTO: 31.6 PG (ref 26.6–33)
MCHC RBC AUTO-ENTMCNC: 33.9 G/DL (ref 31.5–35.7)
MCV RBC AUTO: 93.3 FL (ref 79–97)
MONOCYTES # BLD AUTO: 0.41 10*3/MM3 (ref 0.1–0.9)
MONOCYTES NFR BLD AUTO: 3.5 % (ref 5–12)
NEUTROPHILS NFR BLD AUTO: 10.15 10*3/MM3 (ref 1.7–7)
NEUTROPHILS NFR BLD AUTO: 86.4 % (ref 42.7–76)
NRBC BLD AUTO-RTO: 0 /100 WBC (ref 0–0.2)
PLATELET # BLD AUTO: 109 10*3/MM3 (ref 140–450)
PMV BLD AUTO: 11.7 FL (ref 6–12)
POTASSIUM SERPL-SCNC: 4.1 MMOL/L (ref 3.5–5.2)
RBC # BLD AUTO: 3.42 10*6/MM3 (ref 3.77–5.28)
SODIUM SERPL-SCNC: 138 MMOL/L (ref 136–145)
WBC # BLD AUTO: 11.75 10*3/MM3 (ref 3.4–10.8)

## 2021-07-12 PROCEDURE — 63710000001 INSULIN ASPART PER 5 UNITS: Performed by: FAMILY MEDICINE

## 2021-07-12 PROCEDURE — 80048 BASIC METABOLIC PNL TOTAL CA: CPT | Performed by: HOSPITALIST

## 2021-07-12 PROCEDURE — 94799 UNLISTED PULMONARY SVC/PX: CPT

## 2021-07-12 PROCEDURE — 82962 GLUCOSE BLOOD TEST: CPT

## 2021-07-12 PROCEDURE — 25010000002 HEPARIN (PORCINE) PER 1000 UNITS: Performed by: HOSPITALIST

## 2021-07-12 PROCEDURE — 25010000002 METHYLPREDNISOLONE PER 40 MG: Performed by: FAMILY MEDICINE

## 2021-07-12 PROCEDURE — 96376 TX/PRO/DX INJ SAME DRUG ADON: CPT

## 2021-07-12 PROCEDURE — 96366 THER/PROPH/DIAG IV INF ADDON: CPT

## 2021-07-12 PROCEDURE — G0378 HOSPITAL OBSERVATION PER HR: HCPCS

## 2021-07-12 PROCEDURE — 96372 THER/PROPH/DIAG INJ SC/IM: CPT

## 2021-07-12 PROCEDURE — 94760 N-INVAS EAR/PLS OXIMETRY 1: CPT

## 2021-07-12 PROCEDURE — 85025 COMPLETE CBC W/AUTO DIFF WBC: CPT | Performed by: HOSPITALIST

## 2021-07-12 RX ORDER — ISOSORBIDE MONONITRATE 30 MG/1
TABLET, EXTENDED RELEASE ORAL
Qty: 30 TABLET | Refills: 1 | Status: SHIPPED | OUTPATIENT
Start: 2021-07-12 | End: 2021-08-03

## 2021-07-12 RX ORDER — IPRATROPIUM BROMIDE AND ALBUTEROL SULFATE 2.5; .5 MG/3ML; MG/3ML
3 SOLUTION RESPIRATORY (INHALATION)
Qty: 360 ML | Refills: 1 | Status: SHIPPED | OUTPATIENT
Start: 2021-07-12 | End: 2023-01-08 | Stop reason: HOSPADM

## 2021-07-12 RX ORDER — METHYLPREDNISOLONE 4 MG/1
TABLET ORAL
Qty: 21 TABLET | Refills: 0 | Status: SHIPPED | OUTPATIENT
Start: 2021-07-12 | End: 2021-08-03

## 2021-07-12 RX ADMIN — LISINOPRIL 10 MG: 10 TABLET ORAL at 09:23

## 2021-07-12 RX ADMIN — OXYCODONE HYDROCHLORIDE AND ACETAMINOPHEN 1 TABLET: 7.5; 325 TABLET ORAL at 04:25

## 2021-07-12 RX ADMIN — GUAIFENESIN AND DEXTROMETHORPHAN 5 ML: 100; 10 SYRUP ORAL at 05:30

## 2021-07-12 RX ADMIN — BUDESONIDE AND FORMOTEROL FUMARATE DIHYDRATE 2 PUFF: 160; 4.5 AEROSOL RESPIRATORY (INHALATION) at 08:04

## 2021-07-12 RX ADMIN — GABAPENTIN 300 MG: 300 CAPSULE ORAL at 09:23

## 2021-07-12 RX ADMIN — Medication 1000 UNITS: at 09:22

## 2021-07-12 RX ADMIN — ASPIRIN AND EXTENDED-RELEASE DIPYRIDAMOLE 1 CAPSULE: 25; 200 CAPSULE ORAL at 09:23

## 2021-07-12 RX ADMIN — SODIUM CHLORIDE, PRESERVATIVE FREE 10 ML: 5 INJECTION INTRAVENOUS at 09:24

## 2021-07-12 RX ADMIN — HEPARIN SODIUM 5000 UNITS: 5000 INJECTION INTRAVENOUS; SUBCUTANEOUS at 05:17

## 2021-07-12 RX ADMIN — METHYLPREDNISOLONE SODIUM SUCCINATE 40 MG: 40 INJECTION, POWDER, FOR SOLUTION INTRAMUSCULAR; INTRAVENOUS at 05:17

## 2021-07-12 RX ADMIN — AMLODIPINE BESYLATE 10 MG: 10 TABLET ORAL at 09:33

## 2021-07-12 RX ADMIN — FAMOTIDINE 20 MG: 20 TABLET ORAL at 09:23

## 2021-07-12 RX ADMIN — CLONIDINE HYDROCHLORIDE 0.1 MG: 0.1 TABLET ORAL at 05:30

## 2021-07-12 RX ADMIN — OXYCODONE HYDROCHLORIDE AND ACETAMINOPHEN 1 TABLET: 7.5; 325 TABLET ORAL at 10:49

## 2021-07-12 RX ADMIN — HYDROCHLOROTHIAZIDE 25 MG: 25 TABLET ORAL at 09:23

## 2021-07-12 RX ADMIN — DOXYCYCLINE 100 MG: 100 INJECTION, POWDER, LYOPHILIZED, FOR SOLUTION INTRAVENOUS at 00:11

## 2021-07-12 RX ADMIN — ISOSORBIDE MONONITRATE 30 MG: 30 TABLET, EXTENDED RELEASE ORAL at 09:23

## 2021-07-12 RX ADMIN — ROSUVASTATIN CALCIUM 10 MG: 10 TABLET, FILM COATED ORAL at 09:33

## 2021-07-12 RX ADMIN — FOLIC ACID 1000 MCG: 1 TABLET ORAL at 09:23

## 2021-07-12 RX ADMIN — INSULIN ASPART 2 UNITS: 100 INJECTION, SOLUTION INTRAVENOUS; SUBCUTANEOUS at 09:25

## 2021-07-12 RX ADMIN — INSULIN ASPART 4 UNITS: 100 INJECTION, SOLUTION INTRAVENOUS; SUBCUTANEOUS at 12:40

## 2021-07-12 RX ADMIN — VENLAFAXINE 37.5 MG: 37.5 TABLET ORAL at 06:30

## 2021-07-12 RX ADMIN — NEBIVOLOL HYDROCHLORIDE 5 MG: 5 TABLET ORAL at 09:33

## 2021-07-12 RX ADMIN — ALLOPURINOL 50 MG: 100 TABLET ORAL at 09:24

## 2021-07-12 NOTE — PLAN OF CARE
Goal Outcome Evaluation:              Outcome Summary: Pt rested at times overnight; states she usually only sleeps 2 hours at a time and feels tired; complained of back pain that was relieved by PRN pain med; She was given cough med PRN and PRN HTN medication; vital signs stable  monitoring pt

## 2021-07-12 NOTE — NURSING NOTE
Patient refused bed alarm; patient educated on fall risk score, medications that increase risk and recent history of fall prior to hospitalization. Patient signed bed alarm refusal form.

## 2021-07-12 NOTE — DISCHARGE SUMMARY
Discharge Summary  Wisam Arboleda MD  Hospitalist     Date of Discharge:  7/12/2021    Discharge Diagnosis:      COPD with acute exacerbation (CMS/[facility])    Acute respiratory failure with hypoxia (CMS/[facility])    MARISOL (obstructive sleep apnea)    Obesity (BMI 30-39.9)    Diabetes mellitus (CMS/[facility])    Hypertension    CKD (chronic kidney disease) stage 3, GFR 30-59 ml/min (CMS/[facility])    Chest pain      Presenting Problem/History of Present Illness  Shortness of breath    Hospital Course  Patient is a 75 y.o. female admitted for shortness of breath/acute exacerbation of the underlying COPD with acute hypoxic respiratory failure. Her overall condition improved with the help of supplemental oxygen, nebulized treatments, IV steroids. She still requires supplemental oxygen at 2 L/minute as the oxygen saturation on room air is as low as 88% at rest.    Vital signs are stable, her blood pressure values are better controlled, she is afebrile and she will be discharged home on the following medications as well as oxygen at 2 L/minute nasal cannula.    Consults:   Consults     No orders found from 6/10/2021 to 7/10/2021.        Pertinent Test Results: Negative chest XRay    Condition on Discharge: Stable    Vital Signs  Temp:  [96 °F (35.6 °C)-97.1 °F (36.2 °C)] 96.7 °F (35.9 °C)  Heart Rate:  [49-66] 56  Resp:  [18-20] 18  BP: (142-182)/(70-82) 152/70    Physical Exam:  Physical Exam  Vitals reviewed.   Constitutional:       General: She is not in acute distress.     Appearance: She is obese. She is ill-appearing.   HENT:      Head: Normocephalic and atraumatic.   Eyes:      General: No scleral icterus.     Extraocular Movements: Extraocular movements intact.      Pupils: Pupils are equal, round, and reactive to light.   Cardiovascular:      Rate and Rhythm: Normal rate and regular rhythm.   Pulmonary:      Effort: Pulmonary effort is normal. No respiratory distress.      Breath sounds: Normal breath sounds. No wheezing or rales.    Abdominal:      General: Abdomen is flat. There is no distension.      Palpations: Abdomen is soft. There is no mass.      Tenderness: There is no abdominal tenderness. There is no right CVA tenderness, left CVA tenderness or guarding.      Hernia: No hernia is present.   Musculoskeletal:         General: No swelling or deformity. Normal range of motion.      Cervical back: Normal range of motion and neck supple. No rigidity or tenderness.      Right lower leg: No edema.      Left lower leg: No edema.   Skin:     General: Skin is dry.      Coloration: Skin is not jaundiced or pale.      Findings: No bruising or lesion.   Neurological:      General: No focal deficit present.      Mental Status: She is alert and oriented to person, place, and time. Mental status is at baseline.      Cranial Nerves: No cranial nerve deficit.      Sensory: No sensory deficit.      Motor: No weakness.   Psychiatric:         Mood and Affect: Mood normal.         Behavior: Behavior normal.         Discharge Disposition  Home-Health Care [facility]    Discharge Medications     Discharge Medications      New Medications      Instructions Start Date   ipratropium-albuterol 0.5-2.5 mg/3 ml nebulizer  Commonly known as: DUO-NEB   3 mL, Nebulization, 4 Times Daily - RT      methylPREDNISolone 4 MG dose pack  Commonly known as: MEDROL   Take as directed on package instructions.         Continue These Medications      Instructions Start Date   albuterol sulfate  (90 Base) MCG/ACT inhaler  Commonly known as: PROVENTIL HFA;VENTOLIN HFA;PROAIR HFA   2 puffs, Inhalation, Every 6 Hours PRN      allopurinol 100 MG tablet  Commonly known as: ZYLOPRIM   50 mg, Oral, Daily, Take one-half tablet by mouth daily      amLODIPine 10 MG tablet  Commonly known as: NORVASC   TAKE 1 TABLET BY MOUTH EVERY DAY      aspirin-dipyridamole  MG per 12 hr capsule  Commonly known as: AGGRENOX   1 capsule, Oral, 2 Times Daily      cholecalciferol 25 MCG (1000 UT)  tablet  Commonly known as: VITAMIN D3   1,000 Units, Oral, Daily      famotidine 20 MG tablet  Commonly known as: PEPCID   20 mg, Oral, Daily      fish oil 1200 MG capsule capsule   1,200 mg, Oral, 3 Times Daily With Meals      folic acid 1 MG tablet  Commonly known as: FOLVITE   TAKE 1 TABLET BY MOUTH EVERY DAY      furosemide 40 MG tablet  Commonly known as: LASIX   20 mg, Oral, Daily PRN      gabapentin 300 MG capsule  Commonly known as: NEURONTIN   300 mg, Oral, 2 times daily      hydroCHLOROthiazide 25 MG tablet  Commonly known as: HYDRODIURIL   25 mg, Oral, Daily      isosorbide mononitrate 30 MG 24 hr tablet  Commonly known as: IMDUR   TAKE 1 TABLET BY MOUTH EVERY DAY      lisinopril 10 MG tablet  Commonly known as: PRINIVIL,ZESTRIL   TAKE 1 TABLET BY MOUTH EVERY DAY      Loratadine 10 MG capsule   10 mg, Oral, Daily      metFORMIN  MG 24 hr tablet  Commonly known as: GLUCOPHAGE-XR   TAKE 2 TABLETS BY MOUTH TWICE A DAY      nebivolol 5 MG tablet  Commonly known as: BYSTOLIC   5 mg, Oral, Every 24 Hours Scheduled      oxyCODONE-acetaminophen 7.5-325 MG per tablet  Commonly known as: PERCOCET   1 tablet, Oral, Every 6 Hours PRN, Take 1 tablet by mouth every 6-8 hours as needed       rOPINIRole 1 MG tablet  Commonly known as: REQUIP   TAKE 2 TABLETS BY MOUTH 1 HOUR BEFORE BEDTIME.      rosuvastatin 10 MG tablet  Commonly known as: CRESTOR   TAKE 1 TABLET BY MOUTH EVERY DAY      Symbicort 160-4.5 MCG/ACT inhaler  Generic drug: budesonide-formoterol   2 puffs, Inhalation, 2 Times Daily - RT      tiZANidine 4 MG tablet  Commonly known as: ZANAFLEX   4 mg, Oral, Every 8 Hours PRN, Take at lunch and bedtime as needed.      Triamcinolone Acetonide 55 MCG/ACT nasal inhaler  Commonly known as: NASACORT   INSTILL 1 SPRAY INTO THE NOSTRIL(S) AS DIRECTED BY PROVIDER EVERY OTHER DAY. *NOT COVERED*      venlafaxine 37.5 MG tablet  Commonly known as: EFFEXOR   37.5 mg, Oral, Every Morning         Stop These Medications     citalopram 40 MG tablet  Commonly known as: CeleXA     ipratropium 0.02 % nebulizer solution  Commonly known as: ATROVENT            Discharge Diet:   Diet Instructions     Diet: Consistent Carbohydrate      Discharge Diet: Consistent Carbohydrate          Activity at Discharge:   Activity Instructions     Activity as Tolerated            Follow-up Appointments  Future Appointments   Date Time Provider Department [place]   7/20/2021  4:00 PM Brook Arreola MD [address]   7/30/2021  1:15 PM Brook Arreola MD [address]   9/10/2021 10:00 AM Genaro Cota MD [facility] None   11/15/2021  9:00 AM [facility]        Wisam Arboleda MD  07/12/21  18:14 CDT

## 2021-07-12 NOTE — OUTREACH NOTE
Prep Survey      Responses   [facility] patient discharged from?  [facility]   Is LACE score < 7 ?  No   Emergency Room discharge w/ pulse ox?  No   Eligibility  [facility]   Date of Admission  07/09/21   Date of Discharge  07/12/21   Discharge Disposition  Home or Self Care   Discharge diagnosis  COPD   Does the patient have one of the following disease processes/diagnoses(primary or secondary)?  COPD/Pneumonia   Does the patient have Home health ordered?  No   Is there a DME ordered?  Yes   What DME was ordered?  [facility]   Prep survey completed?  Yes          Soheila Khalil RN

## 2021-07-12 NOTE — DISCHARGE PLACEMENT REQUEST
"Ronda Blair (75 y.o. Female)     Date of Birth Social Security Number Address Home Phone MRN    1945  [address] [place] [id number] [phone] [id number]    Evangel[employer] Marital Status          Nondenominational        Admission Date Admission Type Admitting Provider Attending Provider Department, Room/Bed    7/9/21 Emergency Caleb Arredondo MD Williams, Kevin L, MD [address], 330/1    Discharge Date Discharge Disposition Discharge Destination         Home-[facility]              Attending Provider: Caleb Arredondo MD    Allergies: Aspirin    Isolation: None   Infection: None   Code Status: CPR    Ht: 165.1 cm (65\")   Wt: 95.9 kg (211 lb 6.4 oz)    Admission Cmt: None   Principal Problem: COPD with acute exacerbation (CMS/[facility]) [J44.1]                 Active Insurance as of 7/9/2021     Primary Coverage     Payor Plan Insurance Group Employer/Plan Group    HUMANA MEDICARE REPLACEMENT HUMANA MEDICARE REPLACEMENT [id number]     Payor Plan Address Payor Plan Phone Number Payor Plan Fax Number Effective Dates    [address] [address] [phone]  1/1/2013 - None Entered    [facility] [id number]       Subscriber Name Subscriber Birth Date Member ID       PAYTONJOSE M FER 1945 [id number]                 Emergency Contacts      (Rel.) Home Phone Work Phone Mobile Phone    Martha Ho (Daughter) [phone] -- [phone]            Emergency Contact Information     Name Relation Home Work Mobile    Martha Ho Daughter [phone]          Insurance Information                HUMANA MEDICARE REPLACEMENT/HUMANA MEDICARE REPLACEMENT Phone: [phone]    Subscriber: Ronda Blair Subscriber#: [id number]    Group#: [id number] Precert#:              History & Physical      Caleb Arredondo MD at 07/09/21 1739                [facility] Medicine Admission      Date of " Admission: 7/9/2021      Primary Care Physician: Brook Arreola MD      Chief Complaint: Shortness of breath    HPI: Patient is a 75-year-old female past medical history of COPD, diabetes, sleep apnea, and hypertension who presented to the emergency department 3 to 4 days of worsening shortness of breath.  Patient states that shortness of breath has been worsening, specifically with exertion.  She has had increased wheezing.  She states all morning of admission when she woke up her oxygen saturation on her home pulse ox was 79%.  She did not have supplemental oxygen at home.  She states that her oxygen level improved once she got up and moved around.  When EMS arrived her oxygen level was again in the mid 80s.  She denies fever or chills.  She has had cough with significant greenish-yellow sputum production.  No nausea or vomiting.  She has had decreased appetite due to her significant shortness of breath.  No alleviating or exacerbating factors.    Concurrent Medical History:  has a past medical history of Anxiety, Arthritis, COPD (chronic obstructive pulmonary disease) (CMS/[facility]), Depression, Diabetes mellitus (CMS/[facility]), History of transfusion, Hyperlipidemia, Hypertension, and Stroke (CMS/[facility]).    Past Surgical History:  has a past surgical history that includes Cervical fusion (1999); Lumbar spine surgery (1999); Hemorrhoid surgery; Hysterectomy; Knee arthroscopy (Right, 2015); Pain Pump Insertion/Revision; Intrathecal pump removal; and Eye surgery (Bilateral, 2014).    Family History: family history includes Cancer in her sister; Diabetes in an other family member; Heart attack (age of onset: 75) in her father; Hypertension in her sister and another family member; Kidney disease in an other family member; Lung cancer in her brother; Other in an other family member.     Social History:  reports that she quit smoking about 22 years ago. She has a 40.00 pack-year smoking history. She has never used smokeless  tobacco. She reports that she does not drink alcohol and does not use drugs.    Allergies:   Allergies   Allergen Reactions   • Aspirin GI Intolerance     Had bleeding ulcer in the past       Medications:   Prior to Admission medications    Medication Sig Start Date End Date Taking? Authorizing Provider   allopurinol (ZYLOPRIM) 100 MG tablet Take 50 mg by mouth Daily. Take one-half tablet by mouth daily 10/29/20  Yes Samina Leggett MD   amLODIPine (NORVASC) 10 MG tablet TAKE 1 TABLET BY MOUTH EVERY DAY 5/21/21  Yes Brook Arreola MD   aspirin-dipyridamole (AGGRENOX)  MG per 12 hr capsule Take 1 capsule by mouth 2 (Two) Times a Day. 2/18/21  Yes Brook Arreola MD   citalopram (CeleXA) 40 MG tablet Take 40 mg by mouth Every Night.   Yes Samina Leggett MD   folic acid (FOLVITE) 1 MG tablet TAKE 1 TABLET BY MOUTH EVERY DAY 5/17/21  Yes Brook Arroela MD   furosemide (LASIX) 40 MG tablet Take 0.5 tablets by mouth Daily As Needed (lower extremity edema). 4/30/21  Yes Brook Arreola MD   gabapentin (NEURONTIN) 300 MG capsule Take 300 mg by mouth 2 (two) times a day.   Yes Samina Leggett MD   hydroCHLOROthiazide (HYDRODIURIL) 25 MG tablet Take 1 tablet by mouth Daily. 9/2/20  Yes Genaro Cota MD   isosorbide mononitrate (IMDUR) 30 MG 24 hr tablet TAKE 1 TABLET BY MOUTH EVERY DAY 6/14/21  Yes Brook Arreola MD   lisinopril (PRINIVIL,ZESTRIL) 10 MG tablet TAKE 1 TABLET BY MOUTH EVERY DAY 5/21/21  Yes Brook Arreola MD   metFORMIN ER (GLUCOPHAGE-XR) 500 MG 24 hr tablet TAKE 2 TABLETS BY MOUTH TWICE A DAY 6/4/21  Yes Brook Arreola MD   nebivolol (BYSTOLIC) 5 MG tablet Take 1 tablet by mouth Daily. 2/18/21  Yes Brook Arreola MD   oxyCODONE-acetaminophen (PERCOCET) 7.5-325 MG per tablet Take 1 tablet by mouth Every 6 (Six) Hours As Needed for Moderate Pain . Take 1 tablet by mouth every 6-8 hours as needed   Yes Provider, MD Samina   rOPINIRole (REQUIP) 1  MG tablet TAKE 2 TABLETS BY MOUTH 1 HOUR BEFORE BEDTIME. 4/22/21  Yes Brook Arreola MD   rosuvastatin (CRESTOR) 10 MG tablet TAKE 1 TABLET BY MOUTH EVERY DAY 11/16/20  Yes Isabel Donald MD   tiZANidine (ZANAFLEX) 4 MG tablet Take 1 tablet by mouth Every 8 (Eight) Hours As Needed for Muscle Spasms. Take at lunch and bedtime as needed. 4/30/21  Yes Brook Arreola MD   Triamcinolone Acetonide (NASACORT) 55 MCG/ACT nasal inhaler INSTILL 1 SPRAY INTO THE NOSTRIL(S) AS DIRECTED BY PROVIDER EVERY OTHER DAY. *NOT COVERED* 5/21/21  Yes Brook Arreola MD   venlafaxine (EFFEXOR) 37.5 MG tablet Take 37.5 mg by mouth Every Morning.   Yes Samina Leggett MD   venlafaxine XR (EFFEXOR-XR) 37.5 MG 24 hr capsule Take 37.5 mg by mouth Daily.  7/9/21 Yes Samina Leggett MD   albuterol sulfate  (90 Base) MCG/ACT inhaler Inhale 2 puffs Every 6 (Six) Hours As Needed for Wheezing or Shortness of Air. 11/5/20   Vinod Blanton MD   cholecalciferol (VITAMIN D3) 25 MCG (1000 UT) tablet Take 1,000 Units by mouth Daily.    Samina Leggett MD   famotidine (PEPCID) 20 MG tablet Take 20 mg by mouth Daily. 8/24/20   Samina Leggett MD   ipratropium (ATROVENT) 0.02 % nebulizer solution Take 2.5 mL by nebulization 3 (Three) Times a Day. 8/30/20   Juan Antonio Thompson MD   Loratadine 10 MG capsule Take 10 mg by mouth Daily.    Samina Leggett MD   Omega-3 Fatty Acids (FISH OIL) 1200 MG capsule capsule Take 1,200 mg by mouth 3 (Three) Times a Day With Meals.    Samina Leggett MD   Symbicort 160-4.5 MCG/ACT inhaler Inhale 2 puffs 2 (Two) Times a Day. 1/12/21   Samina Leggett MD   Fluticasone-Umeclidin-Vilant (Trelegy Ellipta) 100-62.5-25 MCG/INH aerosol powder  Inhale 1 puff Daily. 11/5/20 7/9/21  Vinod Blanton MD   oxyCODONE-acetaminophen (PERCOCET)  MG per tablet Take 1 tablet by mouth Every 6 (Six) Hours As Needed for Moderate Pain .  7/9/21  Provider,  MD Samina   pregabalin (LYRICA) 75 MG capsule Take 1 capsule by mouth Daily. 3/4/21 7/9/21  Brook Arreola MD       Review of Systems:  Review of Systems   Constitutional: Positive for activity change. Negative for appetite change, chills, fatigue, fever and unexpected weight change.   HENT: Negative for congestion, facial swelling, hearing loss, nosebleeds, rhinorrhea, sneezing, trouble swallowing and voice change.    Eyes: Negative for photophobia and visual disturbance.   Respiratory: Positive for shortness of breath and wheezing. Negative for apnea, cough, choking, chest tightness and stridor.    Cardiovascular: Negative for chest pain, palpitations and leg swelling.   Gastrointestinal: Negative for abdominal pain, blood in stool, constipation, diarrhea, nausea and vomiting.   Endocrine: Negative for cold intolerance, heat intolerance, polydipsia, polyphagia and polyuria.   Genitourinary: Negative for dysuria, flank pain and hematuria.   Musculoskeletal: Negative for arthralgias, back pain, myalgias and neck pain.   Skin: Negative for rash and wound.   Allergic/Immunologic: Negative for immunocompromised state.   Neurological: Negative for dizziness, seizures, syncope, speech difficulty, weakness, light-headedness, numbness and headaches.   Hematological: Does not bruise/bleed easily.   Psychiatric/Behavioral: Negative for agitation, behavioral problems, confusion, decreased concentration, hallucinations, self-injury and suicidal ideas. The patient is not nervous/anxious.       Otherwise complete ROS is negative except as mentioned above.    Physical Exam:   Temp:  [96 °F (35.6 °C)-97.5 °F (36.4 °C)] 96 °F (35.6 °C)  Heart Rate:  [50-77] 60  Resp:  [17-22] 18  BP: ()/(51-63) 136/60     Physical Exam  Constitutional:       Appearance: She is well-developed.   HENT:      Head: Normocephalic and atraumatic.      Nose: Nose normal.   Eyes:      General: Lids are normal. No scleral icterus.      Conjunctiva/sclera: Conjunctivae normal.      Pupils: Pupils are equal, round, and reactive to light.   Neck:      Vascular: No JVD.      Trachea: No tracheal tenderness or tracheal deviation.   Cardiovascular:      Rate and Rhythm: Normal rate and regular rhythm.      Pulses: Normal pulses.      Heart sounds: Normal heart sounds, S1 normal and S2 normal. No murmur heard.   No friction rub. No gallop.    Pulmonary:      Effort: Pulmonary effort is normal. No accessory muscle usage or respiratory distress.      Breath sounds: Decreased breath sounds and wheezing present. No rales.   Chest:      Chest wall: No tenderness.   Abdominal:      General: Bowel sounds are normal. There is no distension.      Palpations: Abdomen is soft. There is no mass.      Tenderness: There is no abdominal tenderness. There is no guarding or rebound.   Musculoskeletal:         General: No tenderness.      Cervical back: Normal range of motion and neck supple. No spinous process tenderness.   Skin:     General: Skin is warm.      Coloration: Skin is not pale.      Findings: No rash.   Neurological:      Mental Status: She is alert and oriented to person, place, and time.      Cranial Nerves: No cranial nerve deficit.      Sensory: No sensory deficit.      Motor: No atrophy, abnormal muscle tone or seizure activity.      Coordination: Coordination normal.      Deep Tendon Reflexes: Reflexes are normal and symmetric. Reflexes normal.   Psychiatric:         Behavior: Behavior normal.         Thought Content: Thought content normal.         Judgment: Judgment normal.         Results Reviewed:  I have personally reviewed current lab, radiology, and data and agree with results.  Lab Results (last 24 hours)     Procedure Component Value Units Date/Time    Troponin [[id number]]  (Normal) Collected: 07/09/21 1713    Specimen: Blood Updated: 07/09/21 1730     Troponin T <0.010 ng/mL     Narrative:      Troponin T Reference Range:  <= 0.03 ng/mL-    Negative for AMI  >0.03 ng/mL-     Abnormal for myocardial necrosis.  Clinicians would have to utilize clinical acumen, EKG, Troponin and serial changes to determine if it is an Acute Myocardial Infarction or myocardial injury due to an underlying chronic condition.       Results may be falsely decreased if patient taking Biotin.      Lactic Acid, Plasma [[id number]]  (Normal) Collected: 07/09/21 1224    Specimen: Blood Updated: 07/09/21 1245     Lactate 1.6 mmol/L     Blood Culture - Blood, Arm, Left [[id number]] Collected: 07/09/21 1239    Specimen: Blood from Arm, Left Updated: 07/09/21 1242    Blood Culture - Blood, Arm, Right [[id number]] Collected: 07/09/21 1224    Specimen: Blood from Arm, Right Updated: 07/09/21 1228    Troponin [[id number]]  (Normal) Collected: 07/09/21 1058    Specimen: Blood Updated: 07/09/21 1121     Troponin T <0.010 ng/mL     Narrative:      Troponin T Reference Range:  <= 0.03 ng/mL-   Negative for AMI  >0.03 ng/mL-     Abnormal for myocardial necrosis.  Clinicians would have to utilize clinical acumen, EKG, Troponin and serial changes to determine if it is an Acute Myocardial Infarction or myocardial injury due to an underlying chronic condition.       Results may be falsely decreased if patient taking Biotin.      D-dimer, Quantitative [[id number]]  (Abnormal) Collected: 07/09/21 1058    Specimen: Blood Updated: 07/09/21 1107     D-Dimer, Quantitative 843 ng/mL (FEU)     Narrative:      Dimer values <500 ng/ml FEU are FDA approved as aid in diagnosis of deep venous thrombosis and pulmonary embolism.  This test should not be used in an exclusion strategy with pretest probability alone.    A recent guideline regarding diagnosis for pulmonary thromboembolism recommends an adjusted exclusion criterion of age x 10 ng/ml FEU for patients >50 years of age ( Intern Med 2015; 163: 701-711).      [place] Draw [[id number]] Collected: 07/09/21 0857    Specimen: Blood Updated: 07/09/21 1000     Narrative:      The following orders were created for panel order [place] Draw.  Procedure                               Abnormality         Status                     ---------                               -----------         ------                     Green Top (Gel)[[id number]]                                  Final result               Lavender Top[[id number]]                                     Final result               Gold Top - SST[[id number]]                                   Final result                 Please view results for these tests on the individual orders.    Lavender Top [[id number]] Collected: 07/09/21 0857    Specimen: Blood Updated: 07/09/21 1000     Extra Tube hold for add-on     Comment: Auto resulted       Gold Top - SST [[id number]] Collected: 07/09/21 0857    Specimen: Blood Updated: 07/09/21 1000     Extra Tube Hold for add-ons.     Comment: Auto resulted.       Green Top (Gel) [[id number]] Collected: 07/09/21 0857    Specimen: Blood Updated: 07/09/21 1000     Extra Tube Hold for add-ons.     Comment: Auto resulted.       COVID-19 and FLU A/B PCR - Swab, Nasopharynx [[id number]]  (Normal) Collected: 07/09/21 0927    Specimen: Swab from Nasopharynx Updated: 07/09/21 0958     COVID19 Not Detected     Influenza A PCR Not Detected     Influenza B PCR Not Detected    Narrative:      Fact sheet for providers: https://www.fda.gov/media/735463/download    Fact sheet for patients: https://www.fda.gov/media/304444/download    Test performed by PCR.    Comprehensive Metabolic Panel [[id number]]  (Abnormal) Collected: 07/09/21 0857    Specimen: Blood Updated: 07/09/21 0923     Glucose 151 mg/dL      BUN 36 mg/dL      Creatinine 1.62 mg/dL      Sodium 140 mmol/L      Potassium 3.8 mmol/L      Chloride 102 mmol/L      CO2 26.0 mmol/L      Calcium 8.8 mg/dL      Total Protein 6.6 g/dL      Albumin 4.00 g/dL      ALT (SGPT) 20 U/L      AST (SGOT) 24 U/L      Alkaline Phosphatase 93 U/L      Total Bilirubin 0.2  mg/dL      eGFR Non African Amer 31 mL/min/1.73      Globulin 2.6 gm/dL      A/G Ratio 1.5 g/dL      BUN/Creatinine Ratio 22.2     Anion Gap 12.0 mmol/L     Narrative:      GFR Normal >60  Chronic Kidney Disease <60  Kidney Failure <15      CK [[id number]]  (Normal) Collected: 07/09/21 0857    Specimen: Blood Updated: 07/09/21 0923     Creatine Kinase 137 U/L     Lipase [[id number]]  (Normal) Collected: 07/09/21 0857    Specimen: Blood Updated: 07/09/21 0923     Lipase 20 U/L     Magnesium [[id number]]  (Abnormal) Collected: 07/09/21 0857    Specimen: Blood Updated: 07/09/21 0923     Magnesium 1.5 mg/dL     Troponin [[id number]]  (Normal) Collected: 07/09/21 0857    Specimen: Blood Updated: 07/09/21 0920     Troponin T <0.010 ng/mL     Narrative:      Troponin T Reference Range:  <= 0.03 ng/mL-   Negative for AMI  >0.03 ng/mL-     Abnormal for myocardial necrosis.  Clinicians would have to utilize clinical acumen, EKG, Troponin and serial changes to determine if it is an Acute Myocardial Infarction or myocardial injury due to an underlying chronic condition.       Results may be falsely decreased if patient taking Biotin.      BNP [[id number]]  (Normal) Collected: 07/09/21 0857    Specimen: Blood Updated: 07/09/21 0920     proBNP 680.5 pg/mL     Narrative:      Among patients with dyspnea, NT-proBNP is highly sensitive for the detection of acute congestive heart failure. In addition NT-proBNP of <300 pg/ml effectively rules out acute congestive heart failure with 99% negative predictive value.    Results may be falsely decreased if patient taking Biotin.      CBC & Differential [[id number]]  (Abnormal) Collected: 07/09/21 0857    Specimen: Blood Updated: 07/09/21 0903    Narrative:      The following orders were created for panel order CBC & Differential.  Procedure                               Abnormality         Status                     ---------                               -----------         ------                      CBC Auto Differential[[id number]]        Abnormal            Final result                 Please view results for these tests on the individual orders.    CBC Auto Differential [[id number]]  (Abnormal) Collected: 07/09/21 0857    Specimen: Blood Updated: 07/09/21 0903     WBC 6.27 10*3/mm3      RBC 3.52 10*6/mm3      Hemoglobin 11.0 g/dL      Hematocrit 33.0 %      MCV 93.8 fL      MCH 31.3 pg      MCHC 33.3 g/dL      RDW 13.8 %      RDW-SD 47.2 fl      MPV 11.7 fL      Platelets 113 10*3/mm3      Neutrophil % 69.4 %      Lymphocyte % 16.1 %      Monocyte % 8.5 %      Eosinophil % 5.4 %      Basophil % 0.3 %      Immature Grans % 0.3 %      Neutrophils, Absolute 4.35 10*3/mm3      Lymphocytes, Absolute 1.01 10*3/mm3      Monocytes, Absolute 0.53 10*3/mm3      Eosinophils, Absolute 0.34 10*3/mm3      Basophils, Absolute 0.02 10*3/mm3      Immature Grans, Absolute 0.02 10*3/mm3      nRBC 0.0 /100 WBC         Imaging Results (Last 24 Hours)     Procedure Component Value Units Date/Time    XR Chest 1 View [[id number]] Collected: 07/09/21 0913     Updated: 07/09/21 0934    Narrative:      Chest x-ray single view.       CLINICAL INDICATION: Shortness of breath . Chest pain protocol.    COMPARISON: August 29, 2020.    FINDINGS: Cardiac silhouette is normal in size. Pulmonary  vascularity is unremarkable.     No focal infiltrate or consolidation.  No pleural effusion.  No  pneumothorax.  Benign calcified granulomata both hilar regions. Old right rib  fractures.        Impression:      No evidence of active disease.    Electronically signed by:  Sammy Mccnan MD  7/9/2021 9:33 AM CDT  Workstation: [id number]            Assessment:    Active Hospital Problems    Diagnosis    • Precordial pain    • MARISOL (obstructive sleep apnea)    • COPD (chronic obstructive pulmonary disease) (CMS/[facility])    • Type 2 diabetes mellitus, without long-term current use of insulin (CMS/[facility])    • Essential hypertension            Plan:  1.  Acute  "hypoxic respiratory failure: Patient states that her oxygen saturation was 79% when measured on pulse oximetry at home.  Patient states that she has been on home oxygen in the past, but currently is not.  2.  COPD exacerbation: Patient with significant wheezing.  Will start on steroid and empiric antibiotics.  Bronchodilators per respiratory protocol.  3.  Obstructive sleep apnea: On CPAP at home.  We will continue.  4.  Chest pain: Most likely secondary to COPD, but we will rule out myocardial infarction.  5.  DVT prophylaxis: Heparin.    I confirmed that the patient's Advance Care Plan is present, code status is documented, or surrogate decision maker is listed in the patient's medical record.     I have utilized all available immediate resources to obtain, update, or review the patient's current medications.     I discussed the patient's findings and my recommendations with: Patient        This document has been electronically signed by Caleb Arredondo MD on 2021 17:53 CDT                    Electronically signed by Caleb Arredondo MD at 21 1805       Physician Progress Notes (last 24 hours) (Notes from 21 1200 through 21 1200)    No notes of this type exist for this encounter.        21 1053 21 1055   Oxygen Therapy   SpO2 (!) 88 %  (with activity; walking to the bathroom) 96 %  (while ambulating)   Device (Oxygen Therapy) room air nasal cannula   Flow (L/min)  --  2     [address] [address]  Dept. Phone:  [phone]  Dept. Fax:  [phone] Date Ordered: 2021         Patient:  Ronda Blair MRN:  [id number]   [address] [place] [id number] :  1945  SSN:    Phone: [phone] Sex:  F     Weight: 95.9 kg (211 lb 6.4 oz)         Ht Readings from Last 1 Encounters:   21 165.1 cm (65\")         Home Oxygen Therapy          (Order ID: [id number])    Diagnosis:  " Acute respiratory failure with hypoxia (CMS/HCC) (J96.01 [ICD-10-CM] 518.81 [ICD-9-CM])   Quantity:  1     Delivery Modality: Nasal Cannula  Liters Per Minute: 2  Duration: Continuous  Equipment:  Oxygen Concentrator &  &  Portable Gaseous Oxygen System & Portable Oxygen Contents Gaseous &  Conserving Regulator  The face to face evaluation was performed on: 7/12/2021  Length of Need (99 Months = Lifetime): 99 Months = Lifetime        Authorizing Provider's Phone: [phone]   Authorizing Provider:Wisam Arboleda MD  Authorizing Provider's NPI: [id number]  Order Entered By: Wisam Arboleda MD 7/12/2021 11:17 AM     Electronically signed by: Wisam Arboleda MD 7/12/2021 11:17 AM

## 2021-07-13 ENCOUNTER — TRANSITIONAL CARE MANAGEMENT TELEPHONE ENCOUNTER (OUTPATIENT)
Dept: CALL CENTER | Facility: HOSPITAL | Age: 76
End: 2021-07-13

## 2021-07-13 NOTE — OUTREACH NOTE
Call Center TCM Note      Responses   [facility] patient discharged from?  [place]   Does the patient have one of the following disease processes/diagnoses(primary or secondary)?  COPD/Pneumonia   Was the primary reason for admission:  COPD exacerbation   TCM attempt successful?  Yes   Call start time  1440   Call end time  1442   Discharge diagnosis  COPD   Is patient permission given to speak with other caregiver?  Yes   List who call center can speak with  daughter- Charlet   Person spoke with today (if not patient) and relationship  daughter- Charlet   Does the patient have all medications ordered at discharge?  Yes   Is the patient taking all medications as directed (includes completed medication regime)?  Yes   Does the patient have a primary care provider?   Yes   Does the patient have an appointment with their PCP or specialist within 7 days of discharge?  Yes   Comments regarding PCP  hospital f/u with Dr. Arreola on 7/20   Has the patient kept scheduled appointments due by today?  N/A   What DME was ordered?  [facility] medical- Home O2   Has all DME been delivered?  Yes   Did the patient receive a copy of their discharge instructions?  Yes   Nursing interventions  Reviewed instructions with patient [with daughter]   What is the patient's perception of their health status since discharge?  Improving   Nursing Interventions  Nurse provided patient education   Is the patient/caregiver able to teach back the hierarchy of who to call/visit for symptoms/problems? PCP, Specialist, Home health nurse, Urgent Care, ED, 911  Yes   Patient reports what zone on this call?  Green Zone   Green Zone  Reports doing well   Green Zone interventions:  Take daily medications, Use oxygen as prescribed   TCM call completed?  Yes   Wrap up additional comments  Per daughter, patient is doing well, no questions or concerns, confirmed f/u appt with Dr. Arreola for 7/20.          Mercedez Red RN    7/13/2021,  14:42 EDT

## 2021-07-14 LAB
BACTERIA SPEC AEROBE CULT: NORMAL
BACTERIA SPEC AEROBE CULT: NORMAL

## 2021-07-19 RX ORDER — ROPINIROLE 1 MG/1
TABLET, FILM COATED ORAL
Qty: 180 TABLET | Refills: 1 | Status: SHIPPED | OUTPATIENT
Start: 2021-07-19 | End: 2021-11-22 | Stop reason: SDUPTHER

## 2021-07-20 ENCOUNTER — OFFICE VISIT (OUTPATIENT)
Dept: FAMILY MEDICINE CLINIC | Facility: CLINIC | Age: 76
End: 2021-07-20

## 2021-07-20 VITALS
HEIGHT: 65 IN | DIASTOLIC BLOOD PRESSURE: 72 MMHG | WEIGHT: 205 LBS | SYSTOLIC BLOOD PRESSURE: 120 MMHG | OXYGEN SATURATION: 96 % | HEART RATE: 70 BPM | BODY MASS INDEX: 34.16 KG/M2

## 2021-07-20 DIAGNOSIS — M54.12 CERVICAL RADICULOPATHY: ICD-10-CM

## 2021-07-20 DIAGNOSIS — M51.16 LUMBAR DISC DISEASE WITH RADICULOPATHY: ICD-10-CM

## 2021-07-20 DIAGNOSIS — J44.1 COPD WITH ACUTE EXACERBATION (HCC): Primary | ICD-10-CM

## 2021-07-20 PROCEDURE — 99495 TRANSJ CARE MGMT MOD F2F 14D: CPT | Performed by: FAMILY MEDICINE

## 2021-07-22 ENCOUNTER — READMISSION MANAGEMENT (OUTPATIENT)
Dept: CALL CENTER | Facility: HOSPITAL | Age: 76
End: 2021-07-22

## 2021-07-22 NOTE — OUTREACH NOTE
COPD/PN Week 2 Survey      Responses   [facility] patient discharged from?  [place]   Does the patient have one of the following disease processes/diagnoses(primary or secondary)?  COPD/Pneumonia   Was the primary reason for admission:  COPD exacerbation   Week 2 attempt successful?  Yes   Call start time  0947   Call end time  0957   Discharge diagnosis  COPD   Meds reviewed with patient/caregiver?  Yes   Is the patient having any side effects they believe may be caused by any medication additions or changes?  No   Does the patient have all medications ordered at discharge?  Yes   Is the patient taking all medications as directed (includes completed medication regime)?  Yes   Does the patient have a primary care provider?   Yes   Has the patient kept scheduled appointments due by today?  Yes   Has home health visited the patient within 72 hours of discharge?  N/A   What DME was ordered?  [facility] medical- Home O2 @ [facility]   Pulse Ox monitoring  Intermittent   Pulse Ox device source  Patient   O2 Sat comments  94% on RA   O2 Sat: education provided  Sat levels, Monitoring frequency, When to seek care   O2 Sat education comments  Told pt. if unable to maintain O2 level 90% or greater return to ED.   Psychosocial issues?  No   Did the patient receive a copy of their discharge instructions?  Yes   Nursing interventions  Reviewed instructions with patient   What is the patient's perception of their health status since discharge?  Improving   Nursing Interventions  Nurse provided patient education   Nursing interventions  Advised patient to discuss with provider at next visit   If the patient is a current smoker, are they able to teach back resources for cessation?  Not a smoker   Is the patient/caregiver able to teach back the hierarchy of who to call/visit for symptoms/problems? PCP, Specialist, Home health nurse, Urgent Care, ED, 911  Yes   Is the patient able to teach back COPD zones?  Yes   Nursing  interventions  Education provided on various zones   Patient reports what zone on this call?  Green Zone   Green Zone  Reports doing well, Usual activity and exercise level, Appetite is good   Green Zone interventions:  Take daily medications, Use oxygen as prescribed, Avoid indoor/outdoor triggers, Continue regular exercise/diet plan   Week 2 call completed?  Yes          Tamela Cruz, RN

## 2021-07-28 ENCOUNTER — HOSPITAL ENCOUNTER (OUTPATIENT)
Dept: MRI IMAGING | Facility: HOSPITAL | Age: 76
Discharge: HOME | End: 2021-07-28
Admitting: FAMILY MEDICINE

## 2021-07-28 ENCOUNTER — APPOINTMENT (OUTPATIENT)
Dept: MRI IMAGING | Facility: HOSPITAL | Age: 76
End: 2021-07-28

## 2021-07-28 DIAGNOSIS — M54.12 CERVICAL RADICULOPATHY: ICD-10-CM

## 2021-07-28 PROCEDURE — 72141 MRI NECK SPINE W/O DYE: CPT

## 2021-07-29 ENCOUNTER — LAB (OUTPATIENT)
Dept: LAB | Facility: HOSPITAL | Age: 76
End: 2021-07-29

## 2021-07-29 ENCOUNTER — TRANSCRIBE ORDERS (OUTPATIENT)
Dept: LAB | Facility: HOSPITAL | Age: 76
End: 2021-07-29

## 2021-07-29 DIAGNOSIS — I10 HYPERTENSION, ESSENTIAL: ICD-10-CM

## 2021-07-29 DIAGNOSIS — N28.9 RENAL IMPAIRMENT: ICD-10-CM

## 2021-07-29 DIAGNOSIS — E11.69 TYPE 2 DIABETES MELLITUS WITH OTHER SPECIFIED COMPLICATION, UNSPECIFIED WHETHER LONG TERM INSULIN USE (HCC): ICD-10-CM

## 2021-07-29 DIAGNOSIS — N18.31 CHRONIC KIDNEY DISEASE (CKD) STAGE G3A/A1, MODERATELY DECREASED GLOMERULAR FILTRATION RATE (GFR) BETWEEN 45-59 ML/MIN/1.73 SQUARE METER AND ALBUMINURIA CREATININE RATIO LESS THAN 30 MG/G (CMS/H* (HCC): ICD-10-CM

## 2021-07-29 DIAGNOSIS — N18.31 CHRONIC KIDNEY DISEASE (CKD) STAGE G3A/A1, MODERATELY DECREASED GLOMERULAR FILTRATION RATE (GFR) BETWEEN 45-59 ML/MIN/1.73 SQUARE METER AND ALBUMINURIA CREATININE RATIO LESS THAN 30 MG/G (CMS/H* (HCC): Primary | ICD-10-CM

## 2021-07-29 PROCEDURE — 82652 VIT D 1 25-DIHYDROXY: CPT

## 2021-07-29 PROCEDURE — 83735 ASSAY OF MAGNESIUM: CPT

## 2021-07-29 PROCEDURE — 80069 RENAL FUNCTION PANEL: CPT

## 2021-07-29 PROCEDURE — 84156 ASSAY OF PROTEIN URINE: CPT

## 2021-07-29 PROCEDURE — 85007 BL SMEAR W/DIFF WBC COUNT: CPT

## 2021-07-29 PROCEDURE — 82570 ASSAY OF URINE CREATININE: CPT

## 2021-07-29 PROCEDURE — 84466 ASSAY OF TRANSFERRIN: CPT

## 2021-07-29 PROCEDURE — 83540 ASSAY OF IRON: CPT

## 2021-07-29 PROCEDURE — 83970 ASSAY OF PARATHORMONE: CPT

## 2021-07-29 PROCEDURE — 85027 COMPLETE CBC AUTOMATED: CPT

## 2021-07-29 PROCEDURE — 36415 COLL VENOUS BLD VENIPUNCTURE: CPT

## 2021-07-29 PROCEDURE — 81001 URINALYSIS AUTO W/SCOPE: CPT

## 2021-07-29 PROCEDURE — 84550 ASSAY OF BLOOD/URIC ACID: CPT

## 2021-07-30 LAB
ALBUMIN SERPL-MCNC: 4.1 G/DL (ref 3.5–5.2)
ANION GAP SERPL CALCULATED.3IONS-SCNC: 10.4 MMOL/L (ref 5–15)
BACTERIA UR QL AUTO: NORMAL /HPF
BILIRUB UR QL STRIP: NEGATIVE
BUN SERPL-MCNC: 35 MG/DL (ref 8–23)
BUN/CREAT SERPL: 19.8 (ref 7–25)
CALCIUM SPEC-SCNC: 9.2 MG/DL (ref 8.6–10.5)
CHLORIDE SERPL-SCNC: 106 MMOL/L (ref 98–107)
CLARITY UR: CLEAR
CO2 SERPL-SCNC: 25.6 MMOL/L (ref 22–29)
COLOR UR: YELLOW
CREAT SERPL-MCNC: 1.77 MG/DL (ref 0.57–1)
CREAT UR-MCNC: 102 MG/DL
DEPRECATED RDW RBC AUTO: 48.2 FL (ref 37–54)
EOSINOPHIL # BLD MANUAL: 0.33 10*3/MM3 (ref 0–0.4)
EOSINOPHIL NFR BLD MANUAL: 5.2 % (ref 0.3–6.2)
ERYTHROCYTE [DISTWIDTH] IN BLOOD BY AUTOMATED COUNT: 14.1 % (ref 12.3–15.4)
GFR SERPL CREATININE-BSD FRML MDRD: 28 ML/MIN/1.73
GLUCOSE SERPL-MCNC: 139 MG/DL (ref 65–99)
GLUCOSE UR STRIP-MCNC: NEGATIVE MG/DL
HCT VFR BLD AUTO: 33 % (ref 34–46.6)
HGB BLD-MCNC: 11.2 G/DL (ref 12–15.9)
HGB UR QL STRIP.AUTO: NEGATIVE
HYALINE CASTS UR QL AUTO: NORMAL /LPF
IRON 24H UR-MRATE: 115 MCG/DL (ref 37–145)
IRON SATN MFR SERPL: 31 % (ref 20–50)
KETONES UR QL STRIP: NEGATIVE
LEUKOCYTE ESTERASE UR QL STRIP.AUTO: NEGATIVE
LYMPHOCYTES # BLD MANUAL: 1.65 10*3/MM3 (ref 0.7–3.1)
LYMPHOCYTES NFR BLD MANUAL: 26 % (ref 19.6–45.3)
LYMPHOCYTES NFR BLD MANUAL: 9.4 % (ref 5–12)
MAGNESIUM SERPL-MCNC: 2.2 MG/DL (ref 1.6–2.4)
MCH RBC QN AUTO: 31.9 PG (ref 26.6–33)
MCHC RBC AUTO-ENTMCNC: 33.9 G/DL (ref 31.5–35.7)
MCV RBC AUTO: 94 FL (ref 79–97)
MONOCYTES # BLD AUTO: 0.6 10*3/MM3 (ref 0.1–0.9)
NEUTROPHILS # BLD AUTO: 3.77 10*3/MM3 (ref 1.7–7)
NEUTROPHILS NFR BLD MANUAL: 59.4 % (ref 42.7–76)
NITRITE UR QL STRIP: NEGATIVE
PH UR STRIP.AUTO: 5.5 [PH] (ref 5–8)
PHOSPHATE SERPL-MCNC: 3.6 MG/DL (ref 2.5–4.5)
PLAT MORPH BLD: NORMAL
PLATELET # BLD AUTO: 144 10*3/MM3 (ref 140–450)
PMV BLD AUTO: 12.1 FL (ref 6–12)
POIKILOCYTOSIS BLD QL SMEAR: NORMAL
POTASSIUM SERPL-SCNC: 4.3 MMOL/L (ref 3.5–5.2)
PROT UR QL STRIP: ABNORMAL
PROT UR-MCNC: 36 MG/DL
PROT/CREAT UR: 352.9 MG/G CREA (ref 0–200)
PTH-INTACT SERPL-MCNC: 88.9 PG/ML (ref 15–65)
RBC # BLD AUTO: 3.51 10*6/MM3 (ref 3.77–5.28)
RBC # UR: NORMAL /HPF
REF LAB TEST METHOD: NORMAL
SODIUM SERPL-SCNC: 142 MMOL/L (ref 136–145)
SP GR UR STRIP: 1.02 (ref 1–1.03)
SQUAMOUS #/AREA URNS HPF: NORMAL /HPF
TIBC SERPL-MCNC: 375 MCG/DL (ref 298–536)
TRANSFERRIN SERPL-MCNC: 252 MG/DL (ref 200–360)
URATE SERPL-MCNC: 9.4 MG/DL (ref 2.4–5.7)
UROBILINOGEN UR QL STRIP: ABNORMAL
WBC # BLD AUTO: 6.34 10*3/MM3 (ref 3.4–10.8)
WBC MORPH BLD: NORMAL
WBC UR QL AUTO: NORMAL /HPF

## 2021-08-02 ENCOUNTER — READMISSION MANAGEMENT (OUTPATIENT)
Dept: CALL CENTER | Facility: HOSPITAL | Age: 76
End: 2021-08-02

## 2021-08-02 LAB — 1,25(OH)2D SERPL-MCNC: 27.6 PG/ML (ref 19.9–79.3)

## 2021-08-02 NOTE — OUTREACH NOTE
COPD/PN Week 3 Survey      Responses   [facility] patient discharged from?  [place]   Does the patient have one of the following disease processes/diagnoses(primary or secondary)?  COPD/Pneumonia   Was the primary reason for admission:  COPD exacerbation   Week 3 attempt successful?  No   Unsuccessful attempts  Attempt 1          Tiffany Grey LPN

## 2021-08-03 ENCOUNTER — OFFICE VISIT (OUTPATIENT)
Dept: FAMILY MEDICINE CLINIC | Facility: CLINIC | Age: 76
End: 2021-08-03

## 2021-08-03 VITALS
DIASTOLIC BLOOD PRESSURE: 76 MMHG | HEART RATE: 69 BPM | HEIGHT: 65 IN | OXYGEN SATURATION: 98 % | BODY MASS INDEX: 34.49 KG/M2 | WEIGHT: 207 LBS | SYSTOLIC BLOOD PRESSURE: 144 MMHG

## 2021-08-03 DIAGNOSIS — M54.12 CERVICAL RADICULOPATHY: Primary | ICD-10-CM

## 2021-08-03 DIAGNOSIS — M48.02 SPINAL STENOSIS IN CERVICAL REGION: ICD-10-CM

## 2021-08-03 DIAGNOSIS — M48.02 FORAMINAL STENOSIS OF CERVICAL REGION: ICD-10-CM

## 2021-08-03 DIAGNOSIS — I10 ESSENTIAL HYPERTENSION: ICD-10-CM

## 2021-08-03 PROCEDURE — 99213 OFFICE O/P EST LOW 20 MIN: CPT | Performed by: FAMILY MEDICINE

## 2021-08-03 RX ORDER — GUAIFENESIN 600 MG/1
1200 TABLET, EXTENDED RELEASE ORAL 2 TIMES DAILY
COMMUNITY
End: 2022-02-14

## 2021-08-03 RX ORDER — DOCUSATE SODIUM 100 MG/1
100 CAPSULE, LIQUID FILLED ORAL 2 TIMES DAILY
COMMUNITY

## 2021-08-03 RX ORDER — ISOSORBIDE MONONITRATE 30 MG/1
TABLET, EXTENDED RELEASE ORAL
Qty: 30 TABLET | Refills: 1 | Status: SHIPPED | OUTPATIENT
Start: 2021-08-03 | End: 2021-09-01

## 2021-08-03 NOTE — PROGRESS NOTES
"Chief Complaint  Back Pain    Subjective          Ronda Blair presents to [facility]  History of Present Illness    Patient presents today for continued back pain.  She has been having symptoms similar to what she was experiencing prior to her previous C spine surgery.  Patient has pain in her neck that goes into her shoulder.  Had MRI done.    Objective   Vital Signs:   /76   Pulse 69   Ht 165.1 cm (65\")   Wt 93.9 kg (207 lb)   SpO2 98%   BMI 34.45 kg/m²     Physical Exam  Vitals reviewed.   Constitutional:       General: She is not in acute distress.     Appearance: She is well-developed.   Cardiovascular:      Rate and Rhythm: Normal rate and regular rhythm.      Heart sounds: Normal heart sounds. No murmur heard.     Pulmonary:      Effort: Pulmonary effort is normal. No respiratory distress.      Breath sounds: Normal breath sounds. No wheezing or rales.   Abdominal:      Palpations: Abdomen is soft.      Tenderness: There is no abdominal tenderness.   Musculoskeletal:      Cervical back: Muscular tenderness present.   Skin:     General: Skin is warm and dry.   Neurological:      Mental Status: She is alert and oriented to person, place, and time.        Result Review :   The following data was reviewed by: Brook Arreola MD on 08/03/2021:  Common labs    Common Labsle 7/29/21 7/29/21 7/29/21    1648 1648 1648   Glucose  139 (A)    BUN  35 (A)    Creatinine  1.77 (A)    eGFR Non African Am  28 (A)    Sodium  142    Potassium  4.3    Chloride  106    Calcium  9.2    Albumin  4.10    WBC   6.34   Hemoglobin   11.2 (A)   Hematocrit   33.0 (A)   Platelets   144   Uric Acid 9.4 (A)     (A) Abnormal value              MRI Cervical Spine 7/28/2021  \"FINDINGS:  No acute or suspicious findings within the paraspinal soft  tissues identified.     The craniovertebral junction is unremarkable. No Chiari  malformation.     No syrinx or myelopathic signal alteration " within the cervical or  included thoracic cord.     There is straightening of normal cervical curvature. Minimal  degenerative anterolisthesis at C7-T1, otherwise there is  anatomic alignment. There is redemonstration of cervical fusion  procedure with anterior plate and screw fixation at C5-C6. There  is solid osseous fusion across the disc spaces at these levels,  with only small amount of residual disc eccentric to the left at  the C6-7 level.  No fracture, suspicious marrow replacement or osseous lesion  identified.     Loss of T2 disc signal at all nonsurgical cervical levels. There  is loss of disc space height at C7-T1.     C2-C3: No central spinal canal stenosis. Uncovertebral and  posterior facet arthritic change contributes to moderate right  and mild left foraminal stenosis.     C3-C4: There is disc osteophyte complex and posterior ligamentous  thickening contributing to mild-to-moderate central spinal canal  stenosis with mild cord compression. Moderate/severe bilateral  foraminal stenosis.     C4-C5: Disc osteophyte complex eccentric to the left contributing  to flattening of the ventral thecal sac and mild central spinal  canal stenosis. There is moderate right and severe left foraminal  stenosis.     C5-C6: Postsurgical changes with fusion procedure. No central  spinal canal stenosis. There is mild-to-moderate right and  moderate left foraminal stenosis.     C6-C7: Postsurgical changes with fusion procedure. No central  spinal canal stenosis. There is mild right and mild/moderate left  foraminal stenosis     C7-T1: No central spinal canal or foraminal stenosis.        IMPRESSION:  Straightening of normal cervical curvature with anterior fusion  procedure at C5-C7.     Disc osteophyte complex and ligamentous thickening posteriorly at  C3-4 contributes to moderate central spinal canal stenosis with  mild cord compression. There is moderate/severe bilateral  foraminal stenosis at this  "level.     Additional multilevel degenerative changes as detailed above.     No syrinx or myelopathic signal within the cervical cord.\"                Assessment and Plan    Diagnoses and all orders for this visit:    1. Cervical radiculopathy (Primary)  -     Ambulatory Referral to Neurosurgery    2. Foraminal stenosis of cervical region  -     Ambulatory Referral to Neurosurgery    3. Spinal stenosis in cervical region  -     Ambulatory Referral to Neurosurgery    4. Essential hypertension      Given symptoms and previous C-spine surgery, will refer to neurosurgery for evaluation and management options  If no surgical intervention, patient advised to consider trial of physical therapy and/or pain management referral     Blood pressure not well controlled, systolic elevated  Patient will monitor at home  Call with persistent elevations          Follow Up   Return in about 3 months (around 11/3/2021) for Recheck.  Patient was given instructions and counseling regarding her condition or for health maintenance advice. Please see specific information pulled into the AVS if appropriate.         This document has been electronically signed by Brook Arreola MD       "

## 2021-08-14 DIAGNOSIS — I50.32 CHRONIC HEART FAILURE WITH PRESERVED EJECTION FRACTION (HCC): ICD-10-CM

## 2021-08-16 RX ORDER — NEBIVOLOL HYDROCHLORIDE 5 MG/1
TABLET ORAL
Qty: 90 TABLET | Refills: 1 | Status: SHIPPED | OUTPATIENT
Start: 2021-08-16 | End: 2021-11-29

## 2021-08-24 NOTE — TELEPHONE ENCOUNTER
Requested Refill was sent to her pharmacy in July.  Her bottle of Requip was an old script and said no refills, it was last refilled in April

## 2021-08-27 DIAGNOSIS — I50.32 CHRONIC HEART FAILURE WITH PRESERVED EJECTION FRACTION (HCC): ICD-10-CM

## 2021-08-27 RX ORDER — ASPIRIN AND DIPYRIDAMOLE 25; 200 MG/1; MG/1
CAPSULE, EXTENDED RELEASE ORAL
Qty: 180 CAPSULE | Refills: 1 | Status: SHIPPED | OUTPATIENT
Start: 2021-08-27 | End: 2021-12-20

## 2021-08-30 ENCOUNTER — TELEPHONE (OUTPATIENT)
Dept: FAMILY MEDICINE CLINIC | Facility: CLINIC | Age: 76
End: 2021-08-30

## 2021-08-30 NOTE — TELEPHONE ENCOUNTER
Pt came by and wanted to see if you could change the dosage of her rOPINIRole (REQUIP) 1 MG tablet 2 times at night to 3 at night. Can she take 3 instead of 2. Call her at [phone].

## 2021-08-31 RX ORDER — ROPINIROLE 0.5 MG/1
0.5 TABLET, FILM COATED ORAL NIGHTLY
Qty: 30 TABLET | Refills: 2 | Status: SHIPPED | OUTPATIENT
Start: 2021-08-31 | End: 2021-11-22 | Stop reason: SDUPTHER

## 2021-08-31 NOTE — TELEPHONE ENCOUNTER
Try 2.5 mg nightly first.  Will send in additional 0.5 mg tablet that can be taken with current dose.  Thanks, JEFFERSON Arreola

## 2021-09-01 RX ORDER — ISOSORBIDE MONONITRATE 30 MG/1
TABLET, EXTENDED RELEASE ORAL
Qty: 30 TABLET | Refills: 1 | Status: SHIPPED | OUTPATIENT
Start: 2021-09-01 | End: 2021-09-24

## 2021-09-10 ENCOUNTER — OFFICE VISIT (OUTPATIENT)
Dept: CARDIOLOGY | Facility: CLINIC | Age: 76
End: 2021-09-10

## 2021-09-10 VITALS
SYSTOLIC BLOOD PRESSURE: 150 MMHG | DIASTOLIC BLOOD PRESSURE: 72 MMHG | OXYGEN SATURATION: 95 % | HEART RATE: 91 BPM | TEMPERATURE: 97.7 F | HEIGHT: 65 IN | WEIGHT: 212.4 LBS | BODY MASS INDEX: 35.39 KG/M2

## 2021-09-10 DIAGNOSIS — R06.02 SOB (SHORTNESS OF BREATH): Primary | ICD-10-CM

## 2021-09-10 DIAGNOSIS — J44.1 COPD WITH ACUTE EXACERBATION (HCC): ICD-10-CM

## 2021-09-10 PROCEDURE — 99214 OFFICE O/P EST MOD 30 MIN: CPT | Performed by: INTERNAL MEDICINE

## 2021-09-10 NOTE — PROGRESS NOTES
[facility] Cardiology  OFFICE NOTE    Cardiovascular Medicine  Genaro Cota M.D., RPVI         No referring provider defined for this encounter.    Thank you for asking me to see Ronda Blair for chest pain.    History of Present Illness  This is a 76 y.o. female with:    1.  Diabetes  2.  COPD  3.  Congestive heart failure preserved ejection fraction  4.  Coronary artery disease nonobstructive  5.  Hypertension  6.  Hyperlipidemia  7.  CKD  8.  Thrombocytopenia  9.  CVA    Ronda Blair is a 76 y.o. female who presents for consultation today.  Patient went to the hospital last week with COPD exacerbation.  She had a VQ scan at that time which was low probability for PE, ultrasound and was negative for DVT she had an echocardiogram in July 2020 which showed an EF of 56 to 60% % she had grade 2 diastolic dysfunction.  EKG was a sinus rhythm with sinus arrhythmia and PACs in a pattern of bigeminy.  Of note patient had a CTA coronary angiogram in July 2020 showed a calcium score of 345.  However she had mild nonobstructive disease in all her 3 vessels.  Patient is here for establishment of care.  Her shortness of breath is back to baseline.  Denying any chest pain or shortness of breath.    Was admitted again with COPD exacerbation    Review of Systems - ROS  Constitution: Negative for weakness, weight gain and weight loss.   HENT: Negative for congestion.    Eyes: Negative for blurred vision.   Cardiovascular: As mentioned above  Respiratory: Negative for cough and hemoptysis.    Endocrine: Negative for polydipsia and polyuria.   Hematologic/Lymphatic: Negative for bleeding problem. Does not bruise/bleed easily.   Skin: Negative for flushing.   Musculoskeletal: Negative for neck pain and stiffness.   Gastrointestinal: Negative for abdominal pain, diarrhea, jaundice, melena, nausea and vomiting.   Genitourinary: Negative for dysuria and hematuria.   Neurological: Negative for  dizziness, focal weakness and numbness.   Psychiatric/Behavioral: Negative for altered mental status and depression.          All other systems were reviewed and were negative.    family history includes Cancer in her sister; Diabetes in an other family member; Heart attack (age of onset: 75) in her father; Hypertension in her sister and another family member; Kidney disease in an other family member; Lung cancer in her brother; Other in an other family member.     reports that she quit smoking about 22 years ago. She has a 40.00 pack-year smoking history. She has never used smokeless tobacco. She reports that she does not drink alcohol and does not use drugs.    Allergies   Allergen Reactions   • Aspirin GI Intolerance     Had bleeding ulcer in the past         Current Outpatient Medications:   •  albuterol sulfate  (90 Base) MCG/ACT inhaler, Inhale 2 puffs Every 6 (Six) Hours As Needed for Wheezing or Shortness of Air., Disp: 18 g, Rfl: 9  •  allopurinol (ZYLOPRIM) 100 MG tablet, Take 50 mg by mouth Daily. Take one-half tablet by mouth daily, Disp: , Rfl:   •  amLODIPine (NORVASC) 10 MG tablet, TAKE 1 TABLET BY MOUTH EVERY DAY, Disp: 90 tablet, Rfl: 1  •  aspirin-dipyridamole (AGGRENOX)  MG per 12 hr capsule, TAKE 1 CAPSULE BY MOUTH TWICE A DAY, Disp: 180 capsule, Rfl: 1  •  Bystolic 5 MG tablet, TAKE 1 TABLET BY MOUTH EVERY DAY, Disp: 90 tablet, Rfl: 1  •  cholecalciferol (VITAMIN D3) 25 MCG (1000 UT) tablet, Take 1,000 Units by mouth Daily., Disp: , Rfl:   •  docusate sodium ([employer] Stool Softener) 100 MG capsule, Take 100 mg by mouth 2 (Two) Times a Day., Disp: , Rfl:   •  folic acid (FOLVITE) 1 MG tablet, TAKE 1 TABLET BY MOUTH EVERY DAY, Disp: 90 tablet, Rfl: 2  •  furosemide (LASIX) 40 MG tablet, Take 0.5 tablets by mouth Daily As Needed (lower extremity edema)., Disp: 45 tablet, Rfl: 3  •  gabapentin (NEURONTIN) 300 MG capsule, Take 300 mg by mouth 2 (two) times a day., Disp: , Rfl:   •   guaiFENesin (MUCINEX) 600 MG 12 hr tablet, Take 1,200 mg by mouth 2 (Two) Times a Day., Disp: , Rfl:   •  hydroCHLOROthiazide (HYDRODIURIL) 25 MG tablet, Take 1 tablet by mouth Daily., Disp: 30 tablet, Rfl: 11  •  ipratropium-albuterol (DUO-NEB) 0.5-2.5 mg/3 ml nebulizer, Take 3 mL by nebulization 4 (Four) Times a Day., Disp: 360 mL, Rfl: 1  •  isosorbide mononitrate (IMDUR) 30 MG 24 hr tablet, TAKE 1 TABLET BY MOUTH EVERY DAY, Disp: 30 tablet, Rfl: 1  •  lisinopril (PRINIVIL,ZESTRIL) 10 MG tablet, TAKE 1 TABLET BY MOUTH EVERY DAY, Disp: 90 tablet, Rfl: 1  •  Loratadine 10 MG capsule, Take 10 mg by mouth Daily., Disp: , Rfl:   •  metFORMIN ER (GLUCOPHAGE-XR) 500 MG 24 hr tablet, TAKE 2 TABLETS BY MOUTH TWICE A DAY (Patient taking differently: 500 mg. 2 qam), Disp: 360 tablet, Rfl: 1  •  Omega-3 Fatty Acids (FISH OIL) 1200 MG capsule capsule, Take 1,200 mg by mouth 3 (Three) Times a Day With Meals., Disp: , Rfl:   •  oxyCODONE-acetaminophen (PERCOCET) 7.5-325 MG per tablet, Take 1 tablet by mouth Every 6 (Six) Hours As Needed for Moderate Pain . Take 1 tablet by mouth every 6-8 hours as needed, Disp: , Rfl:   •  rOPINIRole (Requip) 0.5 MG tablet, Take 1 tablet by mouth Every Night. Take in addition to 1 mg tablets.  Take 1 hour before bedtime., Disp: 30 tablet, Rfl: 2  •  rOPINIRole (REQUIP) 1 MG tablet, TAKE 2 TABLETS BY MOUTH 1 HOUR BEFORE BEDTIME., Disp: 180 tablet, Rfl: 1  •  rosuvastatin (CRESTOR) 10 MG tablet, TAKE 1 TABLET BY MOUTH EVERY DAY, Disp: 90 tablet, Rfl: 11  •  Symbicort 160-4.5 MCG/ACT inhaler, Inhale 2 puffs 2 (Two) Times a Day., Disp: , Rfl:   •  tiZANidine (ZANAFLEX) 4 MG tablet, Take 1 tablet by mouth Every 8 (Eight) Hours As Needed for Muscle Spasms. Take at lunch and bedtime as needed., Disp: 90 tablet, Rfl: 5  •  Triamcinolone Acetonide (NASACORT) 55 MCG/ACT nasal inhaler, INSTILL 1 SPRAY INTO THE NOSTRIL(S) AS DIRECTED BY PROVIDER EVERY OTHER DAY. *NOT COVERED*, Disp: 16.5 g, Rfl: 2  •   "venlafaxine (EFFEXOR) 37.5 MG tablet, Take 75 mg by mouth Every Morning., Disp: , Rfl:     Physical Exam:  Vitals:    09/10/21 1001   BP: 150/72   BP Location: Left arm   Patient Position: Sitting   Cuff Size: Adult   Pulse: 91   Temp: 97.7 °F (36.5 °C)   SpO2: 95%   Weight: 96.3 kg (212 lb 6.4 oz)   Height: 165.1 cm (65\")   PainSc:   5   PainLoc: Back     Current Pain Level: none  Pulse Ox: Normal  on room air  General: alert, appears stated age and cooperative     Body Habitus: well-nourished    HEENT: Head: Normocephalic, no lesions, without obvious abnormality. No arcus senilis, xanthelasma or xanthomas.    Neuro: alert, oriented x3  Pulses: 2+ and symmetric  JVP: Volume/Pulsation: Normal.  Normal waveforms.   Appropriate inspiratory decrease.  No Kussmaul's. No [place]'s.   Carotid Exam: no bruit normal pulsation bilaterally   Carotid Volume: normal.     Respirations: no increased work of breathing   Chest:  Normal    Pulmonary:Normal   Precordium: Normal impulses. P2 is not palpable.  RV Heave: absent  LV Heave: absent  Palmer:  normal size and placement  Palpable S4: absent.  Heart rate: normal    Heart Rhythm: regular     Heart Sounds: S1: normal  S2: normal  S3: absent   S4: absent  Opening Snap: absent    Pericardial Rub:  Absent: .    Abdomen:   Appearance: normal .  Palpation: Soft, non-tender to palpation, bowel sounds positive in all four quadrants; no guarding or rebound tenderness  Extremity: no edema.   LE Skin: no rashes  LE Hair:  normal  LE Pulses: well perfused with normal pulses in the distal extremities  Pallor on elevation: Absent. Rubor on dependency: None      DATA REVIEWED:     EKG. I personally reviewed and interpreted the EKG.  Sinus rhythm with PACs in a pattern of bigeminy    ECG/EMG Results (all)     None        ---------------------------------------------------  TTE/ELVIS:  Results for orders placed during the hospital encounter of 07/21/20    Transthoracic Echo Complete With Contrast " if Necessary Per Protocol    Interpretation Summary  · Estimated EF appears to be in the range of 56 - 60%.  · Left ventricular systolic function is normal.  · Left ventricular diastolic dysfunction (grade II) consistent with pseudonormalization.  · Left ventricular wall thickness is consistent with borderline concentric hypertrophy.  · Left atrial cavity size is moderately dilated.  · Mild mitral valve regurgitation is present  · Mild tricuspid valve regurgitation is present.      CT:   No radiology results for the last 30 days.      --------------------------------------------------------------------------------------------------  LABS:     The CVD Risk score (Jese et al., 2008) failed to calculate for the following reasons:    The 2008 CVD risk score is only valid for ages 30 to 74    The patient has a prior MI, stroke, CHF, or peripheral vascular disease diagnosis         Lab Results   Component Value Date    GLUCOSE 139 (H) 07/29/2021    BUN 35 (H) 07/29/2021    CREATININE 1.77 (H) 07/29/2021    EGFRIFNONA 28 (L) 07/29/2021    BCR 19.8 07/29/2021    K 4.3 07/29/2021    CO2 25.6 07/29/2021    CALCIUM 9.2 07/29/2021    ALBUMIN 4.10 07/29/2021    AST 24 07/09/2021    ALT 20 07/09/2021     Lab Results   Component Value Date    WBC 6.34 07/29/2021    HGB 11.2 (L) 07/29/2021    HCT 33.0 (L) 07/29/2021    MCV 94.0 07/29/2021     07/29/2021     Lab Results   Component Value Date    CHOL 160 07/21/2020    TRIG 167 (H) 07/21/2020    HDL 41 07/21/2020    LDL 86 07/21/2020     Lab Results   Component Value Date    TSH 0.539 08/24/2020     Lab Results   Component Value Date    CKTOTAL 137 07/09/2021    TROPONINT <0.010 07/09/2021     Lab Results   Component Value Date    HGBA1C 6.04 (H) 04/30/2021     No results found for: DDIMER  Lab Results   Component Value Date    ALT 20 07/09/2021     Lab Results   Component Value Date    HGBA1C 6.04 (H) 04/30/2021    HGBA1C 5.90 (H) 11/05/2020    HGBA1C 6.00 (H)  07/21/2020     Lab Results   Component Value Date    CREATININE 1.77 (H) 07/29/2021     Lab Results   Component Value Date    IRON 115 07/29/2021    TIBC 375 07/29/2021    FERRITIN 29.30 11/05/2020     Lab Results   Component Value Date    INR 0.95 07/21/2020    PROTIME 13.1 07/21/2020       Assessment/Plan     1.  Coronary artery disease:  CTA showed mild nonobstructive disease in all 3 vessels.  Risk factor modification for secondary prevention  She is on Aggrenox, not sure indication for that.  Patient reported she had CVA long time ago.  Cardiology standpoint aspirin should be okay    2.  Congestive heart failure with preserved ejection fraction:  Optimal blood pressure control.      3.  Hypertension:  Currently on amlodipine 10 mg, furosemide 20 mg twice a day, Imdur 30 mg, lisinopril 10 mg, Bystolic 5 mg twice daily  Switching her Lasix to hydrochlorothiazide.  She can use her Lasix as needed  Has CKD, follows with nephrology.    4.  Hyperlipidemia:  Continue Crestor 10 mg    5.  COPD:  PFTs showed moderate restriction.  Admitted again with COPD exacerbation in July.  We will refer her to pulmonary.    Prevention:  Patient's Body mass index is 35.35 kg/m². BMI is above normal parameters. Recommendations include: exercise counseling and nutrition counseling.      Ronda Blair  reports that she quit smoking about 22 years ago. She has a 40.00 pack-year smoking history. She has never used smokeless tobacco..             This document has been electronically signed by Genaro Cota MD on September 10, 2021 10:09 CDT

## 2021-09-24 RX ORDER — ISOSORBIDE MONONITRATE 30 MG/1
TABLET, EXTENDED RELEASE ORAL
Qty: 30 TABLET | Refills: 1 | Status: SHIPPED | OUTPATIENT
Start: 2021-09-24 | End: 2021-10-25

## 2021-10-13 ENCOUNTER — OFFICE VISIT (OUTPATIENT)
Dept: PULMONOLOGY | Facility: CLINIC | Age: 76
End: 2021-10-13

## 2021-10-13 VITALS
HEART RATE: 88 BPM | DIASTOLIC BLOOD PRESSURE: 64 MMHG | TEMPERATURE: 97.3 F | WEIGHT: 214 LBS | HEIGHT: 65 IN | OXYGEN SATURATION: 98 % | BODY MASS INDEX: 35.65 KG/M2 | SYSTOLIC BLOOD PRESSURE: 122 MMHG

## 2021-10-13 DIAGNOSIS — I50.32 CHRONIC HEART FAILURE WITH PRESERVED EJECTION FRACTION (HCC): ICD-10-CM

## 2021-10-13 DIAGNOSIS — E66.9 OBESITY (BMI 30-39.9): Chronic | ICD-10-CM

## 2021-10-13 DIAGNOSIS — G47.33 OSA (OBSTRUCTIVE SLEEP APNEA): Chronic | ICD-10-CM

## 2021-10-13 DIAGNOSIS — J44.9 CHRONIC OBSTRUCTIVE PULMONARY DISEASE, UNSPECIFIED COPD TYPE (HCC): Primary | ICD-10-CM

## 2021-10-13 PROCEDURE — 99204 OFFICE O/P NEW MOD 45 MIN: CPT | Performed by: INTERNAL MEDICINE

## 2021-10-13 NOTE — PROGRESS NOTES
Pulmonary Consultation    Genaro Cota MD,    Thank you for asking me to see Ronda Blair for   Chief Complaint   Patient presents with   • Shortness of Breath   • COPD   .      History of Present Illness  Ronda Blair is a 76 y.o. female   Patient referred from cardiology for COPD  Patient reports that she has been seen in [place] by pulmonary in the past for this, but not in a few years  She reports having albuterol hfa for emergency use andSymbicort which she only remembers to use once a day at most. She has exertional dyspnea which is stable.      Tobacco use history:  Type: cigarettes  Amount: 1.5 ppd  Duration: 30 years  Cessation: quit 20 years ago   Willing to quit: N/A      Review of Systems: History obtained from chart review and the patient.  Review of Systems  As described in the HPI. Otherwise, remainder of ROS (14 systems) were negative.    Patient Active Problem List   Diagnosis   • Degeneration of intervertebral disc of mid-cervical region   • Morbid obesity due to excess calories ([facility])   • Former smoker   • Chest pain   • Diabetes mellitus ([facility])   • COPD with acute exacerbation ([facility])   • Dyslipidemia   • Pulmonary nodules   • Adrenal nodule ([facility])   • Fecal occult blood test positive   • Benign neoplasm of colon   • Chronic nonalcoholic liver disease   • Diverticular disease   • Dysphagia   • Hypertension   • Internal hemorrhoids   • Knee pain, bilateral   • Lumbar disc disease with radiculopathy   • Neuralgia, geniculate   • Stage 2 chronic kidney disease   • Gait disturbance   • Nocturnal hypoxia   • Chronic heart failure with preserved ejection fraction ([facility])   • Normocytic anemia   • Acute gout involving toe of left foot   • MARISOL (obstructive sleep apnea)   • MARISOL and COPD overlap syndrome ([facility])   • Precordial pain   • Obesity (BMI 30-39.9)   • Acute respiratory failure with hypoxia ([facility])   • CKD (chronic kidney disease) stage 3, GFR 30-59 ml/min ([facility])  No change from visit last week. Will proceed as planned with Caden charles.           Current Outpatient Medications:   •  albuterol sulfate  (90 Base) MCG/ACT inhaler, Inhale 2 puffs Every 6 (Six) Hours As Needed for Wheezing or Shortness of Air., Disp: 18 g, Rfl: 9  •  allopurinol (ZYLOPRIM) 100 MG tablet, Take 50 mg by mouth Daily. Take one-half tablet by mouth daily, Disp: , Rfl:   •  amLODIPine (NORVASC) 10 MG tablet, TAKE 1 TABLET BY MOUTH EVERY DAY, Disp: 90 tablet, Rfl: 1  •  Bystolic 5 MG tablet, TAKE 1 TABLET BY MOUTH EVERY DAY, Disp: 90 tablet, Rfl: 1  •  cholecalciferol (VITAMIN D3) 25 MCG (1000 UT) tablet, Take 1,000 Units by mouth Daily., Disp: , Rfl:   •  docusate sodium ([employer] Stool Softener) 100 MG capsule, Take 100 mg by mouth 2 (Two) Times a Day., Disp: , Rfl:   •  folic acid (FOLVITE) 1 MG tablet, TAKE 1 TABLET BY MOUTH EVERY DAY, Disp: 90 tablet, Rfl: 2  •  furosemide (LASIX) 40 MG tablet, Take 0.5 tablets by mouth Daily As Needed (lower extremity edema)., Disp: 45 tablet, Rfl: 3  •  gabapentin (NEURONTIN) 300 MG capsule, Take 300 mg by mouth 2 (two) times a day., Disp: , Rfl:   •  guaiFENesin (MUCINEX) 600 MG 12 hr tablet, Take 1,200 mg by mouth 2 (Two) Times a Day., Disp: , Rfl:   •  hydroCHLOROthiazide (HYDRODIURIL) 25 MG tablet, Take 1 tablet by mouth Daily., Disp: 30 tablet, Rfl: 11  •  ipratropium-albuterol (DUO-NEB) 0.5-2.5 mg/3 ml nebulizer, Take 3 mL by nebulization 4 (Four) Times a Day., Disp: 360 mL, Rfl: 1  •  isosorbide mononitrate (IMDUR) 30 MG 24 hr tablet, TAKE 1 TABLET BY MOUTH EVERY DAY, Disp: 30 tablet, Rfl: 1  •  lisinopril (PRINIVIL,ZESTRIL) 10 MG tablet, TAKE 1 TABLET BY MOUTH EVERY DAY, Disp: 90 tablet, Rfl: 1  •  Loratadine 10 MG capsule, Take 10 mg by mouth Daily., Disp: , Rfl:   •  metFORMIN ER (GLUCOPHAGE-XR) 500 MG 24 hr tablet, TAKE 2 TABLETS BY MOUTH TWICE A DAY (Patient taking differently: 500 mg. 2 qam), Disp: 360 tablet, Rfl: 1  •  Omega-3 Fatty Acids (FISH OIL) 1200 MG capsule capsule, Take 1,200 mg by mouth 3 (Three) Times a Day  With Meals., Disp: , Rfl:   •  oxyCODONE-acetaminophen (PERCOCET) 7.5-325 MG per tablet, Take 1 tablet by mouth Every 6 (Six) Hours As Needed for Moderate Pain . Take 1 tablet by mouth every 6-8 hours as needed, Disp: , Rfl:   •  rOPINIRole (Requip) 0.5 MG tablet, Take 1 tablet by mouth Every Night. Take in addition to 1 mg tablets.  Take 1 hour before bedtime., Disp: 30 tablet, Rfl: 2  •  rOPINIRole (REQUIP) 1 MG tablet, TAKE 2 TABLETS BY MOUTH 1 HOUR BEFORE BEDTIME., Disp: 180 tablet, Rfl: 1  •  rosuvastatin (CRESTOR) 10 MG tablet, TAKE 1 TABLET BY MOUTH EVERY DAY, Disp: 90 tablet, Rfl: 11  •  Symbicort 160-4.5 MCG/ACT inhaler, Inhale 2 puffs 2 (Two) Times a Day., Disp: , Rfl:   •  tiZANidine (ZANAFLEX) 4 MG tablet, Take 1 tablet by mouth Every 8 (Eight) Hours As Needed for Muscle Spasms. Take at lunch and bedtime as needed., Disp: 90 tablet, Rfl: 5  •  Triamcinolone Acetonide (NASACORT) 55 MCG/ACT nasal inhaler, INSTILL 1 SPRAY INTO THE NOSTRIL(S) AS DIRECTED BY PROVIDER EVERY OTHER DAY. *NOT COVERED*, Disp: 16.5 g, Rfl: 2  •  venlafaxine (EFFEXOR) 37.5 MG tablet, Take 75 mg by mouth Every Morning., Disp: , Rfl:   •  aspirin-dipyridamole (AGGRENOX)  MG per 12 hr capsule, TAKE 1 CAPSULE BY MOUTH TWICE A DAY, Disp: 180 capsule, Rfl: 1    Allergies   Allergen Reactions   • Aspirin GI Intolerance     Had bleeding ulcer in the past       Past Medical History:   Diagnosis Date   • Anxiety    • Arthritis    • COPD (chronic obstructive pulmonary disease) (HCC)    • Depression    • Diabetes mellitus (HCC)    • History of transfusion    • Hyperlipidemia    • Hypertension    • Stroke (HCC)     Greater than 5 years ago     Past Surgical History:   Procedure Laterality Date   • CERVICAL FUSION  1999   • EYE SURGERY Bilateral 2014    lasix eye surgery   • HEMORRHOIDECTOMY     • HYSTERECTOMY     • INTRATHECAL PUMP REMOVAL      2006   • KNEE ARTHROSCOPY Right 2015   • LUMBAR SPINE SURGERY  1999   • PAIN PUMP  "INSERTION/REVISION      Removed in      Social History     Socioeconomic History   • Marital status:    Tobacco Use   • Smoking status: Former Smoker     Packs/day: 1.00     Years: 40.00     Pack years: 40.00     Quit date:      Years since quittin.7   • Smokeless tobacco: Never Used   Vaping Use   • Vaping Use: Never used   Substance and Sexual Activity   • Alcohol use: No   • Drug use: No   • Sexual activity: Defer     Family History   Problem Relation Age of Onset   • Diabetes Other    • Hypertension Other    • Other Other    • Kidney disease Other    • Heart attack Father 75   • Cancer Sister    • Lung cancer Brother    • Hypertension Sister           Objective     Blood pressure 122/64, pulse 88, temperature 97.3 °F (36.3 °C), height 165.1 cm (65\"), weight 97.1 kg (214 lb), SpO2 98 %, not currently breastfeeding.  Physical Exam  Vitals reviewed.   Constitutional:       Appearance: Normal appearance.   HENT:      Head: Normocephalic and atraumatic.      Nose: Nose normal.      Mouth/Throat:      Mouth: Mucous membranes are moist.      Pharynx: Oropharynx is clear.   Eyes:      Conjunctiva/sclera: Conjunctivae normal.      Pupils: Pupils are equal, round, and reactive to light.   Cardiovascular:      Rate and Rhythm: Normal rate and regular rhythm.      Pulses: Normal pulses.      Heart sounds: Normal heart sounds.   Pulmonary:      Effort: Pulmonary effort is normal.      Breath sounds: Normal breath sounds.   Abdominal:      General: Abdomen is flat. Bowel sounds are normal.      Palpations: Abdomen is soft.   Musculoskeletal:         General: Normal range of motion.      Cervical back: Normal range of motion.   Skin:     General: Skin is warm and dry.   Neurological:      General: No focal deficit present.      Mental Status: She is alert and oriented to person, place, and time.   Psychiatric:         Mood and Affect: Mood normal.         Behavior: Behavior normal.         PFTs:  " (independently reviewed and interpreted by me)  9/10/2020  FVC 1.84L, 66%  FEV1 1.26L, 59%  Ratio 68%  TLC 3.02L, 57%  DLCO 59%      Radiology (independently reviewed and interpreted by me):  7/21/20 CTA chest- no concerning nodules or masses, no PE       Assessment/Plan     Diagnoses and all orders for this visit:    1. Chronic obstructive pulmonary disease, unspecified COPD type (HCC) (Primary)    2. Chronic heart failure with preserved ejection fraction (HCC)    3. Obesity (BMI 30-39.9)    4. MARISOL (obstructive sleep apnea)         Discussion/ Recommendations:   Patient with moderate obstruction/restriction on PFTS from last year. She's not really compliant with her Symbicort and this is likely not the most effective med for her anyway. Will give her a sample of Stiolto instead as this is once a day, and doesn't have an ICS. She can continue her albuterol prn. We reviewed proper inhaler use. She is out of the time frame for lung cancer screening CTs.     Will see her back in a month to see how she did with Stiolto             Return in about 4 weeks (around 11/10/2021).      Thank you for allowing me to participate in the care of Ronda Gillespie  Johnnie. Please do not hesitate to contact me with any questions.         This document has been electronically signed by Alejandra Santos DO on October 13, 2021 08:53 CDT

## 2021-10-15 ENCOUNTER — PATIENT ROUNDING (BHMG ONLY) (OUTPATIENT)
Dept: PULMONOLOGY | Facility: CLINIC | Age: 76
End: 2021-10-15

## 2021-10-15 NOTE — PROGRESS NOTES
October 15, 2021    Hello, may I speak with Ronda Blair?    My name is Faraz Goetz, clinical coordinator    I am  with [facility]  [address] [place] [address] [address]  [facility] [id number] [phone].    Before we get started may I verify your date of birth? 1945    I am calling to officially welcome you to our practice and ask about your recent visit. Is this a good time to talk? yes    Tell me about your visit with us. What things went well?  Dr. Santos really took the time out to listen to me and I felt like she really cared       We're always looking for ways to make our patients' experiences even better. Do you have recommendations on ways we may improve?  no    Overall were you satisfied with your first visit to our practice? yes       I appreciate you taking the time to speak with me today. Is there anything else I can do for you? no      Thank you, and have a great day.

## 2021-10-18 RX ORDER — HYDROCHLOROTHIAZIDE 25 MG/1
TABLET ORAL
Qty: 56 TABLET | Refills: 11 | Status: SHIPPED | OUTPATIENT
Start: 2021-10-18 | End: 2022-02-14

## 2021-10-20 DIAGNOSIS — M50.322 DEGENERATION OF INTERVERTEBRAL DISC AT C5-C6 LEVEL: ICD-10-CM

## 2021-10-20 RX ORDER — TIZANIDINE 4 MG/1
TABLET ORAL
Qty: 270 TABLET | Refills: 1 | Status: SHIPPED | OUTPATIENT
Start: 2021-10-20 | End: 2022-03-29

## 2021-10-25 RX ORDER — ISOSORBIDE MONONITRATE 30 MG/1
TABLET, EXTENDED RELEASE ORAL
Qty: 30 TABLET | Refills: 1 | Status: SHIPPED | OUTPATIENT
Start: 2021-10-25 | End: 2021-11-16

## 2021-10-26 ENCOUNTER — TRANSCRIBE ORDERS (OUTPATIENT)
Dept: LAB | Facility: HOSPITAL | Age: 76
End: 2021-10-26

## 2021-10-26 ENCOUNTER — LAB (OUTPATIENT)
Dept: LAB | Facility: HOSPITAL | Age: 76
End: 2021-10-26

## 2021-10-26 DIAGNOSIS — E11.9 DIABETES MELLITUS WITHOUT COMPLICATION (HCC): ICD-10-CM

## 2021-10-26 DIAGNOSIS — N17.9 ACUTE KIDNEY INJURY (NONTRAUMATIC) (HCC): ICD-10-CM

## 2021-10-26 DIAGNOSIS — N18.32 CHRONIC KIDNEY DISEASE (CKD) STAGE G3B/A1, MODERATELY DECREASED GLOMERULAR FILTRATION RATE (GFR) BETWEEN 30-44 ML/MIN/1.73 SQUARE METER AND ALBUMINURIA CREATININE RATIO LESS THAN 30 MG/G (CMS/H* (HCC): ICD-10-CM

## 2021-10-26 DIAGNOSIS — N17.9 ACUTE KIDNEY INJURY (NONTRAUMATIC) (HCC): Primary | ICD-10-CM

## 2021-10-26 DIAGNOSIS — I10 HYPERTENSION, ESSENTIAL: ICD-10-CM

## 2021-10-26 DIAGNOSIS — M10.30 GOUT DUE TO RENAL IMPAIRMENT, UNSPECIFIED CHRONICITY, UNSPECIFIED SITE: ICD-10-CM

## 2021-10-26 LAB
ALBUMIN SERPL-MCNC: 4.8 G/DL (ref 3.5–5.2)
ANION GAP SERPL CALCULATED.3IONS-SCNC: 11.8 MMOL/L (ref 5–15)
BACTERIA UR QL AUTO: ABNORMAL /HPF
BASOPHILS # BLD AUTO: 0.04 10*3/MM3 (ref 0–0.2)
BASOPHILS NFR BLD AUTO: 0.6 % (ref 0–1.5)
BILIRUB UR QL STRIP: NEGATIVE
BUN SERPL-MCNC: 28 MG/DL (ref 8–23)
BUN/CREAT SERPL: 20.6 (ref 7–25)
CALCIUM SPEC-SCNC: 9.8 MG/DL (ref 8.6–10.5)
CHLORIDE SERPL-SCNC: 105 MMOL/L (ref 98–107)
CLARITY UR: CLEAR
CO2 SERPL-SCNC: 28.2 MMOL/L (ref 22–29)
COLOR UR: YELLOW
CREAT SERPL-MCNC: 1.36 MG/DL (ref 0.57–1)
DEPRECATED RDW RBC AUTO: 41.3 FL (ref 37–54)
EOSINOPHIL # BLD AUTO: 0.17 10*3/MM3 (ref 0–0.4)
EOSINOPHIL NFR BLD AUTO: 2.5 % (ref 0.3–6.2)
ERYTHROCYTE [DISTWIDTH] IN BLOOD BY AUTOMATED COUNT: 12.3 % (ref 12.3–15.4)
GFR SERPL CREATININE-BSD FRML MDRD: 38 ML/MIN/1.73
GLUCOSE SERPL-MCNC: 144 MG/DL (ref 65–99)
GLUCOSE UR STRIP-MCNC: NEGATIVE MG/DL
HCT VFR BLD AUTO: 35.9 % (ref 34–46.6)
HGB BLD-MCNC: 12.4 G/DL (ref 12–15.9)
HGB UR QL STRIP.AUTO: NEGATIVE
HYALINE CASTS UR QL AUTO: ABNORMAL /LPF
IMM GRANULOCYTES # BLD AUTO: 0.02 10*3/MM3 (ref 0–0.05)
IMM GRANULOCYTES NFR BLD AUTO: 0.3 % (ref 0–0.5)
KETONES UR QL STRIP: NEGATIVE
LEUKOCYTE ESTERASE UR QL STRIP.AUTO: ABNORMAL
LYMPHOCYTES # BLD AUTO: 1.79 10*3/MM3 (ref 0.7–3.1)
LYMPHOCYTES NFR BLD AUTO: 26.2 % (ref 19.6–45.3)
MAGNESIUM SERPL-MCNC: 1.9 MG/DL (ref 1.6–2.4)
MCH RBC QN AUTO: 32 PG (ref 26.6–33)
MCHC RBC AUTO-ENTMCNC: 34.5 G/DL (ref 31.5–35.7)
MCV RBC AUTO: 92.8 FL (ref 79–97)
MONOCYTES # BLD AUTO: 0.49 10*3/MM3 (ref 0.1–0.9)
MONOCYTES NFR BLD AUTO: 7.2 % (ref 5–12)
NEUTROPHILS NFR BLD AUTO: 4.31 10*3/MM3 (ref 1.7–7)
NEUTROPHILS NFR BLD AUTO: 63.2 % (ref 42.7–76)
NITRITE UR QL STRIP: NEGATIVE
NRBC BLD AUTO-RTO: 0 /100 WBC (ref 0–0.2)
PH UR STRIP.AUTO: 6.5 [PH] (ref 5–8)
PHOSPHATE SERPL-MCNC: 4 MG/DL (ref 2.5–4.5)
PLATELET # BLD AUTO: 145 10*3/MM3 (ref 140–450)
PMV BLD AUTO: 11.9 FL (ref 6–12)
POTASSIUM SERPL-SCNC: 4.4 MMOL/L (ref 3.5–5.2)
PROT UR QL STRIP: ABNORMAL
PTH-INTACT SERPL-MCNC: 96 PG/ML (ref 15–65)
RBC # BLD AUTO: 3.87 10*6/MM3 (ref 3.77–5.28)
RBC # UR: ABNORMAL /HPF
REF LAB TEST METHOD: ABNORMAL
SODIUM SERPL-SCNC: 145 MMOL/L (ref 136–145)
SP GR UR STRIP: 1.01 (ref 1–1.03)
SQUAMOUS #/AREA URNS HPF: ABNORMAL /HPF
URATE SERPL-MCNC: 9.1 MG/DL (ref 2.4–5.7)
UROBILINOGEN UR QL STRIP: ABNORMAL
WBC # BLD AUTO: 6.82 10*3/MM3 (ref 3.4–10.8)
WBC UR QL AUTO: ABNORMAL /HPF

## 2021-10-26 PROCEDURE — 81001 URINALYSIS AUTO W/SCOPE: CPT

## 2021-10-26 PROCEDURE — 83735 ASSAY OF MAGNESIUM: CPT

## 2021-10-26 PROCEDURE — 85025 COMPLETE CBC W/AUTO DIFF WBC: CPT

## 2021-10-26 PROCEDURE — 36415 COLL VENOUS BLD VENIPUNCTURE: CPT

## 2021-10-26 PROCEDURE — 80069 RENAL FUNCTION PANEL: CPT

## 2021-10-26 PROCEDURE — 83970 ASSAY OF PARATHORMONE: CPT

## 2021-10-26 PROCEDURE — 84550 ASSAY OF BLOOD/URIC ACID: CPT

## 2021-11-03 ENCOUNTER — OFFICE VISIT (OUTPATIENT)
Dept: FAMILY MEDICINE CLINIC | Facility: CLINIC | Age: 76
End: 2021-11-03

## 2021-11-03 VITALS
BODY MASS INDEX: 34.66 KG/M2 | OXYGEN SATURATION: 97 % | HEART RATE: 81 BPM | WEIGHT: 208 LBS | DIASTOLIC BLOOD PRESSURE: 90 MMHG | SYSTOLIC BLOOD PRESSURE: 150 MMHG | HEIGHT: 65 IN

## 2021-11-03 DIAGNOSIS — I10 ESSENTIAL HYPERTENSION: Primary | ICD-10-CM

## 2021-11-03 DIAGNOSIS — N18.32 STAGE 3B CHRONIC KIDNEY DISEASE (HCC): ICD-10-CM

## 2021-11-03 DIAGNOSIS — E11.42 TYPE 2 DIABETES MELLITUS WITH DIABETIC POLYNEUROPATHY, WITHOUT LONG-TERM CURRENT USE OF INSULIN (HCC): ICD-10-CM

## 2021-11-03 DIAGNOSIS — J44.9 CHRONIC OBSTRUCTIVE PULMONARY DISEASE, UNSPECIFIED COPD TYPE (HCC): ICD-10-CM

## 2021-11-03 PROCEDURE — 99214 OFFICE O/P EST MOD 30 MIN: CPT | Performed by: FAMILY MEDICINE

## 2021-11-03 NOTE — PROGRESS NOTES
"Chief Complaint  COPD (follow up)    Subjective     {Problem List  Visit Diagnosis   Encounters  Notes  Medications  Labs  Result Review Imaging  Media :23}     Ronda Blair presents to [facility] [place]  History of Present Illness    Patient seen today for follow-up chronic medical problems.  COPD symptoms controlled.  Patient has had follow-up with new pulmonology provider.  Medication changed to Stiolto, and albuterol use as needed continued.  Doing okay on new medication.  No new concerns.  Hypertension has previously been controlled.  Patient taking Bystolic 5 mg daily, lisinopril 10 mg daily, hydrochlorothiazide 25 mg daily and amlodipine 10 mg daily.  Is taking some medications in the morning and some in the evenings.  Did not take medication this morning.  Type 2 diabetes has been well controlled.  Patient is taking Metformin XR 1000 mg in the morning.  Uses gabapentin 300 mg twice daily for neuropathy symptoms.      Objective   Vital Signs:   /90   Pulse 81   Ht 165.1 cm (65\")   Wt 94.3 kg (208 lb)   SpO2 97%   BMI 34.61 kg/m²     Physical Exam  Vitals reviewed.   Constitutional:       General: She is not in acute distress.     Appearance: She is well-developed.   Cardiovascular:      Rate and Rhythm: Normal rate and regular rhythm.      Heart sounds: Normal heart sounds. No murmur heard.      Pulmonary:      Effort: Pulmonary effort is normal. No respiratory distress.      Breath sounds: Normal breath sounds. No wheezing or rales.   Abdominal:      Palpations: Abdomen is soft.      Tenderness: There is no abdominal tenderness.   Skin:     General: Skin is warm and dry.      Findings: No rash.   Neurological:      Mental Status: She is alert and oriented to person, place, and time.        Result Review :   The following data was reviewed by: Brook Arreola MD on 11/03/2021:  Common labs    Common Labsle 10/26/21 10/26/21 " 10/26/21    1505 1505 1505   Glucose   144 (A)   BUN   28 (A)   Creatinine   1.36 (A)   eGFR Non  Am   38 (A)   Sodium   145   Potassium   4.4   Chloride   105   Calcium   9.8   Albumin   4.80   WBC 6.82     Hemoglobin 12.4     Hematocrit 35.9     Platelets 145     Uric Acid  9.1 (A)    (A) Abnormal value            Lab Results   Component Value Date    HGBA1C 6.04 (H) 04/30/2021     Lab Results   Component Value Date    TSH 0.539 08/24/2020                 Assessment and Plan    Diagnoses and all orders for this visit:    1. Essential hypertension (Primary)  -     Lipid Panel; Future    2. Stage 3b chronic kidney disease (HCC)    3. Type 2 diabetes mellitus with diabetic polyneuropathy, without long-term current use of insulin (HCC)  -     Hemoglobin A1c; Future    4. Chronic obstructive pulmonary disease, unspecified COPD type (HCC)      Patient seen today for follow-up  Blood pressure was not well controlled, patient did not take medication this morning  Recommended compliance with antihypertensive therapy and decreasing missed doses  Patient will take medication when she gets home  We will check lipid panel, order placed today  Following with nephrology for chronic kidney disease  Type 2 diabetes controlled  Due for recheck of hemoglobin A1c  Continue Metformin and diabetic diet  COPD symptoms controlled  Following with pulmonology, continues Stiolto            Follow Up   Return in about 3 months (around 2/3/2022) for Recheck.  Patient was given instructions and counseling regarding her condition or for health maintenance advice. Please see specific information pulled into the AVS if appropriate.           This document has been electronically signed by Brook Arreola MD

## 2021-11-12 RX ORDER — LISINOPRIL 10 MG/1
TABLET ORAL
Qty: 90 TABLET | Refills: 1 | Status: SHIPPED | OUTPATIENT
Start: 2021-11-12 | End: 2022-03-28 | Stop reason: SDUPTHER

## 2021-11-12 RX ORDER — AMLODIPINE BESYLATE 10 MG/1
TABLET ORAL
Qty: 90 TABLET | Refills: 1 | Status: SHIPPED | OUTPATIENT
Start: 2021-11-12 | End: 2022-03-29

## 2021-11-15 ENCOUNTER — OFFICE VISIT (OUTPATIENT)
Dept: SLEEP MEDICINE | Facility: HOSPITAL | Age: 76
End: 2021-11-15

## 2021-11-15 VITALS
HEART RATE: 62 BPM | OXYGEN SATURATION: 98 % | SYSTOLIC BLOOD PRESSURE: 125 MMHG | DIASTOLIC BLOOD PRESSURE: 60 MMHG | WEIGHT: 204 LBS | HEIGHT: 65 IN | BODY MASS INDEX: 33.99 KG/M2

## 2021-11-15 DIAGNOSIS — J44.9 OSA AND COPD OVERLAP SYNDROME (HCC): ICD-10-CM

## 2021-11-15 DIAGNOSIS — G47.33 OBSTRUCTIVE SLEEP APNEA, ADULT: Primary | ICD-10-CM

## 2021-11-15 DIAGNOSIS — G47.33 OSA AND COPD OVERLAP SYNDROME (HCC): ICD-10-CM

## 2021-11-15 PROCEDURE — 99213 OFFICE O/P EST LOW 20 MIN: CPT | Performed by: NURSE PRACTITIONER

## 2021-11-15 NOTE — PROGRESS NOTES
Sleep Clinic Follow Up    Date: 11/15/2021  Primary Care Provider: Brook Arreola MD    Last office visit: 2021 (telephone visit) (I reviewed this note)    CC: Follow up: MARISOL on BiPAP      Interim History:  Since the last visit:    1) moderate MARISOL -  Ronda Blair has not recently remained compliant with BiPAP due to the safety recall. She denies mask and machine issues, dry mouth, headaches, or pressures intolerance. She denies abnormal dreams, sleep paralysis, nasal congestion, URI sx. She has recently been using nocturnal O2 without her BiPAP machine.  Patient is accompanied by her granddaughter today.    Sleep Testin. PSG in ~, AHI of ?  2. PSG in ~, AHI of ? ([place])  3. Split night PSG on 2021, AHI of 24  4. PAP titration on same day, recommended BiPAP 16/8 cm H2O   5. Currently on 18/10 cm H2O with 2L O2    PAP Data:    Stopped using machine ~1 month ago due to recall  Mask type: Full face mask  DME: [facility], switching to [facility] (moved to [facility])    Bed time routine unchanged from previous visit    [place] - 4    PMHx, FH, SH reviewed and pertinent changes are: Reportedly unchanged from last office visit with us.      REVIEW OF SYSTEMS:   Negative for chest pain, SOA, fever, chills, cough, N/V/D, abdominal pain.    Smoking:none    Ronda Blair  reports that she quit smoking about 22 years ago. She has a 40.00 pack-year smoking history. She has never used smokeless tobacco.      Exam:  Vitals:    11/15/21 0958   BP: 125/60   Pulse: 62   SpO2: 98%           11/15/21  0958   Weight: 92.5 kg (204 lb)     Body mass index is 33.95 kg/m². Patient's Body mass index is 33.95 kg/m². indicating that she is obese (BMI >30). Obesity-related health conditions include the following: obstructive sleep apnea, hypertension, diabetes mellitus and dyslipidemias. Obesity is unchanged. BMI is is above average; BMI management plan is completed. I recommend  portion control and increasing exercise.      Gen:                No distress, conversant, pleasant, appears stated age, alert, oriented  Eyes:               Anicteric sclera, moist conjunctiva, no lid lag                           PERRL, EOMI   Heent:             NC/AT                          Normal hearing  Lungs:             Normal effort, non-labored breathing    CV:                  Normal S1/S2                          No lower extremity edema  ABD:               Rounded, non-distended               Psych:             Appropriate affect  Neuro:             CN 2-12 appear intact    Past Medical History:   Diagnosis Date   • Anxiety    • Arthritis    • COPD (chronic obstructive pulmonary disease) ([facility])    • Depression    • Diabetes mellitus (HCC)    • History of transfusion    • Hyperlipidemia    • Hypertension    • Stroke ([facility])     Greater than 5 years ago       Current Outpatient Medications:   •  albuterol sulfate  (90 Base) MCG/ACT inhaler, Inhale 2 puffs Every 6 (Six) Hours As Needed for Wheezing or Shortness of Air., Disp: 18 g, Rfl: 9  •  allopurinol (ZYLOPRIM) 100 MG tablet, Take 50 mg by mouth Daily. Take one-half tablet by mouth daily, Disp: , Rfl:   •  amLODIPine (NORVASC) 10 MG tablet, TAKE 1 TABLET BY MOUTH EVERY DAY, Disp: 90 tablet, Rfl: 1  •  aspirin-dipyridamole (AGGRENOX)  MG per 12 hr capsule, TAKE 1 CAPSULE BY MOUTH TWICE A DAY, Disp: 180 capsule, Rfl: 1  •  Bystolic 5 MG tablet, TAKE 1 TABLET BY MOUTH EVERY DAY, Disp: 90 tablet, Rfl: 1  •  cholecalciferol (VITAMIN D3) 25 MCG (1000 UT) tablet, Take 1,000 Units by mouth Daily., Disp: , Rfl:   •  docusate sodium ([employer] Stool Softener) 100 MG capsule, Take 100 mg by mouth 2 (Two) Times a Day., Disp: , Rfl:   •  folic acid (FOLVITE) 1 MG tablet, TAKE 1 TABLET BY MOUTH EVERY DAY, Disp: 90 tablet, Rfl: 2  •  furosemide (LASIX) 40 MG tablet, Take 0.5 tablets by mouth Daily As Needed (lower extremity edema)., Disp: 45 tablet, Rfl: 3  •   gabapentin (NEURONTIN) 300 MG capsule, Take 300 mg by mouth 2 (two) times a day., Disp: , Rfl:   •  guaiFENesin (MUCINEX) 600 MG 12 hr tablet, Take 1,200 mg by mouth 2 (Two) Times a Day., Disp: , Rfl:   •  hydroCHLOROthiazide (HYDRODIURIL) 25 MG tablet, TAKE 1 TABLET BY MOUTH EVERY DAY, Disp: 56 tablet, Rfl: 11  •  ipratropium-albuterol (DUO-NEB) 0.5-2.5 mg/3 ml nebulizer, Take 3 mL by nebulization 4 (Four) Times a Day., Disp: 360 mL, Rfl: 1  •  isosorbide mononitrate (IMDUR) 30 MG 24 hr tablet, TAKE 1 TABLET BY MOUTH EVERY DAY, Disp: 30 tablet, Rfl: 1  •  lisinopril (PRINIVIL,ZESTRIL) 10 MG tablet, TAKE 1 TABLET BY MOUTH EVERY DAY, Disp: 90 tablet, Rfl: 1  •  Loratadine 10 MG capsule, Take 10 mg by mouth Daily., Disp: , Rfl:   •  metFORMIN ER (GLUCOPHAGE-XR) 500 MG 24 hr tablet, TAKE 2 TABLETS BY MOUTH TWICE A DAY (Patient taking differently: 500 mg. 2 qam), Disp: 360 tablet, Rfl: 1  •  Omega-3 Fatty Acids (FISH OIL) 1200 MG capsule capsule, Take 1,200 mg by mouth 3 (Three) Times a Day With Meals., Disp: , Rfl:   •  oxyCODONE-acetaminophen (PERCOCET) 7.5-325 MG per tablet, Take 1 tablet by mouth Every 6 (Six) Hours As Needed for Moderate Pain . Take 1 tablet by mouth every 6-8 hours as needed, Disp: , Rfl:   •  rOPINIRole (Requip) 0.5 MG tablet, Take 1 tablet by mouth Every Night. Take in addition to 1 mg tablets.  Take 1 hour before bedtime., Disp: 30 tablet, Rfl: 2  •  rOPINIRole (REQUIP) 1 MG tablet, TAKE 2 TABLETS BY MOUTH 1 HOUR BEFORE BEDTIME., Disp: 180 tablet, Rfl: 1  •  rosuvastatin (CRESTOR) 10 MG tablet, TAKE 1 TABLET BY MOUTH EVERY DAY, Disp: 90 tablet, Rfl: 11  •  Symbicort 160-4.5 MCG/ACT inhaler, Inhale 2 puffs 2 (Two) Times a Day., Disp: , Rfl:   •  tiZANidine (ZANAFLEX) 4 MG tablet, TAKE 1 TABLET BY MOUTH EVERY 8 HOURS AS NEEDED FOR MUSCLE SPASMS (AT LUNCH AND BEDTIME AS NEEDED), Disp: 270 tablet, Rfl: 1  •  Triamcinolone Acetonide (NASACORT) 55 MCG/ACT nasal inhaler, INSTILL 1 SPRAY INTO THE  NOSTRIL(S) AS DIRECTED BY PROVIDER EVERY OTHER DAY. *NOT COVERED*, Disp: 16.5 g, Rfl: 2  •  venlafaxine (EFFEXOR) 37.5 MG tablet, Take 75 mg by mouth Every Morning., Disp: , Rfl:     WBC   Date Value Ref Range Status   10/26/2021 6.82 3.40 - 10.80 10*3/mm3 Final     RBC   Date Value Ref Range Status   10/26/2021 3.87 3.77 - 5.28 10*6/mm3 Final     Hemoglobin   Date Value Ref Range Status   10/26/2021 12.4 12.0 - 15.9 g/dL Final     Hematocrit   Date Value Ref Range Status   10/26/2021 35.9 34.0 - 46.6 % Final     MCV   Date Value Ref Range Status   10/26/2021 92.8 79.0 - 97.0 fL Final     MCH   Date Value Ref Range Status   10/26/2021 32.0 26.6 - 33.0 pg Final     MCHC   Date Value Ref Range Status   10/26/2021 34.5 31.5 - 35.7 g/dL Final     RDW   Date Value Ref Range Status   10/26/2021 12.3 12.3 - 15.4 % Final     RDW-SD   Date Value Ref Range Status   10/26/2021 41.3 37.0 - 54.0 fl Final     MPV   Date Value Ref Range Status   10/26/2021 11.9 6.0 - 12.0 fL Final     Platelets   Date Value Ref Range Status   10/26/2021 145 140 - 450 10*3/mm3 Final     Neutrophil %   Date Value Ref Range Status   10/26/2021 63.2 42.7 - 76.0 % Final     Lymphocyte %   Date Value Ref Range Status   10/26/2021 26.2 19.6 - 45.3 % Final     Monocyte %   Date Value Ref Range Status   10/26/2021 7.2 5.0 - 12.0 % Final     Eosinophil %   Date Value Ref Range Status   10/26/2021 2.5 0.3 - 6.2 % Final     Basophil %   Date Value Ref Range Status   10/26/2021 0.6 0.0 - 1.5 % Final     Immature Grans %   Date Value Ref Range Status   10/26/2021 0.3 0.0 - 0.5 % Final     Neutrophils, Absolute   Date Value Ref Range Status   10/26/2021 4.31 1.70 - 7.00 10*3/mm3 Final     Lymphocytes, Absolute   Date Value Ref Range Status   10/26/2021 1.79 0.70 - 3.10 10*3/mm3 Final     Monocytes, Absolute   Date Value Ref Range Status   10/26/2021 0.49 0.10 - 0.90 10*3/mm3 Final     Eosinophils, Absolute   Date Value Ref Range Status   10/26/2021 0.17 0.00 -  0.40 10*3/mm3 Final     Basophils, Absolute   Date Value Ref Range Status   10/26/2021 0.04 0.00 - 0.20 10*3/mm3 Final     Immature Grans, Absolute   Date Value Ref Range Status   10/26/2021 0.02 0.00 - 0.05 10*3/mm3 Final     nRBC   Date Value Ref Range Status   10/26/2021 0.0 0.0 - 0.2 /100 WBC Final       Lab Results   Component Value Date    GLUCOSE 144 (H) 10/26/2021    BUN 28 (H) 10/26/2021    CREATININE 1.36 (H) 10/26/2021    EGFRIFNONA 38 (L) 10/26/2021    BCR 20.6 10/26/2021    K 4.4 10/26/2021    CO2 28.2 10/26/2021    CALCIUM 9.8 10/26/2021    ALBUMIN 4.80 10/26/2021    AST 24 07/09/2021    ALT 20 07/09/2021       Assessment and Plan:    1. Obstructive sleep apnea - Established, stable (1)  1. Compliant with PAP therapy  2. Advised patient of new safety recall of [employer] CPAP/BiPAP/AVAPS machines. We discussed the risk versus benefit of continuing usage of PAP therapy. The company has recommended that patients stop usage of their current machines. Instructed patient to call [employer] ([phone]) to check if the machine is under warranty for repair or replacement. [place] machine with serial number on website: www.[employer]/src-updates. Advised patient to avoid unapproved ozone-type PAP . Advised to call us if any further questions or problems.  3. Script for PAP supplies and in line filter for BiPAP  4. Recommend mask liners or mask cushion - patient and granddaughter looked up online while in the room today  5. Patient is going to call her insurance company to discuss possible early approval for a new machine - will send orders at that time if approved, and change follow up appointment  6. Return to clinic in 1 year with compliance report unless change in symptoms in interim period - change follow up if new machine is ordered  2. MARISOL/COPD overlap - Established, stable (1)   1. Patient continues to use nocturnal O2 @ 2 LPM alone without BiPAP usage currently      I  spent 20 minutes caring for Ronda on this date of service. This time includes time spent by me in the following activities: preparing for the visit, reviewing tests, obtaining and/or reviewing a separately obtained history, performing a medically appropriate examination and/or evaluation , counseling and educating the patient/family/caregiver and documenting information in the medical record; discussing PAP therapy, PAP compliance and PAP maintenance    RTC in 12 months. Patient agrees to return sooner if changes in symptoms.        This document has been electronically signed by CIARA Dolan on November 15, 2021 10:03 CST          CC: Brook Arreola MD          No ref. provider found

## 2021-11-16 RX ORDER — ISOSORBIDE MONONITRATE 30 MG/1
TABLET, EXTENDED RELEASE ORAL
Qty: 30 TABLET | Refills: 1 | Status: SHIPPED | OUTPATIENT
Start: 2021-11-16 | End: 2021-12-13

## 2021-11-19 ENCOUNTER — OFFICE VISIT (OUTPATIENT)
Dept: PULMONOLOGY | Facility: CLINIC | Age: 76
End: 2021-11-19

## 2021-11-19 ENCOUNTER — TELEPHONE (OUTPATIENT)
Dept: FAMILY MEDICINE CLINIC | Facility: CLINIC | Age: 76
End: 2021-11-19

## 2021-11-19 VITALS
OXYGEN SATURATION: 98 % | BODY MASS INDEX: 34.32 KG/M2 | TEMPERATURE: 95.5 F | WEIGHT: 206 LBS | DIASTOLIC BLOOD PRESSURE: 60 MMHG | HEART RATE: 74 BPM | HEIGHT: 65 IN | SYSTOLIC BLOOD PRESSURE: 110 MMHG

## 2021-11-19 DIAGNOSIS — Z87.891 FORMER SMOKER: ICD-10-CM

## 2021-11-19 DIAGNOSIS — J44.9 CHRONIC OBSTRUCTIVE PULMONARY DISEASE, UNSPECIFIED COPD TYPE (HCC): Primary | ICD-10-CM

## 2021-11-19 DIAGNOSIS — G47.33 OSA (OBSTRUCTIVE SLEEP APNEA): Chronic | ICD-10-CM

## 2021-11-19 PROBLEM — J96.01 ACUTE RESPIRATORY FAILURE WITH HYPOXIA: Chronic | Status: RESOLVED | Noted: 2021-07-12 | Resolved: 2021-11-19

## 2021-11-19 PROBLEM — R91.8 PULMONARY NODULES: Status: RESOLVED | Noted: 2020-07-30 | Resolved: 2021-11-19

## 2021-11-19 PROCEDURE — 99213 OFFICE O/P EST LOW 20 MIN: CPT | Performed by: INTERNAL MEDICINE

## 2021-11-19 RX ORDER — TIOTROPIUM BROMIDE AND OLODATEROL 3.124; 2.736 UG/1; UG/1
2 SPRAY, METERED RESPIRATORY (INHALATION) DAILY
Qty: 1 EACH | Refills: 11 | Status: SHIPPED | OUTPATIENT
Start: 2021-11-19

## 2021-11-19 NOTE — TELEPHONE ENCOUNTER
Blood pressure this week looks good.  Please ask that patient continue to monitor.  Thanks, JEFFERSON Arreola

## 2021-11-19 NOTE — PROGRESS NOTES
Pulmonary Consultation    No ref. provider found,    Thank you for asking me to see Ronda Blair for   Chief Complaint   Patient presents with   • COPD   .      History of Present Illness  Ronda Blair is a 76 y.o. female     11/19/21  Patient here for follow up. At last visit we tried switching from Symbicort to stiolto. She feel slike she did well wit this, no increased symptoms    Original OV 10/13  Patient referred from cardiology for COPD  Patient reports that she has been seen in [place] by pulmonary in the past for this, but not in a few years  She reports having albuterol hfa for emergency use andSymbicort which she only remembers to use once a day at most. She has exertional dyspnea which is stable.      Tobacco use history:  Type: cigarettes  Amount: 1.5 ppd  Duration: 30 years  Cessation: quit 20 years ago   Willing to quit: N/A      Review of Systems: History obtained from chart review and the patient.  Review of Systems  As described in the HPI. Otherwise, remainder of ROS (14 systems) were negative.    Patient Active Problem List   Diagnosis   • Degeneration of intervertebral disc of mid-cervical region   • Morbid obesity due to excess calories (HCC)   • Former smoker   • Chest pain   • Diabetes mellitus (HCC)   • Chronic obstructive pulmonary disease (HCC)   • Dyslipidemia   • Adrenal nodule (HCC)   • Fecal occult blood test positive   • Benign neoplasm of colon   • Chronic nonalcoholic liver disease   • Diverticular disease   • Dysphagia   • Hypertension   • Internal hemorrhoids   • Knee pain, bilateral   • Lumbar disc disease with radiculopathy   • Neuralgia, geniculate   • Stage 2 chronic kidney disease   • Gait disturbance   • Nocturnal hypoxia   • Chronic heart failure with preserved ejection fraction (HCC)   • Normocytic anemia   • Acute gout involving toe of left foot   • MARISOL (obstructive sleep apnea)   • MARISOL and COPD overlap syndrome (HCC)   • Precordial pain    • Obesity (BMI 30-39.9)   • CKD (chronic kidney disease) stage 3, GFR 30-59 ml/min ([facility])         Current Outpatient Medications:   •  albuterol sulfate  (90 Base) MCG/ACT inhaler, Inhale 2 puffs Every 6 (Six) Hours As Needed for Wheezing or Shortness of Air., Disp: 18 g, Rfl: 9  •  allopurinol (ZYLOPRIM) 100 MG tablet, Take 50 mg by mouth Daily. Take one-half tablet by mouth daily, Disp: , Rfl:   •  amLODIPine (NORVASC) 10 MG tablet, TAKE 1 TABLET BY MOUTH EVERY DAY, Disp: 90 tablet, Rfl: 1  •  aspirin-dipyridamole (AGGRENOX)  MG per 12 hr capsule, TAKE 1 CAPSULE BY MOUTH TWICE A DAY, Disp: 180 capsule, Rfl: 1  •  Bystolic 5 MG tablet, TAKE 1 TABLET BY MOUTH EVERY DAY, Disp: 90 tablet, Rfl: 1  •  cholecalciferol (VITAMIN D3) 25 MCG (1000 UT) tablet, Take 1,000 Units by mouth Daily., Disp: , Rfl:   •  docusate sodium ([employer] Stool Softener) 100 MG capsule, Take 100 mg by mouth 2 (Two) Times a Day., Disp: , Rfl:   •  folic acid (FOLVITE) 1 MG tablet, TAKE 1 TABLET BY MOUTH EVERY DAY, Disp: 90 tablet, Rfl: 2  •  furosemide (LASIX) 40 MG tablet, Take 0.5 tablets by mouth Daily As Needed (lower extremity edema)., Disp: 45 tablet, Rfl: 3  •  gabapentin (NEURONTIN) 300 MG capsule, Take 300 mg by mouth 2 (two) times a day., Disp: , Rfl:   •  guaiFENesin (MUCINEX) 600 MG 12 hr tablet, Take 1,200 mg by mouth 2 (Two) Times a Day., Disp: , Rfl:   •  hydroCHLOROthiazide (HYDRODIURIL) 25 MG tablet, TAKE 1 TABLET BY MOUTH EVERY DAY, Disp: 56 tablet, Rfl: 11  •  ipratropium-albuterol (DUO-NEB) 0.5-2.5 mg/3 ml nebulizer, Take 3 mL by nebulization 4 (Four) Times a Day., Disp: 360 mL, Rfl: 1  •  isosorbide mononitrate (IMDUR) 30 MG 24 hr tablet, TAKE 1 TABLET BY MOUTH EVERY DAY, Disp: 30 tablet, Rfl: 1  •  lisinopril (PRINIVIL,ZESTRIL) 10 MG tablet, TAKE 1 TABLET BY MOUTH EVERY DAY, Disp: 90 tablet, Rfl: 1  •  Loratadine 10 MG capsule, Take 10 mg by mouth Daily., Disp: , Rfl:   •  metFORMIN ER (GLUCOPHAGE-XR) 500 MG 24 hr  tablet, TAKE 2 TABLETS BY MOUTH TWICE A DAY (Patient taking differently: 500 mg. 2 qam), Disp: 360 tablet, Rfl: 1  •  Omega-3 Fatty Acids (FISH OIL) 1200 MG capsule capsule, Take 1,200 mg by mouth 3 (Three) Times a Day With Meals., Disp: , Rfl:   •  oxyCODONE-acetaminophen (PERCOCET) 7.5-325 MG per tablet, Take 1 tablet by mouth Every 6 (Six) Hours As Needed for Moderate Pain . Take 1 tablet by mouth every 6-8 hours as needed, Disp: , Rfl:   •  rOPINIRole (Requip) 0.5 MG tablet, Take 1 tablet by mouth Every Night. Take in addition to 1 mg tablets.  Take 1 hour before bedtime., Disp: 30 tablet, Rfl: 2  •  rOPINIRole (REQUIP) 1 MG tablet, TAKE 2 TABLETS BY MOUTH 1 HOUR BEFORE BEDTIME., Disp: 180 tablet, Rfl: 1  •  rosuvastatin (CRESTOR) 10 MG tablet, TAKE 1 TABLET BY MOUTH EVERY DAY, Disp: 90 tablet, Rfl: 11  •  Symbicort 160-4.5 MCG/ACT inhaler, Inhale 2 puffs 2 (Two) Times a Day., Disp: , Rfl:   •  tiZANidine (ZANAFLEX) 4 MG tablet, TAKE 1 TABLET BY MOUTH EVERY 8 HOURS AS NEEDED FOR MUSCLE SPASMS (AT LUNCH AND BEDTIME AS NEEDED), Disp: 270 tablet, Rfl: 1  •  Triamcinolone Acetonide (NASACORT) 55 MCG/ACT nasal inhaler, INSTILL 1 SPRAY INTO THE NOSTRIL(S) AS DIRECTED BY PROVIDER EVERY OTHER DAY. *NOT COVERED*, Disp: 16.5 g, Rfl: 2  •  venlafaxine (EFFEXOR) 37.5 MG tablet, Take 75 mg by mouth Every Morning., Disp: , Rfl:     Allergies   Allergen Reactions   • Aspirin GI Intolerance     Had bleeding ulcer in the past       Past Medical History:   Diagnosis Date   • Anxiety    • Arthritis    • COPD (chronic obstructive pulmonary disease) (HCC)    • Depression    • Diabetes mellitus (HCC)    • History of transfusion    • Hyperlipidemia    • Hypertension    • Stroke (HCC)     Greater than 5 years ago     Past Surgical History:   Procedure Laterality Date   • CERVICAL FUSION  1999   • EYE SURGERY Bilateral 2014    lasix eye surgery   • HEMORRHOIDECTOMY     • HYSTERECTOMY     • INTRATHECAL PUMP REMOVAL      2006   • KNEE  "ARTHROSCOPY Right 2015   • LUMBAR SPINE SURGERY     • PAIN PUMP INSERTION/REVISION      Removed in      Social History     Socioeconomic History   • Marital status:    Tobacco Use   • Smoking status: Former Smoker     Packs/day: 1.00     Years: 40.00     Pack years: 40.00     Quit date:      Years since quittin.8   • Smokeless tobacco: Never Used   Vaping Use   • Vaping Use: Never used   Substance and Sexual Activity   • Alcohol use: No   • Drug use: No   • Sexual activity: Defer     Family History   Problem Relation Age of Onset   • Diabetes Other    • Hypertension Other    • Other Other    • Kidney disease Other    • Heart attack Father 75   • Cancer Sister    • Lung cancer Brother    • Hypertension Sister           Objective     Height 165.1 cm (65\"), not currently breastfeeding.  Physical Exam  Vitals reviewed.   Constitutional:       Appearance: Normal appearance.   HENT:      Head: Normocephalic and atraumatic.      Nose: Nose normal.      Mouth/Throat:      Mouth: Mucous membranes are moist.      Pharynx: Oropharynx is clear.   Eyes:      Conjunctiva/sclera: Conjunctivae normal.      Pupils: Pupils are equal, round, and reactive to light.   Cardiovascular:      Rate and Rhythm: Normal rate and regular rhythm.      Pulses: Normal pulses.      Heart sounds: Normal heart sounds.   Pulmonary:      Effort: Pulmonary effort is normal.      Breath sounds: Normal breath sounds.   Abdominal:      General: Abdomen is flat. Bowel sounds are normal.      Palpations: Abdomen is soft.   Musculoskeletal:         General: Normal range of motion.      Cervical back: Normal range of motion.   Skin:     General: Skin is warm and dry.   Neurological:      General: No focal deficit present.      Mental Status: She is alert and oriented to person, place, and time.   Psychiatric:         Mood and Affect: Mood normal.         Behavior: Behavior normal.         PFTs:  (independently reviewed and interpreted " by me)  9/10/2020  FVC 1.84L, 66%  FEV1 1.26L, 59%  Ratio 68%  TLC 3.02L, 57%  DLCO 59%      Radiology (independently reviewed and interpreted by me):  7/21/20 CTA chest- no concerning nodules or masses, no PE       Assessment/Plan     Diagnoses and all orders for this visit:    1. Chronic obstructive pulmonary disease, unspecified COPD type (HCC) (Primary)    2. MARISOL (obstructive sleep apnea)    3. Former smoker         Discussion/ Recommendations:   Patient with moderate obstruction, did well on Stiolto, controlling symptoms. No signs of exacerbation. Can continue on this. Will follow up in 6 months or sooner if needed             Return in about 6 months (around 5/19/2022).      Thank you for allowing me to participate in the care of Ronda Blair. Please do not hesitate to contact me with any questions.         This document has been electronically signed by Alejandra Santos DO on November 19, 2021 08:08 CST

## 2021-11-19 NOTE — TELEPHONE ENCOUNTER
Patient is wanting to update  in regards of her blood pressure.   164-69 last week was a little higher     This Week it has been running 131/61 monday ,111/62 Tuesday , 110/60 today 11/19/21

## 2021-11-22 NOTE — TELEPHONE ENCOUNTER
Incoming Refill Request      Medication requested (name and dose): ROPINIROLE 0.5MG  ROPINIROLE 1MG     Pharmacy where request should be sent: [facility]     Additional details provided by patient:    Best call back number: [phone]    Does the patient have less than a 3 day supply:  [x] Yes  [] No    Elijah Hernandez Rep  11/22/21, 11:44 CST

## 2021-11-23 RX ORDER — ROPINIROLE 0.5 MG/1
0.5 TABLET, FILM COATED ORAL NIGHTLY
Qty: 90 TABLET | Refills: 1 | Status: SHIPPED | OUTPATIENT
Start: 2021-11-23 | End: 2022-03-29

## 2021-11-23 RX ORDER — ROPINIROLE 1 MG/1
2 TABLET, FILM COATED ORAL NIGHTLY
Qty: 180 TABLET | Refills: 1 | Status: SHIPPED | OUTPATIENT
Start: 2021-11-23 | End: 2022-08-03

## 2021-11-24 DIAGNOSIS — E11.42 TYPE 2 DIABETES MELLITUS WITH DIABETIC POLYNEUROPATHY, WITHOUT LONG-TERM CURRENT USE OF INSULIN (HCC): ICD-10-CM

## 2021-11-24 RX ORDER — METFORMIN HYDROCHLORIDE 500 MG/1
TABLET, EXTENDED RELEASE ORAL
Qty: 360 TABLET | Refills: 1 | Status: SHIPPED | OUTPATIENT
Start: 2021-11-24 | End: 2022-11-14 | Stop reason: SDUPTHER

## 2021-11-25 DIAGNOSIS — D64.9 ANEMIA, UNSPECIFIED: ICD-10-CM

## 2021-11-25 DIAGNOSIS — I50.32 CHRONIC HEART FAILURE WITH PRESERVED EJECTION FRACTION (HCC): ICD-10-CM

## 2021-11-29 RX ORDER — FOLIC ACID 1 MG/1
TABLET ORAL
Qty: 90 TABLET | Refills: 2 | Status: SHIPPED | OUTPATIENT
Start: 2021-11-29 | End: 2022-10-31

## 2021-11-29 RX ORDER — NEBIVOLOL 5 MG/1
TABLET ORAL
Qty: 90 TABLET | Refills: 1 | Status: SHIPPED | OUTPATIENT
Start: 2021-11-29 | End: 2021-12-16 | Stop reason: SDUPTHER

## 2021-11-29 RX ORDER — ROSUVASTATIN CALCIUM 10 MG/1
TABLET, COATED ORAL
Qty: 90 TABLET | Refills: 11 | Status: SHIPPED | OUTPATIENT
Start: 2021-11-29 | End: 2023-02-22

## 2021-12-13 RX ORDER — ISOSORBIDE MONONITRATE 30 MG/1
TABLET, EXTENDED RELEASE ORAL
Qty: 30 TABLET | Refills: 1 | Status: SHIPPED | OUTPATIENT
Start: 2021-12-13 | End: 2022-01-07

## 2021-12-14 ENCOUNTER — TELEPHONE (OUTPATIENT)
Dept: FAMILY MEDICINE CLINIC | Facility: CLINIC | Age: 76
End: 2021-12-14

## 2021-12-14 DIAGNOSIS — I50.32 CHRONIC HEART FAILURE WITH PRESERVED EJECTION FRACTION (HCC): ICD-10-CM

## 2021-12-14 NOTE — TELEPHONE ENCOUNTER
Ronda would like to speak w/ you about her BP after her med change. She states her BP is 164/74 during the day and drops to 99/49 in the evening.

## 2021-12-16 RX ORDER — NEBIVOLOL 5 MG/1
5 TABLET ORAL DAILY
Qty: 90 TABLET | Refills: 1 | Status: SHIPPED | OUTPATIENT
Start: 2021-12-16 | End: 2021-12-16

## 2021-12-16 RX ORDER — NEBIVOLOL HYDROCHLORIDE 5 MG/1
5 TABLET ORAL DAILY
Qty: 90 TABLET | Refills: 1 | Status: SHIPPED | OUTPATIENT
Start: 2021-12-16 | End: 2023-01-03

## 2021-12-16 NOTE — TELEPHONE ENCOUNTER
Spoke with patient.  She has noticed a difference with blood pressure elevations since switching to generic medication.  Having some low BP, but also some high BP.  Patient will continue current doses of lisinopril and hydrochlorothiazide.  Restart name brand Bystolic.  Temporarily decrease amlodipine to 5 mg daily, 1/2 tab, but can increase back to 10 mg daily with any blood pressure elevations.  She will call next week if any issues.  ThanksJEFFERSON

## 2021-12-20 DIAGNOSIS — I50.32 CHRONIC HEART FAILURE WITH PRESERVED EJECTION FRACTION (HCC): ICD-10-CM

## 2021-12-20 RX ORDER — ASPIRIN AND DIPYRIDAMOLE 25; 200 MG/1; MG/1
CAPSULE, EXTENDED RELEASE ORAL
Qty: 180 CAPSULE | Refills: 1 | Status: SHIPPED | OUTPATIENT
Start: 2021-12-20 | End: 2022-08-03

## 2022-01-07 RX ORDER — ISOSORBIDE MONONITRATE 30 MG/1
TABLET, EXTENDED RELEASE ORAL
Qty: 30 TABLET | Refills: 1 | Status: SHIPPED | OUTPATIENT
Start: 2022-01-07 | End: 2022-02-07

## 2022-02-07 RX ORDER — ISOSORBIDE MONONITRATE 30 MG/1
TABLET, EXTENDED RELEASE ORAL
Qty: 30 TABLET | Refills: 1 | Status: SHIPPED | OUTPATIENT
Start: 2022-02-07 | End: 2022-03-29

## 2022-02-14 ENCOUNTER — LAB (OUTPATIENT)
Dept: LAB | Facility: HOSPITAL | Age: 77
End: 2022-02-14

## 2022-02-14 ENCOUNTER — OFFICE VISIT (OUTPATIENT)
Dept: FAMILY MEDICINE CLINIC | Facility: CLINIC | Age: 77
End: 2022-02-14

## 2022-02-14 VITALS
WEIGHT: 208 LBS | HEART RATE: 84 BPM | SYSTOLIC BLOOD PRESSURE: 156 MMHG | BODY MASS INDEX: 34.66 KG/M2 | OXYGEN SATURATION: 96 % | HEIGHT: 65 IN | DIASTOLIC BLOOD PRESSURE: 78 MMHG

## 2022-02-14 DIAGNOSIS — Z91.09 ENVIRONMENTAL ALLERGIES: ICD-10-CM

## 2022-02-14 DIAGNOSIS — J02.9 PHARYNGITIS, UNSPECIFIED ETIOLOGY: ICD-10-CM

## 2022-02-14 DIAGNOSIS — I10 ESSENTIAL HYPERTENSION: ICD-10-CM

## 2022-02-14 DIAGNOSIS — E78.5 DYSLIPIDEMIA: Primary | ICD-10-CM

## 2022-02-14 DIAGNOSIS — E78.5 DYSLIPIDEMIA: ICD-10-CM

## 2022-02-14 DIAGNOSIS — E11.42 TYPE 2 DIABETES MELLITUS WITH DIABETIC POLYNEUROPATHY, WITHOUT LONG-TERM CURRENT USE OF INSULIN: ICD-10-CM

## 2022-02-14 DIAGNOSIS — M10.9 GOUT, UNSPECIFIED CAUSE, UNSPECIFIED CHRONICITY, UNSPECIFIED SITE: ICD-10-CM

## 2022-02-14 DIAGNOSIS — N18.32 STAGE 3B CHRONIC KIDNEY DISEASE: ICD-10-CM

## 2022-02-14 PROBLEM — R74.01 ELEVATION OF LEVEL OF TRANSAMINASE AND LACTIC ACID DEHYDROGENASE (LDH): Status: ACTIVE | Noted: 2022-02-14

## 2022-02-14 PROBLEM — M10.30 GOUT DUE TO RENAL IMPAIRMENT, UNSPECIFIED SITE: Status: ACTIVE | Noted: 2021-02-01

## 2022-02-14 PROBLEM — N18.31 STAGE 3A CHRONIC KIDNEY DISEASE (HCC): Status: ACTIVE | Noted: 2021-02-01

## 2022-02-14 PROBLEM — J44.9 CHRONIC OBSTRUCTIVE PULMONARY DISEASE: Status: ACTIVE | Noted: 2022-02-14

## 2022-02-14 PROBLEM — E11.9 TYPE 2 DIABETES MELLITUS: Status: ACTIVE | Noted: 2021-02-01

## 2022-02-14 PROBLEM — R74.02 ELEVATION OF LEVEL OF TRANSAMINASE AND LACTIC ACID DEHYDROGENASE (LDH): Status: ACTIVE | Noted: 2022-02-14

## 2022-02-14 PROBLEM — N17.9 ACUTE KIDNEY INJURY (HCC): Status: ACTIVE | Noted: 2021-08-02

## 2022-02-14 LAB
25(OH)D3 SERPL-MCNC: 34.7 NG/ML (ref 30–100)
ALBUMIN SERPL-MCNC: 4.9 G/DL (ref 3.5–5.2)
ANION GAP SERPL CALCULATED.3IONS-SCNC: 13 MMOL/L (ref 5–15)
BASOPHILS # BLD AUTO: 0.03 10*3/MM3 (ref 0–0.2)
BASOPHILS NFR BLD AUTO: 0.5 % (ref 0–1.5)
BILIRUB UR QL STRIP: NEGATIVE
BUN SERPL-MCNC: 31 MG/DL (ref 8–23)
BUN/CREAT SERPL: 24.4 (ref 7–25)
CALCIUM SPEC-SCNC: 9.9 MG/DL (ref 8.6–10.5)
CHLORIDE SERPL-SCNC: 103 MMOL/L (ref 98–107)
CHOLEST SERPL-MCNC: 162 MG/DL (ref 0–200)
CLARITY UR: CLEAR
CO2 SERPL-SCNC: 26 MMOL/L (ref 22–29)
COLOR UR: YELLOW
CREAT SERPL-MCNC: 1.27 MG/DL (ref 0.57–1)
CREAT UR-MCNC: 30.6 MG/DL
DEPRECATED RDW RBC AUTO: 42.3 FL (ref 37–54)
EOSINOPHIL # BLD AUTO: 0.26 10*3/MM3 (ref 0–0.4)
EOSINOPHIL NFR BLD AUTO: 4.2 % (ref 0.3–6.2)
ERYTHROCYTE [DISTWIDTH] IN BLOOD BY AUTOMATED COUNT: 12.8 % (ref 12.3–15.4)
GFR SERPL CREATININE-BSD FRML MDRD: 41 ML/MIN/1.73
GLUCOSE SERPL-MCNC: 101 MG/DL (ref 65–99)
GLUCOSE UR STRIP-MCNC: NEGATIVE MG/DL
HCT VFR BLD AUTO: 37.8 % (ref 34–46.6)
HDLC SERPL-MCNC: 39 MG/DL (ref 40–60)
HGB BLD-MCNC: 12.8 G/DL (ref 12–15.9)
HGB UR QL STRIP.AUTO: NEGATIVE
IMM GRANULOCYTES # BLD AUTO: 0.02 10*3/MM3 (ref 0–0.05)
IMM GRANULOCYTES NFR BLD AUTO: 0.3 % (ref 0–0.5)
KETONES UR QL STRIP: NEGATIVE
LDLC SERPL CALC-MCNC: 59 MG/DL (ref 0–100)
LDLC/HDLC SERPL: 0.99 {RATIO}
LEUKOCYTE ESTERASE UR QL STRIP.AUTO: NEGATIVE
LYMPHOCYTES # BLD AUTO: 1.52 10*3/MM3 (ref 0.7–3.1)
LYMPHOCYTES NFR BLD AUTO: 24.4 % (ref 19.6–45.3)
MAGNESIUM SERPL-MCNC: 2 MG/DL (ref 1.6–2.4)
MCH RBC QN AUTO: 31.1 PG (ref 26.6–33)
MCHC RBC AUTO-ENTMCNC: 33.9 G/DL (ref 31.5–35.7)
MCV RBC AUTO: 91.7 FL (ref 79–97)
MONOCYTES # BLD AUTO: 0.65 10*3/MM3 (ref 0.1–0.9)
MONOCYTES NFR BLD AUTO: 10.4 % (ref 5–12)
NEUTROPHILS NFR BLD AUTO: 3.75 10*3/MM3 (ref 1.7–7)
NEUTROPHILS NFR BLD AUTO: 60.2 % (ref 42.7–76)
NITRITE UR QL STRIP: NEGATIVE
NRBC BLD AUTO-RTO: 0 /100 WBC (ref 0–0.2)
PH UR STRIP.AUTO: 5.5 [PH] (ref 5–8)
PHOSPHATE SERPL-MCNC: 3.3 MG/DL (ref 2.5–4.5)
PLATELET # BLD AUTO: 145 10*3/MM3 (ref 140–450)
PMV BLD AUTO: 12.1 FL (ref 6–12)
POTASSIUM SERPL-SCNC: 4 MMOL/L (ref 3.5–5.2)
PROT ?TM UR-MCNC: 8.5 MG/DL
PROT UR QL STRIP: NEGATIVE
PROT/CREAT UR: 277.8 MG/G CREA (ref 0–200)
PTH-INTACT SERPL-MCNC: 55.1 PG/ML (ref 15–65)
RBC # BLD AUTO: 4.12 10*6/MM3 (ref 3.77–5.28)
SARS-COV-2 N GENE RESP QL NAA+PROBE: NOT DETECTED
SODIUM SERPL-SCNC: 142 MMOL/L (ref 136–145)
SP GR UR STRIP: 1.01 (ref 1–1.03)
TRIGL SERPL-MCNC: 422 MG/DL (ref 0–150)
URATE SERPL-MCNC: 9.2 MG/DL (ref 2.4–5.7)
UROBILINOGEN UR QL STRIP: NORMAL
VLDLC SERPL-MCNC: 64 MG/DL (ref 5–40)
WBC NRBC COR # BLD: 6.23 10*3/MM3 (ref 3.4–10.8)

## 2022-02-14 PROCEDURE — 36415 COLL VENOUS BLD VENIPUNCTURE: CPT

## 2022-02-14 PROCEDURE — 84550 ASSAY OF BLOOD/URIC ACID: CPT

## 2022-02-14 PROCEDURE — 83036 HEMOGLOBIN GLYCOSYLATED A1C: CPT

## 2022-02-14 PROCEDURE — 82306 VITAMIN D 25 HYDROXY: CPT

## 2022-02-14 PROCEDURE — 83970 ASSAY OF PARATHORMONE: CPT

## 2022-02-14 PROCEDURE — 99214 OFFICE O/P EST MOD 30 MIN: CPT | Performed by: FAMILY MEDICINE

## 2022-02-14 PROCEDURE — 80069 RENAL FUNCTION PANEL: CPT

## 2022-02-14 PROCEDURE — 82570 ASSAY OF URINE CREATININE: CPT

## 2022-02-14 PROCEDURE — 81003 URINALYSIS AUTO W/O SCOPE: CPT

## 2022-02-14 PROCEDURE — 84156 ASSAY OF PROTEIN URINE: CPT

## 2022-02-14 PROCEDURE — 85025 COMPLETE CBC W/AUTO DIFF WBC: CPT

## 2022-02-14 PROCEDURE — 87635 SARS-COV-2 COVID-19 AMP PRB: CPT | Performed by: FAMILY MEDICINE

## 2022-02-14 PROCEDURE — 80061 LIPID PANEL: CPT

## 2022-02-14 PROCEDURE — 83735 ASSAY OF MAGNESIUM: CPT

## 2022-02-14 RX ORDER — LORATADINE 10 MG/1
10 CAPSULE, LIQUID FILLED ORAL DAILY
Qty: 30 EACH | Refills: 5 | Status: SHIPPED | OUTPATIENT
Start: 2022-02-14 | End: 2022-08-15

## 2022-02-14 RX ORDER — GABAPENTIN 100 MG/1
100 CAPSULE ORAL DAILY
COMMUNITY
Start: 2021-12-09

## 2022-02-14 RX ORDER — LISINOPRIL 20 MG/1
20 TABLET ORAL DAILY
Qty: 90 TABLET | Refills: 1 | Status: CANCELLED | OUTPATIENT
Start: 2022-02-14

## 2022-02-14 NOTE — PROGRESS NOTES
"Chief Complaint  dyslipidemia (3 mo f/u ), Hypertension, Diabetes, and COPD    Subjective          PatriziaVerna Blair presents to [facility] - [place]  History of Present Illness    Patient seen today for follow up.  Overall has been doing ok.  Having sore throat today, started a few days ago.  Associated with cough and nasal congestion.  No fever.  No loss of taste or smell.  Generally feeling a little unwell.  History of laryngitis previously.  Patient has been using throat spray.  No known Covid contacts.    Patient's blood pressure is elevated.  She reports normal readings at home, generally systolic 130s/diastolic 60s.  Says that morning is higher than evenings.  She is taking all of her blood pressure medications in the morning including Bystolic 5 mg daily, amlodipine 10 g daily and lisinopril 10 mg daily.  She was having low blood pressure previously when taking hydrochlorothiazide.  Says that she was also taking more for muscle relaxer,    Diabetes well controlled.  Does not check blood glucose regularly at home.  Patient is using Metformin XR 1000 mg twice daily.     Objective   Vital Signs:   /78   Pulse 84   Ht 165.1 cm (65\")   Wt 94.3 kg (208 lb)   SpO2 96%   BMI 34.61 kg/m²     Physical Exam  Vitals reviewed.   Constitutional:       General: She is not in acute distress.     Appearance: She is well-developed.   HENT:      Mouth/Throat:      Pharynx: Posterior oropharyngeal erythema present. No pharyngeal swelling or oropharyngeal exudate.   Cardiovascular:      Rate and Rhythm: Normal rate and regular rhythm.      Heart sounds: Normal heart sounds. No murmur heard.      Pulmonary:      Effort: Pulmonary effort is normal. No respiratory distress.      Breath sounds: Normal breath sounds. No wheezing or rales.   Abdominal:      Palpations: Abdomen is soft.      Tenderness: There is no abdominal tenderness.   Skin:     General: Skin is warm " and dry.      Findings: No rash.   Neurological:      Mental Status: She is alert and oriented to person, place, and time.        Result Review :   The following data was reviewed by: Brook Arreola MD on 02/14/2022:  Common labs    Common Labsle 10/26/21 10/26/21 10/26/21    1505 1505 1505   Glucose   144 (A)   BUN   28 (A)   Creatinine   1.36 (A)   eGFR Non  Am   38 (A)   Sodium   145   Potassium   4.4   Chloride   105   Calcium   9.8   Albumin   4.80   WBC 6.82     Hemoglobin 12.4     Hematocrit 35.9     Platelets 145     Uric Acid  9.1 (A)    (A) Abnormal value                      Assessment and Plan    Diagnoses and all orders for this visit:    1. Dyslipidemia (Primary)  -     Lipid Panel; Future    2. Type 2 diabetes mellitus with diabetic polyneuropathy, without long-term current use of insulin (HCC)  -     Hemoglobin A1c; Future    3. Essential hypertension  -     Lipid Panel; Future  -     Cancel: Comprehensive Metabolic Panel; Future    4. Stage 3b chronic kidney disease (HCC)  -     Cancel: Comprehensive Metabolic Panel; Future    5. Gout, unspecified cause, unspecified chronicity, unspecified site  -     Cancel: Uric acid; Future    6. Pharyngitis, unspecified etiology  -     COVID-19, [facility] IN-HOUSE, NP SWAB IN TRANSPORT MEDIA 8-10 HR TAT - Swab, Nasopharynx    7. Environmental allergies  -     Loratadine 10 MG capsule; Take 1 capsule by mouth Daily.  Dispense: 30 each; Refill: 5      Patient seen today for follow-up.  Will check labs including lipid panel and hemoglobin A1c.  Patient already has lab orders from nephrology checking renal function panel and uric acid level.  Patient has new complaint of pharyngitis, most likely viral in nature.  Will check COVID-19 swab.  Patient advised to home quarantine until results return.  Postnasal drip may also be contributing.  Patient inadvertently has stopped taking Claritin, refill provided today.  She should continue Claritin and Nasacort for  allergy symptoms.  Type 2 diabetes has been historically well controlled, continue Metformin.  Patient is on Crestor for hyperlipidemia.  For blood pressure medication, patient will continue taking lisinopril and Bystolic in the morning, move amlodipine 10 mg at bedtime to help with better 24-hour blood pressure control.  Patient will monitor more closely.          Follow Up   Return in about 6 weeks (around 3/28/2022) for Recheck.  Patient was given instructions and counseling regarding her condition or for health maintenance advice. Please see specific information pulled into the AVS if appropriate.           This document has been electronically signed by Brook Arreola MD

## 2022-02-15 ENCOUNTER — TELEPHONE (OUTPATIENT)
Dept: FAMILY MEDICINE CLINIC | Facility: CLINIC | Age: 77
End: 2022-02-15

## 2022-02-15 LAB — HBA1C MFR BLD: 6.9 % (ref 4.8–5.6)

## 2022-02-16 NOTE — TELEPHONE ENCOUNTER
Per Dr. Arreola, Ms. Blair has been called with recent lab results & recommendations.  Continue current medications and follow-up as planned or sooner if any problems.

## 2022-02-16 NOTE — TELEPHONE ENCOUNTER
Please let patient know that COVID testing negative.  Other labs are as follows:    - Cholesterol shows elevated triglycerides, recommend healthy diet with lean meats, more vegetables, less fried/fatty foods.  - CBC is ok  - HgbA1c is 6.9%.  Lifestyle modification with diet and exercise recommended.  - Urine test shows no infection.    ThanksJEFFERSON

## 2022-03-28 ENCOUNTER — OFFICE VISIT (OUTPATIENT)
Dept: FAMILY MEDICINE CLINIC | Facility: CLINIC | Age: 77
End: 2022-03-28

## 2022-03-28 VITALS
DIASTOLIC BLOOD PRESSURE: 86 MMHG | HEART RATE: 63 BPM | BODY MASS INDEX: 34.99 KG/M2 | OXYGEN SATURATION: 98 % | WEIGHT: 210 LBS | SYSTOLIC BLOOD PRESSURE: 154 MMHG | HEIGHT: 65 IN

## 2022-03-28 DIAGNOSIS — J44.9 CHRONIC OBSTRUCTIVE PULMONARY DISEASE, UNSPECIFIED COPD TYPE: ICD-10-CM

## 2022-03-28 DIAGNOSIS — I50.32 CHRONIC HEART FAILURE WITH PRESERVED EJECTION FRACTION: ICD-10-CM

## 2022-03-28 DIAGNOSIS — M10.9 GOUT, UNSPECIFIED CAUSE, UNSPECIFIED CHRONICITY, UNSPECIFIED SITE: ICD-10-CM

## 2022-03-28 DIAGNOSIS — K21.9 GASTROESOPHAGEAL REFLUX DISEASE, UNSPECIFIED WHETHER ESOPHAGITIS PRESENT: ICD-10-CM

## 2022-03-28 DIAGNOSIS — E11.42 TYPE 2 DIABETES MELLITUS WITH DIABETIC POLYNEUROPATHY, WITHOUT LONG-TERM CURRENT USE OF INSULIN: ICD-10-CM

## 2022-03-28 DIAGNOSIS — G47.33 OSA (OBSTRUCTIVE SLEEP APNEA): ICD-10-CM

## 2022-03-28 DIAGNOSIS — M50.322 DEGENERATION OF INTERVERTEBRAL DISC AT C5-C6 LEVEL: ICD-10-CM

## 2022-03-28 DIAGNOSIS — I10 ESSENTIAL HYPERTENSION: Primary | ICD-10-CM

## 2022-03-28 PROCEDURE — 99214 OFFICE O/P EST MOD 30 MIN: CPT | Performed by: FAMILY MEDICINE

## 2022-03-28 RX ORDER — ALLOPURINOL 100 MG/1
200 TABLET ORAL DAILY
Qty: 60 TABLET | Refills: 2 | Status: SHIPPED | OUTPATIENT
Start: 2022-03-28 | End: 2022-07-05

## 2022-03-28 RX ORDER — LISINOPRIL 20 MG/1
20 TABLET ORAL DAILY
Qty: 90 TABLET | Refills: 3 | Status: SHIPPED | OUTPATIENT
Start: 2022-03-28 | End: 2023-03-10

## 2022-03-28 NOTE — PROGRESS NOTES
Chief Complaint  Back Pain, Hypertension, and Diabetes    Subjective    History of Present Illness {CC  Problem List  Visit  Diagnosis   Encounters  Notes  Medications  Labs  Result Review Imaging  Media :23}     Ronda Blair presents to [facility] [place] for     Chief Complaint   Patient presents with   • Back Pain   • Hypertension   • Diabetes      Patient seen today for follow-up.  Not monitoring hypertension regularly at home.  Has been elevated last few office visit.  Patient is taking lisinopril 10 mg daily, Bystolic 5 mg daily and amlodipine 10 mg daily.  Diabetes well controlled.  Patient taking Metformin XR 1000 mg twice daily.  Uses gabapentin for neuropathy symptoms.  Patient is also taking 2.5 mg of Requip at night for restless legs.  Patient follows with pain management for chronic mid and low back pain.  She had injection about 2 weeks ago, which is still causing her persistent pain.      Current Outpatient Medications:   •  albuterol sulfate  (90 Base) MCG/ACT inhaler, Inhale 2 puffs Every 6 (Six) Hours As Needed for Wheezing or Shortness of Air., Disp: 18 g, Rfl: 9  •  allopurinol (ZYLOPRIM) 100 MG tablet, Take 50 mg by mouth Daily. Take one-half tablet by mouth daily, Disp: , Rfl:   •  amLODIPine (NORVASC) 10 MG tablet, TAKE 1 TABLET BY MOUTH EVERY DAY, Disp: 90 tablet, Rfl: 1  •  aspirin-dipyridamole (AGGRENOX)  MG per 12 hr capsule, TAKE 1 CAPSULE BY MOUTH TWICE A DAY, Disp: 180 capsule, Rfl: 1  •  Bystolic 5 MG tablet, Take 1 tablet by mouth Daily., Disp: 90 tablet, Rfl: 1  •  cholecalciferol (VITAMIN D3) 25 MCG (1000 UT) tablet, Take 1,000 Units by mouth Daily., Disp: , Rfl:   •  docusate sodium (COLACE) 100 MG capsule, Take 100 mg by mouth 2 (Two) Times a Day., Disp: , Rfl:   •  folic acid (FOLVITE) 1 MG tablet, TAKE 1 TABLET BY MOUTH EVERY DAY, Disp: 90 tablet, Rfl: 2  •  furosemide (LASIX) 40 MG tablet, Take  0.5 tablets by mouth Daily As Needed (lower extremity edema)., Disp: 45 tablet, Rfl: 3  •  gabapentin (NEURONTIN) 100 MG capsule, , Disp: , Rfl:   •  ipratropium-albuterol (DUO-NEB) 0.5-2.5 mg/3 ml nebulizer, Take 3 mL by nebulization 4 (Four) Times a Day., Disp: 360 mL, Rfl: 1  •  isosorbide mononitrate (IMDUR) 30 MG 24 hr tablet, TAKE 1 TABLET BY MOUTH EVERY DAY (INSURANCE CARD), Disp: 30 tablet, Rfl: 1  •  lisinopril (PRINIVIL,ZESTRIL) 10 MG tablet, TAKE 1 TABLET BY MOUTH EVERY DAY, Disp: 90 tablet, Rfl: 1  •  Loratadine 10 MG capsule, Take 1 capsule by mouth Daily., Disp: 30 each, Rfl: 5  •  metFORMIN ER (GLUCOPHAGE-XR) 500 MG 24 hr tablet, TAKE 2 TABLETS BY MOUTH TWICE A DAY, Disp: 360 tablet, Rfl: 1  •  Omega-3 Fatty Acids (FISH OIL) 1200 MG capsule capsule, Take 1,200 mg by mouth 3 (Three) Times a Day With Meals., Disp: , Rfl:   •  oxyCODONE-acetaminophen (PERCOCET) 7.5-325 MG per tablet, Take 1 tablet by mouth Every 6 (Six) Hours As Needed for Moderate Pain . Take 1 tablet by mouth every 6-8 hours as needed, Disp: , Rfl:   •  rOPINIRole (Requip) 0.5 MG tablet, Take 1 tablet by mouth Every Night. Take in addition to 1 mg tablets.  Take 1 hour before bedtime., Disp: 90 tablet, Rfl: 1  •  rOPINIRole (REQUIP) 1 MG tablet, Take 2 tablets by mouth Every Night. Take 1 hour before bedtime., Disp: 180 tablet, Rfl: 1  •  rosuvastatin (CRESTOR) 10 MG tablet, TAKE 1 TABLET BY MOUTH EVERY DAY, Disp: 90 tablet, Rfl: 11  •  tiotropium bromide-olodaterol (Stiolto Respimat) 2.5-2.5 MCG/ACT aerosol solution inhaler, Inhale 2 puffs Daily., Disp: 1 each, Rfl: 11  •  tiZANidine (ZANAFLEX) 4 MG tablet, TAKE 1 TABLET BY MOUTH EVERY 8 HOURS AS NEEDED FOR MUSCLE SPASMS (AT LUNCH AND BEDTIME AS NEEDED), Disp: 270 tablet, Rfl: 1  •  Triamcinolone Acetonide (NASACORT) 55 MCG/ACT nasal inhaler, INSTILL 1 SPRAY INTO THE NOSTRIL(S) AS DIRECTED BY PROVIDER EVERY OTHER DAY. *NOT COVERED*, Disp: 16.5 g, Rfl: 2  •  venlafaxine (EFFEXOR) 37.5  "MG tablet, Take 75 mg by mouth Every Morning., Disp: , Rfl:      Objective       Vital Signs:   /86   Pulse 63   Ht 165.1 cm (65\")   Wt 95.3 kg (210 lb)   SpO2 98%   BMI 34.95 kg/m²     Physical Exam  Vitals reviewed.   Constitutional:       General: She is not in acute distress.     Appearance: She is well-developed.   Cardiovascular:      Rate and Rhythm: Normal rate and regular rhythm.      Heart sounds: Normal heart sounds. No murmur heard.  Pulmonary:      Effort: Pulmonary effort is normal. No respiratory distress.      Breath sounds: Normal breath sounds. No wheezing or rales.   Abdominal:      Palpations: Abdomen is soft.      Tenderness: There is no abdominal tenderness.   Musculoskeletal:      Thoracic back: Spasms and tenderness present.      Lumbar back: Spasms and tenderness present. Decreased range of motion.   Skin:     General: Skin is warm and dry.      Findings: No rash.   Neurological:      Mental Status: She is alert and oriented to person, place, and time.        Result Review :{ Labs  Result Review  Imaging  Med Tab  Media :23}   The following data was reviewed by: Brook Arreola MD on 03/28/2022    Common labs    Common Labsle 2/14/22 2/14/22 2/14/22 2/14/22 2/14/22    1106 1106 1107 1107 1107   Glucose    101 (A)    BUN    31 (A)    Creatinine    1.27 (A)    eGFR Non African Am    41 (A)    Sodium    142    Potassium    4.0    Chloride    103    Calcium    9.9    Albumin    4.90    WBC 6.23       Hemoglobin 12.8       Hematocrit 37.8       Platelets 145       Total Cholesterol   162     Triglycerides   422 (A)     HDL Cholesterol   39 (A)     LDL Cholesterol    59     Hemoglobin A1C  6.90 (A)      Uric Acid     9.2 (A)   (A) Abnormal value                    Assessment and Plan {CC Problem List  Visit Diagnosis  ROS  Review (Popup)  Health Maintenance  Quality  BestPractice  Medications  SmartSets  SnapShot Encounters  Media :23}   Diagnoses and all orders for " this visit:    1. Essential hypertension (Primary)  -     lisinopril (PRINIVIL,ZESTRIL) 20 MG tablet; Take 1 tablet by mouth Daily.  Dispense: 90 tablet; Refill: 3    2. Gout, unspecified cause, unspecified chronicity, unspecified site  -     allopurinol (ZYLOPRIM) 100 MG tablet; Take 2 tablets by mouth Daily for 90 days. Take one-half tablet by mouth daily  Dispense: 60 tablet; Refill: 2    3. Type 2 diabetes mellitus with diabetic polyneuropathy, without long-term current use of insulin (HCC)    4. Chronic obstructive pulmonary disease, unspecified COPD type ([facility])  -     Miscellaneous DME    5. MARISOL (obstructive sleep apnea)  -     Miscellaneous DME  -     Miscellaneous DME    6. Gastroesophageal reflux disease, unspecified whether esophagitis present  -     Miscellaneous DME      Hypertension not well controlled  Increase lisinopril to 20 mg daily  Continue other antihypertensive medications at current dose  Uric acid still elevated, increase allopurinol to 200 mg daily  Type 2 diabetes has historically been well controlled  Continue diabetic diet and Metformin  Prescription for wedge pillow sent to home medical supply, help with GERD and COPD symptoms  Patient needs new humidifier for CPAP machine, prescription sent      Follow Up {Instructions Charge Capture  Follow-up Communications :23}   Return in about 6 weeks (around 5/9/2022) for Medicare Wellness, Recheck.  Patient was given instructions and counseling regarding her condition or for health maintenance advice. Please see specific information pulled into the AVS if appropriate.        This document has been electronically signed by Brook Arreola MD

## 2022-03-29 RX ORDER — AMLODIPINE BESYLATE 10 MG/1
TABLET ORAL
Qty: 90 TABLET | Refills: 1 | Status: SHIPPED | OUTPATIENT
Start: 2022-03-29 | End: 2022-10-20

## 2022-03-29 RX ORDER — FUROSEMIDE 40 MG/1
TABLET ORAL
Qty: 45 TABLET | Refills: 3 | Status: SHIPPED | OUTPATIENT
Start: 2022-03-29 | End: 2023-03-10

## 2022-03-29 RX ORDER — ROPINIROLE 0.5 MG/1
TABLET, FILM COATED ORAL
Qty: 90 TABLET | Refills: 1 | Status: SHIPPED | OUTPATIENT
Start: 2022-03-29 | End: 2022-08-03 | Stop reason: SDUPTHER

## 2022-03-29 RX ORDER — ISOSORBIDE MONONITRATE 30 MG/1
TABLET, EXTENDED RELEASE ORAL
Qty: 30 TABLET | Refills: 1 | Status: SHIPPED | OUTPATIENT
Start: 2022-03-29

## 2022-03-29 RX ORDER — LISINOPRIL 10 MG/1
TABLET ORAL
Qty: 90 TABLET | Refills: 1 | OUTPATIENT
Start: 2022-03-29

## 2022-03-29 RX ORDER — TIZANIDINE 4 MG/1
TABLET ORAL
Qty: 270 TABLET | Refills: 1 | Status: SHIPPED | OUTPATIENT
Start: 2022-03-29 | End: 2022-05-09

## 2022-05-09 ENCOUNTER — OFFICE VISIT (OUTPATIENT)
Dept: FAMILY MEDICINE CLINIC | Facility: CLINIC | Age: 77
End: 2022-05-09

## 2022-05-09 VITALS
BODY MASS INDEX: 35.32 KG/M2 | OXYGEN SATURATION: 97 % | SYSTOLIC BLOOD PRESSURE: 134 MMHG | HEIGHT: 65 IN | HEART RATE: 80 BPM | WEIGHT: 212 LBS | DIASTOLIC BLOOD PRESSURE: 63 MMHG

## 2022-05-09 DIAGNOSIS — I10 ESSENTIAL HYPERTENSION: ICD-10-CM

## 2022-05-09 DIAGNOSIS — M51.16 LUMBAR DISC DISEASE WITH RADICULOPATHY: ICD-10-CM

## 2022-05-09 DIAGNOSIS — Z00.00 MEDICARE ANNUAL WELLNESS VISIT, SUBSEQUENT: Primary | ICD-10-CM

## 2022-05-09 DIAGNOSIS — E11.42 TYPE 2 DIABETES MELLITUS WITH DIABETIC POLYNEUROPATHY, WITHOUT LONG-TERM CURRENT USE OF INSULIN: ICD-10-CM

## 2022-05-09 DIAGNOSIS — J44.9 CHRONIC OBSTRUCTIVE PULMONARY DISEASE, UNSPECIFIED COPD TYPE: ICD-10-CM

## 2022-05-09 PROCEDURE — 1170F FXNL STATUS ASSESSED: CPT | Performed by: FAMILY MEDICINE

## 2022-05-09 PROCEDURE — G0439 PPPS, SUBSEQ VISIT: HCPCS | Performed by: FAMILY MEDICINE

## 2022-05-09 PROCEDURE — 96160 PT-FOCUSED HLTH RISK ASSMT: CPT | Performed by: FAMILY MEDICINE

## 2022-05-09 PROCEDURE — 1126F AMNT PAIN NOTED NONE PRSNT: CPT | Performed by: FAMILY MEDICINE

## 2022-05-09 RX ORDER — LISINOPRIL 10 MG/1
TABLET ORAL
Qty: 90 TABLET | Refills: 1 | OUTPATIENT
Start: 2022-05-09

## 2022-05-09 NOTE — PROGRESS NOTES
The ABCs of the Annual Wellness Visit  Subsequent Medicare Wellness Visit    Chief Complaint   Patient presents with   • Medicare Wellness-subsequent     Subjective    History of Present Illness:  Ronda Blair is a 76 y.o. female who presents for a Subsequent Medicare Wellness Visit.    The following portions of the patient's history were reviewed and   updated as appropriate: allergies, current medications, past family history, past medical history, past social history, past surgical history and problem list.    Compared to one year ago, the patient feels her physical   health is worse.  Having more back and hip pain.  Following with pain management.  Injections not helpful, considering spinal stimulator.    Compared to one year ago, the patient feels her mental   health is worse.  Gets more depression when she is in pain.    Recent Hospitalizations:  This patient has had a [facility] admission record on file within the last 365 days.  Admitted for COPD exacerbation 07/2021.    Current Medical Providers:  Patient Care Team:  Brook Arreola MD as PCP - General (Family Medicine)  Giselle Lugo MA as Medical Assistant (Family Medicine)  Lucina Fitzgerald APRN as Psychologist (Nurse Practitioner)  Genaro Cota MD as Consulting Physician (Cardiology)  Alejandra Santos DO as Consulting Physician (Internal Medicine)  Cesar Hamm APRN as Nurse Practitioner (Nurse Practitioner)    Outpatient Medications Prior to Visit   Medication Sig Dispense Refill   • albuterol sulfate  (90 Base) MCG/ACT inhaler Inhale 2 puffs Every 6 (Six) Hours As Needed for Wheezing or Shortness of Air. 18 g 9   • allopurinol (ZYLOPRIM) 100 MG tablet Take 2 tablets by mouth Daily for 90 days. Take one-half tablet by mouth daily 60 tablet 2   • amLODIPine (NORVASC) 10 MG tablet TAKE 1 TABLET BY MOUTH EVERY DAY 90 tablet 1   • aspirin-dipyridamole (AGGRENOX)  MG per 12 hr capsule TAKE 1 CAPSULE BY MOUTH TWICE A   capsule 1   • Bystolic 5 MG tablet Take 1 tablet by mouth Daily. 90 tablet 1   • cholecalciferol (VITAMIN D3) 25 MCG (1000 UT) tablet Take 1,000 Units by mouth Daily.     • docusate sodium (COLACE) 100 MG capsule Take 100 mg by mouth 2 (Two) Times a Day.     • folic acid (FOLVITE) 1 MG tablet TAKE 1 TABLET BY MOUTH EVERY DAY 90 tablet 2   • furosemide (LASIX) 40 MG tablet TAKE 1/2 TABLET BY MOUTH DAILY AS NEEDED (LOWER EXTREMITY EDEMA). 45 tablet 3   • gabapentin (NEURONTIN) 100 MG capsule      • ipratropium-albuterol (DUO-NEB) 0.5-2.5 mg/3 ml nebulizer Take 3 mL by nebulization 4 (Four) Times a Day. 360 mL 1   • isosorbide mononitrate (IMDUR) 30 MG 24 hr tablet TAKE 1 TABLET BY MOUTH EVERY DAY 30 tablet 1   • lisinopril (PRINIVIL,ZESTRIL) 20 MG tablet Take 1 tablet by mouth Daily. 90 tablet 3   • Loratadine 10 MG capsule Take 1 capsule by mouth Daily. 30 each 5   • metFORMIN ER (GLUCOPHAGE-XR) 500 MG 24 hr tablet TAKE 2 TABLETS BY MOUTH TWICE A  tablet 1   • Omega-3 Fatty Acids (FISH OIL) 1200 MG capsule capsule Take 1,200 mg by mouth 3 (Three) Times a Day With Meals.     • oxyCODONE-acetaminophen (PERCOCET) 7.5-325 MG per tablet Take 1 tablet by mouth Every 6 (Six) Hours As Needed for Moderate Pain . Take 1 tablet by mouth every 6-8 hours as needed     • rOPINIRole (REQUIP) 0.5 MG tablet TAKE 1 TABLET BY MOUTH EVERY NIGHT 1 HOUR BEFORE BEDTIME. TAKE IN ADDITION TO 1 MG TABLETS. 90 tablet 1   • rOPINIRole (REQUIP) 1 MG tablet Take 2 tablets by mouth Every Night. Take 1 hour before bedtime. 180 tablet 1   • rosuvastatin (CRESTOR) 10 MG tablet TAKE 1 TABLET BY MOUTH EVERY DAY 90 tablet 11   • tiotropium bromide-olodaterol (Stiolto Respimat) 2.5-2.5 MCG/ACT aerosol solution inhaler Inhale 2 puffs Daily. 1 each 11   • tiZANidine (ZANAFLEX) 4 MG tablet TAKE 1 TABLET BY MOUTH EVERY 8 HOURS AS NEEDED FOR MUSCLE SPASMS (AT LUNCH AND BEDTIME AS NEEDED) 270 tablet 1   • Triamcinolone Acetonide (NASACORT) 55  MCG/ACT nasal inhaler INSTILL 1 SPRAY INTO THE NOSTRIL(S) AS DIRECTED BY PROVIDER EVERY OTHER DAY. *NOT COVERED* 16.5 g 2   • venlafaxine (EFFEXOR) 37.5 MG tablet Take 75 mg by mouth Every Morning.       No facility-administered medications prior to visit.       Opioid medication/s are on active medication list.  and I have evaluated her active treatment plan and pain score trends (see table).  Vitals:    05/09/22 0958   PainSc: 0-No pain     I have reviewed the chart for potential of high risk medication and harmful drug interactions in the elderly.            Aspirin is not on active medication list.  Aspirin use is not indicated based on review of current medical condition/s. Risk of harm outweighs potential benefits.      Patient Active Problem List   Diagnosis   • Degeneration of intervertebral disc of mid-cervical region   • Morbid obesity due to excess calories ([facility])   • Former smoker   • Chest pain   • Diabetes mellitus (HCC)   • Chronic obstructive pulmonary disease (HCC)   • Dyslipidemia   • Adrenal nodule ([facility])   • Fecal occult blood test positive   • Benign neoplasm of colon   • Chronic nonalcoholic liver disease   • Diverticular disease   • Dysphagia   • Essential hypertension   • Internal hemorrhoids   • Knee pain, bilateral   • Lumbar disc disease with radiculopathy   • Neuralgia, geniculate   • Stage 2 chronic kidney disease   • Gait disturbance   • Nocturnal hypoxia   • Chronic heart failure with preserved ejection fraction ([facility])   • Normocytic anemia   • Acute gout involving toe of left foot   • MARISOL (obstructive sleep apnea)   • MARISOL and COPD overlap syndrome ([facility])   • Precordial pain   • Obesity (BMI 30-39.9)   • CKD (chronic kidney disease) stage 3, GFR 30-59 ml/min ([facility])   • Acute kidney injury (HCC)   • Elevation of level of transaminase and lactic acid dehydrogenase (LDH)   • Gout due to renal impairment, unspecified site   • Chronic obstructive pulmonary disease (HCC)   • Type 2 diabetes  "mellitus (HCC)   • Essential hypertension   • Stage 3a chronic kidney disease (HCC)   • Stage 3b chronic kidney disease (HCC)     Advance Care Planning  Advance Directive is not on file.  ACP discussion was held with the patient during this visit. Patient does not have an advance directive, information provided.          Objective    Vitals:    22 0958   BP: 134/63   Pulse: 80   SpO2: 97%   Weight: 96.2 kg (212 lb)   Height: 165.1 cm (65\")   PainSc: 0-No pain     BMI Readings from Last 1 Encounters:   22 35.28 kg/m²   BMI is above normal parameters. Recommendations include: exercise counseling and nutrition counseling    Does the patient have evidence of cognitive impairment? No    Physical Exam  Vitals reviewed.   Constitutional:       General: She is not in acute distress.     Appearance: She is well-developed.   Cardiovascular:      Rate and Rhythm: Normal rate and regular rhythm.      Heart sounds: Normal heart sounds. No murmur heard.  Pulmonary:      Effort: Pulmonary effort is normal. No respiratory distress.      Breath sounds: Normal breath sounds. No wheezing or rales.   Abdominal:      Palpations: Abdomen is soft.      Tenderness: There is no abdominal tenderness.   Musculoskeletal:      Right lower le+ Pitting Edema present.      Left lower le+ Pitting Edema present.   Skin:     General: Skin is warm and dry.   Neurological:      Mental Status: She is alert and oriented to person, place, and time.       Lab Results   Component Value Date    TRIG 422 (H) 2022    HDL 39 (L) 2022    LDL 59 2022    VLDL 64 (H) 2022    HGBA1C 6.90 (H) 2022            HEALTH RISK ASSESSMENT    Smoking Status:  Social History     Tobacco Use   Smoking Status Former Smoker   • Packs/day: 1.00   • Years: 40.00   • Pack years: 40.00   • Quit date:    • Years since quittin.3   Smokeless Tobacco Never Used     Alcohol Consumption:  Social History     Substance and Sexual " Activity   Alcohol Use No     Fall Risk Screen:    ELSA Fall Risk Assessment was completed, and patient is at LOW risk for falls.Assessment completed on:5/9/2022    Depression Screening:  PHQ-2/PHQ-9 Depression Screening 5/9/2022   Retired Total Score -   Little Interest or Pleasure in Doing Things 2-->more than half the days   Feeling Down, Depressed or Hopeless 3-->nearly every day   Trouble Falling or Staying Asleep, or Sleeping Too Much 3-->nearly every day   Feeling Tired or Having Little Energy 3-->nearly every day   Poor Appetite or Overeating 3-->nearly every day   Feeling Bad about Yourself - or that You are a Failure or Have Let Yourself or Your Family Down 3-->nearly every day   Trouble Concentrating on Things, Such as Reading the Newspaper or Watching Television 3-->nearly every day   Moving or Speaking So Slowly that Other People Could Have Noticed? Or the Opposite - Being So Fidgety 3-->nearly every day   Thoughts that You Would be Better Off Dead or of Hurting Yourself in Some Way 0-->not at all   PHQ-9: Brief Depression Severity Measure Score 23   If You Checked Off Any Problems, How Difficult Have These Problems Made It For You to Do Your Work, Take Care of Things at Home, or Get Along with Other People? somewhat difficult       Health Habits and Functional and Cognitive Screening:  Functional & Cognitive Status 5/9/2022   Do you have difficulty preparing food and eating? No   Do you have difficulty bathing yourself, getting dressed or grooming yourself? No   Do you have difficulty using the toilet? No   Do you have difficulty moving around from place to place? Yes   Do you have trouble with steps or getting out of a bed or a chair? Yes   Current Diet Unhealthy Diet   Dental Exam Not up to date   Eye Exam Not up to date   Exercise (times per week) 2 times per week   Current Exercises Include Walking   Do you need help using the phone?  No   Are you deaf or do you have serious difficulty hearing?   No   Do you need help with transportation? No   Do you need help shopping? No   Do you need help preparing meals?  No   Do you need help with housework?  No   Do you need help with laundry? No   Do you need help taking your medications? No   Do you need help managing money? No   Do you ever drive or ride in a car without wearing a seat belt? No   Have you felt unusual stress, anger or loneliness in the last month? Yes   Who do you live with? Other   If you need help, do you have trouble finding someone available to you? No   Have you been bothered in the last four weeks by sexual problems? No   Do you have difficulty concentrating, remembering or making decisions? No       Age-appropriate Screening Schedule:  Refer to the list below for future screening recommendations based on patient's age, sex and/or medical conditions. Orders for these recommended tests are listed in the plan section. The patient has been provided with a written plan.    Health Maintenance   Topic Date Due   • DXA SCAN  Never done   • ZOSTER VACCINE (1 of 2) Never done   • DIABETIC EYE EXAM  10/14/2020   • INFLUENZA VACCINE  08/01/2022   • HEMOGLOBIN A1C  08/14/2022   • MAMMOGRAM  01/29/2023   • LIPID PANEL  02/14/2023   • URINE MICROALBUMIN  02/14/2023   • TDAP/TD VACCINES (4 - Td or Tdap) 10/21/2029              Assessment/Plan   CMS Preventative Services Quick Reference  Risk Factors Identified During Encounter  Cardiovascular Disease  Inactivity/Sedentary  Obesity/Overweight   The above risks/problems have been discussed with the patient.  Follow up actions/plans if indicated are seen below in the Assessment/Plan Section.  Pertinent information has been shared with the patient in the After Visit Summary.    Diagnoses and all orders for this visit:    1. Medicare annual wellness visit, subsequent (Primary)    2. Chronic obstructive pulmonary disease, unspecified COPD type (HCC)    3. Essential hypertension    4. Type 2 diabetes mellitus with  diabetic polyneuropathy, without long-term current use of insulin (HCC)    5. Lumbar disc disease with radiculopathy      Medicare Wellness Visit completed today  COPD symptoms fair control  Doing a lot of pursed lip breathing today, which patient reports is normal for her  Oxygen saturations are good and lung physical exam normal  Encouraged to make 6 month follow up with pulmonology   Hypertension and diabetes controlled - continue to monitor  Working with pain management on chronic low back pain  Encouraged elevation of feet and compression stocking for lower extremity edema      Follow Up:   Return in about 6 weeks (around 6/20/2022) for Recheck.     An After Visit Summary and PPPS were made available to the patient.                     This document has been electronically signed by Brook Arreola MD

## 2022-05-09 NOTE — PATIENT INSTRUCTIONS
Medicare Wellness  Personal Prevention Plan of Service     Date of Office Visit:    Encounter Provider:  Brook Arreola MD  Place of Service:  [facility] [place]  Patient Name: Ronda Blair  :  1945    As part of the Medicare Wellness portion of your visit today, we are providing you with this personalized preventive plan of services (PPPS). This plan is based upon recommendations of the [place] Preventive Services Task Force (USPSTF) and the Advisory Committee on Immunization Practices (ACIP).    This lists the preventive care services that should be considered, and provides dates of when you are due. Items listed as completed are up-to-date and do not require any further intervention.    Health Maintenance   Topic Date Due    DXA SCAN  Never done    COLORECTAL CANCER SCREENING  Never done    Hepatitis B (1 of 3 - Risk 3-dose series) Never done    ZOSTER VACCINE (1 of 2) Never done    Pneumococcal Vaccine 65+ (2 - PCV) 10/07/2020    DIABETIC EYE EXAM  10/14/2020    COVID-19 Vaccine (3 - Booster for Pfizer series) 07/15/2021    INFLUENZA VACCINE  2022    HEMOGLOBIN A1C  2022    MAMMOGRAM  2023    LIPID PANEL  2023    URINE MICROALBUMIN  2023    ANNUAL WELLNESS VISIT  2023    TDAP/TD VACCINES (4 - Td or Tdap) 10/21/2029    HEPATITIS C SCREENING  Completed       No orders of the defined types were placed in this encounter.      Return in about 6 weeks (around 2022) for Recheck.

## 2022-05-20 ENCOUNTER — TELEPHONE (OUTPATIENT)
Dept: FAMILY MEDICINE CLINIC | Facility: CLINIC | Age: 77
End: 2022-05-20

## 2022-05-20 DIAGNOSIS — R68.89 ABNORMALITY ON SCREENING TEST: Primary | ICD-10-CM

## 2022-05-20 DIAGNOSIS — I10 ESSENTIAL HYPERTENSION: ICD-10-CM

## 2022-05-20 DIAGNOSIS — I50.32 CHRONIC HEART FAILURE WITH PRESERVED EJECTION FRACTION: ICD-10-CM

## 2022-05-20 DIAGNOSIS — R09.89 OTHER SPECIFIED SYMPTOMS AND SIGNS INVOLVING THE CIRCULATORY AND RESPIRATORY SYSTEMS: ICD-10-CM

## 2022-05-20 DIAGNOSIS — E11.42 TYPE 2 DIABETES MELLITUS WITH DIABETIC POLYNEUROPATHY, WITHOUT LONG-TERM CURRENT USE OF INSULIN: ICD-10-CM

## 2022-05-20 NOTE — TELEPHONE ENCOUNTER
Received paperwork from at home screening for peripheral artery disease which showed abnormal findings.  Will order SELAM for additional evaluation.  Please let patient know she will be contacted for appointment.  ThanksJEFFERSON

## 2022-05-23 NOTE — TELEPHONE ENCOUNTER
Per Dr. Man, Ms. Blair has been called with referral information for additional testing for her recent abnormal peripheral Artery Screening

## 2022-06-10 ENCOUNTER — OFFICE VISIT (OUTPATIENT)
Dept: CARDIOLOGY | Facility: CLINIC | Age: 77
End: 2022-06-10

## 2022-06-10 VITALS
HEIGHT: 65 IN | HEART RATE: 73 BPM | OXYGEN SATURATION: 95 % | WEIGHT: 211.2 LBS | SYSTOLIC BLOOD PRESSURE: 130 MMHG | BODY MASS INDEX: 35.19 KG/M2 | DIASTOLIC BLOOD PRESSURE: 54 MMHG

## 2022-06-10 DIAGNOSIS — I73.9 CLAUDICATION: ICD-10-CM

## 2022-06-10 DIAGNOSIS — I50.30 HEART FAILURE WITH PRESERVED EJECTION FRACTION, UNSPECIFIED HF CHRONICITY: Primary | ICD-10-CM

## 2022-06-10 PROCEDURE — 93000 ELECTROCARDIOGRAM COMPLETE: CPT | Performed by: INTERNAL MEDICINE

## 2022-06-10 PROCEDURE — 99214 OFFICE O/P EST MOD 30 MIN: CPT | Performed by: INTERNAL MEDICINE

## 2022-06-10 RX ORDER — TIZANIDINE 4 MG/1
4 TABLET ORAL NIGHTLY PRN
COMMUNITY
End: 2022-10-17

## 2022-06-10 NOTE — PROGRESS NOTES
[facility] Cardiology  OFFICE NOTE    Cardiovascular Medicine  Genaro Cota M.D., RPVI         No referring provider defined for this encounter.    Thank you for asking me to see Ronda Blair for chest pain.    History of Present Illness  This is a 76 y.o. female with:    1.  Diabetes  2.  COPD  3.  Congestive heart failure preserved ejection fraction  4.  Coronary artery disease nonobstructive  5.  Hypertension  6.  Hyperlipidemia  7.  CKD  8.  Thrombocytopenia  9.  CVA    Ronda Blair is a 76 y.o. female who presents for consultation today.  Patient went to the hospital last week with COPD exacerbation.  She had a VQ scan at that time which was low probability for PE, ultrasound and was negative for DVT she had an echocardiogram in July 2020 which showed an EF of 56 to 60% % she had grade 2 diastolic dysfunction.  EKG was a sinus rhythm with sinus arrhythmia and PACs in a pattern of bigeminy.  Of note patient had a CTA coronary angiogram in July 2020 showed a calcium score of 345.  However she had mild nonobstructive disease in all her 3 vessels.  Patient is here for establishment of care.  Her shortness of breath is back to baseline.  Denying any chest pain or shortness of breath.  Was admitted again with COPD exacerbation    Complaining of claudications.  No chest pain.  Shortness of breath at baseline.      Review of Systems - ROS  Constitution: Negative for weakness, weight gain and weight loss.   HENT: Negative for congestion.    Eyes: Negative for blurred vision.   Cardiovascular: As mentioned above  Respiratory: Negative for cough and hemoptysis.    Endocrine: Negative for polydipsia and polyuria.   Hematologic/Lymphatic: Negative for bleeding problem. Does not bruise/bleed easily.   Skin: Negative for flushing.   Musculoskeletal: Negative for neck pain and stiffness.   Gastrointestinal: Negative for abdominal pain, diarrhea, jaundice, melena, nausea and vomiting.    Genitourinary: Negative for dysuria and hematuria.   Neurological: Negative for dizziness, focal weakness and numbness.   Psychiatric/Behavioral: Negative for altered mental status and depression.          All other systems were reviewed and were negative.    family history includes Cancer in her sister; Diabetes in an other family member; Heart attack (age of onset: 75) in her father; Hypertension in her sister and another family member; Kidney disease in an other family member; Lung cancer in her brother; Other in an other family member.     reports that she quit smoking about 23 years ago. She has a 40.00 pack-year smoking history. She has never used smokeless tobacco. She reports that she does not drink alcohol and does not use drugs.    Allergies   Allergen Reactions   • Aspirin GI Intolerance     Had bleeding ulcer in the past         Current Outpatient Medications:   •  albuterol sulfate  (90 Base) MCG/ACT inhaler, Inhale 2 puffs Every 6 (Six) Hours As Needed for Wheezing or Shortness of Air., Disp: 18 g, Rfl: 9  •  allopurinol (ZYLOPRIM) 100 MG tablet, Take 2 tablets by mouth Daily for 90 days. Take one-half tablet by mouth daily (Patient taking differently: Take 200 mg by mouth Daily.), Disp: 60 tablet, Rfl: 2  •  amLODIPine (NORVASC) 10 MG tablet, TAKE 1 TABLET BY MOUTH EVERY DAY, Disp: 90 tablet, Rfl: 1  •  aspirin-dipyridamole (AGGRENOX)  MG per 12 hr capsule, TAKE 1 CAPSULE BY MOUTH TWICE A DAY, Disp: 180 capsule, Rfl: 1  •  Bystolic 5 MG tablet, Take 1 tablet by mouth Daily., Disp: 90 tablet, Rfl: 1  •  cholecalciferol (VITAMIN D3) 25 MCG (1000 UT) tablet, Take 1,000 Units by mouth Daily., Disp: , Rfl:   •  docusate sodium (COLACE) 100 MG capsule, Take 100 mg by mouth 2 (Two) Times a Day., Disp: , Rfl:   •  folic acid (FOLVITE) 1 MG tablet, TAKE 1 TABLET BY MOUTH EVERY DAY, Disp: 90 tablet, Rfl: 2  •  furosemide (LASIX) 40 MG tablet, TAKE 1/2 TABLET BY MOUTH DAILY AS NEEDED (LOWER  EXTREMITY EDEMA)., Disp: 45 tablet, Rfl: 3  •  gabapentin (NEURONTIN) 100 MG capsule, , Disp: , Rfl:   •  ipratropium-albuterol (DUO-NEB) 0.5-2.5 mg/3 ml nebulizer, Take 3 mL by nebulization 4 (Four) Times a Day., Disp: 360 mL, Rfl: 1  •  isosorbide mononitrate (IMDUR) 30 MG 24 hr tablet, TAKE 1 TABLET BY MOUTH EVERY DAY, Disp: 30 tablet, Rfl: 1  •  lisinopril (PRINIVIL,ZESTRIL) 20 MG tablet, Take 1 tablet by mouth Daily., Disp: 90 tablet, Rfl: 3  •  Loratadine 10 MG capsule, Take 1 capsule by mouth Daily., Disp: 30 each, Rfl: 5  •  metFORMIN ER (GLUCOPHAGE-XR) 500 MG 24 hr tablet, TAKE 2 TABLETS BY MOUTH TWICE A DAY, Disp: 360 tablet, Rfl: 1  •  Omega-3 Fatty Acids (FISH OIL) 1200 MG capsule capsule, Take 1,200 mg by mouth 3 (Three) Times a Day With Meals., Disp: , Rfl:   •  oxyCODONE-acetaminophen (PERCOCET) 7.5-325 MG per tablet, Take 1 tablet by mouth Every 6 (Six) Hours As Needed for Moderate Pain . Take 1 tablet by mouth every 6-8 hours as needed, Disp: , Rfl:   •  rOPINIRole (REQUIP) 0.5 MG tablet, TAKE 1 TABLET BY MOUTH EVERY NIGHT 1 HOUR BEFORE BEDTIME. TAKE IN ADDITION TO 1 MG TABLETS., Disp: 90 tablet, Rfl: 1  •  rOPINIRole (REQUIP) 1 MG tablet, Take 2 tablets by mouth Every Night. Take 1 hour before bedtime., Disp: 180 tablet, Rfl: 1  •  rosuvastatin (CRESTOR) 10 MG tablet, TAKE 1 TABLET BY MOUTH EVERY DAY, Disp: 90 tablet, Rfl: 11  •  tiotropium bromide-olodaterol (Stiolto Respimat) 2.5-2.5 MCG/ACT aerosol solution inhaler, Inhale 2 puffs Daily., Disp: 1 each, Rfl: 11  •  Triamcinolone Acetonide (NASACORT) 55 MCG/ACT nasal inhaler, INSTILL 1 SPRAY INTO THE NOSTRIL(S) AS DIRECTED BY PROVIDER EVERY OTHER DAY. *NOT COVERED*, Disp: 16.5 g, Rfl: 2  •  venlafaxine (EFFEXOR) 37.5 MG tablet, Take 75 mg by mouth Every Morning., Disp: , Rfl:   •  tiZANidine (ZANAFLEX) 4 MG tablet, Take 4 mg by mouth At Night As Needed for Muscle Spasms., Disp: , Rfl:     Physical Exam:  Vitals:    06/10/22 1001   BP: 130/54   BP  "Location: Right arm   Patient Position: Sitting   Cuff Size: Large Adult   Pulse: 73   SpO2: 95%   Weight: 95.8 kg (211 lb 3.2 oz)   Height: 165.1 cm (65\")   PainSc: 0-No pain     Current Pain Level: none  Pulse Ox: Normal  on room air  General: alert, appears stated age and cooperative     Body Habitus: well-nourished    HEENT: Head: Normocephalic, no lesions, without obvious abnormality. No arcus senilis, xanthelasma or xanthomas.    Neuro: alert, oriented x3  Pulses: 2+ and symmetric  JVP: Volume/Pulsation: Normal.  Normal waveforms.   Appropriate inspiratory decrease.  No Kussmaul's. No [place]'s.   Carotid Exam: no bruit normal pulsation bilaterally   Carotid Volume: normal.     Respirations: no increased work of breathing   Chest:  Normal    Pulmonary:Normal   Precordium: Normal impulses. P2 is not palpable.  RV Heave: absent  LV Heave: absent  [place]:  normal size and placement  Palpable S4: absent.  Heart rate: normal    Heart Rhythm: regular     Heart Sounds: S1: normal  S2: normal  S3: absent   S4: absent  Opening Snap: absent    Pericardial Rub:  Absent: .    Abdomen:   Appearance: normal .  Palpation: Soft, non-tender to palpation, bowel sounds positive in all four quadrants; no guarding or rebound tenderness  Extremity: no edema.   LE Skin: no rashes  LE Hair:  normal  LE Pulses: well perfused with normal pulses in the distal extremities  Pallor on elevation: Absent. Rubor on dependency: None      DATA REVIEWED:     EKG. I personally reviewed and interpreted the EKG.  Sinus rhythm with PACs in a pattern of bigeminy    ECG/EMG Results (all)     None        ---------------------------------------------------  TTE/ELVIS:  Results for orders placed during the hospital encounter of 07/21/20    Transthoracic Echo Complete With Contrast if Necessary Per Protocol    Interpretation Summary  · Estimated EF appears to be in the range of 56 - 60%.  · Left ventricular systolic function is normal.  · Left ventricular " diastolic dysfunction (grade II) consistent with pseudonormalization.  · Left ventricular wall thickness is consistent with borderline concentric hypertrophy.  · Left atrial cavity size is moderately dilated.  · Mild mitral valve regurgitation is present  · Mild tricuspid valve regurgitation is present.      CT:   No radiology results for the last 30 days.      --------------------------------------------------------------------------------------------------  LABS:     The CVD Risk score (Jese et al., 2008) failed to calculate for the following reasons:    The 2008 CVD risk score is only valid for ages 30 to 74    The patient has a prior MI, stroke, CHF, or peripheral vascular disease diagnosis         Lab Results   Component Value Date    GLUCOSE 101 (H) 02/14/2022    BUN 31 (H) 02/14/2022    CREATININE 1.27 (H) 02/14/2022    EGFRIFNONA 41 (L) 02/14/2022    BCR 24.4 02/14/2022    K 4.0 02/14/2022    CO2 26.0 02/14/2022    CALCIUM 9.9 02/14/2022    ALBUMIN 4.90 02/14/2022    AST 24 07/09/2021    ALT 20 07/09/2021     Lab Results   Component Value Date    WBC 6.23 02/14/2022    HGB 12.8 02/14/2022    HCT 37.8 02/14/2022    MCV 91.7 02/14/2022     02/14/2022     Lab Results   Component Value Date    CHOL 162 02/14/2022    TRIG 422 (H) 02/14/2022    HDL 39 (L) 02/14/2022    LDL 59 02/14/2022     Lab Results   Component Value Date    TSH 0.539 08/24/2020     Lab Results   Component Value Date    CKTOTAL 137 07/09/2021    TROPONINT <0.010 07/09/2021     Lab Results   Component Value Date    HGBA1C 6.90 (H) 02/14/2022     No results found for: DDIMER  Lab Results   Component Value Date    ALT 20 07/09/2021     Lab Results   Component Value Date    HGBA1C 6.90 (H) 02/14/2022    HGBA1C 6.04 (H) 04/30/2021    HGBA1C 5.90 (H) 11/05/2020     Lab Results   Component Value Date    CREATININE 1.27 (H) 02/14/2022     Lab Results   Component Value Date    IRON 115 07/29/2021    TIBC 375 07/29/2021    FERRITIN 29.30  11/05/2020     Lab Results   Component Value Date    INR 0.95 07/21/2020    PROTIME 13.1 07/21/2020       Assessment/Plan     1.  Coronary artery disease:  CTA showed mild nonobstructive disease in all 3 vessels.  Risk factor modification for secondary prevention. Currently asymptomatic. If recurrent chest pain, consider cardiac cath to assess for progression of disease  She is on Aggrenox, not sure indication for that.  Patient reported she had CVA long time ago.  Cardiology standpoint aspirin should be okay    2.  Congestive heart failure with preserved ejection fraction:  Optimal blood pressure control.      3.  Hypertension:  Currently on amlodipine 10 mg, furosemide 20 mg twice a day, Imdur 30 mg, lisinopril 20 mg, Bystolic 5 mg twice daily  Switched her Lasix to hydrochlorothiazide but she is not taking it.  She can use her Lasix as needed  Has CKD, follows with nephrology.    4.  Hyperlipidemia:  Continue Crestor 10 mg    5.  COPD:  PFTs showed moderate restriction.  Admitted again with COPD exacerbation in July.  [facility] to [facility]    Complaining claudications.  We will plan performing screening ABIs.    Prevention:  Patient's Body mass index is 35.15 kg/m². BMI is above normal parameters. Recommendations include: exercise counseling and nutrition counseling.      Ronda Blair  reports that she quit smoking about 23 years ago. She has a 40.00 pack-year smoking history. She has never used smokeless tobacco..             This document has been electronically signed by Genaro Cota MD on Valorie 10, 2022 10:19 CDT

## 2022-06-14 LAB
QT INTERVAL: 378 MS
QTC INTERVAL: 416 MS

## 2022-06-28 ENCOUNTER — OFFICE VISIT (OUTPATIENT)
Dept: FAMILY MEDICINE CLINIC | Facility: CLINIC | Age: 77
End: 2022-06-28

## 2022-06-28 ENCOUNTER — TELEPHONE (OUTPATIENT)
Dept: FAMILY MEDICINE CLINIC | Facility: CLINIC | Age: 77
End: 2022-06-28

## 2022-06-28 VITALS
BODY MASS INDEX: 34.82 KG/M2 | SYSTOLIC BLOOD PRESSURE: 145 MMHG | DIASTOLIC BLOOD PRESSURE: 68 MMHG | WEIGHT: 209 LBS | OXYGEN SATURATION: 94 % | HEIGHT: 65 IN | HEART RATE: 77 BPM

## 2022-06-28 DIAGNOSIS — R06.2 WHEEZING: ICD-10-CM

## 2022-06-28 DIAGNOSIS — J44.9 CHRONIC OBSTRUCTIVE PULMONARY DISEASE, UNSPECIFIED COPD TYPE: Primary | ICD-10-CM

## 2022-06-28 PROCEDURE — 99213 OFFICE O/P EST LOW 20 MIN: CPT | Performed by: FAMILY MEDICINE

## 2022-06-28 PROCEDURE — 87426 SARSCOV CORONAVIRUS AG IA: CPT | Performed by: FAMILY MEDICINE

## 2022-06-28 RX ORDER — OXYCODONE AND ACETAMINOPHEN 10; 325 MG/1; MG/1
1 TABLET ORAL 3 TIMES DAILY
COMMUNITY
Start: 2022-06-21

## 2022-06-28 RX ORDER — GUAIFENESIN 600 MG/1
600 TABLET, EXTENDED RELEASE ORAL 2 TIMES DAILY
Qty: 20 TABLET | Refills: 0 | Status: SHIPPED | OUTPATIENT
Start: 2022-06-28 | End: 2022-07-08

## 2022-06-28 RX ORDER — HYDROCHLOROTHIAZIDE 25 MG/1
25 TABLET ORAL DAILY
Status: ON HOLD | COMMUNITY
Start: 2022-06-09 | End: 2023-01-04

## 2022-06-28 RX ORDER — DOXYCYCLINE HYCLATE 100 MG/1
100 CAPSULE ORAL 2 TIMES DAILY
Qty: 20 CAPSULE | Refills: 0 | Status: SHIPPED | OUTPATIENT
Start: 2022-06-28 | End: 2022-06-28

## 2022-06-28 RX ORDER — PREDNISONE 20 MG/1
40 TABLET ORAL DAILY
Qty: 10 TABLET | Refills: 0 | Status: SHIPPED | OUTPATIENT
Start: 2022-06-28 | End: 2022-06-28

## 2022-06-28 RX ORDER — PREDNISONE 20 MG/1
40 TABLET ORAL DAILY
Qty: 10 TABLET | Refills: 0 | Status: SHIPPED | OUTPATIENT
Start: 2022-06-28 | End: 2022-07-03

## 2022-06-28 RX ORDER — GUAIFENESIN 600 MG/1
600 TABLET, EXTENDED RELEASE ORAL 2 TIMES DAILY
Qty: 20 TABLET | Refills: 0 | Status: SHIPPED | OUTPATIENT
Start: 2022-06-28 | End: 2022-06-28

## 2022-06-28 RX ORDER — DOXYCYCLINE HYCLATE 100 MG/1
100 CAPSULE ORAL 2 TIMES DAILY
Qty: 20 CAPSULE | Refills: 0 | Status: SHIPPED | OUTPATIENT
Start: 2022-06-28 | End: 2022-07-08

## 2022-06-28 NOTE — TELEPHONE ENCOUNTER
Patient called stating she is at [facility] and her medication script is there but the script for the nebulizer machine is not Please advise [phone]

## 2022-06-29 ENCOUNTER — TELEPHONE (OUTPATIENT)
Dept: FAMILY MEDICINE CLINIC | Facility: CLINIC | Age: 77
End: 2022-06-29

## 2022-06-29 NOTE — TELEPHONE ENCOUNTER
Per Dr. Arreola, Ms Blair has been called with recent CXR results & recommendations.  Continue current medications and follow-up as planned or sooner if any problems.       ----- Message from Brook Arreola MD sent at 6/29/2022  7:07 AM CDT -----  No pneumonia.  Continue with current plan for treatment.  Thanks, JEFFERSON Arreola

## 2022-06-30 LAB
EXPIRATION DATE: NORMAL
INTERNAL CONTROL: NORMAL
Lab: NORMAL
SARS-COV-2 AG UPPER RESP QL IA.RAPID: NOT DETECTED

## 2022-07-02 DIAGNOSIS — M10.9 GOUT, UNSPECIFIED CAUSE, UNSPECIFIED CHRONICITY, UNSPECIFIED SITE: ICD-10-CM

## 2022-07-05 DIAGNOSIS — M10.9 GOUT, UNSPECIFIED CAUSE, UNSPECIFIED CHRONICITY, UNSPECIFIED SITE: ICD-10-CM

## 2022-07-05 RX ORDER — ALLOPURINOL 100 MG/1
TABLET ORAL
Qty: 180 TABLET | Refills: 1 | Status: SHIPPED | OUTPATIENT
Start: 2022-07-05

## 2022-07-05 RX ORDER — ALLOPURINOL 100 MG/1
200 TABLET ORAL DAILY
Qty: 180 TABLET | Refills: 1 | Status: SHIPPED | OUTPATIENT
Start: 2022-07-05 | End: 2022-07-05 | Stop reason: SDUPTHER

## 2022-07-12 ENCOUNTER — TELEPHONE (OUTPATIENT)
Dept: CARDIOLOGY | Facility: CLINIC | Age: 77
End: 2022-07-12

## 2022-07-12 ENCOUNTER — OFFICE VISIT (OUTPATIENT)
Dept: PULMONOLOGY | Facility: CLINIC | Age: 77
End: 2022-07-12

## 2022-07-12 VITALS
HEIGHT: 65 IN | BODY MASS INDEX: 34.95 KG/M2 | WEIGHT: 209.8 LBS | OXYGEN SATURATION: 96 % | SYSTOLIC BLOOD PRESSURE: 138 MMHG | HEART RATE: 70 BPM | DIASTOLIC BLOOD PRESSURE: 54 MMHG

## 2022-07-12 DIAGNOSIS — J44.9 CHRONIC OBSTRUCTIVE PULMONARY DISEASE, UNSPECIFIED COPD TYPE: Primary | ICD-10-CM

## 2022-07-12 PROCEDURE — 99214 OFFICE O/P EST MOD 30 MIN: CPT | Performed by: NURSE PRACTITIONER

## 2022-07-12 RX ORDER — ALBUTEROL SULFATE 90 UG/1
2 AEROSOL, METERED RESPIRATORY (INHALATION) EVERY 6 HOURS PRN
Qty: 6.7 G | Refills: 11 | Status: SHIPPED | OUTPATIENT
Start: 2022-07-12

## 2022-07-12 NOTE — TELEPHONE ENCOUNTER
Reading Physician Reading Date Result Priority   Genaro Cota MD  [phone] 7/12/2022 Routine     Result Text  · Right Conclusion: The right SELAM is normal.  · Left Conclusion: The left SELAM is normal.  · VPRs obtained  · WAVEFORMS TRIPHASIC     Patient contacted with selam results per dr. Cota.

## 2022-07-12 NOTE — PROGRESS NOTES
Pulmonary Office Visit      Thank you for asking me to see Ronda Blair for   Chief Complaint   Patient presents with   • COPD       History of Present Illness  Ronda Blair is a 76 y.o. female who has moderate COPD with FEV1 of 63% post drug.    7/12/22: 6 month follow up. Needs new script for her nocturnal O2. She is on 2.5L and has been on it for several years.   She has a CPAP also and follows here for that.   She is still using her Stiloto. She has not had much albuterol use. She has duonebs at home at home also.   She is using Mucinex, nasal spray, allergy pill.     She still gets out and grocery shops, and does her own chores. She is taking care of her ex  currently and her duties have doubled. She does not have trouble preforming these tasks.    She does have HFpEF and it is well controlled.      11/19/21  Patient here for follow up. At last visit we tried switching from Symbicort to stiolto. She feel slike she did well with this, no increased symptoms    Original OV 10/13/21  Patient referred from cardiology for COPD  Patient reports that she has been seen in [place] by pulmonary in the past for this, but not in a few years  She reports having albuterol hfa for emergency use andSymbicort which she only remembers to use once a day at most. She has exertional dyspnea which is stable.      Tobacco use history:  Type: cigarettes  Amount: 1.5 ppd  Duration: 30 years  Cessation: quit 20 years ago   Willing to quit: N/A      Review of Systems: History obtained from chart review and the patient.  Review of Systems   Constitutional: Negative for diaphoresis, fatigue, fever and unexpected weight change.   Respiratory: Positive for cough and shortness of breath. Negative for chest tightness and wheezing.    Cardiovascular: Negative for chest pain, palpitations and leg swelling.   Gastrointestinal: Negative for abdominal distention and blood in stool.   Genitourinary: Negative for  hematuria.   Musculoskeletal: Negative for arthralgias and myalgias.   Skin: Negative for pallor and rash.   Neurological: Negative for dizziness, syncope and weakness.   Hematological: Does not bruise/bleed easily.   Psychiatric/Behavioral: Negative for confusion. The patient is not nervous/anxious.      As described in the HPI. Otherwise, remainder of ROS (14 systems) were negative.    Patient Active Problem List   Diagnosis   • Degeneration of intervertebral disc of mid-cervical region   • Morbid obesity due to excess calories (HCC)   • Former smoker   • Chest pain   • Diabetes mellitus (HCC)   • Chronic obstructive pulmonary disease (HCC)   • Dyslipidemia   • Adrenal nodule (HCC)   • Fecal occult blood test positive   • Benign neoplasm of colon   • Chronic nonalcoholic liver disease   • Diverticular disease   • Dysphagia   • Essential hypertension   • Internal hemorrhoids   • Knee pain, bilateral   • Lumbar disc disease with radiculopathy   • Neuralgia, geniculate   • Stage 2 chronic kidney disease   • Gait disturbance   • Nocturnal hypoxia   • Chronic heart failure with preserved ejection fraction (HCC)   • Normocytic anemia   • Acute gout involving toe of left foot   • MARISOL (obstructive sleep apnea)   • MARISOL and COPD overlap syndrome (HCC)   • Precordial pain   • Obesity (BMI 30-39.9)   • CKD (chronic kidney disease) stage 3, GFR 30-59 ml/min (HCC)   • Acute kidney injury (HCC)   • Elevation of level of transaminase and lactic acid dehydrogenase (LDH)   • Gout due to renal impairment, unspecified site   • Chronic obstructive pulmonary disease (HCC)   • Type 2 diabetes mellitus (HCC)   • Essential hypertension   • Stage 3a chronic kidney disease (HCC)   • Stage 3b chronic kidney disease (HCC)         Current Outpatient Medications:   •  albuterol sulfate  (90 Base) MCG/ACT inhaler, Inhale 2 puffs Every 6 (Six) Hours As Needed for Wheezing or Shortness of Air., Disp: 18 g, Rfl: 9  •  allopurinol (ZYLOPRIM)  100 MG tablet, Take half tablet by mouth daily, Disp: 180 tablet, Rfl: 1  •  amLODIPine (NORVASC) 10 MG tablet, TAKE 1 TABLET BY MOUTH EVERY DAY, Disp: 90 tablet, Rfl: 1  •  aspirin-dipyridamole (AGGRENOX)  MG per 12 hr capsule, TAKE 1 CAPSULE BY MOUTH TWICE A DAY, Disp: 180 capsule, Rfl: 1  •  Bystolic 5 MG tablet, Take 1 tablet by mouth Daily., Disp: 90 tablet, Rfl: 1  •  cholecalciferol (VITAMIN D3) 25 MCG (1000 UT) tablet, Take 1,000 Units by mouth Daily., Disp: , Rfl:   •  docusate sodium (COLACE) 100 MG capsule, Take 100 mg by mouth 2 (Two) Times a Day., Disp: , Rfl:   •  folic acid (FOLVITE) 1 MG tablet, TAKE 1 TABLET BY MOUTH EVERY DAY, Disp: 90 tablet, Rfl: 2  •  furosemide (LASIX) 40 MG tablet, TAKE 1/2 TABLET BY MOUTH DAILY AS NEEDED (LOWER EXTREMITY EDEMA)., Disp: 45 tablet, Rfl: 3  •  gabapentin (NEURONTIN) 100 MG capsule, , Disp: , Rfl:   •  hydroCHLOROthiazide (HYDRODIURIL) 25 MG tablet, Take 25 mg by mouth Daily., Disp: , Rfl:   •  ipratropium-albuterol (DUO-NEB) 0.5-2.5 mg/3 ml nebulizer, Take 3 mL by nebulization 4 (Four) Times a Day., Disp: 360 mL, Rfl: 1  •  isosorbide mononitrate (IMDUR) 30 MG 24 hr tablet, TAKE 1 TABLET BY MOUTH EVERY DAY, Disp: 30 tablet, Rfl: 1  •  lisinopril (PRINIVIL,ZESTRIL) 20 MG tablet, Take 1 tablet by mouth Daily., Disp: 90 tablet, Rfl: 3  •  Loratadine 10 MG capsule, Take 1 capsule by mouth Daily., Disp: 30 each, Rfl: 5  •  metFORMIN ER (GLUCOPHAGE-XR) 500 MG 24 hr tablet, TAKE 2 TABLETS BY MOUTH TWICE A DAY, Disp: 360 tablet, Rfl: 1  •  Omega-3 Fatty Acids (FISH OIL) 1200 MG capsule capsule, Take 1,200 mg by mouth 3 (Three) Times a Day With Meals., Disp: , Rfl:   •  oxyCODONE-acetaminophen (PERCOCET)  MG per tablet, , Disp: , Rfl:   •  rOPINIRole (REQUIP) 0.5 MG tablet, TAKE 1 TABLET BY MOUTH EVERY NIGHT 1 HOUR BEFORE BEDTIME. TAKE IN ADDITION TO 1 MG TABLETS., Disp: 90 tablet, Rfl: 1  •  rOPINIRole (REQUIP) 1 MG tablet, Take 2 tablets by mouth Every  Night. Take 1 hour before bedtime., Disp: 180 tablet, Rfl: 1  •  rosuvastatin (CRESTOR) 10 MG tablet, TAKE 1 TABLET BY MOUTH EVERY DAY, Disp: 90 tablet, Rfl: 11  •  tiotropium bromide-olodaterol (Stiolto Respimat) 2.5-2.5 MCG/ACT aerosol solution inhaler, Inhale 2 puffs Daily., Disp: 1 each, Rfl: 11  •  tiZANidine (ZANAFLEX) 4 MG tablet, Take 4 mg by mouth At Night As Needed for Muscle Spasms., Disp: , Rfl:   •  Triamcinolone Acetonide (NASACORT) 55 MCG/ACT nasal inhaler, INSTILL 1 SPRAY INTO THE NOSTRIL(S) AS DIRECTED BY PROVIDER EVERY OTHER DAY. *NOT COVERED*, Disp: 16.5 g, Rfl: 2  •  venlafaxine (EFFEXOR) 37.5 MG tablet, Take 75 mg by mouth Every Morning., Disp: , Rfl:     Allergies   Allergen Reactions   • Aspirin GI Intolerance     Had bleeding ulcer in the past       Past Medical History:   Diagnosis Date   • Anxiety    • Arthritis    • COPD (chronic obstructive pulmonary disease) (HCC)    • Depression    • Diabetes mellitus (HCC)    • History of transfusion    • Hyperlipidemia    • Hypertension    • Stroke (HCC)     Greater than 5 years ago     Past Surgical History:   Procedure Laterality Date   • CERVICAL FUSION     • EYE SURGERY Bilateral     lasix eye surgery   • HEMORRHOIDECTOMY     • HYSTERECTOMY     • INTRATHECAL PUMP REMOVAL         • KNEE ARTHROSCOPY Right 2015   • LUMBAR SPINE SURGERY     • PAIN PUMP INSERTION/REVISION      Removed in      Social History     Socioeconomic History   • Marital status:    Tobacco Use   • Smoking status: Former Smoker     Packs/day: 1.00     Years: 40.00     Pack years: 40.00     Quit date:      Years since quittin.5   • Smokeless tobacco: Never Used   Vaping Use   • Vaping Use: Never used   Substance and Sexual Activity   • Alcohol use: No   • Drug use: No   • Sexual activity: Defer     Family History   Problem Relation Age of Onset   • Diabetes Other    • Hypertension Other    • Other Other    • Kidney disease Other    • Heart  "attack Father 75   • Cancer Sister    • Lung cancer Brother    • Hypertension Sister           Objective     Blood pressure 138/54, pulse 70, height 165.1 cm (65\"), weight 95.2 kg (209 lb 12.8 oz), SpO2 96 %, not currently breastfeeding.  Physical Exam  Vitals reviewed.   Constitutional:       Appearance: Normal appearance. She is obese.   HENT:      Head: Normocephalic and atraumatic.      Nose: Nose normal.      Mouth/Throat:      Mouth: Mucous membranes are moist.      Pharynx: Oropharynx is clear.   Eyes:      Conjunctiva/sclera: Conjunctivae normal.      Pupils: Pupils are equal, round, and reactive to light.   Cardiovascular:      Rate and Rhythm: Normal rate and regular rhythm.      Pulses: Normal pulses.      Heart sounds: Normal heart sounds.   Pulmonary:      Effort: Pulmonary effort is normal.      Breath sounds: Normal breath sounds.   Abdominal:      General: Abdomen is flat. Bowel sounds are normal.      Palpations: Abdomen is soft.   Musculoskeletal:         General: Normal range of motion.      Cervical back: Normal range of motion.   Skin:     General: Skin is warm and dry.   Neurological:      General: No focal deficit present.      Mental Status: She is alert and oriented to person, place, and time.   Psychiatric:         Mood and Affect: Mood normal.         Behavior: Behavior normal. Behavior is cooperative.         PFTs:  (independently reviewed and interpreted by me)  9/10/2020  FVC 1.84L, 66%  FEV1 1.26L, 59%  Ratio 68%  TLC 3.02L, 57%  DLCO 59%      Radiology (independently reviewed and interpreted by me):  7/21/20 CTA chest- no concerning nodules or masses, no PE       Assessment & Plan        Diagnosis Plan   1. Chronic obstructive pulmonary disease, unspecified COPD type (HCC)  - Continue Stiolto inhaler  - Albuterol HFA and duonebs PRN    Flu/Pneumonia/Covid vaccine recommended annually.          Discussion/ Recommendations:   Patient with moderate obstruction, did well on Stiolto, " controlling symptoms. No signs of exacerbation. Can continue on this. Will follow up in 6 months or sooner if needed      Follow up 6 months for COPD      I spent 30 minutes caring for Ronda on this date of service. This time includes time spent by me in the following activities: preparing for the visit, reviewing tests, obtaining and/or reviewing a separately obtained history, performing a medically appropriate examination and/or evaluation, counseling and educating the patient/family/caregiver, ordering medications, tests, or procedures, referring and communicating with other health care professionals, documenting information in the medical record, independently interpreting results and communicating that information with the patient/family/caregiver and care coordination          This document has been electronically signed by CIARA Johnston on July 12, 2022 15:36 CDT

## 2022-07-26 ENCOUNTER — OFFICE VISIT (OUTPATIENT)
Dept: FAMILY MEDICINE CLINIC | Facility: CLINIC | Age: 77
End: 2022-07-26

## 2022-07-26 ENCOUNTER — TELEPHONE (OUTPATIENT)
Dept: FAMILY MEDICINE CLINIC | Facility: CLINIC | Age: 77
End: 2022-07-26

## 2022-07-26 ENCOUNTER — LAB (OUTPATIENT)
Dept: LAB | Facility: HOSPITAL | Age: 77
End: 2022-07-26

## 2022-07-26 VITALS
HEIGHT: 65 IN | WEIGHT: 211.1 LBS | HEART RATE: 65 BPM | OXYGEN SATURATION: 98 % | RESPIRATION RATE: 18 BRPM | DIASTOLIC BLOOD PRESSURE: 84 MMHG | SYSTOLIC BLOOD PRESSURE: 130 MMHG | BODY MASS INDEX: 35.17 KG/M2

## 2022-07-26 DIAGNOSIS — B37.2 SKIN CANDIDIASIS: ICD-10-CM

## 2022-07-26 DIAGNOSIS — E11.42 TYPE 2 DIABETES MELLITUS WITH DIABETIC POLYNEUROPATHY, WITHOUT LONG-TERM CURRENT USE OF INSULIN: ICD-10-CM

## 2022-07-26 DIAGNOSIS — J44.9 CHRONIC OBSTRUCTIVE PULMONARY DISEASE, UNSPECIFIED COPD TYPE: ICD-10-CM

## 2022-07-26 DIAGNOSIS — E11.42 TYPE 2 DIABETES MELLITUS WITH DIABETIC POLYNEUROPATHY, WITHOUT LONG-TERM CURRENT USE OF INSULIN: Primary | ICD-10-CM

## 2022-07-26 DIAGNOSIS — I10 ESSENTIAL HYPERTENSION: ICD-10-CM

## 2022-07-26 DIAGNOSIS — I50.32 CHRONIC HEART FAILURE WITH PRESERVED EJECTION FRACTION: ICD-10-CM

## 2022-07-26 PROCEDURE — 36415 COLL VENOUS BLD VENIPUNCTURE: CPT

## 2022-07-26 PROCEDURE — 99214 OFFICE O/P EST MOD 30 MIN: CPT | Performed by: FAMILY MEDICINE

## 2022-07-26 PROCEDURE — 83036 HEMOGLOBIN GLYCOSYLATED A1C: CPT

## 2022-07-26 PROCEDURE — 80053 COMPREHEN METABOLIC PANEL: CPT

## 2022-07-26 PROCEDURE — 85025 COMPLETE CBC W/AUTO DIFF WBC: CPT

## 2022-07-26 RX ORDER — NYSTATIN 100000 [USP'U]/G
POWDER TOPICAL 3 TIMES DAILY
Qty: 120 G | Refills: 2 | Status: SHIPPED | OUTPATIENT
Start: 2022-07-26

## 2022-07-27 LAB
ALBUMIN SERPL-MCNC: 4.4 G/DL (ref 3.5–5.2)
ALBUMIN/GLOB SERPL: 1.8 G/DL
ALP SERPL-CCNC: 96 U/L (ref 39–117)
ALT SERPL W P-5'-P-CCNC: 24 U/L (ref 1–33)
ANION GAP SERPL CALCULATED.3IONS-SCNC: 10.4 MMOL/L (ref 5–15)
AST SERPL-CCNC: 24 U/L (ref 1–32)
BASOPHILS # BLD AUTO: 0.04 10*3/MM3 (ref 0–0.2)
BASOPHILS NFR BLD AUTO: 0.6 % (ref 0–1.5)
BILIRUB SERPL-MCNC: 0.3 MG/DL (ref 0–1.2)
BUN SERPL-MCNC: 24 MG/DL (ref 8–23)
BUN/CREAT SERPL: 20.7 (ref 7–25)
CALCIUM SPEC-SCNC: 9.3 MG/DL (ref 8.6–10.5)
CHLORIDE SERPL-SCNC: 102 MMOL/L (ref 98–107)
CO2 SERPL-SCNC: 25.6 MMOL/L (ref 22–29)
CREAT SERPL-MCNC: 1.16 MG/DL (ref 0.57–1)
DEPRECATED RDW RBC AUTO: 48.5 FL (ref 37–54)
EGFRCR SERPLBLD CKD-EPI 2021: 48.7 ML/MIN/1.73
EOSINOPHIL # BLD AUTO: 0.22 10*3/MM3 (ref 0–0.4)
EOSINOPHIL NFR BLD AUTO: 3.5 % (ref 0.3–6.2)
ERYTHROCYTE [DISTWIDTH] IN BLOOD BY AUTOMATED COUNT: 14.1 % (ref 12.3–15.4)
GLOBULIN UR ELPH-MCNC: 2.4 GM/DL
GLUCOSE SERPL-MCNC: 163 MG/DL (ref 65–99)
HBA1C MFR BLD: 6.9 % (ref 4.8–5.6)
HCT VFR BLD AUTO: 33.7 % (ref 34–46.6)
HGB BLD-MCNC: 11.3 G/DL (ref 12–15.9)
IMM GRANULOCYTES # BLD AUTO: 0.03 10*3/MM3 (ref 0–0.05)
IMM GRANULOCYTES NFR BLD AUTO: 0.5 % (ref 0–0.5)
LYMPHOCYTES # BLD AUTO: 1.87 10*3/MM3 (ref 0.7–3.1)
LYMPHOCYTES NFR BLD AUTO: 29.4 % (ref 19.6–45.3)
MCH RBC QN AUTO: 32.3 PG (ref 26.6–33)
MCHC RBC AUTO-ENTMCNC: 33.5 G/DL (ref 31.5–35.7)
MCV RBC AUTO: 96.3 FL (ref 79–97)
MONOCYTES # BLD AUTO: 0.56 10*3/MM3 (ref 0.1–0.9)
MONOCYTES NFR BLD AUTO: 8.8 % (ref 5–12)
NEUTROPHILS NFR BLD AUTO: 3.63 10*3/MM3 (ref 1.7–7)
NEUTROPHILS NFR BLD AUTO: 57.2 % (ref 42.7–76)
NRBC BLD AUTO-RTO: 0 /100 WBC (ref 0–0.2)
PLATELET # BLD AUTO: 171 10*3/MM3 (ref 140–450)
PMV BLD AUTO: 11.9 FL (ref 6–12)
POTASSIUM SERPL-SCNC: 4.3 MMOL/L (ref 3.5–5.2)
PROT SERPL-MCNC: 6.8 G/DL (ref 6–8.5)
RBC # BLD AUTO: 3.5 10*6/MM3 (ref 3.77–5.28)
SODIUM SERPL-SCNC: 138 MMOL/L (ref 136–145)
WBC NRBC COR # BLD: 6.35 10*3/MM3 (ref 3.4–10.8)

## 2022-08-03 DIAGNOSIS — I50.32 CHRONIC HEART FAILURE WITH PRESERVED EJECTION FRACTION: ICD-10-CM

## 2022-08-03 RX ORDER — ASPIRIN AND DIPYRIDAMOLE 25; 200 MG/1; MG/1
CAPSULE, EXTENDED RELEASE ORAL
Qty: 180 CAPSULE | Refills: 1 | Status: SHIPPED | OUTPATIENT
Start: 2022-08-03 | End: 2023-02-21

## 2022-08-03 RX ORDER — ROPINIROLE 1 MG/1
TABLET, FILM COATED ORAL
Qty: 180 TABLET | Refills: 1 | Status: SHIPPED | OUTPATIENT
Start: 2022-08-03 | End: 2023-01-13 | Stop reason: SDUPTHER

## 2022-08-03 NOTE — TELEPHONE ENCOUNTER
Please get more information for sleep.  Trouble falling or staying asleep?  What has she tried?  Does she stop caffeine after lunch?  Is she having pain or anxiety keeping her awake?  Star Arreola

## 2022-08-04 ENCOUNTER — TELEPHONE (OUTPATIENT)
Dept: FAMILY MEDICINE CLINIC | Facility: CLINIC | Age: 77
End: 2022-08-04

## 2022-08-04 NOTE — TELEPHONE ENCOUNTER
She is having trouble staying a sleep, she said she sleeps about 2 hours and wake up and then can not go back to sleep.  She goes to bed around 2 AM, sleeps until around 4 AM  then she gets up and eats and starts her day.    She said she usually takes about a 2 hour nap around 3 PM.  She said not every day but some days.     Today she went to bed at 4 AM and slept till 6 AM.    She said its been going on for about 7 months.  She said she was taking care of her x- at his home but now she is back at her place.  She said she decided it was best that she return back to her home.   She states he was getting irritable and she decided it would be best if she left.     She states she has tried melatonin gummies, OTC sleep 3.    She states no caffeine after lunch.    She states no pain, anxiety.    She states she either watched her iPad or TV until around 2 AM.  She states she gets up and does a few things.     She listens to the radio before bed, she states she sleeps in a dark place.       Applied

## 2022-08-04 NOTE — TELEPHONE ENCOUNTER
Per Dr. Arreola, Ms. Blair has been called with recent lab results & recommendations.  Continue current medications and follow-up as planned or sooner if any problems.       ----- Message from Brook Arreola MD sent at 8/3/2022  1:07 PM CDT -----  Kidney function is good.  Keep working on hydration.  Diabetes is less than goal of 7% at 6.9%.  Mild anemia.  ThanksJEFFERSON

## 2022-08-05 DIAGNOSIS — J44.9 CHRONIC OBSTRUCTIVE PULMONARY DISEASE, UNSPECIFIED COPD TYPE: Primary | ICD-10-CM

## 2022-08-10 NOTE — TELEPHONE ENCOUNTER
I recommend that she cut out naps during the day, turn TV off at 10-11 PM and do activities that do not involve screen after that.  Can discuss medication options at next visit if still needed after these changes.  Thanks, JEFFERSON Arreola

## 2022-08-12 ENCOUNTER — TELEPHONE (OUTPATIENT)
Dept: FAMILY MEDICINE CLINIC | Facility: CLINIC | Age: 77
End: 2022-08-12

## 2022-08-12 DIAGNOSIS — M10.9 GOUT, UNSPECIFIED CAUSE, UNSPECIFIED CHRONICITY, UNSPECIFIED SITE: Primary | ICD-10-CM

## 2022-08-12 RX ORDER — COLCHICINE 0.6 MG/1
TABLET ORAL
Qty: 15 TABLET | Refills: 0 | Status: SHIPPED | OUTPATIENT
Start: 2022-08-12

## 2022-08-12 NOTE — TELEPHONE ENCOUNTER
Please let her know that I have sent colchicine, take 2 tabs today and then 1 tab daily until flare resolves.  If pain not improving with this treatment, please make sure she is seen in urgent care over the weekend.  She should hold cholesterol medication for a few days while taking the gout flare up medication.  ThanksJEFFERSON

## 2022-08-12 NOTE — TELEPHONE ENCOUNTER
Ms. Blair left message after 5PM on 08/10/2022 and 08/12/2022 that she is having a gout flare up and is requesting to be seen ASAP.    States she is in a lot of pain.

## 2022-08-12 NOTE — TELEPHONE ENCOUNTER
Per Dr. Arreola, Ms. Blair has been called with new medication information and recommendations.     Follow-up as planned.

## 2022-08-14 DIAGNOSIS — Z91.09 ENVIRONMENTAL ALLERGIES: ICD-10-CM

## 2022-08-15 RX ORDER — LORATADINE 10 MG/1
TABLET ORAL
Qty: 90 TABLET | Refills: 1 | Status: SHIPPED | OUTPATIENT
Start: 2022-08-15

## 2022-08-22 ENCOUNTER — TRANSCRIBE ORDERS (OUTPATIENT)
Dept: ADMINISTRATIVE | Facility: HOSPITAL | Age: 77
End: 2022-08-22

## 2022-08-22 ENCOUNTER — LAB (OUTPATIENT)
Dept: LAB | Facility: HOSPITAL | Age: 77
End: 2022-08-22

## 2022-08-22 DIAGNOSIS — N18.32 CHRONIC KIDNEY DISEASE (CKD) STAGE G3B/A3, MODERATELY DECREASED GLOMERULAR FILTRATION RATE (GFR) BETWEEN 30-44 ML/MIN/1.73 SQUARE METER AND ALBUMINURIA CREATININE RATIO GREATER THAN 300 MG/G (C*: Primary | ICD-10-CM

## 2022-08-22 DIAGNOSIS — N18.32 CHRONIC KIDNEY DISEASE (CKD) STAGE G3B/A3, MODERATELY DECREASED GLOMERULAR FILTRATION RATE (GFR) BETWEEN 30-44 ML/MIN/1.73 SQUARE METER AND ALBUMINURIA CREATININE RATIO GREATER THAN 300 MG/G (C*: ICD-10-CM

## 2022-08-22 LAB
ALBUMIN SERPL-MCNC: 4.3 G/DL (ref 3.5–5.2)
ANION GAP SERPL CALCULATED.3IONS-SCNC: 13 MMOL/L (ref 5–15)
BASOPHILS # BLD AUTO: 0.03 10*3/MM3 (ref 0–0.2)
BASOPHILS NFR BLD AUTO: 0.4 % (ref 0–1.5)
BUN SERPL-MCNC: 30 MG/DL (ref 8–23)
BUN/CREAT SERPL: 23.1 (ref 7–25)
CALCIUM SPEC-SCNC: 9.7 MG/DL (ref 8.6–10.5)
CHLORIDE SERPL-SCNC: 103 MMOL/L (ref 98–107)
CO2 SERPL-SCNC: 24 MMOL/L (ref 22–29)
CREAT SERPL-MCNC: 1.3 MG/DL (ref 0.57–1)
CREAT UR-MCNC: 34.8 MG/DL
DEPRECATED RDW RBC AUTO: 44.6 FL (ref 37–54)
EGFRCR SERPLBLD CKD-EPI 2021: 42.2 ML/MIN/1.73
EOSINOPHIL # BLD AUTO: 0.24 10*3/MM3 (ref 0–0.4)
EOSINOPHIL NFR BLD AUTO: 3.4 % (ref 0.3–6.2)
ERYTHROCYTE [DISTWIDTH] IN BLOOD BY AUTOMATED COUNT: 13.1 % (ref 12.3–15.4)
GLUCOSE SERPL-MCNC: 114 MG/DL (ref 65–99)
HCT VFR BLD AUTO: 34.7 % (ref 34–46.6)
HGB BLD-MCNC: 11.6 G/DL (ref 12–15.9)
IMM GRANULOCYTES # BLD AUTO: 0.05 10*3/MM3 (ref 0–0.05)
IMM GRANULOCYTES NFR BLD AUTO: 0.7 % (ref 0–0.5)
LYMPHOCYTES # BLD AUTO: 2.2 10*3/MM3 (ref 0.7–3.1)
LYMPHOCYTES NFR BLD AUTO: 31.5 % (ref 19.6–45.3)
MAGNESIUM SERPL-MCNC: 2 MG/DL (ref 1.6–2.4)
MCH RBC QN AUTO: 31.5 PG (ref 26.6–33)
MCHC RBC AUTO-ENTMCNC: 33.4 G/DL (ref 31.5–35.7)
MCV RBC AUTO: 94.3 FL (ref 79–97)
MONOCYTES # BLD AUTO: 0.67 10*3/MM3 (ref 0.1–0.9)
MONOCYTES NFR BLD AUTO: 9.6 % (ref 5–12)
NEUTROPHILS NFR BLD AUTO: 3.8 10*3/MM3 (ref 1.7–7)
NEUTROPHILS NFR BLD AUTO: 54.4 % (ref 42.7–76)
NRBC BLD AUTO-RTO: 0 /100 WBC (ref 0–0.2)
PHOSPHATE SERPL-MCNC: 2.9 MG/DL (ref 2.5–4.5)
PLATELET # BLD AUTO: 213 10*3/MM3 (ref 140–450)
PMV BLD AUTO: 11.2 FL (ref 6–12)
POTASSIUM SERPL-SCNC: 4.2 MMOL/L (ref 3.5–5.2)
PROT ?TM UR-MCNC: 9.9 MG/DL
PROT/CREAT UR: 284.5 MG/G CREA (ref 0–200)
RBC # BLD AUTO: 3.68 10*6/MM3 (ref 3.77–5.28)
SODIUM SERPL-SCNC: 140 MMOL/L (ref 136–145)
URATE SERPL-MCNC: 9 MG/DL (ref 2.4–5.7)
WBC NRBC COR # BLD: 6.99 10*3/MM3 (ref 3.4–10.8)

## 2022-08-22 PROCEDURE — 85025 COMPLETE CBC W/AUTO DIFF WBC: CPT

## 2022-08-22 PROCEDURE — 84156 ASSAY OF PROTEIN URINE: CPT

## 2022-08-22 PROCEDURE — 84550 ASSAY OF BLOOD/URIC ACID: CPT

## 2022-08-22 PROCEDURE — 83735 ASSAY OF MAGNESIUM: CPT

## 2022-08-22 PROCEDURE — 80069 RENAL FUNCTION PANEL: CPT

## 2022-08-22 PROCEDURE — 82570 ASSAY OF URINE CREATININE: CPT

## 2022-08-22 PROCEDURE — 36415 COLL VENOUS BLD VENIPUNCTURE: CPT

## 2022-09-27 NOTE — TELEPHONE ENCOUNTER
Per Dr. Arreola, Ms. Blair has been called with recent lab results and recommendations.   Continue current medications as planned or sooner if any problems.    Dr. Arreola  She is taking Metformin  mg 2 tablets in the AM & 2 tablets in the PM    Do you want her to cut to 1 tablet BID or 2 tablets in the AM with breakfast?    Please Advise   Less than monthly

## 2022-10-11 ENCOUNTER — OFFICE VISIT (OUTPATIENT)
Dept: SLEEP MEDICINE | Facility: HOSPITAL | Age: 77
End: 2022-10-11

## 2022-10-11 ENCOUNTER — TRANSCRIBE ORDERS (OUTPATIENT)
Dept: PHYSICAL THERAPY | Facility: HOSPITAL | Age: 77
End: 2022-10-11

## 2022-10-11 VITALS
HEART RATE: 75 BPM | WEIGHT: 214 LBS | HEIGHT: 65 IN | SYSTOLIC BLOOD PRESSURE: 135 MMHG | BODY MASS INDEX: 35.65 KG/M2 | OXYGEN SATURATION: 93 % | DIASTOLIC BLOOD PRESSURE: 66 MMHG

## 2022-10-11 DIAGNOSIS — G47.33 OSA AND COPD OVERLAP SYNDROME: ICD-10-CM

## 2022-10-11 DIAGNOSIS — G47.34 NOCTURNAL HYPOXIA: ICD-10-CM

## 2022-10-11 DIAGNOSIS — M51.37 OTHER INTERVERTEBRAL DISC DEGENERATION, LUMBOSACRAL REGION: ICD-10-CM

## 2022-10-11 DIAGNOSIS — M51.35 OTHER INTERVERTEBRAL DISC DEGENERATION, THORACOLUMBAR REGION: ICD-10-CM

## 2022-10-11 DIAGNOSIS — M51.34 OTHER INTERVERTEBRAL DISC DEGENERATION, THORACIC REGION: Primary | ICD-10-CM

## 2022-10-11 DIAGNOSIS — M51.36 OTHER INTERVERTEBRAL DISC DEGENERATION, LUMBAR REGION: ICD-10-CM

## 2022-10-11 DIAGNOSIS — F51.04 PSYCHOPHYSIOLOGICAL INSOMNIA: ICD-10-CM

## 2022-10-11 DIAGNOSIS — J44.9 OSA AND COPD OVERLAP SYNDROME: ICD-10-CM

## 2022-10-11 DIAGNOSIS — G47.33 OBSTRUCTIVE SLEEP APNEA: Primary | ICD-10-CM

## 2022-10-11 PROCEDURE — 99213 OFFICE O/P EST LOW 20 MIN: CPT | Performed by: NURSE PRACTITIONER

## 2022-10-11 NOTE — PROGRESS NOTES
"Sleep Clinic Follow Up    Date: 10/11/2022  Primary Care Provider: Brook Arreola MD    Last office visit: 11/15/2021 (I reviewed this note)    CC: Follow up: MARISOL on BiPAP, new machine follow up      Interim History:  Since the last visit:    1) moderate MARISOL -  Ronda Blair has not remained compliant with BiPAP. She denies mask and machine issues, dry mouth, headaches, or pressures intolerance. She denies abnormal dreams, sleep paralysis, nasal congestion, URI sx. She reports decreased hours of usage due to not being able to sleep for many hours at a time at night. She naps occasionally on the couch but does not put her BiPAP on.  I have advised patient that her insurance company may not cover her BiPAP machine beyond her initial 90 day \"trial\" period due to lack of compliance for >4 hours most nights. She is going to try using her machine for at least 4 hours per night for the remaining 13 days in her 90-day period and we will download her card again at that point to determine best 30 day compliance.      Sleep Testin. PSG in ~, AHI of ?  2. PSG in ~, AHI of ? ([place])  3. Split night PSG on 2021, AHI of 24  4. PAP titration on same day, recommended BiPAP 16/8 cm H2O   5. Currently on 16/8 cm H2O with 2L O2    PAP Data:    Time frame: 2022-10/11/2022   Compliance: 74%  Average use on days used: 3 hrs 49 min  Percent of days with usage greater than or equal to 4 hours: 32%  PAP range: 16/8 cm H2O  Average 90% pressure: 16/8 cmH2O  Leak: 0.4 L/minute  Average AHI: 7.4 events/hr  Mask type: Full face mask  DME: [facility]  Machine type: ResMed AirCurve 10    Bed time: 0200  Sleep latency: 5-30 minutes  Number of times awakens during the night: 1  Wake time: 0700  Estimated total sleep time at night: 3-4 hours  Caffeine intake: 0-1 cups of coffee, 0 cups of tea, and 1 sodas per day  Alcohol intake: 0 drinks per week  Nap time: daily   Sleepiness with " Driving: none     [place] - 3  [place] Sleepiness Scale  Sitting and reading: Would never doze  Watching TV: Would never doze  Sitting, inactive in a public place (e.g. a theatre or a meeting): Would never doze  As a passenger in a car for an hour without a break: Would never doze  Lying down to rest in the afternoon when circumstances permit: High chance of dozing  Sitting and talking to someone: Would never doze  Sitting quietly after a lunch without alcohol: Would never doze  In a car, while stopped for a few minutes in traffic: Would never doze  Total score: 3    PMHx, FH, SH reviewed and pertinent changes are: Reportedly unchanged from last office visit with us.      Review of Systems:   Negative for chest pain, SOA, fever, chills, cough, N/V/D, abdominal pain.    Smoking:none    PatriziaVerna Blair  reports that she quit smoking about 23 years ago. Her smoking use included cigarettes. She has a 40.00 pack-year smoking history. She has never used smokeless tobacco.      Physical Exam:  Vitals:    10/11/22 1046   BP: 135/66   Pulse: 75   SpO2: 93%           10/11/22  1046   Weight: 97.1 kg (214 lb)     Body mass index is 35.61 kg/m². Class 2 Severe Obesity (BMI >=35 and <=39.9). Obesity-related health conditions include the following: obstructive sleep apnea, hypertension, diabetes mellitus and dyslipidemias. Obesity is unchanged. BMI is is above average; BMI management plan is completed. I recommend portion control and increasing exercise.    Gen:                No distress, conversant, pleasant, appears stated age, alert, oriented  Eyes:               Anicteric sclera, moist conjunctiva, no lid lag                           PERRL, EOMI   Heent:             NC/AT                          Oropharynx clear                          Normal hearing  Lungs:             Normal effort, non-labored breathing  CV:                  Normal S1/S2                          No lower extremity edema  ABD:                Soft, rounded, non-distended              Psych:             Appropriate affect  Neuro:             CN 2-12 appear intact    Past Medical History:   Diagnosis Date   • Anxiety    • Arthritis    • COPD (chronic obstructive pulmonary disease) (HCC)    • Depression    • Diabetes mellitus (HCC)    • History of transfusion    • Hyperlipidemia    • Hypertension    • Stroke (HCC)     Greater than 5 years ago       Current Outpatient Medications:   •  albuterol sulfate  (90 Base) MCG/ACT inhaler, Inhale 2 puffs Every 6 (Six) Hours As Needed for Wheezing or Shortness of Air., Disp: 6.7 g, Rfl: 11  •  allopurinol (ZYLOPRIM) 100 MG tablet, Take half tablet by mouth daily, Disp: 180 tablet, Rfl: 1  •  amLODIPine (NORVASC) 10 MG tablet, TAKE 1 TABLET BY MOUTH EVERY DAY, Disp: 90 tablet, Rfl: 1  •  aspirin-dipyridamole (AGGRENOX)  MG per 12 hr capsule, TAKE 1 CAPSULE BY MOUTH TWICE A DAY, Disp: 180 capsule, Rfl: 1  •  Bystolic 5 MG tablet, Take 1 tablet by mouth Daily., Disp: 90 tablet, Rfl: 1  •  cholecalciferol (VITAMIN D3) 25 MCG (1000 UT) tablet, Take 1,000 Units by mouth Daily., Disp: , Rfl:   •  colchicine 0.6 MG tablet, Take 2 tabs (1.2 mg) today, then 1 tab (0.6 mg) daily for up to 2 weeks after gout flare resolves., Disp: 15 tablet, Rfl: 0  •  docusate sodium (COLACE) 100 MG capsule, Take 100 mg by mouth 2 (Two) Times a Day., Disp: , Rfl:   •  folic acid (FOLVITE) 1 MG tablet, TAKE 1 TABLET BY MOUTH EVERY DAY, Disp: 90 tablet, Rfl: 2  •  furosemide (LASIX) 40 MG tablet, TAKE 1/2 TABLET BY MOUTH DAILY AS NEEDED (LOWER EXTREMITY EDEMA)., Disp: 45 tablet, Rfl: 3  •  gabapentin (NEURONTIN) 100 MG capsule, , Disp: , Rfl:   •  hydroCHLOROthiazide (HYDRODIURIL) 25 MG tablet, Take 25 mg by mouth Daily., Disp: , Rfl:   •  ipratropium-albuterol (DUO-NEB) 0.5-2.5 mg/3 ml nebulizer, Take 3 mL by nebulization 4 (Four) Times a Day., Disp: 360 mL, Rfl: 1  •  isosorbide mononitrate (IMDUR) 30 MG 24 hr tablet, TAKE 1 TABLET  BY MOUTH EVERY DAY, Disp: 30 tablet, Rfl: 1  •  lisinopril (PRINIVIL,ZESTRIL) 20 MG tablet, Take 1 tablet by mouth Daily., Disp: 90 tablet, Rfl: 3  •  loratadine (CLARITIN) 10 MG tablet, TAKE 1 TABLET BY MOUTH EVERY DAY, Disp: 90 tablet, Rfl: 1  •  metFORMIN ER (GLUCOPHAGE-XR) 500 MG 24 hr tablet, TAKE 2 TABLETS BY MOUTH TWICE A DAY, Disp: 360 tablet, Rfl: 1  •  nystatin (MYCOSTATIN) 331077 UNIT/GM powder, Apply  topically to the appropriate area as directed 3 (Three) Times a Day., Disp: 120 g, Rfl: 2  •  Omega-3 Fatty Acids (FISH OIL) 1200 MG capsule capsule, Take 1,200 mg by mouth 3 (Three) Times a Day With Meals., Disp: , Rfl:   •  oxyCODONE-acetaminophen (PERCOCET)  MG per tablet, , Disp: , Rfl:   •  rOPINIRole (REQUIP) 1 MG tablet, TAKE 2 TABLETS BY MOUTH EVERY NIGHT 1 HOUR BEFORE BEDTIME., Disp: 180 tablet, Rfl: 1  •  rosuvastatin (CRESTOR) 10 MG tablet, TAKE 1 TABLET BY MOUTH EVERY DAY, Disp: 90 tablet, Rfl: 11  •  tiotropium bromide-olodaterol (Stiolto Respimat) 2.5-2.5 MCG/ACT aerosol solution inhaler, Inhale 2 puffs Daily., Disp: 1 each, Rfl: 11  •  tiZANidine (ZANAFLEX) 4 MG tablet, Take 4 mg by mouth At Night As Needed for Muscle Spasms., Disp: , Rfl:   •  Triamcinolone Acetonide (NASACORT) 55 MCG/ACT nasal inhaler, INSTILL 1 SPRAY INTO THE NOSTRIL(S) AS DIRECTED BY PROVIDER EVERY OTHER DAY. *NOT COVERED*, Disp: 16.5 g, Rfl: 2  •  venlafaxine (EFFEXOR) 37.5 MG tablet, Take 75 mg by mouth Every Morning., Disp: , Rfl:     WBC   Date Value Ref Range Status   08/22/2022 6.99 3.40 - 10.80 10*3/mm3 Final     RBC   Date Value Ref Range Status   08/22/2022 3.68 (L) 3.77 - 5.28 10*6/mm3 Final     Hemoglobin   Date Value Ref Range Status   08/22/2022 11.6 (L) 12.0 - 15.9 g/dL Final     Hematocrit   Date Value Ref Range Status   08/22/2022 34.7 34.0 - 46.6 % Final     MCV   Date Value Ref Range Status   08/22/2022 94.3 79.0 - 97.0 fL Final     MCH   Date Value Ref Range Status   08/22/2022 31.5 26.6 - 33.0 pg  Final     MCHC   Date Value Ref Range Status   08/22/2022 33.4 31.5 - 35.7 g/dL Final     RDW   Date Value Ref Range Status   08/22/2022 13.1 12.3 - 15.4 % Final     RDW-SD   Date Value Ref Range Status   08/22/2022 44.6 37.0 - 54.0 fl Final     MPV   Date Value Ref Range Status   08/22/2022 11.2 6.0 - 12.0 fL Final     Platelets   Date Value Ref Range Status   08/22/2022 213 140 - 450 10*3/mm3 Final     Neutrophil %   Date Value Ref Range Status   08/22/2022 54.4 42.7 - 76.0 % Final     Lymphocyte %   Date Value Ref Range Status   08/22/2022 31.5 19.6 - 45.3 % Final     Monocyte %   Date Value Ref Range Status   08/22/2022 9.6 5.0 - 12.0 % Final     Eosinophil %   Date Value Ref Range Status   08/22/2022 3.4 0.3 - 6.2 % Final     Basophil %   Date Value Ref Range Status   08/22/2022 0.4 0.0 - 1.5 % Final     Immature Grans %   Date Value Ref Range Status   08/22/2022 0.7 (H) 0.0 - 0.5 % Final     Neutrophils, Absolute   Date Value Ref Range Status   08/22/2022 3.80 1.70 - 7.00 10*3/mm3 Final     Lymphocytes, Absolute   Date Value Ref Range Status   08/22/2022 2.20 0.70 - 3.10 10*3/mm3 Final     Monocytes, Absolute   Date Value Ref Range Status   08/22/2022 0.67 0.10 - 0.90 10*3/mm3 Final     Eosinophils, Absolute   Date Value Ref Range Status   08/22/2022 0.24 0.00 - 0.40 10*3/mm3 Final     Basophils, Absolute   Date Value Ref Range Status   08/22/2022 0.03 0.00 - 0.20 10*3/mm3 Final     Immature Grans, Absolute   Date Value Ref Range Status   08/22/2022 0.05 0.00 - 0.05 10*3/mm3 Final     nRBC   Date Value Ref Range Status   08/22/2022 0.0 0.0 - 0.2 /100 WBC Final       Lab Results   Component Value Date    GLUCOSE 114 (H) 08/22/2022    BUN 30 (H) 08/22/2022    CREATININE 1.30 (H) 08/22/2022    EGFRIFNONA 41 (L) 02/14/2022    BCR 23.1 08/22/2022    K 4.2 08/22/2022    CO2 24.0 08/22/2022    CALCIUM 9.7 08/22/2022    ALBUMIN 4.30 08/22/2022    AST 24 07/26/2022    ALT 24 07/26/2022       Assessment and  Plan:    1. Obstructive sleep apnea - Established, stable (1)  1. Sub-optimally compliant with PAP therapy - discussed importance of increased compliance  2. Download data card in 12-13 days at end of 90-day period and check best 30 days of data to determine if patient can continue to use her machine or if insurance will continue to cover machine rental  3. Continue PAP as prescribed  4. Script for PAP supplies  5. Return to clinic in 12-13 days with compliance report unless change in symptoms in interim period  2. Insomnia - sleep onset and or maintenance - Established, stable (1)    1.   Address at follow up  3. COPD overlap, nocturnal hypoxia - Established, stable  1.   Continue nocturnal O2 @ 2LPM bled into BiPAP  4. Restless legs syndrome - Established, stable  1.   Continue Requip nightly  5. Morbid obesity - BMI 35.6 - stable chronic illness      I spent 20 minutes caring for Ronda on this date of service. This time includes time spent by me in the following activities: preparing for the visit, reviewing tests, obtaining and/or reviewing a separately obtained history, performing a medically appropriate examination and/or evaluation , counseling and educating the patient/family/caregiver, documenting information in the medical record and care coordination; discussing PAP therapy, PAP compliance and PAP maintenance    RTC in 12-13 days. Patient agrees to return sooner if changes in symptoms.        This document has been electronically signed by CIARA Dolan on October 11, 2022 14:17 CDT          CC: Brook Arreola MD          No ref. provider found

## 2022-10-15 DIAGNOSIS — M50.322 OTHER CERVICAL DISC DEGENERATION AT C5-C6 LEVEL: ICD-10-CM

## 2022-10-17 RX ORDER — TIZANIDINE 4 MG/1
TABLET ORAL
Qty: 270 TABLET | Refills: 1 | Status: SHIPPED | OUTPATIENT
Start: 2022-10-17 | End: 2023-03-01 | Stop reason: SDUPTHER

## 2022-10-18 ENCOUNTER — HOSPITAL ENCOUNTER (OUTPATIENT)
Dept: PHYSICAL THERAPY | Facility: HOSPITAL | Age: 77
Setting detail: THERAPIES SERIES
Discharge: HOME | End: 2022-10-18

## 2022-10-18 DIAGNOSIS — M51.36 OTHER INTERVERTEBRAL DISC DEGENERATION, LUMBAR REGION: ICD-10-CM

## 2022-10-18 DIAGNOSIS — M51.37 OTHER INTERVERTEBRAL DISC DEGENERATION, LUMBOSACRAL REGION: Primary | ICD-10-CM

## 2022-10-18 DIAGNOSIS — M51.34 OTHER INTERVERTEBRAL DISC DEGENERATION, THORACIC REGION: ICD-10-CM

## 2022-10-18 DIAGNOSIS — M51.35 OTHER INTERVERTEBRAL DISC DEGENERATION, THORACOLUMBAR REGION: ICD-10-CM

## 2022-10-18 PROCEDURE — 97162 PT EVAL MOD COMPLEX 30 MIN: CPT | Performed by: PHYSICAL THERAPIST

## 2022-10-18 NOTE — THERAPY EVALUATION
Outpatient Physical Therapy Ortho Initial Evaluation  [facility]     Patient Name: Ronda Blair  : 1945  MRN: [id number]  Today's Date: 10/18/2022      Visit Date: 10/18/2022  Attendance:    Subjective % Improvement: n/a  Recert Date: 22  MD appointment: 22    Therapy Diagnosis:Thoracolumbar back pain chronic     Patient Active Problem List   Diagnosis   • Degeneration of intervertebral disc of mid-cervical region   • Morbid obesity due to excess calories (HCC)   • Former smoker   • Chest pain   • Diabetes mellitus (HCC)   • Chronic obstructive pulmonary disease (HCC)   • Dyslipidemia   • Adrenal nodule (HCC)   • Fecal occult blood test positive   • Benign neoplasm of colon   • Chronic nonalcoholic liver disease   • Diverticular disease   • Dysphagia   • Essential hypertension   • Internal hemorrhoids   • Knee pain, bilateral   • Lumbar disc disease with radiculopathy   • Neuralgia, geniculate   • Stage 2 chronic kidney disease   • Gait disturbance   • Nocturnal hypoxia   • Chronic heart failure with preserved ejection fraction (HCC)   • Normocytic anemia   • Acute gout involving toe of left foot   • MARISOL (obstructive sleep apnea)   • MARISOL and COPD overlap syndrome (HCC)   • Precordial pain   • Obesity (BMI 30-39.9)   • CKD (chronic kidney disease) stage 3, GFR 30-59 ml/min (HCC)   • Acute kidney injury (HCC)   • Elevation of level of transaminase and lactic acid dehydrogenase (LDH)   • Gout due to renal impairment, unspecified site   • Chronic obstructive pulmonary disease (HCC)   • Type 2 diabetes mellitus (HCC)   • Essential hypertension   • Stage 3a chronic kidney disease (HCC)   • Stage 3b chronic kidney disease (HCC)        Past Medical History:   Diagnosis Date   • Anxiety    • Arthritis    • COPD (chronic obstructive pulmonary disease) (HCC)    • Depression    • Diabetes mellitus (HCC)    • History of transfusion    • Hyperlipidemia    • Hypertension    • Stroke (HCC)      Greater than 5 years ago        Past Surgical History:   Procedure Laterality Date   • CERVICAL FUSION  1999   • EYE SURGERY Bilateral 2014    lasix eye surgery   • HEMORRHOIDECTOMY     • HYSTERECTOMY     • INTRATHECAL PUMP REMOVAL      2006   • KNEE ARTHROSCOPY Right 2015   • LUMBAR SPINE SURGERY  1999   • PAIN PUMP INSERTION/REVISION      Removed in 2006       Visit Dx:     ICD-10-CM ICD-9-CM   1. Other intervertebral disc degeneration, lumbosacral region  M51.37 722.52   2. Other intervertebral disc degeneration, thoracic region  M51.34 722.51   3. Other intervertebral disc degeneration, thoracolumbar region  M51.35 722.51   4. Other intervertebral disc degeneration, lumbar region  M51.36 722.52          Patient History     Row Name 10/18/22 5071             History    Brief Description of Current Complaint Present today with reports of chronic low back and leg pain. Reports hx of pain pump that had removed. Reports hx of right TKA around 8 years ago. Hx of PT for back in past and reports it not helping much. Goes to pain management for back. Reports office is wanting to try and get a stimulator but has to try therapy first. Has oxygen that wears at night.Has pain along bra strap area with driving. Had sx on the low back many years ago and was told disabled then in late 90s.  -BB      Patient/Caregiver Goals Relieve pain  -BB      Patient's Rating of General Health Good  -BB      Occupation/sports/leisure activities n/a  -BB         Pain     Pain Location Back  -BB      Pain at Present 5  -BB      Pain at Best 5  -BB      Pain at Worst 10  -BB      Is your sleep disturbed? Yes  -BB            User Key  (r) = Recorded By, (t) = Taken By, (c) = Cosigned By    Initials Name Provider Type    Michelle Gill PT DPT Physical Therapist                 PT Ortho     Row Name 10/18/22 4342       Subjective Comments    Subjective Comments see pt hx  -BB       Precautions and Contraindications    Precautions COPD, has  O2 at night  -BB    Contraindications monitor O2/HR PRN  -BB       Subjective Pain    Pre-Treatment Pain Level 5  -BB       Vital Signs    Vitals Comment walking into room: O2: 98, HR 80  -BB       Posture/Observations    Posture/Observations Comments sits with a right lateral lean in chair.  -BB       Special Tests/Palpation    Special Tests/Palpation Lumbar/SI;Shoulder  -BB       Lumbosacral Palpation    Lumbosacral Palpation? Yes  -BB    Erector Spinae (Paraspinals) Tender;Guarded/taut  -BB    Lumbosacral Palpation Comments tender of R>L paraspinals grossly. Tender of rhomboids  -BB       General ROM    GENERAL ROM COMMENTS Knee flexion at least 100 degrees seen sitting and exteded in standing. UE are seen at about 75% this date.  -BB       MMT (Manual Muscle Testing)    General MMT Comments hip flexion right: 3-/5, left hip flexion: 3+/5, hip abd/add in sitting 4-/5, knee flexion/extension: 4/5.  -BB       Sensation    Sensation WNL? WFL  -BB       Balance Skills Training    SLS not safe  -BB    Sharpened Rhomberg not safe  -BB    Balance Comments in stance with wide base of support with sway anterior/posterior direction  -BB       Transfers    Comment, (Transfers) Needs UE assist for transfers for control and safety sit to stand  -BB       Gait/Stairs (Locomotion)    Comment, (Gait/Stairs) slow gait with rollator. equal steps but shuffling type steps. trunk flexed posture. Able to ambulate without AD without room this date with uneasy and uneven steps.  -BB          User Key  (r) = Recorded By, (t) = Taken By, (c) = Cosigned By    Initials Name Provider Type    Michelle Gill PT DPT Physical Therapist                                   PT OP Goals     Row Name 10/18/22 1201          PT Short Term Goals    STG Date to Achieve 11/08/22  -BB     STG 1 Independent in HEP and progression  -BB     STG 2 Subjective reports of improved pain in thoracic spine with driving  -BB        Time Calculation    PT Goal  Re-Cert Due Date 11/08/22  -BB           User Key  (r) = Recorded By, (t) = Taken By, (c) = Cosigned By    Initials Name Provider Type    Michelle Gill PT DPT Physical Therapist                 PT Assessment/Plan     Row Name 10/18/22 2754          PT Assessment    Functional Limitations Impaired gait;Limitations in community activities;Limitation in home management;Decreased safety during functional activities;Limitations in functional capacity and performance;Performance in self-care ADL;Performance in leisure activities;Impaired locomotion  -BB     Impairments Balance;Gait;Sensation;Range of motion;Posture;Pain;Muscle strength;Impaired muscle endurance;Impaired aerobic capacity  -BB     Assessment Comments Patient presents today with reports of chronic back pain that has been treated in pain management, by surgery, and PT in the past with continued pain reported. Patient today has thoracolumbar pain with significant loss of spinal curvature noted of thoracic and lumbar spine seen in sitting with taut and firm palpation of musculature. Is noted to have increaed cervical kyphosis at C7 level. Good mobility of scapula with tenderness of medial board of scapula to the transverse procresses. Discussed to patient plan to develop HEP for LE and UE to improve overall muscular strength/performance and gentle manual to possibly aide in tissue mobility of thoracolumbar spine with pain as guide for tasks.  -BB     Rehab Potential Other (comment)  very chronic history of pain with previous lack of improvements noted  -BB     Patient/caregiver participated in establishment of treatment plan and goals Yes  -BB     Patient would benefit from skilled therapy intervention Yes  -BB        PT Plan    PT Frequency 1x/week  -BB     Predicted Duration of Therapy Intervention (PT) 4-6 weeks- until returns to MD  -ROSA     Planned CPT's? PT EVAL MOD COMPLELITY: 40818;PT RE-EVAL: 77014;PT THER PROC EA 15 MIN: 01846;PT THER ACT EA 15  MIN: 00604;PT MANUAL THERAPY EA 15 MIN: 88209;PT GAIT TRAINING EA 15 MIN: 99182;PT THER SUPP EA 15 MIN  -BB     PT Plan Comments gentle HEP for strength of UE/LE, spine neutral strength, heat/ice PRN, manual to improve tissue mobility and pain. Allow rest breaks and use pain as guide  -BB           User Key  (r) = Recorded By, (t) = Taken By, (c) = Cosigned By    Initials Name Provider Type    Michelle Gill PT DPT Physical Therapist                   OP Exercises     Row Name 10/18/22 1204             Subjective Comments    Subjective Comments see pt hx  -BB         Subjective Pain    Able to rate subjective pain? yes  -BB      Pre-Treatment Pain Level 5  -BB      Post-Treatment Pain Level 5  -BB         Exercise 1    Exercise Name 1 eval only per insurance this date  -BB            User Key  (r) = Recorded By, (t) = Taken By, (c) = Cosigned By    Initials Name Provider Type    Michelle Gill PT DPT Physical Therapist                                        Time Calculation:     Start Time: 1204  Stop Time: 1230  Time Calculation (min): 26 min     Therapy Charges for Today     Code Description Service Date Service Provider Modifiers Qty    [id number]  PT EVAL MOD COMPLEXITY 2 10/18/2022 Michelle Trorez PT DPT GP 1                    Michelle Torrez PT DPT  10/18/2022

## 2022-10-20 RX ORDER — AMLODIPINE BESYLATE 10 MG/1
TABLET ORAL
Qty: 90 TABLET | Refills: 1 | Status: SHIPPED | OUTPATIENT
Start: 2022-10-20 | End: 2023-03-10

## 2022-10-24 ENCOUNTER — OFFICE VISIT (OUTPATIENT)
Dept: SLEEP MEDICINE | Facility: HOSPITAL | Age: 77
End: 2022-10-24

## 2022-10-24 VITALS
DIASTOLIC BLOOD PRESSURE: 65 MMHG | WEIGHT: 214 LBS | OXYGEN SATURATION: 95 % | SYSTOLIC BLOOD PRESSURE: 119 MMHG | HEIGHT: 65 IN | HEART RATE: 70 BPM | BODY MASS INDEX: 35.65 KG/M2

## 2022-10-24 DIAGNOSIS — G47.34 NOCTURNAL HYPOXIA: ICD-10-CM

## 2022-10-24 DIAGNOSIS — G47.33 OBSTRUCTIVE SLEEP APNEA: Primary | ICD-10-CM

## 2022-10-24 DIAGNOSIS — G47.33 OSA AND COPD OVERLAP SYNDROME: ICD-10-CM

## 2022-10-24 DIAGNOSIS — E66.01 MORBID OBESITY: ICD-10-CM

## 2022-10-24 DIAGNOSIS — J44.9 OSA AND COPD OVERLAP SYNDROME: ICD-10-CM

## 2022-10-24 PROCEDURE — 99213 OFFICE O/P EST LOW 20 MIN: CPT | Performed by: NURSE PRACTITIONER

## 2022-10-24 NOTE — PROGRESS NOTES
Sleep Clinic Follow Up    Date: 10/24/2022  Primary Care Provider: Brook Arreola MD    Last office visit: 10/11/2022 (I reviewed this note)    CC: Follow up: MARISOL on BiPAP      Interim History:  Since the last visit:    1) moderate MARISOL -  Ronda Blair has remained compliant with CPAP. She denies mask and machine issues, dry mouth, headaches, or pressures intolerance. She denies abnormal dreams, sleep paralysis, nasal congestion, URI sx. Patient was previously struggling with compliance, so we followed up closely after discussion of increased compliance.    2) Patient denies RLS symptoms.     Sleep Testin. PSG in ~, AHI of ?  2. PSG in ~, AHI of ? ([place])  3. Split night PSG on 2021, AHI of 24  4. PAP titration on same day, recommended BiPAP 16/8 cm H2O   5. Currently on 16/8 cm H2O with 2L O2    PAP Data:    Time frame: 10/11/2022-10/23/2022   Compliance: 100%  Average use on days used: 6 hrs 31 min  Percent of days with usage greater than or equal to 4 hours: 92%  PAP range: 16/8 cm H2O  Average 90% pressure: 16/8 cmH2O  Leak: 0 minutes  Average AHI: 5.7 events/hr  Mask type: Full face mask  DME: [facility]  Machine type: ResMed AirCurve 10    Bed time: 0200  Sleep latency: 5-30 minutes  Number of times awakens during the night: 1  Wake time: 0700  Estimated total sleep time at night: 3-4 hours  Caffeine intake: 0-1 cups of coffee, 0 cups of tea, and 1 sodas per day  Alcohol intake: 0 drinks per week  Nap time: daily - using machine   Sleepiness with Driving: none     [place] - 3    PMHx, FH, SH reviewed and pertinent changes are: Reportedly unchanged from last office visit with us.      Review of Systems:   Negative for chest pain, SOA, fever, chills, cough, N/V/D, abdominal pain.    Smoking:none    Ronda Blair  reports that she quit smoking about 23 years ago. Her smoking use included cigarettes. She has a 40.00 pack-year smoking history. She  has never used smokeless tobacco.      Physical Exam:  Vitals:    10/24/22 1046   BP: 119/65   Pulse: 70   SpO2: 95%           10/24/22  1046   Weight: 97.1 kg (214 lb)     Body mass index is 35.61 kg/m². Class 2 Severe Obesity (BMI >=35 and <=39.9). Obesity-related health conditions include the following: obstructive sleep apnea, hypertension, diabetes mellitus and dyslipidemias. Obesity is unchanged. BMI is is above average; BMI management plan is completed. I recommend portion control and increasing exercise.    Gen:                No distress, conversant, pleasant, appears stated age, alert, oriented  Eyes:               Anicteric sclera, moist conjunctiva, no lid lag                           PERRL, EOMI   Heent:             NC/AT                          Oropharynx clear                          Normal hearing  Lungs:             Normal effort, non-labored breathing        CV:                  Normal S1/S2                          No lower extremity edema  ABD:               Soft, rounded, non-distended              Psych:             Appropriate affect  Neuro:             CN 2-12 appear intact    Past Medical History:   Diagnosis Date   • Anxiety    • Arthritis    • COPD (chronic obstructive pulmonary disease) (HCC)    • Depression    • Diabetes mellitus (HCC)    • History of transfusion    • Hyperlipidemia    • Hypertension    • Stroke (HCC)     Greater than 5 years ago       Current Outpatient Medications:   •  albuterol sulfate  (90 Base) MCG/ACT inhaler, Inhale 2 puffs Every 6 (Six) Hours As Needed for Wheezing or Shortness of Air., Disp: 6.7 g, Rfl: 11  •  allopurinol (ZYLOPRIM) 100 MG tablet, Take half tablet by mouth daily, Disp: 180 tablet, Rfl: 1  •  amLODIPine (NORVASC) 10 MG tablet, TAKE 1 TABLET BY MOUTH EVERY DAY, Disp: 90 tablet, Rfl: 1  •  aspirin-dipyridamole (AGGRENOX)  MG per 12 hr capsule, TAKE 1 CAPSULE BY MOUTH TWICE A DAY, Disp: 180 capsule, Rfl: 1  •  Bystolic 5 MG tablet,  Take 1 tablet by mouth Daily., Disp: 90 tablet, Rfl: 1  •  cholecalciferol (VITAMIN D3) 25 MCG (1000 UT) tablet, Take 1,000 Units by mouth Daily., Disp: , Rfl:   •  colchicine 0.6 MG tablet, Take 2 tabs (1.2 mg) today, then 1 tab (0.6 mg) daily for up to 2 weeks after gout flare resolves., Disp: 15 tablet, Rfl: 0  •  docusate sodium (COLACE) 100 MG capsule, Take 100 mg by mouth 2 (Two) Times a Day., Disp: , Rfl:   •  folic acid (FOLVITE) 1 MG tablet, TAKE 1 TABLET BY MOUTH EVERY DAY, Disp: 90 tablet, Rfl: 2  •  furosemide (LASIX) 40 MG tablet, TAKE 1/2 TABLET BY MOUTH DAILY AS NEEDED (LOWER EXTREMITY EDEMA)., Disp: 45 tablet, Rfl: 3  •  gabapentin (NEURONTIN) 100 MG capsule, , Disp: , Rfl:   •  hydroCHLOROthiazide (HYDRODIURIL) 25 MG tablet, Take 25 mg by mouth Daily., Disp: , Rfl:   •  ipratropium-albuterol (DUO-NEB) 0.5-2.5 mg/3 ml nebulizer, Take 3 mL by nebulization 4 (Four) Times a Day., Disp: 360 mL, Rfl: 1  •  isosorbide mononitrate (IMDUR) 30 MG 24 hr tablet, TAKE 1 TABLET BY MOUTH EVERY DAY, Disp: 30 tablet, Rfl: 1  •  lisinopril (PRINIVIL,ZESTRIL) 20 MG tablet, Take 1 tablet by mouth Daily., Disp: 90 tablet, Rfl: 3  •  loratadine (CLARITIN) 10 MG tablet, TAKE 1 TABLET BY MOUTH EVERY DAY, Disp: 90 tablet, Rfl: 1  •  metFORMIN ER (GLUCOPHAGE-XR) 500 MG 24 hr tablet, TAKE 2 TABLETS BY MOUTH TWICE A DAY, Disp: 360 tablet, Rfl: 1  •  nystatin (MYCOSTATIN) 140537 UNIT/GM powder, Apply  topically to the appropriate area as directed 3 (Three) Times a Day., Disp: 120 g, Rfl: 2  •  Omega-3 Fatty Acids (FISH OIL) 1200 MG capsule capsule, Take 1,200 mg by mouth 3 (Three) Times a Day With Meals., Disp: , Rfl:   •  oxyCODONE-acetaminophen (PERCOCET)  MG per tablet, , Disp: , Rfl:   •  rOPINIRole (REQUIP) 1 MG tablet, TAKE 2 TABLETS BY MOUTH EVERY NIGHT 1 HOUR BEFORE BEDTIME., Disp: 180 tablet, Rfl: 1  •  rosuvastatin (CRESTOR) 10 MG tablet, TAKE 1 TABLET BY MOUTH EVERY DAY, Disp: 90 tablet, Rfl: 11  •  tiotropium  bromide-olodaterol (Stiolto Respimat) 2.5-2.5 MCG/ACT aerosol solution inhaler, Inhale 2 puffs Daily., Disp: 1 each, Rfl: 11  •  tiZANidine (ZANAFLEX) 4 MG tablet, TAKE 1 TABLET BY MOUTH EVERY 8 HOURS AS NEEDED FOR MUSCLE SPASMS (AT LUNCH AND BEDTIME AS NEEDED), Disp: 270 tablet, Rfl: 1  •  Triamcinolone Acetonide (NASACORT) 55 MCG/ACT nasal inhaler, INSTILL 1 SPRAY INTO THE NOSTRIL(S) AS DIRECTED BY PROVIDER EVERY OTHER DAY. *NOT COVERED*, Disp: 16.5 g, Rfl: 2  •  venlafaxine (EFFEXOR) 37.5 MG tablet, Take 75 mg by mouth Every Morning., Disp: , Rfl:     WBC   Date Value Ref Range Status   08/22/2022 6.99 3.40 - 10.80 10*3/mm3 Final     RBC   Date Value Ref Range Status   08/22/2022 3.68 (L) 3.77 - 5.28 10*6/mm3 Final     Hemoglobin   Date Value Ref Range Status   08/22/2022 11.6 (L) 12.0 - 15.9 g/dL Final     Hematocrit   Date Value Ref Range Status   08/22/2022 34.7 34.0 - 46.6 % Final     MCV   Date Value Ref Range Status   08/22/2022 94.3 79.0 - 97.0 fL Final     MCH   Date Value Ref Range Status   08/22/2022 31.5 26.6 - 33.0 pg Final     MCHC   Date Value Ref Range Status   08/22/2022 33.4 31.5 - 35.7 g/dL Final     RDW   Date Value Ref Range Status   08/22/2022 13.1 12.3 - 15.4 % Final     RDW-SD   Date Value Ref Range Status   08/22/2022 44.6 37.0 - 54.0 fl Final     MPV   Date Value Ref Range Status   08/22/2022 11.2 6.0 - 12.0 fL Final     Platelets   Date Value Ref Range Status   08/22/2022 213 140 - 450 10*3/mm3 Final     Neutrophil %   Date Value Ref Range Status   08/22/2022 54.4 42.7 - 76.0 % Final     Lymphocyte %   Date Value Ref Range Status   08/22/2022 31.5 19.6 - 45.3 % Final     Monocyte %   Date Value Ref Range Status   08/22/2022 9.6 5.0 - 12.0 % Final     Eosinophil %   Date Value Ref Range Status   08/22/2022 3.4 0.3 - 6.2 % Final     Basophil %   Date Value Ref Range Status   08/22/2022 0.4 0.0 - 1.5 % Final     Immature Grans %   Date Value Ref Range Status   08/22/2022 0.7 (H) 0.0 - 0.5 %  Final     Neutrophils, Absolute   Date Value Ref Range Status   08/22/2022 3.80 1.70 - 7.00 10*3/mm3 Final     Lymphocytes, Absolute   Date Value Ref Range Status   08/22/2022 2.20 0.70 - 3.10 10*3/mm3 Final     Monocytes, Absolute   Date Value Ref Range Status   08/22/2022 0.67 0.10 - 0.90 10*3/mm3 Final     Eosinophils, Absolute   Date Value Ref Range Status   08/22/2022 0.24 0.00 - 0.40 10*3/mm3 Final     Basophils, Absolute   Date Value Ref Range Status   08/22/2022 0.03 0.00 - 0.20 10*3/mm3 Final     Immature Grans, Absolute   Date Value Ref Range Status   08/22/2022 0.05 0.00 - 0.05 10*3/mm3 Final     nRBC   Date Value Ref Range Status   08/22/2022 0.0 0.0 - 0.2 /100 WBC Final       Lab Results   Component Value Date    GLUCOSE 114 (H) 08/22/2022    BUN 30 (H) 08/22/2022    CREATININE 1.30 (H) 08/22/2022    EGFRIFNONA 41 (L) 02/14/2022    BCR 23.1 08/22/2022    K 4.2 08/22/2022    CO2 24.0 08/22/2022    CALCIUM 9.7 08/22/2022    ALBUMIN 4.30 08/22/2022    AST 24 07/26/2022    ALT 24 07/26/2022       Assessment and Plan:    1. Obstructive sleep apnea - Established, stable (1)  1. Compliant with PAP therapy  2. Continue PAP as prescribed  3. Script for PAP supplies  4. Return to clinic in 1 year with compliance report unless change in symptoms in interim period      I spent 15 minutes caring for Ronda on this date of service. This time includes time spent by me in the following activities: preparing for the visit, reviewing tests, obtaining and/or reviewing a separately obtained history, performing a medically appropriate examination and/or evaluation , counseling and educating the patient/family/caregiver, documenting information in the medical record and care coordination; discussing PAP therapy, PAP compliance and PAP maintenance    RTC in 12 months. Patient agrees to return sooner if changes in symptoms.        This document has been electronically signed by CIARA Dolan on October 24, 2022  10:58 CDT          CC: Brook Arreola MD          No ref. provider found

## 2022-10-26 ENCOUNTER — OFFICE VISIT (OUTPATIENT)
Dept: FAMILY MEDICINE CLINIC | Facility: CLINIC | Age: 77
End: 2022-10-26

## 2022-10-26 VITALS
SYSTOLIC BLOOD PRESSURE: 126 MMHG | HEART RATE: 80 BPM | WEIGHT: 211 LBS | DIASTOLIC BLOOD PRESSURE: 78 MMHG | HEIGHT: 65 IN | RESPIRATION RATE: 18 BRPM | OXYGEN SATURATION: 98 % | BODY MASS INDEX: 35.16 KG/M2

## 2022-10-26 DIAGNOSIS — I50.32 CHRONIC HEART FAILURE WITH PRESERVED EJECTION FRACTION: ICD-10-CM

## 2022-10-26 DIAGNOSIS — E11.42 TYPE 2 DIABETES MELLITUS WITH DIABETIC POLYNEUROPATHY, WITHOUT LONG-TERM CURRENT USE OF INSULIN: ICD-10-CM

## 2022-10-26 DIAGNOSIS — N18.32 STAGE 3B CHRONIC KIDNEY DISEASE: ICD-10-CM

## 2022-10-26 DIAGNOSIS — I10 ESSENTIAL HYPERTENSION: Primary | ICD-10-CM

## 2022-10-26 DIAGNOSIS — J44.9 CHRONIC OBSTRUCTIVE PULMONARY DISEASE, UNSPECIFIED COPD TYPE: ICD-10-CM

## 2022-10-26 PROCEDURE — 99214 OFFICE O/P EST MOD 30 MIN: CPT | Performed by: FAMILY MEDICINE

## 2022-10-26 NOTE — PROGRESS NOTES
Chief Complaint  Follow-up (3 mo)    Subjective    History of Present Illness {CC  Problem List  Visit  Diagnosis   Encounters  Notes  Medications  Labs  Result Review Imaging  Media :23}     Ronda Blair presents to [facility] [place] for     Chief Complaint   Patient presents with   • Follow-up     3 mo      Patient seen today for follow-up.  Has chronic medical problems including hypertension, chronic kidney disease, diabetes, COPD, heart failure, chronic low back pain and gout.  Has been feeling well since last visit.  Has been staying more active, getting out to go to [facility].  Moods have been doing okay.  She continues to live on her own.  Says that she has some aches and pains from arthritis from time to time.  Able to do the activities that she wants to do.  Does have physical therapy scheduled to start later this week.      Current Outpatient Medications:   •  albuterol sulfate  (90 Base) MCG/ACT inhaler, Inhale 2 puffs Every 6 (Six) Hours As Needed for Wheezing or Shortness of Air., Disp: 6.7 g, Rfl: 11  •  allopurinol (ZYLOPRIM) 100 MG tablet, Take half tablet by mouth daily, Disp: 180 tablet, Rfl: 1  •  amLODIPine (NORVASC) 10 MG tablet, TAKE 1 TABLET BY MOUTH EVERY DAY, Disp: 90 tablet, Rfl: 1  •  aspirin-dipyridamole (AGGRENOX)  MG per 12 hr capsule, TAKE 1 CAPSULE BY MOUTH TWICE A DAY, Disp: 180 capsule, Rfl: 1  •  Bystolic 5 MG tablet, Take 1 tablet by mouth Daily., Disp: 90 tablet, Rfl: 1  •  cholecalciferol (VITAMIN D3) 25 MCG (1000 UT) tablet, Take 1,000 Units by mouth Daily., Disp: , Rfl:   •  colchicine 0.6 MG tablet, Take 2 tabs (1.2 mg) today, then 1 tab (0.6 mg) daily for up to 2 weeks after gout flare resolves., Disp: 15 tablet, Rfl: 0  •  docusate sodium (COLACE) 100 MG capsule, Take 100 mg by mouth 2 (Two) Times a Day., Disp: , Rfl:   •  folic acid (FOLVITE) 1 MG tablet, TAKE 1 TABLET BY MOUTH EVERY DAY, Disp:  90 tablet, Rfl: 2  •  furosemide (LASIX) 40 MG tablet, TAKE 1/2 TABLET BY MOUTH DAILY AS NEEDED (LOWER EXTREMITY EDEMA)., Disp: 45 tablet, Rfl: 3  •  gabapentin (NEURONTIN) 100 MG capsule, , Disp: , Rfl:   •  ipratropium-albuterol (DUO-NEB) 0.5-2.5 mg/3 ml nebulizer, Take 3 mL by nebulization 4 (Four) Times a Day., Disp: 360 mL, Rfl: 1  •  isosorbide mononitrate (IMDUR) 30 MG 24 hr tablet, TAKE 1 TABLET BY MOUTH EVERY DAY, Disp: 30 tablet, Rfl: 1  •  lisinopril (PRINIVIL,ZESTRIL) 20 MG tablet, Take 1 tablet by mouth Daily., Disp: 90 tablet, Rfl: 3  •  loratadine (CLARITIN) 10 MG tablet, TAKE 1 TABLET BY MOUTH EVERY DAY, Disp: 90 tablet, Rfl: 1  •  metFORMIN ER (GLUCOPHAGE-XR) 500 MG 24 hr tablet, TAKE 2 TABLETS BY MOUTH TWICE A DAY, Disp: 360 tablet, Rfl: 1  •  nystatin (MYCOSTATIN) 685814 UNIT/GM powder, Apply  topically to the appropriate area as directed 3 (Three) Times a Day., Disp: 120 g, Rfl: 2  •  Omega-3 Fatty Acids (FISH OIL) 1200 MG capsule capsule, Take 1,200 mg by mouth 3 (Three) Times a Day With Meals., Disp: , Rfl:   •  oxyCODONE-acetaminophen (PERCOCET)  MG per tablet, , Disp: , Rfl:   •  rOPINIRole (REQUIP) 1 MG tablet, TAKE 2 TABLETS BY MOUTH EVERY NIGHT 1 HOUR BEFORE BEDTIME., Disp: 180 tablet, Rfl: 1  •  rosuvastatin (CRESTOR) 10 MG tablet, TAKE 1 TABLET BY MOUTH EVERY DAY, Disp: 90 tablet, Rfl: 11  •  tiotropium bromide-olodaterol (Stiolto Respimat) 2.5-2.5 MCG/ACT aerosol solution inhaler, Inhale 2 puffs Daily., Disp: 1 each, Rfl: 11  •  tiZANidine (ZANAFLEX) 4 MG tablet, TAKE 1 TABLET BY MOUTH EVERY 8 HOURS AS NEEDED FOR MUSCLE SPASMS (AT LUNCH AND BEDTIME AS NEEDED), Disp: 270 tablet, Rfl: 1  •  Triamcinolone Acetonide (NASACORT) 55 MCG/ACT nasal inhaler, INSTILL 1 SPRAY INTO THE NOSTRIL(S) AS DIRECTED BY PROVIDER EVERY OTHER DAY. *NOT COVERED*, Disp: 16.5 g, Rfl: 2  •  venlafaxine (EFFEXOR) 37.5 MG tablet, Take 75 mg by mouth Every Morning., Disp: , Rfl:   •  hydroCHLOROthiazide  "(HYDRODIURIL) 25 MG tablet, Take 25 mg by mouth Daily., Disp: , Rfl:      Objective       Vital Signs:   /78   Pulse 80   Resp 18   Ht 165.1 cm (65\")   Wt 95.7 kg (211 lb)   SpO2 98%   BMI 35.11 kg/m²     Physical Exam  Vitals reviewed.   Constitutional:       General: She is not in acute distress.     Appearance: She is well-developed.   Cardiovascular:      Rate and Rhythm: Normal rate and regular rhythm.      Heart sounds: Normal heart sounds. No murmur heard.  Pulmonary:      Effort: Pulmonary effort is normal. No respiratory distress.      Breath sounds: Normal breath sounds. No wheezing or rales.   Abdominal:      Palpations: Abdomen is soft.      Tenderness: There is no abdominal tenderness.   Skin:     General: Skin is warm and dry.   Neurological:      Mental Status: She is alert and oriented to person, place, and time.        Result Review :{ Labs  Result Review  Imaging  Med Tab  Media :23}   The following data was reviewed by: Brook Arreola MD on 10/26/2022    Common labs    Common Labs 8/22/22 8/22/22 8/22/22    1156 1156 1156   Glucose   114 (A)   BUN   30 (A)   Creatinine   1.30 (A)   eGFR Non African Am      Sodium   140   Potassium   4.2   Chloride   103   Calcium   9.7   Albumin   4.30   WBC 6.99     Hemoglobin 11.6 (A)     Hematocrit 34.7     Platelets 213     Uric Acid  9.0 (A)    (A) Abnormal value             Lab Results   Component Value Date    HGBA1C 6.90 (H) 07/26/2022              Assessment and Plan {CC Problem List  Visit Diagnosis  ROS  Review (Popup)  Health Maintenance  Quality  BestPractice  Medications  SmartSets  SnapShot Encounters  Media :23}   Diagnoses and all orders for this visit:    1. Essential hypertension (Primary)  -     CBC & Differential; Future  -     Comprehensive Metabolic Panel; Future    2. Type 2 diabetes mellitus with diabetic polyneuropathy, without long-term current use of insulin (HCC)  -     Hemoglobin A1c; Future    3. " Chronic heart failure with preserved ejection fraction (HCC)    4. Chronic obstructive pulmonary disease, unspecified COPD type (HCC)    5. Stage 3b chronic kidney disease (HCC)       Patient seen today for follow-up  Hypertension controlled, continue current medications  Check CBC and CMP prior to next office visit  Type 2 diabetes controlled, last A1c 6.9%  Recheck hemoglobin A1c prior to next office visit  Patient has follow-up with cardiology and pulmonology after the first of the year  Continue staying active, as this will likely help with chronic pain symptoms      Follow Up {Instructions Charge Capture  Follow-up Communications :23}   Return in about 3 months (around 1/26/2023) for Recheck.  Patient was given instructions and counseling regarding her condition or for health maintenance advice. Please see specific information pulled into the AVS if appropriate.            This document has been electronically signed by Brook Arreola MD

## 2022-10-27 ENCOUNTER — HOSPITAL ENCOUNTER (OUTPATIENT)
Dept: PHYSICAL THERAPY | Facility: HOSPITAL | Age: 77
Setting detail: THERAPIES SERIES
Discharge: HOME | End: 2022-10-27

## 2022-10-27 DIAGNOSIS — M51.34 OTHER INTERVERTEBRAL DISC DEGENERATION, THORACIC REGION: Primary | ICD-10-CM

## 2022-10-27 DIAGNOSIS — M51.35 OTHER INTERVERTEBRAL DISC DEGENERATION, THORACOLUMBAR REGION: ICD-10-CM

## 2022-10-27 PROCEDURE — 97110 THERAPEUTIC EXERCISES: CPT

## 2022-10-27 PROCEDURE — 97140 MANUAL THERAPY 1/> REGIONS: CPT

## 2022-10-27 NOTE — THERAPY TREATMENT NOTE
Outpatient Physical Therapy Ortho Treatment Note  [facility]     Patient Name: Ronda Blair  : 1945  MRN: [id number]  Today's Date: 10/27/2022      Visit Date: 10/27/2022   Attendance:   Subjective improvement: n/a  Recert: 22  MD Appointment: 22      Visit Dx:    ICD-10-CM ICD-9-CM   1. Other intervertebral disc degeneration, thoracic region  M51.34 722.51   2. Other intervertebral disc degeneration, thoracolumbar region  M51.35 722.51       Patient Active Problem List   Diagnosis   • Degeneration of intervertebral disc of mid-cervical region   • Morbid obesity due to excess calories (HCC)   • Former smoker   • Chest pain   • Diabetes mellitus (HCC)   • Chronic obstructive pulmonary disease (HCC)   • Dyslipidemia   • Adrenal nodule (HCC)   • Fecal occult blood test positive   • Benign neoplasm of colon   • Chronic nonalcoholic liver disease   • Diverticular disease   • Dysphagia   • Essential hypertension   • Internal hemorrhoids   • Knee pain, bilateral   • Lumbar disc disease with radiculopathy   • Neuralgia, geniculate   • Stage 2 chronic kidney disease   • Gait disturbance   • Nocturnal hypoxia   • Chronic heart failure with preserved ejection fraction (HCC)   • Normocytic anemia   • Acute gout involving toe of left foot   • MARISOL (obstructive sleep apnea)   • MARISOL and COPD overlap syndrome (HCC)   • Precordial pain   • Obesity (BMI 30-39.9)   • CKD (chronic kidney disease) stage 3, GFR 30-59 ml/min (HCC)   • Acute kidney injury (HCC)   • Elevation of level of transaminase and lactic acid dehydrogenase (LDH)   • Gout due to renal impairment, unspecified site   • Chronic obstructive pulmonary disease (HCC)   • Type 2 diabetes mellitus (HCC)   • Essential hypertension   • Stage 3a chronic kidney disease (HCC)   • Stage 3b chronic kidney disease (HCC)        Past Medical History:   Diagnosis Date   • Anxiety    • Arthritis    • COPD (chronic obstructive pulmonary disease)  (HCC)    • Depression    • Diabetes mellitus (HCC)    • History of transfusion    • Hyperlipidemia    • Hypertension    • Stroke (HCC)     Greater than 5 years ago        Past Surgical History:   Procedure Laterality Date   • CERVICAL FUSION  1999   • EYE SURGERY Bilateral 2014    lasix eye surgery   • HEMORRHOIDECTOMY     • HYSTERECTOMY     • INTRATHECAL PUMP REMOVAL      2006   • KNEE ARTHROSCOPY Right 2015   • LUMBAR SPINE SURGERY  1999   • PAIN PUMP INSERTION/REVISION      Removed in 2006        PT Ortho     Row Name 10/27/22 1600       Precautions and Contraindications    Precautions COPD, has O2 at night  -EM    Contraindications monitor O2/HR PRN  -EM       Vital Signs    Vitals Comment Amb with QC with unsteady gt pattern-no LOB noted. TTP noted.  -EM          User Key  (r) = Recorded By, (t) = Taken By, (c) = Cosigned By    Initials Name Provider Type    EM Sammy Rizzo, PTA Physical Therapist Assistant                             PT Assessment/Plan     Row Name 10/27/22 1600          PT Assessment    Functional Limitations Impaired gait;Limitations in community activities;Limitation in home management;Decreased safety during functional activities;Limitations in functional capacity and performance;Performance in self-care ADL;Performance in leisure activities;Impaired locomotion  -EM     Impairments Balance;Gait;Sensation;Range of motion;Posture;Pain;Muscle strength;Impaired muscle endurance;Impaired aerobic capacity  -EM     Assessment Comments Pt has significant muscle tauntness with TTP noted. Pt responded well to manual this date and blaine likely require additional manual treatments bilaterally.  -EM     Patient/caregiver participated in establishment of treatment plan and goals Yes  -EM     Patient would benefit from skilled therapy intervention Yes  -EM        PT Plan    PT Frequency 1x/week  -EM     Predicted Duration of Therapy Intervention (PT) 4-6 weeks until sees MD  -EM     PT Plan Comments D/C  pro II at this time. Add MHP pre tx next visit. Focus on manual to B paraspinals-pt unable to lay prone, but does tolerate staindg lean onto physioball well.  -EM           User Key  (r) = Recorded By, (t) = Taken By, (c) = Cosigned By    Initials Name Provider Type    EM Sammy Rizzo PTA Physical Therapist Assistant                   OP Exercises     Row Name 10/27/22 1600             Subjective Comments    Subjective Comments Pt reports she has pain all the time at bra level.  -EM         Subjective Pain    Able to rate subjective pain? yes  -EM      Pre-Treatment Pain Level 5  -EM      Post-Treatment Pain Level 5  -EM         Exercise 1    Exercise Name 1 PRO II-UE/LE-4.0  -EM      Time 1 10'  -EM         Exercise 2    Exercise Name 2 Scap Retraction  -EM      Sets 2 1  -EM      Reps 2 20  -EM         Exercise 3    Exercise Name 3 No Money  -EM      Sets 3 1  -EM      Reps 3 20  -EM         Exercise 4    Exercise Name 4 Manual-leaned on high-low table with physioball  -EM      Time 4 10'  -EM            User Key  (r) = Recorded By, (t) = Taken By, (c) = Cosigned By    Initials Name Provider Type    EM Sammy Rizzo PTA Physical Therapist Assistant                         Manual Rx (last 36 hours)     Manual Treatments     Row Name 10/27/22 1500             Manual Rx 1    Manual Rx 1 Location R paraspinals and R QL  -EM      Manual Rx 1 Type STM/MFR withTPR  -EM      Manual Rx 1 Duration 10'  -EM            User Key  (r) = Recorded By, (t) = Taken By, (c) = Cosigned By    Initials Name Provider Type    Sammy Hamm PTA Physical Therapist Assistant                                   Time Calculation:   Start Time: 1520  Stop Time: 1603  Time Calculation (min): 43 min  Total Timed Code Minutes- PT: 43 minute(s)  Therapy Charges for Today     Code Description Service Date Service Provider Modifiers Qty    83962501772 HC PT THER PROC EA 15 MIN 10/27/2022 Sammy Rizzo PTA GP, CQ 2    10331349117 HC PT  MANUAL THERAPY EA 15 MIN 10/27/2022 Sammy Rizzo PTA GP, CQ 1                    Sammy Rizzo PTA  10/27/2022

## 2022-10-28 DIAGNOSIS — E11.42 TYPE 2 DIABETES MELLITUS WITH DIABETIC POLYNEUROPATHY, WITHOUT LONG-TERM CURRENT USE OF INSULIN: ICD-10-CM

## 2022-10-28 RX ORDER — METFORMIN HYDROCHLORIDE 500 MG/1
TABLET, EXTENDED RELEASE ORAL
Qty: 360 TABLET | Refills: 1 | OUTPATIENT
Start: 2022-10-28

## 2022-10-29 DIAGNOSIS — D64.9 ANEMIA, UNSPECIFIED: ICD-10-CM

## 2022-10-31 ENCOUNTER — HOSPITAL ENCOUNTER (OUTPATIENT)
Dept: PHYSICAL THERAPY | Facility: HOSPITAL | Age: 77
Setting detail: THERAPIES SERIES
Discharge: HOME | End: 2022-10-31

## 2022-10-31 DIAGNOSIS — M51.37 OTHER INTERVERTEBRAL DISC DEGENERATION, LUMBOSACRAL REGION: ICD-10-CM

## 2022-10-31 DIAGNOSIS — M51.35 OTHER INTERVERTEBRAL DISC DEGENERATION, THORACOLUMBAR REGION: Primary | ICD-10-CM

## 2022-10-31 PROCEDURE — 97110 THERAPEUTIC EXERCISES: CPT

## 2022-10-31 RX ORDER — FOLIC ACID 1 MG/1
TABLET ORAL
Qty: 90 TABLET | Refills: 2 | Status: SHIPPED | OUTPATIENT
Start: 2022-10-31

## 2022-11-07 ENCOUNTER — HOSPITAL ENCOUNTER (OUTPATIENT)
Dept: PHYSICAL THERAPY | Facility: HOSPITAL | Age: 77
Setting detail: THERAPIES SERIES
Discharge: HOME | End: 2022-11-07

## 2022-11-07 DIAGNOSIS — M51.35 OTHER INTERVERTEBRAL DISC DEGENERATION, THORACOLUMBAR REGION: Primary | ICD-10-CM

## 2022-11-07 PROCEDURE — 97140 MANUAL THERAPY 1/> REGIONS: CPT | Performed by: PHYSICAL THERAPIST

## 2022-11-07 NOTE — THERAPY TREATMENT NOTE
Outpatient Physical Therapy Ortho Treatment Note  [facility]     Patient Name: Ronda Blair  : 1945  MRN: [id number]  Today's Date: 2022      Visit Date: 2022  Attendance: 4/4 of 5  Subjective improvement:0%  Recert: 22  MD Appointment: 22  Visit Dx:    ICD-10-CM ICD-9-CM   1. Other intervertebral disc degeneration, thoracolumbar region  M51.35 722.51       Patient Active Problem List   Diagnosis   • Degeneration of intervertebral disc of mid-cervical region   • Morbid obesity due to excess calories (HCC)   • Former smoker   • Chest pain   • Diabetes mellitus (HCC)   • Chronic obstructive pulmonary disease (HCC)   • Dyslipidemia   • Adrenal nodule (HCC)   • Fecal occult blood test positive   • Benign neoplasm of colon   • Chronic nonalcoholic liver disease   • Diverticular disease   • Dysphagia   • Essential hypertension   • Internal hemorrhoids   • Knee pain, bilateral   • Lumbar disc disease with radiculopathy   • Neuralgia, geniculate   • Stage 2 chronic kidney disease   • Gait disturbance   • Nocturnal hypoxia   • Chronic heart failure with preserved ejection fraction (HCC)   • Normocytic anemia   • Acute gout involving toe of left foot   • MARISOL (obstructive sleep apnea)   • MARISOL and COPD overlap syndrome (HCC)   • Precordial pain   • Obesity (BMI 30-39.9)   • CKD (chronic kidney disease) stage 3, GFR 30-59 ml/min (HCC)   • Acute kidney injury (HCC)   • Elevation of level of transaminase and lactic acid dehydrogenase (LDH)   • Gout due to renal impairment, unspecified site   • Chronic obstructive pulmonary disease (HCC)   • Type 2 diabetes mellitus (HCC)   • Essential hypertension   • Stage 3a chronic kidney disease (HCC)   • Stage 3b chronic kidney disease (HCC)        Past Medical History:   Diagnosis Date   • Anxiety    • Arthritis    • COPD (chronic obstructive pulmonary disease) (HCC)    • Depression    • Diabetes mellitus (HCC)    • History of transfusion   "  • Hyperlipidemia    • Hypertension    • Stroke (HCC)     Greater than 5 years ago        Past Surgical History:   Procedure Laterality Date   • CERVICAL FUSION  1999   • EYE SURGERY Bilateral 2014    lasix eye surgery   • HEMORRHOIDECTOMY     • HYSTERECTOMY     • INTRATHECAL PUMP REMOVAL      2006   • KNEE ARTHROSCOPY Right 2015   • LUMBAR SPINE SURGERY  1999   • PAIN PUMP INSERTION/REVISION      Removed in 2006        PT Ortho     Row Name 11/07/22 1100       Subjective Pain    Post-Treatment Pain Level 5  -BB       Posture/Observations    Posture/Observations Comments Pain increased within walking 200 ft with standing pause taken. Firmness noted of paraspinals and QL in sidelying  -BB          User Key  (r) = Recorded By, (t) = Taken By, (c) = Cosigned By    Initials Name Provider Type    Michelle Gill, PT DPT Physical Therapist                             PT Assessment/Plan     Row Name 11/07/22 1100          PT Assessment    Assessment Comments Decreased firmness of muscles noted with manual treatment but upon walking pain increased within short distance.  -BB        PT Plan    PT Frequency 1x/week  -BB     Predicted Duration of Therapy Intervention (PT) 4-6 weeks  -BB     PT Plan Comments recheck next- likely DC due to limited change  -BB           User Key  (r) = Recorded By, (t) = Taken By, (c) = Cosigned By    Initials Name Provider Type    Michelle Gill, PT DPT Physical Therapist                   OP Exercises     Row Name 11/07/22 1100             Subjective Comments    Subjective Comments Reports pain remains. No change in pain.  -BB         Subjective Pain    Able to rate subjective pain? yes  -BB      Pre-Treatment Pain Level 5  -BB      Post-Treatment Pain Level 5  -BB         Exercise 1    Exercise Name 1 MFR/glides/opposing thumbs/ gentle cross friction  -BB      Time 1 21'  -BB         Exercise 2    Exercise Name 2 3 way ball stretch  -BB      Sets 2 3  -BB      Time 2 20\"  -BB      " Additional Comments each  -BB            User Key  (r) = Recorded By, (t) = Taken By, (c) = Cosigned By    Initials Name Provider Type    Michelle Gill PT DPT Physical Therapist                              PT OP Goals     Row Name 11/07/22 1100          PT Short Term Goals    STG Date to Achieve 11/08/22  -BB     STG 1 Independent in HEP and progression  -BB     STG 1 Progress Not Met  -BB     STG 2 Subjective reports of improved pain in thoracic spine with driving  -BB     STG 2 Progress Not Met  -BB        Time Calculation    PT Goal Re-Cert Due Date 11/08/22  -BB           User Key  (r) = Recorded By, (t) = Taken By, (c) = Cosigned By    Initials Name Provider Type    Michelle Gill PT DPT Physical Therapist                               Time Calculation:   Start Time: 1153  Stop Time: 1229  Time Calculation (min): 36 min  Therapy Charges for Today     Code Description Service Date Service Provider Modifiers Qty    25106438944 HC PT MANUAL THERAPY EA 15 MIN 11/7/2022 Michelle Torrez PT DPT GP 2                    JANINE ReyesT  11/7/2022

## 2022-11-10 ENCOUNTER — APPOINTMENT (OUTPATIENT)
Dept: PHYSICAL THERAPY | Facility: HOSPITAL | Age: 77
End: 2022-11-10

## 2022-11-14 DIAGNOSIS — E11.42 TYPE 2 DIABETES MELLITUS WITH DIABETIC POLYNEUROPATHY, WITHOUT LONG-TERM CURRENT USE OF INSULIN: ICD-10-CM

## 2022-11-14 RX ORDER — METFORMIN HYDROCHLORIDE 500 MG/1
500 TABLET, EXTENDED RELEASE ORAL 2 TIMES DAILY
Qty: 180 TABLET | Refills: 1 | Status: SHIPPED | OUTPATIENT
Start: 2022-11-14

## 2022-11-14 RX ORDER — ROPINIROLE 0.5 MG/1
TABLET, FILM COATED ORAL
Qty: 90 TABLET | Refills: 1 | OUTPATIENT
Start: 2022-11-14

## 2022-11-14 RX ORDER — METFORMIN HYDROCHLORIDE 500 MG/1
1000 TABLET, EXTENDED RELEASE ORAL 2 TIMES DAILY
Qty: 360 TABLET | Refills: 1 | Status: SHIPPED | OUTPATIENT
Start: 2022-11-14 | End: 2022-11-14

## 2022-11-14 RX ORDER — METFORMIN HYDROCHLORIDE 500 MG/1
TABLET, EXTENDED RELEASE ORAL
Qty: 360 TABLET | Refills: 1 | OUTPATIENT
Start: 2022-11-14

## 2022-11-15 ENCOUNTER — HOSPITAL ENCOUNTER (OUTPATIENT)
Dept: PHYSICAL THERAPY | Facility: HOSPITAL | Age: 77
Setting detail: THERAPIES SERIES
Discharge: HOME | End: 2022-11-15

## 2022-11-15 DIAGNOSIS — M51.37 OTHER INTERVERTEBRAL DISC DEGENERATION, LUMBOSACRAL REGION: ICD-10-CM

## 2022-11-15 DIAGNOSIS — M51.35 OTHER INTERVERTEBRAL DISC DEGENERATION, THORACOLUMBAR REGION: Primary | ICD-10-CM

## 2022-11-15 PROCEDURE — 97110 THERAPEUTIC EXERCISES: CPT | Performed by: PHYSICAL THERAPIST

## 2022-11-15 NOTE — THERAPY PROGRESS REPORT/RE-CERT
Outpatient Physical Therapy Ortho Progress Note  [facility]     Patient Name: Ronda Blair  : 1945  MRN: [id number]  Today's Date: 11/15/2022      Visit Date: 11/15/2022  Attendance:  5/5  Subjective % Improvement: 0%  Recert Date: on hold until MD FERRIS appointment: 22     Therapy Diagnosis:Thoracolumbar back pain chronic   Visit Dx:    ICD-10-CM ICD-9-CM   1. Other intervertebral disc degeneration, thoracolumbar region  M51.35 722.51   2. Other intervertebral disc degeneration, lumbosacral region  M51.37 722.52       Patient Active Problem List   Diagnosis   • Degeneration of intervertebral disc of mid-cervical region   • Morbid obesity due to excess calories (HCC)   • Former smoker   • Chest pain   • Diabetes mellitus (HCC)   • Chronic obstructive pulmonary disease (HCC)   • Dyslipidemia   • Adrenal nodule (HCC)   • Fecal occult blood test positive   • Benign neoplasm of colon   • Chronic nonalcoholic liver disease   • Diverticular disease   • Dysphagia   • Essential hypertension   • Internal hemorrhoids   • Knee pain, bilateral   • Lumbar disc disease with radiculopathy   • Neuralgia, geniculate   • Stage 2 chronic kidney disease   • Gait disturbance   • Nocturnal hypoxia   • Chronic heart failure with preserved ejection fraction (HCC)   • Normocytic anemia   • Acute gout involving toe of left foot   • MARISOL (obstructive sleep apnea)   • MARISOL and COPD overlap syndrome (HCC)   • Precordial pain   • Obesity (BMI 30-39.9)   • CKD (chronic kidney disease) stage 3, GFR 30-59 ml/min (HCC)   • Acute kidney injury (HCC)   • Elevation of level of transaminase and lactic acid dehydrogenase (LDH)   • Gout due to renal impairment, unspecified site   • Chronic obstructive pulmonary disease (HCC)   • Type 2 diabetes mellitus (HCC)   • Essential hypertension   • Stage 3a chronic kidney disease (HCC)   • Stage 3b chronic kidney disease (HCC)        Past Medical History:   Diagnosis Date   • Anxiety     • Arthritis    • COPD (chronic obstructive pulmonary disease) (HCC)    • Depression    • Diabetes mellitus (HCC)    • History of transfusion    • Hyperlipidemia    • Hypertension    • Stroke (HCC)     Greater than 5 years ago        Past Surgical History:   Procedure Laterality Date   • CERVICAL FUSION  1999   • EYE SURGERY Bilateral 2014    lasix eye surgery   • HEMORRHOIDECTOMY     • HYSTERECTOMY     • INTRATHECAL PUMP REMOVAL      2006   • KNEE ARTHROSCOPY Right 2015   • LUMBAR SPINE SURGERY  1999   • PAIN PUMP INSERTION/REVISION      Removed in 2006        PT Ortho     Row Name 11/15/22 4320       Subjective Comments    Subjective Comments Pain is unchanged from PT. Pain remains and constant.  -BB       Precautions and Contraindications    Precautions COPD, has O2 at night  -BB    Contraindications monitor O2/HR PRN  -BB       Subjective Pain    Able to rate subjective pain? yes  -BB       Posture/Observations    Posture/Observations Comments very slow elijah with QC. Forward flexed posture  -BB       Special Tests/Palpation    Special Tests/Palpation Lumbar/SI  global taut bands of low back- rigid overall mobility with tendnerness. decreased lumbar lordosis of spine  -BB       General ROM    GENERAL ROM COMMENTS deferred of trunk- LE are WFL for gait  -BB       MMT (Manual Muscle Testing)    General MMT Comments BLE grossly 4-/5 in sitting  -BB          User Key  (r) = Recorded By, (t) = Taken By, (c) = Cosigned By    Initials Name Provider Type    BB Michelle Torrez, JANINE DPT Physical Therapist                             PT Assessment/Plan     Row Name 11/15/22 7480          PT Assessment    Assessment Comments Patient presents today with slow elijah,short shuffling type gait pattern with QC. Visually appears lethargic- vitals WNL. Verbally is unchanged with therapy sessions and pain with tasks. Subjective reports of increased pain by end of sitting HEP this date. Discussed with patient to place PT on hold  "due to low tolerance to activity and no change in pain and to return to MD office for further recommendations, patient agreed with POC. Patient encouraged to perform sitting HEP as able for gentle progression of strength for LEs due to poor tolerance to standing and walking and has had a loss in mobility due to low back pain, patient agreed and performed all HEP appropriately.  -BB     Patient/caregiver participated in establishment of treatment plan and goals Yes  -BB        PT Plan    Predicted Duration of Therapy Intervention (PT) on hold pending return to MD  -BB     PT Plan Comments hold pending MD recommedations  -BB           User Key  (r) = Recorded By, (t) = Taken By, (c) = Cosigned By    Initials Name Provider Type    Michelle Gill, PT DPT Physical Therapist                   OP Exercises     Row Name 11/15/22 5482             Subjective Comments    Subjective Comments Pain is unchanged from PT. Pain remains and constant.  -BB         Subjective Pain    Able to rate subjective pain? yes  -BB      Pre-Treatment Pain Level 5  -BB      Post-Treatment Pain Level 5  -BB         Exercise 1    Exercise Name 1 O2 93 upon walking from car. HR 84-77, /80  -BB         Exercise 2    Exercise Name 2 recheck  -BB         Exercise 3    Exercise Name 3 Hip abd yellow tband  -BB      Sets 3 1  -BB      Reps 3 10  -BB      Additional Comments HEP  -BB         Exercise 4    Exercise Name 4 sitting hip march yellow tband  -BB      Sets 4 1  -BB      Reps 4 10  -BB      Additional Comments HEP  -BB         Exercise 5    Exercise Name 5 sitting HS curl tband  -BB      Sets 5 1  -BB      Reps 5 10 each  -BB      Additional Comments HEP  -BB         Exercise 6    Exercise Name 6 hip ER pillow, hip IR tband  -BB      Sets 6 1  -BB      Reps 6 10  -BB      Time 6 3\" hold  -BB      Additional Comments HEP  -BB         Exercise 7    Exercise Name 7 sitting hip add with pillow  -BB      Sets 7 1  -BB      Reps 7 10  -BB      " "Time 7 3\" hold  -BB      Additional Comments HEP  -BB         Exercise 8    Exercise Name 8 sitting DF/PF  -BB      Sets 8 1  -BB      Reps 8 10  -BB         Exercise 9    Exercise Name 9 taken to care via Wheelchair due to slow, uneasy gait  -BB      Additional Comments Able to perform transfer but slow and needs momentum to enter vehicle.  -BB            User Key  (r) = Recorded By, (t) = Taken By, (c) = Cosigned By    Initials Name Provider Type    Michelle Gill, PT DPT Physical Therapist                              PT OP Goals     Row Name 11/15/22 1147          PT Short Term Goals    STG 1 Independent in HEP and progression  -BB     STG 1 Progress Not Met;Partially Met  -BB     STG 1 Progress Comments HEP sitting progressed today  -BB     STG 2 Subjective reports of improved pain in thoracic spine with driving  -BB     STG 2 Progress Not Met  -BB           User Key  (r) = Recorded By, (t) = Taken By, (c) = Cosigned By    Initials Name Provider Type    Michelle Gill, PT DPT Physical Therapist                               Time Calculation:   Start Time: 1147  Stop Time: 1224  Time Calculation (min): 37 min  Therapy Charges for Today     Code Description Service Date Service Provider Modifiers Qty    87754930805 HC PT THER PROC EA 15 MIN 11/15/2022 Michelle Torrez PT DPT GP 2    26884141108 HC PT THER SUPP EA 15 MIN 11/15/2022 Michelle Torrez PT DPT GP 1                    Michelle Torrez PT DPT  11/15/2022     "

## 2022-11-22 RX ORDER — HYDROCHLOROTHIAZIDE 25 MG/1
TABLET ORAL
Qty: 56 TABLET | Refills: 11 | OUTPATIENT
Start: 2022-11-22

## 2022-12-14 ENCOUNTER — LAB (OUTPATIENT)
Dept: LAB | Facility: HOSPITAL | Age: 77
End: 2022-12-14

## 2022-12-14 ENCOUNTER — TRANSCRIBE ORDERS (OUTPATIENT)
Dept: LAB | Facility: HOSPITAL | Age: 77
End: 2022-12-14

## 2022-12-14 DIAGNOSIS — J44.9 OBSTRUCTIVE CHRONIC BRONCHITIS WITHOUT EXACERBATION: ICD-10-CM

## 2022-12-14 DIAGNOSIS — C18.9 COLON NEOPLASM, M1A: ICD-10-CM

## 2022-12-14 DIAGNOSIS — I10 ESSENTIAL HYPERTENSION: ICD-10-CM

## 2022-12-14 DIAGNOSIS — N18.6 TYPE 2 DIABETES MELLITUS WITH ESRD (END-STAGE RENAL DISEASE): ICD-10-CM

## 2022-12-14 DIAGNOSIS — E11.22 TYPE 2 DIABETES MELLITUS WITH ESRD (END-STAGE RENAL DISEASE): ICD-10-CM

## 2022-12-14 DIAGNOSIS — N18.6 TYPE 2 DIABETES MELLITUS WITH ESRD (END-STAGE RENAL DISEASE): Primary | ICD-10-CM

## 2022-12-14 DIAGNOSIS — C79.9 COLON NEOPLASM, M1A: ICD-10-CM

## 2022-12-14 DIAGNOSIS — N18.32 CHRONIC KIDNEY DISEASE (CKD) STAGE G3B/A1, MODERATELY DECREASED GLOMERULAR FILTRATION RATE (GFR) BETWEEN 30-44 ML/MIN/1.73 SQUARE METER AND ALBUMINURIA CREATININE RATIO LESS THAN 30 MG/G (CMS/H*: ICD-10-CM

## 2022-12-14 DIAGNOSIS — I12.9 HYPERTENSIVE NEPHROPATHY: ICD-10-CM

## 2022-12-14 DIAGNOSIS — E11.22 TYPE 2 DIABETES MELLITUS WITH ESRD (END-STAGE RENAL DISEASE): Primary | ICD-10-CM

## 2022-12-14 DIAGNOSIS — E11.42 TYPE 2 DIABETES MELLITUS WITH DIABETIC POLYNEUROPATHY, WITHOUT LONG-TERM CURRENT USE OF INSULIN: ICD-10-CM

## 2022-12-14 LAB
25(OH)D3 SERPL-MCNC: 40.4 NG/ML (ref 30–100)
ALBUMIN SERPL-MCNC: 4.5 G/DL (ref 3.5–5.2)
ALBUMIN/GLOB SERPL: 2 G/DL
ALP SERPL-CCNC: 111 U/L (ref 39–117)
ALT SERPL W P-5'-P-CCNC: 29 U/L (ref 1–33)
ANION GAP SERPL CALCULATED.3IONS-SCNC: 9.2 MMOL/L (ref 5–15)
AST SERPL-CCNC: 31 U/L (ref 1–32)
BACTERIA UR QL AUTO: ABNORMAL /HPF
BASOPHILS # BLD AUTO: 0.02 10*3/MM3 (ref 0–0.2)
BASOPHILS NFR BLD AUTO: 0.3 % (ref 0–1.5)
BILIRUB SERPL-MCNC: <0.2 MG/DL (ref 0–1.2)
BILIRUB UR QL STRIP: NEGATIVE
BUN SERPL-MCNC: 27 MG/DL (ref 8–23)
BUN/CREAT SERPL: 20.3 (ref 7–25)
CALCIUM SPEC-SCNC: 9.5 MG/DL (ref 8.6–10.5)
CHLORIDE SERPL-SCNC: 104 MMOL/L (ref 98–107)
CLARITY UR: CLEAR
CO2 SERPL-SCNC: 29.8 MMOL/L (ref 22–29)
COLOR UR: YELLOW
CREAT SERPL-MCNC: 1.33 MG/DL (ref 0.57–1)
CREAT UR-MCNC: 68.2 MG/DL
DEPRECATED RDW RBC AUTO: 40.6 FL (ref 37–54)
EGFRCR SERPLBLD CKD-EPI 2021: 41 ML/MIN/1.73
EOSINOPHIL # BLD AUTO: 0.21 10*3/MM3 (ref 0–0.4)
EOSINOPHIL NFR BLD AUTO: 3.5 % (ref 0.3–6.2)
ERYTHROCYTE [DISTWIDTH] IN BLOOD BY AUTOMATED COUNT: 12.5 % (ref 12.3–15.4)
GLOBULIN UR ELPH-MCNC: 2.2 GM/DL
GLUCOSE SERPL-MCNC: 135 MG/DL (ref 65–99)
GLUCOSE UR STRIP-MCNC: NEGATIVE MG/DL
HBA1C MFR BLD: 8 % (ref 4.8–5.6)
HCT VFR BLD AUTO: 33.9 % (ref 34–46.6)
HGB BLD-MCNC: 11.8 G/DL (ref 12–15.9)
HGB UR QL STRIP.AUTO: NEGATIVE
HYALINE CASTS UR QL AUTO: ABNORMAL /LPF
IMM GRANULOCYTES # BLD AUTO: 0.03 10*3/MM3 (ref 0–0.05)
IMM GRANULOCYTES NFR BLD AUTO: 0.5 % (ref 0–0.5)
KETONES UR QL STRIP: NEGATIVE
LEUKOCYTE ESTERASE UR QL STRIP.AUTO: ABNORMAL
LYMPHOCYTES # BLD AUTO: 1.66 10*3/MM3 (ref 0.7–3.1)
LYMPHOCYTES NFR BLD AUTO: 27.8 % (ref 19.6–45.3)
MCH RBC QN AUTO: 31.2 PG (ref 26.6–33)
MCHC RBC AUTO-ENTMCNC: 34.8 G/DL (ref 31.5–35.7)
MCV RBC AUTO: 89.7 FL (ref 79–97)
MONOCYTES # BLD AUTO: 0.4 10*3/MM3 (ref 0.1–0.9)
MONOCYTES NFR BLD AUTO: 6.7 % (ref 5–12)
NEUTROPHILS NFR BLD AUTO: 3.66 10*3/MM3 (ref 1.7–7)
NEUTROPHILS NFR BLD AUTO: 61.2 % (ref 42.7–76)
NITRITE UR QL STRIP: NEGATIVE
NRBC BLD AUTO-RTO: 0 /100 WBC (ref 0–0.2)
PH UR STRIP.AUTO: 7 [PH] (ref 5–8)
PHOSPHATE SERPL-MCNC: 2.9 MG/DL (ref 2.5–4.5)
PLATELET # BLD AUTO: 122 10*3/MM3 (ref 140–450)
PMV BLD AUTO: 11.9 FL (ref 6–12)
POTASSIUM SERPL-SCNC: 4.2 MMOL/L (ref 3.5–5.2)
PROT ?TM UR-MCNC: 37.5 MG/DL
PROT SERPL-MCNC: 6.7 G/DL (ref 6–8.5)
PROT UR QL STRIP: ABNORMAL
PROT/CREAT UR: 549.9 MG/G CREA (ref 0–200)
PTH-INTACT SERPL-MCNC: 81 PG/ML (ref 15–65)
RBC # BLD AUTO: 3.78 10*6/MM3 (ref 3.77–5.28)
RBC # UR STRIP: ABNORMAL /HPF
REF LAB TEST METHOD: ABNORMAL
SODIUM SERPL-SCNC: 143 MMOL/L (ref 136–145)
SP GR UR STRIP: 1.02 (ref 1–1.03)
SQUAMOUS #/AREA URNS HPF: ABNORMAL /HPF
URATE SERPL-MCNC: 8.1 MG/DL (ref 2.4–5.7)
UROBILINOGEN UR QL STRIP: ABNORMAL
WBC # UR STRIP: ABNORMAL /HPF
WBC NRBC COR # BLD: 5.98 10*3/MM3 (ref 3.4–10.8)

## 2022-12-14 PROCEDURE — 83970 ASSAY OF PARATHORMONE: CPT

## 2022-12-14 PROCEDURE — 85025 COMPLETE CBC W/AUTO DIFF WBC: CPT

## 2022-12-14 PROCEDURE — 80053 COMPREHEN METABOLIC PANEL: CPT

## 2022-12-14 PROCEDURE — 84100 ASSAY OF PHOSPHORUS: CPT

## 2022-12-14 PROCEDURE — 84550 ASSAY OF BLOOD/URIC ACID: CPT

## 2022-12-14 PROCEDURE — 81001 URINALYSIS AUTO W/SCOPE: CPT

## 2022-12-14 PROCEDURE — 83036 HEMOGLOBIN GLYCOSYLATED A1C: CPT

## 2022-12-14 PROCEDURE — 82570 ASSAY OF URINE CREATININE: CPT

## 2022-12-14 PROCEDURE — 84156 ASSAY OF PROTEIN URINE: CPT

## 2022-12-14 PROCEDURE — 82306 VITAMIN D 25 HYDROXY: CPT

## 2022-12-19 ENCOUNTER — TELEPHONE (OUTPATIENT)
Dept: FAMILY MEDICINE CLINIC | Facility: CLINIC | Age: 77
End: 2022-12-19

## 2022-12-19 NOTE — TELEPHONE ENCOUNTER
Per Dr. Arreola, Ms. Blair has been called with recent lab results & recommendations.  Continue current medications and follow-up as planned or sooner if any problems.     She said she is not sleeping well and has been drinking more pop. She said she is not really eating a lot but does admit to eating pasta meals as well, she said her activity level has also fallen off.      ----- Message from Brook Arreola MD sent at 12/17/2022 12:31 AM CST -----  HgbA1c up to 8.0%.  This is not normal for her (usually around 6%).  Would recommend healthy diet and exercise.  Can she think of anything that may be causing this jump in glucose levels?  Having any difficulties with exercise/ activity level or eating healthy diet?  - JEFFERSON Arreola

## 2022-12-19 NOTE — PROGRESS NOTES
Per Dr. Arreola, Ms. Blair has been called with recent lab results & recommendations.  Continue current medications and follow-up as planned or sooner if any problems.     She said she is not sleeping well and has been drinking more pop. She said she is not really eating a lot but does admit to eating pasta meals as well, she said her activity level has also fallen off.

## 2022-12-22 ENCOUNTER — TELEPHONE (OUTPATIENT)
Dept: FAMILY MEDICINE CLINIC | Facility: CLINIC | Age: 77
End: 2022-12-22

## 2022-12-22 NOTE — TELEPHONE ENCOUNTER
Per Dr. Arreola, Ms. Blair has been called with recent lab results & recommendations.  Continue current medications and follow-up as planned or sooner if any problems.     Labs faxed to [facility]      ----- Message from Brook Arreola MD sent at 12/22/2022  5:43 AM CST -----  Labs are stable from last year, please let patient know.  Please fax a copy of results to [facility].

## 2022-12-22 NOTE — PROGRESS NOTES
Per Dr. Arreola, Ms. Blair has been called with recent lab results & recommendations.  Continue current medications and follow-up as planned or sooner if any problems.     Labs faxed to [facility]

## 2022-12-31 DIAGNOSIS — I50.32 CHRONIC DIASTOLIC (CONGESTIVE) HEART FAILURE: ICD-10-CM

## 2023-01-03 RX ORDER — NEBIVOLOL 5 MG/1
TABLET ORAL
Qty: 90 TABLET | Refills: 1 | Status: SHIPPED | OUTPATIENT
Start: 2023-01-03

## 2023-01-04 ENCOUNTER — APPOINTMENT (OUTPATIENT)
Dept: GENERAL RADIOLOGY | Facility: HOSPITAL | Age: 78
DRG: 190 | End: 2023-01-04
Payer: MEDICARE

## 2023-01-04 ENCOUNTER — APPOINTMENT (OUTPATIENT)
Dept: CT IMAGING | Facility: HOSPITAL | Age: 78
DRG: 190 | End: 2023-01-04
Payer: MEDICARE

## 2023-01-04 ENCOUNTER — HOSPITAL ENCOUNTER (INPATIENT)
Facility: HOSPITAL | Age: 78
LOS: 4 days | Discharge: HOME HEALTH | DRG: 190 | End: 2023-01-08
Attending: EMERGENCY MEDICINE | Admitting: INTERNAL MEDICINE
Payer: MEDICARE

## 2023-01-04 DIAGNOSIS — Z78.9 IMPAIRED MOBILITY AND ADLS: ICD-10-CM

## 2023-01-04 DIAGNOSIS — Z74.09 IMPAIRED FUNCTIONAL MOBILITY, BALANCE, GAIT, AND ENDURANCE: ICD-10-CM

## 2023-01-04 DIAGNOSIS — D64.9 ANEMIA, UNSPECIFIED TYPE: ICD-10-CM

## 2023-01-04 DIAGNOSIS — N18.9 CHRONIC KIDNEY DISEASE, UNSPECIFIED CKD STAGE: ICD-10-CM

## 2023-01-04 DIAGNOSIS — J44.1 COPD EXACERBATION: ICD-10-CM

## 2023-01-04 DIAGNOSIS — J18.9 PNEUMONIA OF BOTH LUNGS DUE TO INFECTIOUS ORGANISM, UNSPECIFIED PART OF LUNG: Primary | ICD-10-CM

## 2023-01-04 DIAGNOSIS — Z74.09 IMPAIRED MOBILITY AND ADLS: ICD-10-CM

## 2023-01-04 DIAGNOSIS — J44.9 CHRONIC OBSTRUCTIVE PULMONARY DISEASE, UNSPECIFIED COPD TYPE: ICD-10-CM

## 2023-01-04 LAB
ALBUMIN SERPL-MCNC: 3.8 G/DL (ref 3.5–5.2)
ALBUMIN/GLOB SERPL: 1.2 G/DL
ALP SERPL-CCNC: 113 U/L (ref 39–117)
ALT SERPL W P-5'-P-CCNC: 23 U/L (ref 1–33)
ANION GAP SERPL CALCULATED.3IONS-SCNC: 10 MMOL/L (ref 5–15)
AST SERPL-CCNC: 25 U/L (ref 1–32)
BASOPHILS # BLD AUTO: 0.04 10*3/MM3 (ref 0–0.2)
BASOPHILS NFR BLD AUTO: 0.4 % (ref 0–1.5)
BILIRUB SERPL-MCNC: 0.3 MG/DL (ref 0–1.2)
BUN SERPL-MCNC: 22 MG/DL (ref 8–23)
BUN/CREAT SERPL: 15.4 (ref 7–25)
CALCIUM SPEC-SCNC: 9.2 MG/DL (ref 8.6–10.5)
CHLORIDE SERPL-SCNC: 101 MMOL/L (ref 98–107)
CO2 SERPL-SCNC: 26 MMOL/L (ref 22–29)
CREAT SERPL-MCNC: 1.43 MG/DL (ref 0.57–1)
D-DIMER, QUANTITATIVE (MAD,POW, STR): 1270 NG/ML (FEU) (ref 0–780)
D-LACTATE SERPL-SCNC: 1.3 MMOL/L (ref 0.5–2)
DEPRECATED RDW RBC AUTO: 43.4 FL (ref 37–54)
EGFRCR SERPLBLD CKD-EPI 2021: 37.6 ML/MIN/1.73
EOSINOPHIL # BLD AUTO: 0.14 10*3/MM3 (ref 0–0.4)
EOSINOPHIL NFR BLD AUTO: 1.2 % (ref 0.3–6.2)
ERYTHROCYTE [DISTWIDTH] IN BLOOD BY AUTOMATED COUNT: 12.6 % (ref 12.3–15.4)
FLUAV RNA RESP QL NAA+PROBE: NOT DETECTED
FLUBV RNA RESP QL NAA+PROBE: NOT DETECTED
GLOBULIN UR ELPH-MCNC: 3.3 GM/DL
GLUCOSE BLDC GLUCOMTR-MCNC: 255 MG/DL (ref 70–130)
GLUCOSE BLDC GLUCOMTR-MCNC: 297 MG/DL (ref 70–130)
GLUCOSE SERPL-MCNC: 157 MG/DL (ref 65–99)
HCT VFR BLD AUTO: 30.3 % (ref 34–46.6)
HGB BLD-MCNC: 10.1 G/DL (ref 12–15.9)
HOLD SPECIMEN: NORMAL
IMM GRANULOCYTES # BLD AUTO: 0.08 10*3/MM3 (ref 0–0.05)
IMM GRANULOCYTES NFR BLD AUTO: 0.7 % (ref 0–0.5)
LYMPHOCYTES # BLD AUTO: 1.12 10*3/MM3 (ref 0.7–3.1)
LYMPHOCYTES NFR BLD AUTO: 9.8 % (ref 19.6–45.3)
MCH RBC QN AUTO: 31.2 PG (ref 26.6–33)
MCHC RBC AUTO-ENTMCNC: 33.3 G/DL (ref 31.5–35.7)
MCV RBC AUTO: 93.5 FL (ref 79–97)
MONOCYTES # BLD AUTO: 0.87 10*3/MM3 (ref 0.1–0.9)
MONOCYTES NFR BLD AUTO: 7.6 % (ref 5–12)
NEUTROPHILS NFR BLD AUTO: 80.3 % (ref 42.7–76)
NEUTROPHILS NFR BLD AUTO: 9.13 10*3/MM3 (ref 1.7–7)
NRBC BLD AUTO-RTO: 0 /100 WBC (ref 0–0.2)
NT-PROBNP SERPL-MCNC: 1230 PG/ML (ref 0–1800)
PLATELET # BLD AUTO: 175 10*3/MM3 (ref 140–450)
PMV BLD AUTO: 11 FL (ref 6–12)
POTASSIUM SERPL-SCNC: 3.6 MMOL/L (ref 3.5–5.2)
PROT SERPL-MCNC: 7.1 G/DL (ref 6–8.5)
RBC # BLD AUTO: 3.24 10*6/MM3 (ref 3.77–5.28)
SARS-COV-2 RNA RESP QL NAA+PROBE: NOT DETECTED
SODIUM SERPL-SCNC: 137 MMOL/L (ref 136–145)
TROPONIN T SERPL-MCNC: <0.01 NG/ML (ref 0–0.03)
WBC NRBC COR # BLD: 11.38 10*3/MM3 (ref 3.4–10.8)
WHOLE BLOOD HOLD COAG: NORMAL
WHOLE BLOOD HOLD SPECIMEN: NORMAL

## 2023-01-04 PROCEDURE — 94760 N-INVAS EAR/PLS OXIMETRY 1: CPT

## 2023-01-04 PROCEDURE — 85025 COMPLETE CBC W/AUTO DIFF WBC: CPT | Performed by: EMERGENCY MEDICINE

## 2023-01-04 PROCEDURE — 71046 X-RAY EXAM CHEST 2 VIEWS: CPT

## 2023-01-04 PROCEDURE — 87040 BLOOD CULTURE FOR BACTERIA: CPT | Performed by: EMERGENCY MEDICINE

## 2023-01-04 PROCEDURE — 80053 COMPREHEN METABOLIC PANEL: CPT | Performed by: EMERGENCY MEDICINE

## 2023-01-04 PROCEDURE — 94664 DEMO&/EVAL PT USE INHALER: CPT

## 2023-01-04 PROCEDURE — 63710000001 INSULIN ASPART PER 5 UNITS: Performed by: PHYSICIAN ASSISTANT

## 2023-01-04 PROCEDURE — 94640 AIRWAY INHALATION TREATMENT: CPT

## 2023-01-04 PROCEDURE — 25010000002 METHYLPREDNISOLONE PER 125 MG: Performed by: EMERGENCY MEDICINE

## 2023-01-04 PROCEDURE — 99285 EMERGENCY DEPT VISIT HI MDM: CPT

## 2023-01-04 PROCEDURE — 25010000002 AZITHROMYCIN PER 500 MG: Performed by: EMERGENCY MEDICINE

## 2023-01-04 PROCEDURE — 93005 ELECTROCARDIOGRAM TRACING: CPT | Performed by: EMERGENCY MEDICINE

## 2023-01-04 PROCEDURE — 87636 SARSCOV2 & INF A&B AMP PRB: CPT | Performed by: EMERGENCY MEDICINE

## 2023-01-04 PROCEDURE — 71275 CT ANGIOGRAPHY CHEST: CPT

## 2023-01-04 PROCEDURE — 0 IOPAMIDOL PER 1 ML: Performed by: EMERGENCY MEDICINE

## 2023-01-04 PROCEDURE — 25010000002 CEFTRIAXONE PER 250 MG: Performed by: PHYSICIAN ASSISTANT

## 2023-01-04 PROCEDURE — 82962 GLUCOSE BLOOD TEST: CPT

## 2023-01-04 PROCEDURE — 25010000002 ENOXAPARIN PER 10 MG: Performed by: PHYSICIAN ASSISTANT

## 2023-01-04 PROCEDURE — 83880 ASSAY OF NATRIURETIC PEPTIDE: CPT | Performed by: EMERGENCY MEDICINE

## 2023-01-04 PROCEDURE — 84484 ASSAY OF TROPONIN QUANT: CPT | Performed by: EMERGENCY MEDICINE

## 2023-01-04 PROCEDURE — 94799 UNLISTED PULMONARY SVC/PX: CPT

## 2023-01-04 PROCEDURE — 85379 FIBRIN DEGRADATION QUANT: CPT | Performed by: EMERGENCY MEDICINE

## 2023-01-04 PROCEDURE — 93010 ELECTROCARDIOGRAM REPORT: CPT | Performed by: INTERNAL MEDICINE

## 2023-01-04 PROCEDURE — 36415 COLL VENOUS BLD VENIPUNCTURE: CPT

## 2023-01-04 PROCEDURE — 83605 ASSAY OF LACTIC ACID: CPT | Performed by: EMERGENCY MEDICINE

## 2023-01-04 RX ORDER — SODIUM CHLORIDE 0.9 % (FLUSH) 0.9 %
10 SYRINGE (ML) INJECTION AS NEEDED
Status: DISCONTINUED | OUTPATIENT
Start: 2023-01-04 | End: 2023-01-08 | Stop reason: HOSPADM

## 2023-01-04 RX ORDER — VENLAFAXINE 75 MG/1
75 TABLET ORAL EVERY MORNING
Status: DISCONTINUED | OUTPATIENT
Start: 2023-01-05 | End: 2023-01-04

## 2023-01-04 RX ORDER — ACETAMINOPHEN 325 MG/1
650 TABLET ORAL EVERY 4 HOURS PRN
Status: DISCONTINUED | OUTPATIENT
Start: 2023-01-04 | End: 2023-01-08 | Stop reason: HOSPADM

## 2023-01-04 RX ORDER — ENOXAPARIN SODIUM 100 MG/ML
40 INJECTION SUBCUTANEOUS EVERY 24 HOURS
Status: DISCONTINUED | OUTPATIENT
Start: 2023-01-04 | End: 2023-01-08 | Stop reason: HOSPADM

## 2023-01-04 RX ORDER — METFORMIN HYDROCHLORIDE 500 MG/1
500 TABLET, EXTENDED RELEASE ORAL 2 TIMES DAILY
Status: DISCONTINUED | OUTPATIENT
Start: 2023-01-04 | End: 2023-01-08 | Stop reason: HOSPADM

## 2023-01-04 RX ORDER — GUAIFENESIN 200 MG/10ML
200 LIQUID ORAL EVERY 4 HOURS PRN
Status: DISCONTINUED | OUTPATIENT
Start: 2023-01-04 | End: 2023-01-05

## 2023-01-04 RX ORDER — ALBUTEROL SULFATE 2.5 MG/3ML
2.5 SOLUTION RESPIRATORY (INHALATION) ONCE
Status: COMPLETED | OUTPATIENT
Start: 2023-01-04 | End: 2023-01-04

## 2023-01-04 RX ORDER — ALLOPURINOL 100 MG/1
100 TABLET ORAL DAILY
Status: DISCONTINUED | OUTPATIENT
Start: 2023-01-04 | End: 2023-01-08 | Stop reason: HOSPADM

## 2023-01-04 RX ORDER — ISOSORBIDE MONONITRATE 30 MG/1
30 TABLET, EXTENDED RELEASE ORAL DAILY
Status: DISCONTINUED | OUTPATIENT
Start: 2023-01-04 | End: 2023-01-08 | Stop reason: HOSPADM

## 2023-01-04 RX ORDER — IPRATROPIUM BROMIDE AND ALBUTEROL SULFATE 2.5; .5 MG/3ML; MG/3ML
3 SOLUTION RESPIRATORY (INHALATION)
Status: DISCONTINUED | OUTPATIENT
Start: 2023-01-04 | End: 2023-01-04

## 2023-01-04 RX ORDER — ROSUVASTATIN CALCIUM 10 MG/1
10 TABLET, COATED ORAL DAILY
Status: DISCONTINUED | OUTPATIENT
Start: 2023-01-04 | End: 2023-01-08 | Stop reason: HOSPADM

## 2023-01-04 RX ORDER — NICOTINE POLACRILEX 4 MG
15 LOZENGE BUCCAL
Status: DISCONTINUED | OUTPATIENT
Start: 2023-01-04 | End: 2023-01-08 | Stop reason: HOSPADM

## 2023-01-04 RX ORDER — OXYCODONE AND ACETAMINOPHEN 10; 325 MG/1; MG/1
1 TABLET ORAL EVERY 6 HOURS PRN
Status: DISCONTINUED | OUTPATIENT
Start: 2023-01-04 | End: 2023-01-08 | Stop reason: HOSPADM

## 2023-01-04 RX ORDER — LISINOPRIL 20 MG/1
20 TABLET ORAL DAILY
Status: DISCONTINUED | OUTPATIENT
Start: 2023-01-04 | End: 2023-01-08 | Stop reason: HOSPADM

## 2023-01-04 RX ORDER — LANOLIN ALCOHOL/MO/W.PET/CERES
6 CREAM (GRAM) TOPICAL NIGHTLY PRN
Status: DISCONTINUED | OUTPATIENT
Start: 2023-01-04 | End: 2023-01-08 | Stop reason: HOSPADM

## 2023-01-04 RX ORDER — ROPINIROLE 1 MG/1
2 TABLET, FILM COATED ORAL NIGHTLY
Status: DISCONTINUED | OUTPATIENT
Start: 2023-01-04 | End: 2023-01-08 | Stop reason: HOSPADM

## 2023-01-04 RX ORDER — DEXTROSE MONOHYDRATE 25 G/50ML
25 INJECTION, SOLUTION INTRAVENOUS
Status: DISCONTINUED | OUTPATIENT
Start: 2023-01-04 | End: 2023-01-08 | Stop reason: HOSPADM

## 2023-01-04 RX ORDER — ALBUTEROL SULFATE 2.5 MG/3ML
2.5 SOLUTION RESPIRATORY (INHALATION)
Status: DISCONTINUED | OUTPATIENT
Start: 2023-01-04 | End: 2023-01-05 | Stop reason: ALTCHOICE

## 2023-01-04 RX ORDER — VENLAFAXINE HYDROCHLORIDE 75 MG/1
75 CAPSULE, EXTENDED RELEASE ORAL EVERY MORNING
Status: DISCONTINUED | OUTPATIENT
Start: 2023-01-05 | End: 2023-01-08 | Stop reason: HOSPADM

## 2023-01-04 RX ORDER — ONDANSETRON 2 MG/ML
4 INJECTION INTRAMUSCULAR; INTRAVENOUS EVERY 6 HOURS PRN
Status: DISCONTINUED | OUTPATIENT
Start: 2023-01-04 | End: 2023-01-08 | Stop reason: HOSPADM

## 2023-01-04 RX ORDER — SODIUM CHLORIDE 9 MG/ML
40 INJECTION, SOLUTION INTRAVENOUS AS NEEDED
Status: DISCONTINUED | OUTPATIENT
Start: 2023-01-04 | End: 2023-01-08 | Stop reason: HOSPADM

## 2023-01-04 RX ORDER — ASPIRIN AND DIPYRIDAMOLE 25; 200 MG/1; MG/1
1 CAPSULE, EXTENDED RELEASE ORAL 2 TIMES DAILY
Status: DISCONTINUED | OUTPATIENT
Start: 2023-01-04 | End: 2023-01-08 | Stop reason: HOSPADM

## 2023-01-04 RX ORDER — DOCUSATE SODIUM 100 MG/1
100 CAPSULE, LIQUID FILLED ORAL 2 TIMES DAILY
Status: DISCONTINUED | OUTPATIENT
Start: 2023-01-04 | End: 2023-01-08 | Stop reason: HOSPADM

## 2023-01-04 RX ORDER — AMLODIPINE BESYLATE 10 MG/1
10 TABLET ORAL DAILY
Status: DISCONTINUED | OUTPATIENT
Start: 2023-01-04 | End: 2023-01-08 | Stop reason: HOSPADM

## 2023-01-04 RX ORDER — INSULIN ASPART 100 [IU]/ML
0-9 INJECTION, SOLUTION INTRAVENOUS; SUBCUTANEOUS
Status: DISCONTINUED | OUTPATIENT
Start: 2023-01-04 | End: 2023-01-08 | Stop reason: HOSPADM

## 2023-01-04 RX ORDER — GUAIFENESIN 600 MG/1
600 TABLET, EXTENDED RELEASE ORAL EVERY 12 HOURS SCHEDULED
Status: DISCONTINUED | OUTPATIENT
Start: 2023-01-04 | End: 2023-01-05

## 2023-01-04 RX ORDER — SODIUM CHLORIDE 0.9 % (FLUSH) 0.9 %
10 SYRINGE (ML) INJECTION EVERY 12 HOURS SCHEDULED
Status: DISCONTINUED | OUTPATIENT
Start: 2023-01-04 | End: 2023-01-08 | Stop reason: HOSPADM

## 2023-01-04 RX ORDER — ONDANSETRON 4 MG/1
4 TABLET, FILM COATED ORAL EVERY 6 HOURS PRN
Status: DISCONTINUED | OUTPATIENT
Start: 2023-01-04 | End: 2023-01-08 | Stop reason: HOSPADM

## 2023-01-04 RX ORDER — METHYLPREDNISOLONE SODIUM SUCCINATE 40 MG/ML
40 INJECTION, POWDER, LYOPHILIZED, FOR SOLUTION INTRAMUSCULAR; INTRAVENOUS EVERY 12 HOURS
Status: DISCONTINUED | OUTPATIENT
Start: 2023-01-05 | End: 2023-01-08 | Stop reason: HOSPADM

## 2023-01-04 RX ORDER — TIZANIDINE 4 MG/1
4 TABLET ORAL EVERY 8 HOURS PRN
Status: DISCONTINUED | OUTPATIENT
Start: 2023-01-04 | End: 2023-01-08 | Stop reason: HOSPADM

## 2023-01-04 RX ORDER — METHYLPREDNISOLONE SODIUM SUCCINATE 125 MG/2ML
INJECTION, POWDER, LYOPHILIZED, FOR SOLUTION INTRAMUSCULAR; INTRAVENOUS
Status: DISCONTINUED
Start: 2023-01-04 | End: 2023-01-08 | Stop reason: HOSPADM

## 2023-01-04 RX ORDER — FOLIC ACID 1 MG/1
1000 TABLET ORAL DAILY
Status: DISCONTINUED | OUTPATIENT
Start: 2023-01-04 | End: 2023-01-08 | Stop reason: HOSPADM

## 2023-01-04 RX ORDER — METHYLPREDNISOLONE SODIUM SUCCINATE 125 MG/2ML
125 INJECTION, POWDER, LYOPHILIZED, FOR SOLUTION INTRAMUSCULAR; INTRAVENOUS ONCE
Status: COMPLETED | OUTPATIENT
Start: 2023-01-04 | End: 2023-01-04

## 2023-01-04 RX ORDER — NEBIVOLOL 5 MG/1
5 TABLET ORAL DAILY
Status: DISCONTINUED | OUTPATIENT
Start: 2023-01-04 | End: 2023-01-08 | Stop reason: HOSPADM

## 2023-01-04 RX ADMIN — ASPIRIN AND EXTENDED-RELEASE DIPYRIDAMOLE 1 CAPSULE: 25; 200 CAPSULE ORAL at 21:17

## 2023-01-04 RX ADMIN — AZITHROMYCIN MONOHYDRATE 500 MG: 500 INJECTION, POWDER, LYOPHILIZED, FOR SOLUTION INTRAVENOUS at 15:39

## 2023-01-04 RX ADMIN — GUAIFENESIN 600 MG: 600 TABLET, EXTENDED RELEASE ORAL at 20:22

## 2023-01-04 RX ADMIN — OXYCODONE HYDROCHLORIDE AND ACETAMINOPHEN 1 TABLET: 10; 325 TABLET ORAL at 18:04

## 2023-01-04 RX ADMIN — ENOXAPARIN SODIUM 40 MG: 40 INJECTION SUBCUTANEOUS at 18:05

## 2023-01-04 RX ADMIN — DOCUSATE SODIUM 100 MG: 100 CAPSULE, LIQUID FILLED ORAL at 20:22

## 2023-01-04 RX ADMIN — ALBUTEROL SULFATE 2.5 MG: 2.5 SOLUTION RESPIRATORY (INHALATION) at 14:38

## 2023-01-04 RX ADMIN — ROPINIROLE 2 MG: 1 TABLET, FILM COATED ORAL at 20:22

## 2023-01-04 RX ADMIN — INSULIN ASPART 6 UNITS: 100 INJECTION, SOLUTION INTRAVENOUS; SUBCUTANEOUS at 18:05

## 2023-01-04 RX ADMIN — METHYLPREDNISOLONE SODIUM SUCCINATE 125 MG: 125 INJECTION, POWDER, FOR SOLUTION INTRAMUSCULAR; INTRAVENOUS at 14:22

## 2023-01-04 RX ADMIN — IPRATROPIUM BROMIDE 0.5 MG: 0.5 SOLUTION RESPIRATORY (INHALATION) at 14:38

## 2023-01-04 RX ADMIN — CEFTRIAXONE SODIUM 1 G: 1 INJECTION, POWDER, FOR SOLUTION INTRAMUSCULAR; INTRAVENOUS at 18:26

## 2023-01-04 RX ADMIN — Medication 10 ML: at 20:23

## 2023-01-04 RX ADMIN — IOPAMIDOL 60 ML: 755 INJECTION, SOLUTION INTRAVENOUS at 15:08

## 2023-01-04 NOTE — ED PROVIDER NOTES
"Subjective   History of Present Illness  78yo female pmh significant htn/hyperlipidemia/dm2/chf/copd/ckd presents ED c/o 1wk hx productive cough \"green\" sputum/fever (tmax 101F)/congestion/wheezing/soa/lind/orthopnea.  ROS neg chills/n/v/d/chest pain/abd pain/dysuria.    History provided by:  Patient  Illness  Severity:  Moderate  Duration:  1 week  Chronicity:  New  Associated symptoms: congestion, cough, fever, shortness of breath and wheezing        Review of Systems   Constitutional: Positive for fever. Negative for chills.   HENT: Positive for congestion.    Eyes: Negative for redness.   Respiratory: Positive for cough, shortness of breath and wheezing.    Cardiovascular: Negative.    Gastrointestinal: Negative.    Genitourinary: Negative.    Musculoskeletal: Negative.    Skin: Negative.    Allergic/Immunologic: Negative for immunocompromised state.   All other systems reviewed and are negative.      Past Medical History:   Diagnosis Date   • Anxiety    • Arthritis    • COPD (chronic obstructive pulmonary disease) (HCC)    • Depression    • Diabetes mellitus (HCC)    • History of transfusion    • Hyperlipidemia    • Hypertension    • Stroke (HCC)     Greater than 5 years ago       No Known Allergies    Past Surgical History:   Procedure Laterality Date   • CERVICAL FUSION  1999   • EYE SURGERY Bilateral 2014    lasix eye surgery   • HEMORRHOIDECTOMY     • HYSTERECTOMY     • INTRATHECAL PUMP REMOVAL      2006   • KNEE ARTHROSCOPY Right 2015   • LUMBAR SPINE SURGERY  1999   • PAIN PUMP INSERTION/REVISION      Removed in 2006       Family History   Problem Relation Age of Onset   • Diabetes Other    • Hypertension Other    • Other Other    • Kidney disease Other    • Heart attack Father 75   • Cancer Sister    • Lung cancer Brother    • Hypertension Sister        Social History     Socioeconomic History   • Marital status:    Tobacco Use   • Smoking status: Former     Packs/day: 1.00     Years: 40.00     " Pack years: 40.00     Types: Cigarettes     Quit date:      Years since quittin.0   • Smokeless tobacco: Never   Vaping Use   • Vaping Use: Never used   Substance and Sexual Activity   • Alcohol use: No   • Drug use: No   • Sexual activity: Defer           Objective   Physical Exam  Vitals and nursing note reviewed.   Constitutional:       Appearance: Normal appearance.   HENT:      Head: Normocephalic and atraumatic.      Right Ear: External ear normal.      Left Ear: External ear normal.      Mouth/Throat:      Mouth: Mucous membranes are moist.   Eyes:      Pupils: Pupils are equal, round, and reactive to light.   Cardiovascular:      Rate and Rhythm: Normal rate and regular rhythm.      Pulses: Normal pulses.      Heart sounds: Normal heart sounds. No murmur heard.    No friction rub. No gallop.   Pulmonary:      Effort: Tachypnea present.      Breath sounds: Examination of the right-lower field reveals decreased breath sounds. Examination of the left-lower field reveals decreased breath sounds. Decreased breath sounds present. No wheezing, rhonchi or rales.   Abdominal:      General: Abdomen is flat. Bowel sounds are normal. There is no distension.      Palpations: Abdomen is soft.      Tenderness: There is no abdominal tenderness. There is no guarding or rebound.   Musculoskeletal:      Cervical back: Normal range of motion and neck supple. No rigidity.      Right lower leg: No edema.      Left lower leg: No edema.   Lymphadenopathy:      Cervical: No cervical adenopathy.   Skin:     General: Skin is warm and dry.   Neurological:      General: No focal deficit present.      Mental Status: She is alert and oriented to person, place, and time.      GCS: GCS eye subscore is 4. GCS verbal subscore is 5. GCS motor subscore is 6.         ECG 12 Lead      Date/Time: 2023 2:07 PM  Performed by: Hernando Han MD  Authorized by: Hernando Han MD   Interpreted by physician  Rhythm: sinus rhythm  Ectopy:  "PVCs  Rate: normal  BPM: 69  QRS axis: normal  Conduction: conduction normal  ST Segments: ST segments normal  T Waves: T waves normal  Other: no other findings  Clinical impression: non-specific ECG                 ED Course      Labs Reviewed   COMPREHENSIVE METABOLIC PANEL - Abnormal; Notable for the following components:       Result Value    Glucose 157 (*)     Creatinine 1.43 (*)     eGFR 37.6 (*)     All other components within normal limits    Narrative:     GFR Normal >60  Chronic Kidney Disease <60  Kidney Failure <15    The GFR formula is only valid for adults with stable renal function between ages 18 and 70.   CBC WITH AUTO DIFFERENTIAL - Abnormal; Notable for the following components:    WBC 11.38 (*)     RBC 3.24 (*)     Hemoglobin 10.1 (*)     Hematocrit 30.3 (*)     Neutrophil % 80.3 (*)     Lymphocyte % 9.8 (*)     Immature Grans % 0.7 (*)     Neutrophils, Absolute 9.13 (*)     Immature Grans, Absolute 0.08 (*)     All other components within normal limits   D-DIMER, QUANTITATIVE - Abnormal; Notable for the following components:    D-Dimer, Quantitative 1,270 (*)     All other components within normal limits    Narrative:     According to the assay 's published package insert, a normal (<500 ng/mL (FEU)) D-dimer result in conjunction with a non-high clinical probability assessment, excludes deep vein thrombosis (DVT) and pulmonary embolism (PE) with high sensitivity.    D-dimer values increase with age and this can make VTE exclusion of an older population difficult. To address this, the [employer], based on best available evidence and recent guidelines, recommends that clinicians use age-adjusted D-dimer thresholds in patients greater than 50 years of age with: a) a low probability of PE who do not meet all Pulmonary Embolism Rule Out Criteria, or b) in those with intermediate probability of PE.   The formula for an age-adjusted D-dimer cut-off is \"age*10\".  For " example, a 60 year old patient would have an age-adjusted cut-off of 600 ng/mL (FEU) and an 80 year old 800 ng/mL (FEU).     COVID-19 AND FLU A/B, NP SWAB IN TRANSPORT MEDIA 8-12 HR TAT - Normal    Narrative:     Fact sheet for providers: https://www.fda.gov/media/092566/download    Fact sheet for patients: https://www.fda.gov/media/518860/download    Test performed by PCR.   BNP (IN-HOUSE) - Normal    Narrative:     Among patients with dyspnea, NT-proBNP is highly sensitive for the detection of acute congestive heart failure. In addition NT-proBNP of <300 pg/ml effectively rules out acute congestive heart failure with 99% negative predictive value.     TROPONIN (IN-HOUSE) - Normal    Narrative:     Troponin T Reference Range:  <= 0.03 ng/mL-   Negative for AMI  >0.03 ng/mL-     Abnormal for myocardial necrosis.  Clinicians would have to utilize clinical acumen, EKG, Troponin and serial changes to determine if it is an Acute Myocardial Infarction or myocardial injury due to an underlying chronic condition.       Results may be falsely decreased if patient taking Biotin.     LACTIC ACID, PLASMA - Normal   BLOOD CULTURE   BLOOD CULTURE   RAINBOW DRAW    Narrative:     The following orders were created for panel order [place] Draw.  Procedure                               Abnormality         Status                     ---------                               -----------         ------                     Green Top (Gel)[[id number]]                                  Final result               Lavender Top[[id number]]                                     Final result               Gold Top - SST[[id number]]                                   Final result               Light Blue Top[[id number]]                                   Final result                 Please view results for these tests on the individual orders.   CBC AND DIFFERENTIAL    Narrative:     The following orders were created for panel order CBC &  Differential.  Procedure                               Abnormality         Status                     ---------                               -----------         ------                     CBC Auto Differential[[id number]]        Abnormal            Final result                 Please view results for these tests on the individual orders.   GREEN TOP   LAVENDER TOP   GOLD TOP - SST   LIGHT BLUE TOP   EXTRA TUBES    Narrative:     The following orders were created for panel order Extra Tubes.  Procedure                               Abnormality         Status                     ---------                               -----------         ------                     Dela Cruz Top[[id number]]                                         In process                   Please view results for these tests on the individual orders.   GRAY TOP     XR Chest 2 View    Result Date: 1/4/2023  Narrative: EXAM: XR CHEST 2 VIEWS HISTORY: SOA Triage Protocol. COMPARISON: None FINDINGS: Heart: Enlarged Mediastinum: Mild venous congestion. Pleura: No significant effusion Lungs: Ill-defined bilateral pulmonary opacities. Bones: Probable old right rib fractures. Osteopenia. Postsurgical changes in the cervical spine Abdomen: Visualized upper abdomen is unremarkable     Impression: Mild congestion. Ill-defined bilateral opacities may be related to edema or atypical pneumonia. Electronically signed by:  Jesus Adams DO  1/4/2023 2:52 PM CST Workstation: [id number]    CT Angiogram Chest    Result Date: 1/4/2023  Narrative: PROCEDURE: CT -CTA Thorax/PE with contrast REASON FOR EXAM: d dimer+ FINDINGS: Comparison study dated  July 21, 2020. Axial computer tomography sequential imaging was performed from the thoracic inlet through the diaphragms after administration of IV contrast . Sagittal and coronal reformation was performed. This exam was performed according to our departmental dose optimization program, which includes automated exposure control,  adjustment of the mA and/or KV according to patient size and/or use of iterative reconstruction technique.  Streak artifact emanates from the dense contrast within the SVC. Pulmonary arteries are normal. There is no filling defect identified to suggest pulmonary embolus. Enlarged subcarinal lymph node which measures 2.24 x 1.6 cm. This lymph node is of uncertain etiology by strict size criteria. Shotty pretracheal, precarinal, and prevascular lymph nodes with short axis diameter less than 1 cm. Mediastinal structures of the chest are otherwise unremarkable. No pericardial effusion. Right upper lobe apical segment small nodular groundglass opacity. Right middle lobe small linear opacity. Right middle lobe small peripheral groundglass opacity. Right middle lobe medial small patchy opacity. Left upper lobe lingula two small patchy opacities. Lungs are otherwise clear. Pleural spaces are normal. Visualized upper abdominal structures reveals stable left adrenal gland 1.14 cm nodule. This prolonged interval stability is reassuring for benign process. Otherwise visualized upper abdominal structures are unremarkable. No acute osseous abnormality. Anterior metallic plate fixation of the lower cervical spine.     Impression: 1. No evidence of pulmonary embolus. 2. Enlarged subcarinal lymph node which measures 2.24 x 1.6 cm. This lymph node is of uncertain etiology by strict size criteria. The differential would include probable inflammatory lymphadenopathy versus less likely lymphadenopathy secondary to neoplastic involvement. Recommend clinical correlation and consideration of follow-up CT in three months to further evaluate. 3.Right upper lobe apical segment small nodular groundglass opacity. Right middle lobe small peripheral groundglass opacity. Cannot exclude early inflammatory changes such as early viral pneumonia. Recommend clinical correlation. 4. Right middle lobe linear opacity suspicious for discoid atelectasis.  5.Right middle lobe medial small patchy opacity. Left upper lobe lingula two small patchy opacities. These opacities would be suspicious for subsegmental atelectasis versus early pneumonia. Recommend clinical correlation. 6. Visualized upper abdominal structures reveals stable left adrenal gland 1.14 cm nodule. This prolonged interval stability is reassuring for benign process. 7. Remainder CT chest exam is unremarkable as described above. Electronically signed by:  Addison Saha MD  1/4/2023 4:01 PM [facility] Workstation: [id number]                                         [facility]    Final diagnoses:   Pneumonia of both lungs due to infectious organism, unspecified part of lung   COPD exacerbation (HCC)   Chronic kidney disease, unspecified CKD stage   Anemia, unspecified type       ED Disposition  ED Disposition     ED Disposition   Decision to Admit    Condition   --    Comment   Level of Care: Telemetry [5]   Admitting Physician: MARIA DEL CARMEN KEBEDE [[id number]]   Attending Physician: MARIA DEL CARMEN KEBEDE [[id number]]   Patient Class: Inpatient [101]               No follow-up provider specified.       Medication List      No changes were made to your prescriptions during this visit.          Hernando Han MD  01/04/23 [id number]

## 2023-01-04 NOTE — H&P
[facility] Medicine  HISTORY AND PHYSICAL      Date of Admission: 1/4/2023  Primary Care Physician: Brook Arreola MD    Subjective     Chief Complaint: Dyspnea, productive cough, fever    History of Present Illness  Patient is a 77 year old female (PMHx COPD, MARISOL, DM, HLD, HTN, CVA, Arthritis) who presented to [facility] ED today with complaints of dyspnea along with productive cough and fever. Onset of symptoms in the last 4 days. She does have COPD and has supplemental oxygen at home, which she typically uses at night. She has found herself needing to use this during the day and notes increased dyspnea with minimal exertion. Patient reports sputum produced with cough is thick, yellow-green in color. She denies any chest pain.     ED findings include CXR findings of bilateral opacities consistent with pneumonia. Leukocytosis of 11,000 noted with mild left shift; remainder of blood counts stable, consistent with chronic anemia. Creatinine level today is 1.43; baseline appears 1.2-1.3. Electrolytes stable. No elevation of lactic acid. D-dimer elevation prompted CTA imaging of the chest; no pulmonary embolism identified, but bilateral opacities again noted appearing consistent with probable early pneumonia; enlarged subcarinal node also noted which may be reactive given current infectious status. Hospitalist team consulted for admission and management.    Review of Systems   Constitutional: Positive for fatigue and fever. Negative for appetite change, chills and diaphoresis.   HENT: Negative for congestion, ear pain, postnasal drip, rhinorrhea, sinus pressure, sinus pain, sneezing, sore throat and trouble swallowing.    Eyes: Negative for photophobia, pain and discharge.   Respiratory: Positive for cough (productive) and shortness of breath. Negative for chest tightness and wheezing.    Cardiovascular: Negative for chest pain, palpitations and leg swelling.    Gastrointestinal: Negative for abdominal pain, blood in stool, constipation, diarrhea, nausea and vomiting.   Endocrine: Negative for polydipsia, polyphagia and polyuria.   Genitourinary: Negative for difficulty urinating, dysuria, flank pain, frequency, hematuria and urgency.   Musculoskeletal: Negative for arthralgias, back pain, myalgias and neck pain.   Skin: Negative for color change, rash and wound.   Neurological: Negative for dizziness, seizures, syncope, weakness and headaches.   Hematological: Does not bruise/bleed easily.   Psychiatric/Behavioral: Negative for behavioral problems, confusion, hallucinations, sleep disturbance and suicidal ideas.        Otherwise complete ROS reviewed and negative except as mentioned in the HPI.    Past Medical History:   Past Medical History:   Diagnosis Date   • Anxiety    • Arthritis    • COPD (chronic obstructive pulmonary disease) (HCC)    • Depression    • Diabetes mellitus (HCC)    • History of transfusion    • Hyperlipidemia    • Hypertension    • Stroke (HCC)     Greater than 5 years ago     Past Surgical History:  Past Surgical History:   Procedure Laterality Date   • CERVICAL FUSION  1999   • EYE SURGERY Bilateral 2014    lasix eye surgery   • HEMORRHOIDECTOMY     • HYSTERECTOMY     • INTRATHECAL PUMP REMOVAL      2006   • KNEE ARTHROSCOPY Right 2015   • LUMBAR SPINE SURGERY  1999   • PAIN PUMP INSERTION/REVISION      Removed in 2006     Social History:  reports that she quit smoking about 24 years ago. Her smoking use included cigarettes. She has a 40.00 pack-year smoking history. She has never used smokeless tobacco. She reports that she does not drink alcohol and does not use drugs.    Family History: family history includes Cancer in her sister; Diabetes in an other family member; Heart attack (age of onset: 75) in her father; Hypertension in her sister and another family member; Kidney disease in an other family member; Lung cancer in her brother; Other in  an other family member.       Allergies:  No Known Allergies    Medications:  Prior to Admission medications    Medication Sig Start Date End Date Taking? Authorizing Provider   albuterol sulfate  (90 Base) MCG/ACT inhaler Inhale 2 puffs Every 6 (Six) Hours As Needed for Wheezing or Shortness of Air. 7/12/22   Aubree Duke APRN   allopurinol (ZYLOPRIM) 100 MG tablet Take half tablet by mouth daily 7/5/22   Brook Arreola MD   amLODIPine (NORVASC) 10 MG tablet TAKE 1 TABLET BY MOUTH EVERY DAY 10/20/22   Brook Arreola MD   aspirin-dipyridamole (AGGRENOX)  MG per 12 hr capsule TAKE 1 CAPSULE BY MOUTH TWICE A DAY 8/3/22   Brook Arreola MD   cholecalciferol (VITAMIN D3) 25 MCG (1000 UT) tablet Take 1,000 Units by mouth Daily.    Samina Leggett MD   colchicine 0.6 MG tablet Take 2 tabs (1.2 mg) today, then 1 tab (0.6 mg) daily for up to 2 weeks after gout flare resolves. 8/12/22   Borok Arreola MD   docusate sodium (COLACE) 100 MG capsule Take 100 mg by mouth 2 (Two) Times a Day.    Samina Leggett MD   folic acid (FOLVITE) 1 MG tablet TAKE 1 TABLET BY MOUTH EVERY DAY 10/31/22   Brook Arreola MD   furosemide (LASIX) 40 MG tablet TAKE 1/2 TABLET BY MOUTH DAILY AS NEEDED (LOWER EXTREMITY EDEMA). 3/29/22   Brook Arreola MD   gabapentin (NEURONTIN) 100 MG capsule  12/9/21   Samina Leggett MD   hydroCHLOROthiazide (HYDRODIURIL) 25 MG tablet Take 25 mg by mouth Daily. 6/9/22   Samina Leggett MD   ipratropium-albuterol (DUO-NEB) 0.5-2.5 mg/3 ml nebulizer Take 3 mL by nebulization 4 (Four) Times a Day. 7/12/21   Wisam Arboleda MD   isosorbide mononitrate (IMDUR) 30 MG 24 hr tablet TAKE 1 TABLET BY MOUTH EVERY DAY 3/29/22   Brook Arreola MD   lisinopril (PRINIVIL,ZESTRIL) 20 MG tablet Take 1 tablet by mouth Daily. 3/28/22   Brook Arreola MD   loratadine (CLARITIN) 10 MG tablet TAKE 1 TABLET BY MOUTH EVERY DAY 8/15/22   Brook Arreola,  "MD   metFORMIN ER (GLUCOPHAGE-XR) 500 MG 24 hr tablet Take 1 tablet by mouth 2 (Two) Times a Day. 11/14/22   Brook Arreola MD   nebivolol (BYSTOLIC) 5 MG tablet TAKE 1 TABLET BY MOUTH EVERY DAY 1/3/23   Brook Arreola MD   nystatin (MYCOSTATIN) 279753 UNIT/GM powder Apply  topically to the appropriate area as directed 3 (Three) Times a Day. 7/26/22   Brook Arreola MD   Omega-3 Fatty Acids (FISH OIL) 1200 MG capsule capsule Take 1,200 mg by mouth 3 (Three) Times a Day With Meals.    ProviderSamina MD   oxyCODONE-acetaminophen (PERCOCET)  MG per tablet  6/21/22   ProviderSamina MD   rOPINIRole (REQUIP) 1 MG tablet TAKE 2 TABLETS BY MOUTH EVERY NIGHT 1 HOUR BEFORE BEDTIME. 8/3/22   Brook Arreola MD   rosuvastatin (CRESTOR) 10 MG tablet TAKE 1 TABLET BY MOUTH EVERY DAY 11/29/21   Brook Arreola MD   tiotropium bromide-olodaterol (Stiolto Respimat) 2.5-2.5 MCG/ACT aerosol solution inhaler Inhale 2 puffs Daily. 11/19/21   Alejandra Santos,    tiZANidine (ZANAFLEX) 4 MG tablet TAKE 1 TABLET BY MOUTH EVERY 8 HOURS AS NEEDED FOR MUSCLE SPASMS (AT LUNCH AND BEDTIME AS NEEDED) 10/17/22   Brook Arreola MD   Triamcinolone Acetonide (NASACORT) 55 MCG/ACT nasal inhaler INSTILL 1 SPRAY INTO THE NOSTRIL(S) AS DIRECTED BY PROVIDER EVERY OTHER DAY. *NOT COVERED* 5/21/21   Brook Arreola MD   venlafaxine (EFFEXOR) 37.5 MG tablet Take 75 mg by mouth Every Morning.    ProviderSamina MD     I have utilized all available immediate resources to obtain, update, and review the patient's current medications.    Objective     Vital Signs: /64   Pulse 75   Temp 98.6 °F (37 °C) (Oral)   Resp 24   Ht 166.4 cm (65.5\")   Wt 98.4 kg (217 lb)   SpO2 94%   BMI 35.56 kg/m²   Physical Exam  Vitals and nursing note reviewed.   Constitutional:       Appearance: Normal appearance. She is obese.   HENT:      Head: Normocephalic and atraumatic.      Right Ear: External ear " normal.      Left Ear: External ear normal.      Nose: Nose normal. No congestion or rhinorrhea.      Mouth/Throat:      Mouth: Mucous membranes are moist.      Pharynx: Oropharynx is clear.   Eyes:      General: No scleral icterus.     Extraocular Movements: Extraocular movements intact.      Conjunctiva/sclera: Conjunctivae normal.      Pupils: Pupils are equal, round, and reactive to light.   Neck:      Vascular: No carotid bruit.   Cardiovascular:      Rate and Rhythm: Normal rate and regular rhythm.      Pulses: Normal pulses.      Heart sounds: Normal heart sounds. No murmur heard.  Pulmonary:      Effort: Pulmonary effort is normal.      Breath sounds: Examination of the right-middle field reveals rhonchi. Examination of the left-middle field reveals rhonchi. Examination of the right-lower field reveals rhonchi. Examination of the left-lower field reveals rhonchi. Rhonchi present. No wheezing or rales.   Abdominal:      General: Abdomen is flat. Bowel sounds are normal.      Palpations: Abdomen is soft.      Tenderness: There is no abdominal tenderness. There is no guarding.   Musculoskeletal:         General: No swelling or deformity. Normal range of motion.      Cervical back: Normal range of motion and neck supple. No tenderness.      Right lower leg: No edema.      Left lower leg: No edema.   Skin:     General: Skin is warm and dry.      Capillary Refill: Capillary refill takes less than 2 seconds.      Findings: No rash.   Neurological:      General: No focal deficit present.      Mental Status: She is alert and oriented to person, place, and time.      Motor: No weakness.   Psychiatric:         Mood and Affect: Mood normal.         Behavior: Behavior normal.         Thought Content: Thought content normal.         Judgment: Judgment normal.              Results Reviewed:  Lab Results (last 24 hours)     Procedure Component Value Units Date/Time    [place] Draw [[id number]] Collected: 01/04/23 0747     Specimen: Blood Updated: 01/04/23 1500    Narrative:      The following orders were created for panel order [place] Draw.  Procedure                               Abnormality         Status                     ---------                               -----------         ------                     Green Top (Gel)[[id number]]                                  Final result               Lavender Top[[id number]]                                     Final result               Gold Top - SST[[id number]]                                   Final result               Light Blue Top[[id number]]                                   Final result                 Please view results for these tests on the individual orders.    Light Blue Top [[id number]] Collected: 01/04/23 1354    Specimen: Blood Updated: 01/04/23 1500     Extra Tube Hold for add-ons.     Comment: Auto resulted       Green Top (Gel) [[id number]] Collected: 01/04/23 1354    Specimen: Blood Updated: 01/04/23 1500     Extra Tube Hold for add-ons.     Comment: Auto resulted.       Lavender Top [[id number]] Collected: 01/04/23 1354    Specimen: Blood Updated: 01/04/23 1500     Extra Tube hold for add-on     Comment: Auto resulted       Gold Top - SST [[id number]] Collected: 01/04/23 1354    Specimen: Blood Updated: 01/04/23 1500     Extra Tube Hold for add-ons.     Comment: Auto resulted.       Blood Culture - Blood, Hand, Right [[id number]] Collected: 01/04/23 1441    Specimen: Blood from Hand, Right Updated: 01/04/23 1444    Blood Culture - Blood, Arm, Right [[id number]] Collected: 01/04/23 1422    Specimen: Blood from Arm, Right Updated: 01/04/23 1437    Lactic Acid, Plasma [[id number]]  (Normal) Collected: 01/04/23 1401    Specimen: Blood Updated: 01/04/23 1429     Lactate 1.3 mmol/L     Comprehensive Metabolic Panel [[id number]]  (Abnormal) Collected: 01/04/23 1354    Specimen: Blood Updated: 01/04/23 1421     Glucose 157 mg/dL      BUN 22 mg/dL      Creatinine 1.43 mg/dL       Sodium 137 mmol/L      Potassium 3.6 mmol/L      Chloride 101 mmol/L      CO2 26.0 mmol/L      Calcium 9.2 mg/dL      Total Protein 7.1 g/dL      Albumin 3.8 g/dL      ALT (SGPT) 23 U/L      AST (SGOT) 25 U/L      Alkaline Phosphatase 113 U/L      Total Bilirubin 0.3 mg/dL      Globulin 3.3 gm/dL      A/G Ratio 1.2 g/dL      BUN/Creatinine Ratio 15.4     Anion Gap 10.0 mmol/L      eGFR 37.6 mL/min/1.73      Comment: National Kidney Foundation and American Society of Nephrology (ASN) Task Force recommended calculation based on the Chronic Kidney Disease Epidemiology Collaboration (CKD-EPI) equation refit without adjustment for race.       Narrative:      GFR Normal >60  Chronic Kidney Disease <60  Kidney Failure <15    The GFR formula is only valid for adults with stable renal function between ages 18 and 70.    Troponin [[id number]]  (Normal) Collected: 01/04/23 1354    Specimen: Blood Updated: 01/04/23 1421     Troponin T <0.010 ng/mL     Narrative:      Troponin T Reference Range:  <= 0.03 ng/mL-   Negative for AMI  >0.03 ng/mL-     Abnormal for myocardial necrosis.  Clinicians would have to utilize clinical acumen, EKG, Troponin and serial changes to determine if it is an Acute Myocardial Infarction or myocardial injury due to an underlying chronic condition.       Results may be falsely decreased if patient taking Biotin.      BNP [[id number]]  (Normal) Collected: 01/04/23 1354    Specimen: Blood Updated: 01/04/23 1419     proBNP 1,230.0 pg/mL     Narrative:      Among patients with dyspnea, NT-proBNP is highly sensitive for the detection of acute congestive heart failure. In addition NT-proBNP of <300 pg/ml effectively rules out acute congestive heart failure with 99% negative predictive value.      D-dimer, Quantitative [[id number]]  (Abnormal) Collected: 01/04/23 1354    Specimen: Blood Updated: 01/04/23 1418     D-Dimer, Quantitative 1,270 ng/mL (FEU)     Narrative:      According to the assay 's  "published package insert, a normal (<500 ng/mL (FEU)) D-dimer result in conjunction with a non-high clinical probability assessment, excludes deep vein thrombosis (DVT) and pulmonary embolism (PE) with high sensitivity.    D-dimer values increase with age and this can make VTE exclusion of an older population difficult. To address this, the American College of Physicians, based on best available evidence and recent guidelines, recommends that clinicians use age-adjusted D-dimer thresholds in patients greater than 50 years of age with: a) a low probability of PE who do not meet all Pulmonary Embolism Rule Out Criteria, or b) in those with intermediate probability of PE.   The formula for an age-adjusted D-dimer cut-off is \"age*10\".  For example, a 60 year old patient would have an age-adjusted cut-off of 600 ng/mL (FEU) and an 80 year old 800 ng/mL (FEU).      COVID-19 and FLU A/B PCR - Swab, Nasopharynx [[id number]]  (Normal) Collected: 01/04/23 1343    Specimen: Swab from Nasopharynx Updated: 01/04/23 1409     COVID19 Not Detected     Influenza A PCR Not Detected     Influenza B PCR Not Detected    Narrative:      Fact sheet for providers: https://www.fda.gov/media/845429/download    Fact sheet for patients: https://www.fda.gov/media/225203/download    Test performed by PCR.    CBC & Differential [[id number]]  (Abnormal) Collected: 01/04/23 1354    Specimen: Blood Updated: 01/04/23 1403    Narrative:      The following orders were created for panel order CBC & Differential.  Procedure                               Abnormality         Status                     ---------                               -----------         ------                     CBC Auto Differential[[id number]]        Abnormal            Final result                 Please view results for these tests on the individual orders.    CBC Auto Differential [[id number]]  (Abnormal) Collected: 01/04/23 1354    Specimen: Blood Updated: 01/04/23 1403     WBC " 11.38 10*3/mm3      RBC 3.24 10*6/mm3      Hemoglobin 10.1 g/dL      Hematocrit 30.3 %      MCV 93.5 fL      MCH 31.2 pg      MCHC 33.3 g/dL      RDW 12.6 %      RDW-SD 43.4 fl      MPV 11.0 fL      Platelets 175 10*3/mm3      Neutrophil % 80.3 %      Lymphocyte % 9.8 %      Monocyte % 7.6 %      Eosinophil % 1.2 %      Basophil % 0.4 %      Immature Grans % 0.7 %      Neutrophils, Absolute 9.13 10*3/mm3      Lymphocytes, Absolute 1.12 10*3/mm3      Monocytes, Absolute 0.87 10*3/mm3      Eosinophils, Absolute 0.14 10*3/mm3      Basophils, Absolute 0.04 10*3/mm3      Immature Grans, Absolute 0.08 10*3/mm3      nRBC 0.0 /100 WBC     Extra Tubes [[id number]] Collected: 01/04/23 1401    Specimen: Blood Updated: 01/04/23 1401    Narrative:      The following orders were created for panel order Extra Tubes.  Procedure                               Abnormality         Status                     ---------                               -----------         ------                     [place] Top[[id number]]                                         In process                   Please view results for these tests on the individual orders.    Gray Top [[id number]] Collected: 01/04/23 1401    Specimen: Blood Updated: 01/04/23 1401        Imaging Results (Last 24 Hours)     Procedure Component Value Units Date/Time    CT Angiogram Chest [[id number]] Collected: 01/04/23 1458     Updated: 01/04/23 1603    Narrative:        PROCEDURE: CT -CTA Thorax/PE with contrast     REASON FOR EXAM: d dimer+    FINDINGS: Comparison study dated  July 21, 2020. Axial computer  tomography sequential imaging was performed from the thoracic  inlet through the diaphragms after administration of IV contrast  . Sagittal and coronal reformation was performed. This exam was  performed according to our departmental dose optimization  program, which includes automated exposure control, adjustment of  the mA and/or KV according to patient size and/or use of  iterative  reconstruction technique.     Streak artifact emanates from the dense contrast within the SVC.  Pulmonary arteries are normal. There is no filling defect  identified to suggest pulmonary embolus. Enlarged subcarinal  lymph node which measures 2.24 x 1.6 cm. This lymph node is of  uncertain etiology by strict size criteria. Shotty pretracheal,  precarinal, and prevascular lymph nodes with short axis diameter  less than 1 cm. Mediastinal structures of the chest are otherwise  unremarkable. No pericardial effusion. Right upper lobe apical  segment small nodular groundglass opacity. Right middle lobe  small linear opacity. Right middle lobe small peripheral  groundglass opacity. Right middle lobe medial small patchy  opacity. Left upper lobe lingula two small patchy opacities.  Lungs are otherwise clear. Pleural spaces are normal. Visualized  upper abdominal structures reveals stable left adrenal gland 1.14  cm nodule. This prolonged interval stability is reassuring for  benign process. Otherwise visualized upper abdominal structures  are unremarkable. No acute osseous abnormality. Anterior metallic  plate fixation of the lower cervical spine.      Impression:      1. No evidence of pulmonary embolus.  2. Enlarged subcarinal lymph node which measures 2.24 x 1.6 cm.  This lymph node is of uncertain etiology by strict size criteria.  The differential would include probable inflammatory  lymphadenopathy versus less likely lymphadenopathy secondary to  neoplastic involvement. Recommend clinical correlation and  consideration of follow-up CT in three months to further  evaluate.  3.Right upper lobe apical segment small nodular groundglass  opacity. Right middle lobe small peripheral groundglass opacity.  Cannot exclude early inflammatory changes such as early viral  pneumonia. Recommend clinical correlation.  4. Right middle lobe linear opacity suspicious for discoid  atelectasis.  5.Right middle lobe medial small patchy  opacity. Left upper lobe  lingula two small patchy opacities. These opacities would be  suspicious for subsegmental atelectasis versus early pneumonia.  Recommend clinical correlation.  6. Visualized upper abdominal structures reveals stable left  adrenal gland 1.14 cm nodule. This prolonged interval stability  is reassuring for benign process.   7. Remainder CT chest exam is unremarkable as described above.    Electronically signed by:  Addison Saha MD  1/4/2023 4:01 PM CST  Workstation: [id number]    XR Chest 2 View [[id number]] Collected: 01/04/23 1357     Updated: 01/04/23 1454    Narrative:      EXAM: XR CHEST 2 VIEWS     HISTORY: [facility] Triage Protocol.    COMPARISON: None     FINDINGS:    Heart: Enlarged    Mediastinum: Mild venous congestion.    Pleura: No significant effusion    Lungs: Ill-defined bilateral pulmonary opacities.    Bones: Probable old right rib fractures. Osteopenia. Postsurgical  changes in the cervical spine    Abdomen: Visualized upper abdomen is unremarkable      Impression:        Mild congestion. Ill-defined bilateral opacities may be related  to edema or atypical pneumonia.    Electronically signed by:  Jesus Adams DO  1/4/2023 2:52 PM CST  Workstation: [id number]        I have personally reviewed and interpreted the radiology studies and ECG obtained at time of admission.       Assessment / Plan     Assessment:   Active Hospital Problems    Diagnosis    • **Pneumonia of both lungs due to infectious organism, unspecified part of lung      Treatment Plan:   1. Pneumonia of both lungs, New:   -Productive cough with radiographic evidence of pneumonia, mild leukocytosis   -Antibiotic coverage initiated with Azithromycin/Rocephin; IV steroids ordered   -Supportive care including supplemental oxygen, nebs, inhalers, Mucinex, Robitussin   -Sputum culture and Blood cultures in process   -Hypoxia on room air; Patient is on oxygen at home, mainly at night; anticipate will continue daytime use  for some time following hospitalization    2. COPD, established:   -exacerbation in conjunction with new onset pneumonia, as above   -Continue with supportive care as above including steroids    3. HTN, established:   -Continue with home medications of Norvasc, Lisinopril, Bystolic    4. HLD, established:    -Continue with Crestor    5. DM, established:   -Last A1C 8.0 on 12/14/22   -Consistent carbohydrate diet, Acchecks with sliding scale insulin, continue Metformin    6. Anxiety, established:   -continue with Effexor    7. Arthritis, established:   -continue oxycodone     Medical Decision Making  Number and Complexity of problems: 7; high complexity    Conditions and Status:        Condition is unchanged.    [facility] Data  External documents reviewed: Last pulmonology visit, last PCP visit  My EKG interpretation: Sinus rhythm with PVCs noted; ventricular rate 69, no ST wave changes; PVCs new compared to tracing on 06/14/22  My plain film interpretation: Bilateral infiltrates, mid-lower lung fields  Tests considered but not ordered: n/a      Discussed with: Attending provider Dr. Sal     I have utilized all available immediate resources to obtain, update, or review the patient's current medications (including all prescriptions, over-the-counter products, herbals, cannabis/cannabidiol products, and vitamin/mineral/dietary (nutritional) supplements).     Care Planning  Shared decision making: Patient updated on current status and informed of proposed care plan; is in agreement with plan  Code status and discussions: FULL CODE, Healthcare surrogate is Martha Ho, daughter  I confirmed that the patient's Advance Care Plan is present, code status is documented, or surrogate decision maker is listed in the patient's medical record.       Disposition  Social Determinants of Health that impact treatment or disposition: lives alone, weakened physical state  I expect the patient to be discharged to 1 in 3 days.     The  patient was seen and examined by me on 01/04/23  at 1635.        Electronically signed by Elle Santos PA-C, 01/04/23, 16:52 CST.

## 2023-01-04 NOTE — ED NOTES
Nursing report ED to floor  Ronda Blair  77 y.o.  female    HPI:   Chief Complaint   Patient presents with    Cough    Shortness of Breath    URI       Admitting doctor:   Chandrakant Sal MD    Consulting provider(s):  Consults       Date and Time Order Name Status Description    1/4/2023  4:15 PM Hospitalist (on-call MD unless specified)               Admitting diagnosis:   The primary encounter diagnosis was Pneumonia of both lungs due to infectious organism, unspecified part of lung. Diagnoses of COPD exacerbation (HCC), Chronic kidney disease, unspecified CKD stage, and Anemia, unspecified type were also pertinent to this visit.    Code status:   Current Code Status       Date Active Code Status Order ID Comments User Context       Prior            Allergies:   Patient has no known allergies.    Intake and Output  No intake or output data in the 24 hours ending 01/04/23 1626    Weight:       01/04/23  1300   Weight: 98.4 kg (217 lb)       Most recent vitals:   Vitals:    01/04/23 1430 01/04/23 1438 01/04/23 1514 01/04/23 1626   BP: 143/65  169/70 145/64   BP Location:       Patient Position:       Pulse: 62 62 78 75   Resp: 20 18 20 24   Temp:       TempSrc:       SpO2: 98% 98% 95% 94%   Weight:       Height:         Oxygen Therapy: 3 L nasal cannula    Active LDAs/IV Access:   Lines, Drains & Airways       Active LDAs       Name Placement date Placement time Site Days    Peripheral IV 01/04/23 1353 Right Antecubital 01/04/23  1353  Antecubital  less than 1                    Labs (abnormal labs have a star):   Labs Reviewed   COMPREHENSIVE METABOLIC PANEL - Abnormal; Notable for the following components:       Result Value    Glucose 157 (*)     Creatinine 1.43 (*)     eGFR 37.6 (*)     All other components within normal limits    Narrative:     GFR Normal >60  Chronic Kidney Disease <60  Kidney Failure <15    The GFR formula is only valid for adults with stable renal function between ages 18  "and 70.   CBC WITH AUTO DIFFERENTIAL - Abnormal; Notable for the following components:    WBC 11.38 (*)     RBC 3.24 (*)     Hemoglobin 10.1 (*)     Hematocrit 30.3 (*)     Neutrophil % 80.3 (*)     Lymphocyte % 9.8 (*)     Immature Grans % 0.7 (*)     Neutrophils, Absolute 9.13 (*)     Immature Grans, Absolute 0.08 (*)     All other components within normal limits   D-DIMER, QUANTITATIVE - Abnormal; Notable for the following components:    D-Dimer, Quantitative 1,270 (*)     All other components within normal limits    Narrative:     According to the assay 's published package insert, a normal (<500 ng/mL (FEU)) D-dimer result in conjunction with a non-high clinical probability assessment, excludes deep vein thrombosis (DVT) and pulmonary embolism (PE) with high sensitivity.    D-dimer values increase with age and this can make VTE exclusion of an older population difficult. To address this, the [employer], based on best available evidence and recent guidelines, recommends that clinicians use age-adjusted D-dimer thresholds in patients greater than 50 years of age with: a) a low probability of PE who do not meet all Pulmonary Embolism Rule Out Criteria, or b) in those with intermediate probability of PE.   The formula for an age-adjusted D-dimer cut-off is \"age*10\".  For example, a 60 year old patient would have an age-adjusted cut-off of 600 ng/mL (FEU) and an 80 year old 800 ng/mL (FEU).     COVID-19 AND FLU A/B, NP SWAB IN TRANSPORT MEDIA 8-12 HR TAT - Normal    Narrative:     Fact sheet for providers: https://www.fda.gov/media/708617/download    Fact sheet for patients: https://www.fda.gov/media/728929/download    Test performed by PCR.   BNP (IN-HOUSE) - Normal    Narrative:     Among patients with dyspnea, NT-proBNP is highly sensitive for the detection of acute congestive heart failure. In addition NT-proBNP of <300 pg/ml effectively rules out acute congestive heart failure " with 99% negative predictive value.     TROPONIN (IN-HOUSE) - Normal    Narrative:     Troponin T Reference Range:  <= 0.03 ng/mL-   Negative for AMI  >0.03 ng/mL-     Abnormal for myocardial necrosis.  Clinicians would have to utilize clinical acumen, EKG, Troponin and serial changes to determine if it is an Acute Myocardial Infarction or myocardial injury due to an underlying chronic condition.       Results may be falsely decreased if patient taking Biotin.     LACTIC ACID, PLASMA - Normal   BLOOD CULTURE   BLOOD CULTURE   RAINBOW DRAW    Narrative:     The following orders were created for panel order Interior Draw.  Procedure                               Abnormality         Status                     ---------                               -----------         ------                     Green Top (Gel)[[id number]]                                  Final result               Lavender Top[[id number]]                                     Final result               Gold Top - SST[[id number]]                                   Final result               Light Blue Top[[id number]]                                   Final result                 Please view results for these tests on the individual orders.   CBC AND DIFFERENTIAL    Narrative:     The following orders were created for panel order CBC & Differential.  Procedure                               Abnormality         Status                     ---------                               -----------         ------                     CBC Auto Differential[[id number]]        Abnormal            Final result                 Please view results for these tests on the individual orders.   GREEN TOP   LAVENDER TOP   GOLD TOP - SST   LIGHT BLUE TOP   EXTRA TUBES    Narrative:     The following orders were created for panel order Extra Tubes.  Procedure                               Abnormality         Status                     ---------                               -----------          ------                     Dela Cruz Top[[id number]]                                         In process                   Please view results for these tests on the individual orders.   GRAY TOP       Meds given in ED:   Medications   sodium chloride 0.9 % flush 10 mL (has no administration in time range)   cefTRIAXone (ROCEPHIN) 1 g/100 mL 0.9% NS (MBP) (has no administration in time range)   AZITHROMYCIN 500 MG/250 ML 0.9% NS IVPB (vial-mate) (500 mg Intravenous New Bag 1/4/23 1539)   methylPREDNISolone sodium succinate (SOLU-Medrol) injection 125 mg (125 mg Intravenous Given 1/4/23 1422)   albuterol (PROVENTIL) nebulizer solution 0.083% 2.5 mg/3mL (2.5 mg Nebulization Given 1/4/23 1438)   ipratropium (ATROVENT) nebulizer solution 0.5 mg (0.5 mg Nebulization Given 1/4/23 1438)   iopamidol (ISOVUE-370) 76 % injection 100 mL (60 mL Intravenous Given 1/4/23 1508)           NIH Stroke Scale:       Isolation/Infection(s):  No active isolations   No active infections     COVID Testing  Collected 1/4/23  Resulted 1/4/23    Nursing report ED to floor:  Mental status: A+O x4  Ambulatory status: Stand by assist  Precautions: None    ED nurse phone extentsiqu- [phone]

## 2023-01-04 NOTE — Clinical Note
Level of Care: Telemetry [5]   Diagnosis: Pneumonia of both lungs due to infectious organism, unspecified part of lung [[id number]]   Admitting Physician: MARIA DEL CARMEN KEBEDE [[id number]]   Attending Physician: MARIA DEL CARMEN KEBEDE [[id number]]   Certification: I Certify That Inpatient Hospital Services Are Medically Necessary For Greater Than 2 Midnights

## 2023-01-05 LAB
ANION GAP SERPL CALCULATED.3IONS-SCNC: 14 MMOL/L (ref 5–15)
BASOPHILS # BLD AUTO: 0.02 10*3/MM3 (ref 0–0.2)
BASOPHILS NFR BLD AUTO: 0.2 % (ref 0–1.5)
BUN SERPL-MCNC: 29 MG/DL (ref 8–23)
BUN/CREAT SERPL: 19.2 (ref 7–25)
CALCIUM SPEC-SCNC: 9.5 MG/DL (ref 8.6–10.5)
CHLORIDE SERPL-SCNC: 103 MMOL/L (ref 98–107)
CO2 SERPL-SCNC: 25 MMOL/L (ref 22–29)
CREAT SERPL-MCNC: 1.51 MG/DL (ref 0.57–1)
DEPRECATED RDW RBC AUTO: 42.8 FL (ref 37–54)
EGFRCR SERPLBLD CKD-EPI 2021: 35.2 ML/MIN/1.73
EOSINOPHIL # BLD AUTO: 0 10*3/MM3 (ref 0–0.4)
EOSINOPHIL NFR BLD AUTO: 0 % (ref 0.3–6.2)
ERYTHROCYTE [DISTWIDTH] IN BLOOD BY AUTOMATED COUNT: 12.4 % (ref 12.3–15.4)
GLUCOSE BLDC GLUCOMTR-MCNC: 245 MG/DL (ref 70–130)
GLUCOSE BLDC GLUCOMTR-MCNC: 273 MG/DL (ref 70–130)
GLUCOSE BLDC GLUCOMTR-MCNC: 323 MG/DL (ref 70–130)
GLUCOSE SERPL-MCNC: 209 MG/DL (ref 65–99)
HCT VFR BLD AUTO: 32.2 % (ref 34–46.6)
HGB BLD-MCNC: 10.3 G/DL (ref 12–15.9)
IMM GRANULOCYTES # BLD AUTO: 0.11 10*3/MM3 (ref 0–0.05)
IMM GRANULOCYTES NFR BLD AUTO: 1.1 % (ref 0–0.5)
LYMPHOCYTES # BLD AUTO: 1.03 10*3/MM3 (ref 0.7–3.1)
LYMPHOCYTES NFR BLD AUTO: 10.5 % (ref 19.6–45.3)
MCH RBC QN AUTO: 30.2 PG (ref 26.6–33)
MCHC RBC AUTO-ENTMCNC: 32 G/DL (ref 31.5–35.7)
MCV RBC AUTO: 94.4 FL (ref 79–97)
MONOCYTES # BLD AUTO: 0.32 10*3/MM3 (ref 0.1–0.9)
MONOCYTES NFR BLD AUTO: 3.3 % (ref 5–12)
NEUTROPHILS NFR BLD AUTO: 8.31 10*3/MM3 (ref 1.7–7)
NEUTROPHILS NFR BLD AUTO: 84.9 % (ref 42.7–76)
NRBC BLD AUTO-RTO: 0 /100 WBC (ref 0–0.2)
PLATELET # BLD AUTO: 176 10*3/MM3 (ref 140–450)
PMV BLD AUTO: 11.6 FL (ref 6–12)
POTASSIUM SERPL-SCNC: 4.3 MMOL/L (ref 3.5–5.2)
RBC # BLD AUTO: 3.41 10*6/MM3 (ref 3.77–5.28)
SODIUM SERPL-SCNC: 142 MMOL/L (ref 136–145)
WBC NRBC COR # BLD: 9.79 10*3/MM3 (ref 3.4–10.8)

## 2023-01-05 PROCEDURE — 85025 COMPLETE CBC W/AUTO DIFF WBC: CPT | Performed by: PHYSICIAN ASSISTANT

## 2023-01-05 PROCEDURE — 82962 GLUCOSE BLOOD TEST: CPT

## 2023-01-05 PROCEDURE — 87070 CULTURE OTHR SPECIMN AEROBIC: CPT | Performed by: PHYSICIAN ASSISTANT

## 2023-01-05 PROCEDURE — 25010000002 CEFTRIAXONE PER 250 MG: Performed by: PHYSICIAN ASSISTANT

## 2023-01-05 PROCEDURE — 97162 PT EVAL MOD COMPLEX 30 MIN: CPT

## 2023-01-05 PROCEDURE — 94799 UNLISTED PULMONARY SVC/PX: CPT

## 2023-01-05 PROCEDURE — 80048 BASIC METABOLIC PNL TOTAL CA: CPT | Performed by: PHYSICIAN ASSISTANT

## 2023-01-05 PROCEDURE — 25010000002 ONDANSETRON PER 1 MG: Performed by: PHYSICIAN ASSISTANT

## 2023-01-05 PROCEDURE — 63710000001 INSULIN ASPART PER 5 UNITS: Performed by: PHYSICIAN ASSISTANT

## 2023-01-05 PROCEDURE — 94664 DEMO&/EVAL PT USE INHALER: CPT

## 2023-01-05 PROCEDURE — 94760 N-INVAS EAR/PLS OXIMETRY 1: CPT

## 2023-01-05 PROCEDURE — 25010000002 METHYLPREDNISOLONE PER 40 MG: Performed by: PHYSICIAN ASSISTANT

## 2023-01-05 PROCEDURE — 97165 OT EVAL LOW COMPLEX 30 MIN: CPT

## 2023-01-05 PROCEDURE — 63710000001 ONDANSETRON PER 8 MG: Performed by: PHYSICIAN ASSISTANT

## 2023-01-05 PROCEDURE — 87205 SMEAR GRAM STAIN: CPT | Performed by: PHYSICIAN ASSISTANT

## 2023-01-05 PROCEDURE — 25010000002 ENOXAPARIN PER 10 MG: Performed by: PHYSICIAN ASSISTANT

## 2023-01-05 RX ORDER — CARBOXYMETHYLCELLULOSE SODIUM 5 MG/ML
2 SOLUTION/ DROPS OPHTHALMIC 3 TIMES DAILY PRN
Status: DISCONTINUED | OUTPATIENT
Start: 2023-01-05 | End: 2023-01-08 | Stop reason: HOSPADM

## 2023-01-05 RX ORDER — GUAIFENESIN 600 MG/1
1200 TABLET, EXTENDED RELEASE ORAL EVERY 12 HOURS SCHEDULED
Status: DISCONTINUED | OUTPATIENT
Start: 2023-01-05 | End: 2023-01-08 | Stop reason: HOSPADM

## 2023-01-05 RX ORDER — IPRATROPIUM BROMIDE AND ALBUTEROL SULFATE 2.5; .5 MG/3ML; MG/3ML
3 SOLUTION RESPIRATORY (INHALATION)
Status: DISCONTINUED | OUTPATIENT
Start: 2023-01-05 | End: 2023-01-08 | Stop reason: HOSPADM

## 2023-01-05 RX ORDER — BENZONATATE 100 MG/1
200 CAPSULE ORAL 3 TIMES DAILY PRN
Status: DISCONTINUED | OUTPATIENT
Start: 2023-01-05 | End: 2023-01-08 | Stop reason: HOSPADM

## 2023-01-05 RX ADMIN — DOXYCYCLINE 100 MG: 100 INJECTION, POWDER, LYOPHILIZED, FOR SOLUTION INTRAVENOUS at 22:12

## 2023-01-05 RX ADMIN — METHYLPREDNISOLONE SODIUM SUCCINATE 40 MG: 40 INJECTION, POWDER, FOR SOLUTION INTRAMUSCULAR; INTRAVENOUS at 16:53

## 2023-01-05 RX ADMIN — ONDANSETRON HYDROCHLORIDE 4 MG: 4 TABLET, FILM COATED ORAL at 09:13

## 2023-01-05 RX ADMIN — INSULIN ASPART 4 UNITS: 100 INJECTION, SOLUTION INTRAVENOUS; SUBCUTANEOUS at 12:06

## 2023-01-05 RX ADMIN — OXYCODONE HYDROCHLORIDE AND ACETAMINOPHEN 1 TABLET: 10; 325 TABLET ORAL at 15:24

## 2023-01-05 RX ADMIN — NEBIVOLOL HYDROCHLORIDE 5 MG: 5 TABLET ORAL at 09:12

## 2023-01-05 RX ADMIN — Medication 10 ML: at 20:55

## 2023-01-05 RX ADMIN — LISINOPRIL 20 MG: 20 TABLET ORAL at 09:13

## 2023-01-05 RX ADMIN — OXYCODONE HYDROCHLORIDE AND ACETAMINOPHEN 1 TABLET: 10; 325 TABLET ORAL at 09:13

## 2023-01-05 RX ADMIN — ROSUVASTATIN CALCIUM 10 MG: 10 TABLET, FILM COATED ORAL at 09:11

## 2023-01-05 RX ADMIN — VENLAFAXINE HYDROCHLORIDE 75 MG: 37.5 CAPSULE, EXTENDED RELEASE ORAL at 09:11

## 2023-01-05 RX ADMIN — TIZANIDINE 4 MG: 4 TABLET ORAL at 14:29

## 2023-01-05 RX ADMIN — METHYLPREDNISOLONE SODIUM SUCCINATE 40 MG: 40 INJECTION, POWDER, FOR SOLUTION INTRAMUSCULAR; INTRAVENOUS at 05:23

## 2023-01-05 RX ADMIN — ALBUTEROL SULFATE 2.5 MG: 2.5 SOLUTION RESPIRATORY (INHALATION) at 07:50

## 2023-01-05 RX ADMIN — FOLIC ACID 1000 MCG: 1 TABLET ORAL at 09:11

## 2023-01-05 RX ADMIN — ASPIRIN AND EXTENDED-RELEASE DIPYRIDAMOLE 1 CAPSULE: 25; 200 CAPSULE ORAL at 09:11

## 2023-01-05 RX ADMIN — GUAIFENESIN 1200 MG: 600 TABLET, EXTENDED RELEASE ORAL at 20:56

## 2023-01-05 RX ADMIN — INSULIN ASPART 7 UNITS: 100 INJECTION, SOLUTION INTRAVENOUS; SUBCUTANEOUS at 16:54

## 2023-01-05 RX ADMIN — GUAIFENESIN 200 MG: 200 SOLUTION ORAL at 01:53

## 2023-01-05 RX ADMIN — DOCUSATE SODIUM 100 MG: 100 CAPSULE, LIQUID FILLED ORAL at 20:56

## 2023-01-05 RX ADMIN — ALLOPURINOL 100 MG: 100 TABLET ORAL at 09:12

## 2023-01-05 RX ADMIN — Medication 10 ML: at 05:23

## 2023-01-05 RX ADMIN — DOCUSATE SODIUM 100 MG: 100 CAPSULE, LIQUID FILLED ORAL at 09:11

## 2023-01-05 RX ADMIN — Medication 10 ML: at 09:14

## 2023-01-05 RX ADMIN — ONDANSETRON 4 MG: 2 INJECTION INTRAMUSCULAR; INTRAVENOUS at 23:35

## 2023-01-05 RX ADMIN — OXYCODONE HYDROCHLORIDE AND ACETAMINOPHEN 1 TABLET: 10; 325 TABLET ORAL at 21:38

## 2023-01-05 RX ADMIN — CEFTRIAXONE SODIUM 1 G: 1 INJECTION, POWDER, FOR SOLUTION INTRAMUSCULAR; INTRAVENOUS at 05:30

## 2023-01-05 RX ADMIN — ROPINIROLE 2 MG: 1 TABLET, FILM COATED ORAL at 20:55

## 2023-01-05 RX ADMIN — ENOXAPARIN SODIUM 40 MG: 40 INJECTION SUBCUTANEOUS at 16:53

## 2023-01-05 RX ADMIN — ISOSORBIDE MONONITRATE 30 MG: 30 TABLET, EXTENDED RELEASE ORAL at 09:11

## 2023-01-05 RX ADMIN — DOXYCYCLINE 100 MG: 100 INJECTION, POWDER, LYOPHILIZED, FOR SOLUTION INTRAVENOUS at 12:05

## 2023-01-05 RX ADMIN — IPRATROPIUM BROMIDE AND ALBUTEROL SULFATE 3 ML: .5; 2.5 SOLUTION RESPIRATORY (INHALATION) at 14:57

## 2023-01-05 RX ADMIN — IPRATROPIUM BROMIDE 0.5 MG: 0.5 SOLUTION RESPIRATORY (INHALATION) at 07:50

## 2023-01-05 RX ADMIN — GUAIFENESIN 1200 MG: 600 TABLET, EXTENDED RELEASE ORAL at 09:22

## 2023-01-05 RX ADMIN — ONDANSETRON 4 MG: 2 INJECTION INTRAMUSCULAR; INTRAVENOUS at 03:11

## 2023-01-05 RX ADMIN — ASPIRIN AND EXTENDED-RELEASE DIPYRIDAMOLE 1 CAPSULE: 25; 200 CAPSULE ORAL at 20:55

## 2023-01-05 RX ADMIN — OXYCODONE HYDROCHLORIDE AND ACETAMINOPHEN 1 TABLET: 10; 325 TABLET ORAL at 03:11

## 2023-01-05 RX ADMIN — ALBUTEROL SULFATE 2.5 MG: 2.5 SOLUTION RESPIRATORY (INHALATION) at 11:56

## 2023-01-05 RX ADMIN — INSULIN ASPART 4 UNITS: 100 INJECTION, SOLUTION INTRAVENOUS; SUBCUTANEOUS at 09:14

## 2023-01-05 RX ADMIN — BENZONATATE 200 MG: 100 CAPSULE ORAL at 14:28

## 2023-01-05 RX ADMIN — AMLODIPINE BESYLATE 10 MG: 10 TABLET ORAL at 09:13

## 2023-01-05 RX ADMIN — BENZONATATE 200 MG: 100 CAPSULE ORAL at 09:22

## 2023-01-05 RX ADMIN — IPRATROPIUM BROMIDE AND ALBUTEROL SULFATE 3 ML: .5; 2.5 SOLUTION RESPIRATORY (INHALATION) at 20:40

## 2023-01-05 RX ADMIN — METFORMIN HYDROCHLORIDE 500 MG: 500 TABLET, EXTENDED RELEASE ORAL at 21:38

## 2023-01-05 NOTE — PLAN OF CARE
Goal Outcome Evaluation:  Plan of Care Reviewed With: patient           Outcome Evaluation: Initial PT evaluation complete, co-evaluation with OT.  Patient is alert and cooperative.  She demonstrates (I) with bed mobility, transfers and gait, ambulating 25'x1 without an AD, no LOB, able to manage O2 tubing.  Patient has 4WW at home if needed, is unlikely to need HHPT.  Goals established to increase activity tolerance and lower fall risk, continue skilled I/P PT.

## 2023-01-05 NOTE — SIGNIFICANT NOTE
Item 0 Points 1 Point 2 Points 3 Points 4 Points Subtotal   Mental Status Alert, oriented, cooperative Lethargic, follows commands Confused, not following commands Obtunded or Somnolent Comatose 0   Respiratory Pattern Regular RR 8-16 breaths/minute Increased RR 18-25 breaths/minute Dyspnea on exertion, irregular RR 26-30 breaths/minute Shortness of breath,  RR 31-35 breaths/minute Accessory muscle use, severe SOB  RR > 35 breaths/minute 0   Breath Sounds Clear Decreased unilaterally Decreased bilaterally Basilar crackles Wheezing and/or rhonchi 4   Cough Strong, spontaneous, non-productive Strong productive Weak, non-productive Weak, productive or weak with rhonchi Absent or may require suctioning 0   Pulmonary Status Nonsmoker, no previous history, >1 year quit < 1 PPD  <1 year quit > or = 1 PPD Diagnosed pulmonary disease (severe or chronic) Severe or chronic pulmonary disease with exacerbation 0   Surgical Status None General surgery (non-abdominal or non-thoracic) Lower abdominal Thoracic or upper abdominal Thoracic with pulmonary disease 0   Chest X-ray Clear Chronic changes Infiltrates, atelectasis or pleural effusion Infiltrates in > 1 lobe Diffuse infiltrates and atelectasis and/or effusions 3   Activity   Level Ambulatory Ambulatory with assistance Non-ambulatory Paraplegic Quadriplegic 0        Total Score   7   Score    Drug Therapy Frequency 20 or >    Q4 Duoneb with Q2 Albuterol PRN 15-19    Q6 Duoneb with Q4 Albuterol PRN 10-14    QID Duoneb with Q4 Albuterol PRN 5-9    TID Duoneb with Q6 Albuterol PRN 0-4    Q4 PRN Duoneb                  Lung Expansion Therapy (PEP) Bronchopulmonary Hygiene (CPT)   Q4 & PRN - Severe atelectasis, poor oxygenation Q4 - Copious secretions, dyspnea, unable to sleep   QID - High risk for persistent atelectasis, existence of atelectasis QID & Q4 PRN - Moderate secretion production   TID - At risk for developing atelectasis TID - Small amounts of secretions with poor cough    BID - Prevention of atelectasis BID - Unable to breathe deeply and cough spontaneously     RT Comments / Recommendations:patient scored TID nebulizer treatment ut we will keep her on QID due to wheezing

## 2023-01-05 NOTE — THERAPY EVALUATION
Patient Name: Ronda Blair  : 1945    MRN: [id number]                              Today's Date: 2023       Admit Date: 2023    Visit Dx:     ICD-10-CM ICD-9-CM   1. Pneumonia of both lungs due to infectious organism, unspecified part of lung  J18.9 483.8   2. COPD exacerbation (HCC)  J44.1 491.21   3. Chronic kidney disease, unspecified CKD stage  N18.9 585.9   4. Anemia, unspecified type  D64.9 285.9   5. Impaired mobility and ADLs  Z74.09 V49.89    Z78.9    6. Impaired functional mobility, balance, gait, and endurance  Z74.09 V49.89     Patient Active Problem List   Diagnosis   • Degeneration of intervertebral disc of mid-cervical region   • Morbid obesity due to excess calories (HCC)   • Former smoker   • Chest pain   • Diabetes mellitus (HCC)   • Chronic obstructive pulmonary disease (HCC)   • Dyslipidemia   • Adrenal nodule (HCC)   • Fecal occult blood test positive   • Benign neoplasm of colon   • Chronic nonalcoholic liver disease   • Diverticular disease   • Dysphagia   • Essential hypertension   • Internal hemorrhoids   • Knee pain, bilateral   • Lumbar disc disease with radiculopathy   • Neuralgia, geniculate   • Stage 2 chronic kidney disease   • Gait disturbance   • Nocturnal hypoxia   • Chronic heart failure with preserved ejection fraction (HCC)   • Normocytic anemia   • Acute gout involving toe of left foot   • MARISOL (obstructive sleep apnea)   • MARISOL and COPD overlap syndrome (HCC)   • Precordial pain   • Obesity (BMI 30-39.9)   • CKD (chronic kidney disease) stage 3, GFR 30-59 ml/min (HCC)   • Acute kidney injury (HCC)   • Elevation of level of transaminase and lactic acid dehydrogenase (LDH)   • Gout due to renal impairment, unspecified site   • Chronic obstructive pulmonary disease (HCC)   • Type 2 diabetes mellitus (HCC)   • Essential hypertension   • Stage 3a chronic kidney disease (HCC)   • Stage 3b chronic kidney disease (HCC)   • Pneumonia of both lungs due to  infectious organism, unspecified part of lung     Past Medical History:   Diagnosis Date   • Anxiety    • Arthritis    • COPD (chronic obstructive pulmonary disease) (HCC)    • Depression    • Diabetes mellitus (HCC)    • History of transfusion    • Hyperlipidemia    • Hypertension    • Stroke (HCC)     Greater than 5 years ago     Past Surgical History:   Procedure Laterality Date   • CERVICAL FUSION  1999   • EYE SURGERY Bilateral 2014    lasix eye surgery   • HEMORRHOIDECTOMY     • HYSTERECTOMY     • INTRATHECAL PUMP REMOVAL      2006   • KNEE ARTHROSCOPY Right 2015   • LUMBAR SPINE SURGERY  1999   • PAIN PUMP INSERTION/REVISION      Removed in 2006      General Information     Row Name 01/05/23 0937          Physical Therapy Time and Intention    Document Type evaluation  -     Mode of Treatment physical therapy;occupational therapy  -     Row Name 01/05/23 0937          General Information    Patient Profile Reviewed yes  -CZ     Prior Level of Function independent:;all household mobility;community mobility  -     Existing Precautions/Restrictions oxygen therapy device and L/min  -     Row Name 01/05/23 0937          Living Environment    People in Home alone  -     Row Name 01/05/23 0937          Home Main Entrance    Number of Stairs, Main Entrance four  -CZ     Stair Railings, Main Entrance railings on both sides of stairs  -CZ     Row Name 01/05/23 0937          Stairs Within Home, Primary    Stairs, Within Home, Primary Lives on camper on daughter's land. No shower (how water heater broken); sponge bathes or uses daughter's home.  (I) with bathing, dressing, shopping, driving.  (I) ambulation without an AD.  Has 4WW available. O2 at 2.5 LPM.  -CZ     Number of Stairs, Within Home, Primary three  -CZ     Stair Railings, Within Home, Primary railings on both sides of stairs  -CZ     Row Name 01/05/23 0937          Cognition    Orientation Status (Cognition) oriented x 4  -CZ     Row Name 01/05/23  0937          Safety Issues, Functional Mobility    Impairments Affecting Function (Mobility) endurance/activity tolerance;shortness of breath  -CZ           User Key  (r) = Recorded By, (t) = Taken By, (c) = Cosigned By    Initials Name Provider Type    Ruben Ramos, PT Physical Therapist               Mobility     Row Name 01/05/23 0937          Bed Mobility    Bed Mobility supine-sit  -CZ     Supine-Sit [place] (Bed Mobility) modified independence  -CZ     Assistive Device (Bed Mobility) head of bed elevated;bed rails  -CZ     Row Name 01/05/23 0937          Sit-Stand Transfer    Sit-Stand [place] (Transfers) independent  -CZ     Row Name 01/05/23 0937          Gait/Stairs (Locomotion)    [place] Level (Gait) independent  -CZ     Distance in Feet (Gait) 10'x1, 25'x1.  -CZ     Comment, (Gait/Stairs) No AD, no LOB, able to manage O2 tubing.  -CZ           User Key  (r) = Recorded By, (t) = Taken By, (c) = Cosigned By    Initials Name Provider Type    CZ Ruben Weeks, PT Physical Therapist               Obj/Interventions     Row Name 01/05/23 0937          Range of Motion Comprehensive    General Range of Motion bilateral lower extremity ROM WFL  -CZ     Row Name 01/05/23 0937          Strength Comprehensive (MMT)    Comment, General Manual Muscle Testing (MMT) Assessment BLEs: 4/5 grossly.  -CZ     Row Name 01/05/23 0937          Sensory Assessment (Somatosensory)    Sensory Assessment (Somatosensory) LE sensation intact  -CZ           User Key  (r) = Recorded By, (t) = Taken By, (c) = Cosigned By    Initials Name Provider Type    Ruben Ramos, PT Physical Therapist               Goals/Plan     Row Name 01/05/23 0937          Bed Mobility Goal 1 (PT)    Activity/Assistive Device (Bed Mobility Goal 1, PT) sit to supine/supine to sit  -CZ     [place] Level/Cues Needed (Bed Mobility Goal 1, PT) independent  -CZ     Time Frame (Bed Mobility Goal 1, PT) by discharge  -CZ      Strategies/Barriers (Bed Mobility Goal 1, PT) HOB flat, no bed rails.  -CZ     Progress/Outcomes (Bed Mobility Goal 1, PT) goal not met  -CZ     Row Name 01/05/23 0937          Gait Training Goal 1 (PT)    Activity/Assistive Device (Gait Training Goal 1, PT) gait (walking locomotion)  -CZ     [place] Level (Gait Training Goal 1, PT) independent  -CZ     Distance (Gait Training Goal 1, PT) 150'x1.  -CZ     Strategies/Barriers (Gait Training Goal 1, PT) Maintain SpO2 >90% on 2.5 LPM.  -CZ     Progress/Outcome (Gait Training Goal 1, PT) goal not met  -CZ     Row Name 01/05/23 0937          Stairs Goal 1 (PT)    Activity/Assistive Device (Stairs Goal 1, PT) using handrail, right;using handrail, left  -CZ     [place] Level/Cues Needed (Stairs Goal 1, PT) modified independence  -CZ     Number of Stairs (Stairs Goal 1, PT) 4 steps into camper.  -CZ     Time Frame (Stairs Goal 1, PT) by discharge  -CZ     Strategies/Barriers (Stairs Goal 1, PT) Maintain SpO2 >90% on 2.5 LPM.  -CZ     Progress/Outcome (Stairs Goal 1, PT) goal not met  -CZ     Row Name 01/05/23 0937          Problem Specific Goal 1 (PT)    Problem Specific Goal 1 (PT) Score 28/28 on Tinetti fall risk assessment.  -CZ     Time Frame (Problem Specific Goal 1, PT) by discharge  -CZ     Progress/Outcome (Problem Specific Goal 1, PT) goal not met  -CZ     Row Name 01/05/23 0937          Therapy Assessment/Plan (PT)    Planned Therapy Interventions (PT) balance training;bed mobility training;gait training;home exercise program;patient/family education;transfer training;stair training;strengthening;stretching  -CZ           User Key  (r) = Recorded By, (t) = Taken By, (c) = Cosigned By    Initials Name Provider Type    CZ Ruben Weeks, PT Physical Therapist               Clinical Impression     Row Name 01/05/23 0937          Pain    Pretreatment Pain Rating 0/10 - no pain  -CZ     Posttreatment Pain Rating 0/10 - no pain  -CZ     Row Name 01/05/23  0937          Plan of Care Review    Plan of Care Reviewed With patient  -CZ     Outcome Evaluation Initial PT evaluation complete, co-evaluation with OT.  Patient is alert and cooperative.  She demonstrates (I) with bed mobility, transfers and gait, ambulating 25'x1 without an AD, no LOB, able to manage O2 tubing.  Patient has 4WW at home if needed, is unlikely to need HHPT.  Goals established to increase activity tolerance and lower fall risk, continue skilled I/P PT.  -CZ     Row Name 01/05/23 0937          Therapy Assessment/Plan (PT)    Rehab Potential (PT) good, to achieve stated therapy goals  -CZ     Criteria for Skilled Interventions Met (PT) yes;skilled treatment is necessary  -CZ     Therapy Frequency (PT) 5 times/wk  -CZ     Row Name 01/05/23 0937          Vital Signs    Pre Systolic BP Rehab 168  -CZ     Pre Treatment Diastolic BP 72  -CZ     Pretreatment Heart Rate (beats/min) 72  -CZ     Pre SpO2 (%) 95  -CZ     O2 Delivery Pre Treatment other (see comments)  2.5 LPM, same as home O2.  -CZ     Pre Patient Position Supine  -CZ     Row Name 01/05/23 0937          Positioning and Restraints    Pre-Treatment Position in bed  -CZ     Post Treatment Position bed  -CZ     In Bed sitting EOB;call light within reach;with OT  -CZ           User Key  (r) = Recorded By, (t) = Taken By, (c) = Cosigned By    Initials Name Provider Type    CZ Ruben Weeks, PT Physical Therapist               Outcome Measures     Row Name 01/05/23 0937 01/05/23 0856       How much help from another person do you currently need...    Turning from your back to your side while in flat bed without using bedrails? 4  -CZ 4  -AD    Moving from lying on back to sitting on the side of a flat bed without bedrails? 4  -CZ 4  -AD    Moving to and from a bed to a chair (including a wheelchair)? 4  -CZ 4  -AD    Standing up from a chair using your arms (e.g., wheelchair, bedside chair)? 4  -CZ 3  -AD    Climbing 3-5 steps with a railing? 3   -CZ 3  -AD    To walk in hospital room? 4  -CZ 3  -AD    AM-PAC 6 Clicks Score (PT) 23  -CZ 21  -AD    Highest level of mobility 7 --> Walked 25 feet or more  -CZ 6 --> Walked 10 steps or more  -    Row Name 01/05/23 0938 01/05/23 0937       Functional Assessment    Outcome Measure Options AM-PAC 6 Clicks Daily Activity (OT)  - AM-PAC 6 Clicks Basic Mobility (PT)  -          User Key  (r) = Recorded By, (t) = Taken By, (c) = Cosigned By    Initials Name Provider Type    CZ Ruben Weeks, PT Physical Therapist    Renetta Lomeli, OT Occupational Therapist    Cameron Brown, RN Registered Nurse                             Physical Therapy Education     Title: PT OT SLP Therapies (In Progress)     Topic: Physical Therapy (In Progress)     Point: Mobility training (Not Started)     Learner Progress:  Not documented in this visit.          Point: Home exercise program (Not Started)     Learner Progress:  Not documented in this visit.          Point: Body mechanics (Not Started)     Learner Progress:  Not documented in this visit.          Point: Precautions (Done)     Learning Progress Summary           Patient Acceptance, E, VU by  at 1/5/2023 0959    Comment: PT POC, goals.    Acceptance, E, VU by AB at 1/5/2023 0508                               User Key     Initials Effective Dates Name Provider Type Discipline     09/18/22 -  Ruben Weeks, PT Physical Therapist PT    AB 11/07/22 -  Joan Jack LPN Licensed Nurse Nurse              PT Recommendation and Plan  Planned Therapy Interventions (PT): balance training, bed mobility training, gait training, home exercise program, patient/family education, transfer training, stair training, strengthening, stretching  Plan of Care Reviewed With: patient  Outcome Evaluation: Initial PT evaluation complete, co-evaluation with OT.  Patient is alert and cooperative.  She demonstrates (I) with bed mobility, transfers and gait, ambulating 25'x1 without  an AD, no LOB, able to manage O2 tubing.  Patient has 4WW at home if needed, is unlikely to need HHPT.  Goals established to increase activity tolerance and lower fall risk, continue skilled I/P PT.     Time Calculation:    PT Charges     Row Name 01/05/23 1129             Time Calculation    Start Time 0937  -CZ      Stop Time 1003  -CZ      Time Calculation (min) 26 min  -CZ      PT Received On 01/05/23  -CZ      PT Goal Re-Cert Due Date 01/18/23  -CZ         Untimed Charges    PT Eval/Re-eval Minutes 26  -CZ         Total Minutes    Untimed Charges Total Minutes 26  -CZ       Total Minutes 26  -CZ            User Key  (r) = Recorded By, (t) = Taken By, (c) = Cosigned By    Initials Name Provider Type    CZ Ruben Weeks, PT Physical Therapist              Therapy Charges for Today     Code Description Service Date Service Provider Modifiers Qty    14931891624 HC PT EVAL MOD COMPLEXITY 2 1/5/2023 Ruben Weeks, PT GP 1          PT G-Codes  Outcome Measure Options: AM-PAC 6 Clicks Daily Activity (OT)  AM-PAC 6 Clicks Score (PT): 23  AM-PAC 6 Clicks Score (OT): 24  PT Discharge Summary  Anticipated Discharge Disposition (PT): home    Ruben Weeks, JANINE  1/5/2023

## 2023-01-05 NOTE — PLAN OF CARE
Goal Outcome Evaluation:  Plan of Care Reviewed With: patient        Progress: no change  Outcome Evaluation: admitted to floor

## 2023-01-05 NOTE — PLAN OF CARE
Patient Ax4. Patient currently at 2.5LNC, only uses 2.5LNC as a bleed in on her CPAP at home otherwise on RA. Gets very SOB with activity but easily recovers. Has a croupy productive cough. Ambulating independently in room. Having good intake and output. Chronic pain present, PRN medication effective. Musinex and Tessalon effective. Vitally unremarkable.                     Goal Outcome Evaluation: Progressing              Problem: Adult Inpatient Plan of Care  Goal: Plan of Care Review  Outcome: Ongoing, Progressing  Goal: Patient-Specific Goal (Individualized)  Outcome: Ongoing, Progressing  Goal: Absence of Hospital-Acquired Illness or Injury  Outcome: Ongoing, Progressing  Intervention: Identify and Manage Fall Risk  Recent Flowsheet Documentation  Taken 1/5/2023 0856 by Cameron Garcia RN  Safety Promotion/Fall Prevention: nonskid shoes/slippers when out of bed  Intervention: Prevent Skin Injury  Recent Flowsheet Documentation  Taken 1/5/2023 0856 by Cameron Garcia RN  Body Position:  • position changed independently  • sitting up in bed  Intervention: Prevent and Manage VTE (Venous Thromboembolism) Risk  Recent Flowsheet Documentation  Taken 1/5/2023 0856 by Cameron Garcia RN  Activity Management:  • up ad francie  • sitting, edge of bed  Goal: Optimal Comfort and Wellbeing  Outcome: Ongoing, Progressing  Intervention: Monitor Pain and Promote Comfort  Recent Flowsheet Documentation  Taken 1/5/2023 0856 by Cameron Garcia RN  Pain Management Interventions: see MAR  Intervention: Provide Person-Centered Care  Recent Flowsheet Documentation  Taken 1/5/2023 0856 by Cameron Garcia RN  Trust Relationship/Rapport:  • care explained  • questions encouraged  • reassurance provided  • thoughts/feelings acknowledged  Goal: Readiness for Transition of Care  Outcome: Ongoing, Progressing     Problem: Skin Injury Risk Increased  Goal: Skin Health and Integrity  Outcome: Ongoing, Progressing  Intervention: Optimize Skin  Protection  Recent Flowsheet Documentation  Taken 1/5/2023 0856 by Cameron Garcia RN  Head of Bed ([facility]) Positioning: HOB elevated     Problem: Fall Injury Risk  Goal: Absence of Fall and Fall-Related Injury  Outcome: Ongoing, Progressing  Intervention: Identify and Manage Contributors  Recent Flowsheet Documentation  Taken 1/5/2023 0856 by Cameron Garcia RN  Self-Care Promotion: independence encouraged  Intervention: Promote Injury-Free Environment  Recent Flowsheet Documentation  Taken 1/5/2023 0856 by Cameron Garcia RN  Safety Promotion/Fall Prevention: nonskid shoes/slippers when out of bed     Problem: Fluid Imbalance (Pneumonia)  Goal: Fluid Balance  Outcome: Ongoing, Progressing     Problem: Infection (Pneumonia)  Goal: Resolution of Infection Signs and Symptoms  Outcome: Ongoing, Progressing     Problem: Respiratory Compromise (Pneumonia)  Goal: Effective Oxygenation and Ventilation  Outcome: Ongoing, Progressing  Intervention: Promote Airway Secretion Clearance  Recent Flowsheet Documentation  Taken 1/5/2023 0856 by Cameron Garcia RN  Cough And Deep Breathing: done independently per patient  Intervention: Optimize Oxygenation and Ventilation  Recent Flowsheet Documentation  Taken 1/5/2023 0856 by Cameron Garcia RN  Head of Bed ([facility]) Positioning: [facility] elevated  Airway/Ventilation Management: pulmonary hygiene promoted     Problem: COPD (Chronic Obstructive Pulmonary Disease) Comorbidity  Goal: Maintenance of COPD Symptom Control  Outcome: Ongoing, Progressing     Problem: Diabetes Comorbidity  Goal: Blood Glucose Level Within Targeted Range  Outcome: Ongoing, Progressing

## 2023-01-05 NOTE — PLAN OF CARE
Goal Outcome Evaluation:  Plan of Care Reviewed With: patient           Outcome Evaluation: OT eval completed, co-eval with PT. Pt fowlers in bed and agreeable to the session. Pt mod I for bed mobility. Pt independent for sit to stand t/f and toilet t/f. Pt independent for fxnl mobility in the room. Pt SBA for LBD and UBD while sitting EOB and unsupported standing. Pt demonstrates decreased fxnl endurance and independence in ADLs. Pt would benefit from skilled OT services. Recommend home at d/c.

## 2023-01-05 NOTE — DISCHARGE PLACEMENT REQUEST
"Jese Blair (77 y.o. Female)     Date of Birth   1945    Social Security Number       Address   [address] [place] [address]    Home Phone   [phone]    MRN   [id number]       Restorationism   Taoist    Marital Status                               Admission Date   1/4/23    Admission Type   Emergency    Admitting Provider   Chandrakant Sal MD    Attending Provider   Chandrakant Sal MD    Department, Room/Bed   [address], 422/1       Discharge Date       Discharge Disposition       Discharge Destination                               Attending Provider: Chadnrakant Sal MD    Allergies: No Known Allergies    Isolation: None   Infection: None   Code Status: CPR    Ht: 166.4 cm (65.5\")   Wt: 98.4 kg (217 lb)    Admission Cmt: None   Principal Problem: Pneumonia of both lungs due to infectious organism, unspecified part of lung [J18.9]                 Active Insurance as of 1/4/2023     Primary Coverage     Payor Plan Insurance Group Employer/Plan Group    HUMANA MEDICARE REPLACEMENT HUMANA MEDICARE REPLACEMENT [id number]     Payor Plan Address Payor Plan Phone Number Payor Plan Fax Number Effective Dates    [address] [address] [phone]  1/1/2013 - None Entered    [facility] [address]       Subscriber Name Subscriber Birth Date Member ID       JESE BLAIR 1945 [id number]                 Emergency Contacts      (Rel.) Home Phone Work Phone Mobile Phone    Martha Ho (Daughter) [phone] -- [phone]            Emergency Contact Information     Name Relation Home Work Mobile    Martha Ho Daughter [phone]          Insurance Information                HUMANA MEDICARE REPLACEMENT/HUMANA MEDICARE REPLACEMENT Phone: [phone]    Subscriber: Jese Blair Subscriber#: [id number]    Group#: [id number] Precert#: --          "

## 2023-01-05 NOTE — PLAN OF CARE
Goal Outcome Evaluation:               Pt had some congestion and her lung sounds were coarse and diminished. Pt finished her sputum culture at 0510am. Pt has had some frustration from her cough and pain. Pt was given medication per her MAR for both and was resting. Will continue to implement care plan as documented.

## 2023-01-05 NOTE — THERAPY EVALUATION
Patient Name: Ronda Blair  : 1945    MRN: [id number]                              Today's Date: 2023       Admit Date: 2023    Visit Dx:     ICD-10-CM ICD-9-CM   1. Pneumonia of both lungs due to infectious organism, unspecified part of lung  J18.9 483.8   2. COPD exacerbation (HCC)  J44.1 491.21   3. Chronic kidney disease, unspecified CKD stage  N18.9 585.9   4. Anemia, unspecified type  D64.9 285.9   5. Impaired mobility and ADLs  Z74.09 V49.89    Z78.9      Patient Active Problem List   Diagnosis   • Degeneration of intervertebral disc of mid-cervical region   • Morbid obesity due to excess calories (HCC)   • Former smoker   • Chest pain   • Diabetes mellitus (HCC)   • Chronic obstructive pulmonary disease (HCC)   • Dyslipidemia   • Adrenal nodule (HCC)   • Fecal occult blood test positive   • Benign neoplasm of colon   • Chronic nonalcoholic liver disease   • Diverticular disease   • Dysphagia   • Essential hypertension   • Internal hemorrhoids   • Knee pain, bilateral   • Lumbar disc disease with radiculopathy   • Neuralgia, geniculate   • Stage 2 chronic kidney disease   • Gait disturbance   • Nocturnal hypoxia   • Chronic heart failure with preserved ejection fraction (HCC)   • Normocytic anemia   • Acute gout involving toe of left foot   • MARISOL (obstructive sleep apnea)   • MARISOL and COPD overlap syndrome (HCC)   • Precordial pain   • Obesity (BMI 30-39.9)   • CKD (chronic kidney disease) stage 3, GFR 30-59 ml/min (HCC)   • Acute kidney injury (HCC)   • Elevation of level of transaminase and lactic acid dehydrogenase (LDH)   • Gout due to renal impairment, unspecified site   • Chronic obstructive pulmonary disease (HCC)   • Type 2 diabetes mellitus (HCC)   • Essential hypertension   • Stage 3a chronic kidney disease (HCC)   • Stage 3b chronic kidney disease (HCC)   • Pneumonia of both lungs due to infectious organism, unspecified part of lung     Past Medical History:   Diagnosis  Date   • Anxiety    • Arthritis    • COPD (chronic obstructive pulmonary disease) ([facility])    • Depression    • Diabetes mellitus (HCC)    • History of transfusion    • Hyperlipidemia    • Hypertension    • Stroke ([facility])     Greater than 5 years ago     Past Surgical History:   Procedure Laterality Date   • CERVICAL FUSION  1999   • EYE SURGERY Bilateral 2014    lasix eye surgery   • HEMORRHOIDECTOMY     • HYSTERECTOMY     • INTRATHECAL PUMP REMOVAL      2006   • KNEE ARTHROSCOPY Right 2015   • LUMBAR SPINE SURGERY  1999   • PAIN PUMP INSERTION/REVISION      Removed in 2006      General Information     [facility] Name 01/05/23 0938          OT Time and Intention    Document Type evaluation  -     Mode of Treatment physical therapy;occupational therapy  -     Row Name 01/05/23 0938          General Information    Patient Profile Reviewed yes  -     Prior Level of Function independent:;dressing;bathing;home management;all household mobility;driving;shopping;cooking;cleaning;ADL's  -     Existing Precautions/Restrictions oxygen therapy device and L/min  2.5l o2 nc AT HOME  -     Row Name 01/05/23 0938          Living Environment    People in Home alone  -     Row Name 01/05/23 0938          Home Main Entrance    Number of Stairs, Main Entrance four  -     Stair Railings, Main Entrance railings on both sides of stairs  -     Row Name 01/05/23 0938          Stairs Within Home, Primary    Stairs, Within Home, Primary Lives on camper on daughter's land. No shower (hot water heater broken); sponge bathes or uses daughter's home. (I) with bathing, dressing, shopping, driving. (I) ambulation without an AD. Has 4WW available. O2 at 2.5 LPM.  -     Number of Stairs, Within Home, Primary three  -     Stair Railings, Within Home, Primary railings on both sides of stairs  -     Row Name 01/05/23 0938          Cognition    Orientation Status (Cognition) oriented x 4  -     Row Name 01/05/23 0938          Safety Issues,  Functional Mobility    Impairments Affecting Function (Mobility) endurance/activity tolerance;shortness of breath  -           User Key  (r) = Recorded By, (t) = Taken By, (c) = Cosigned By    Initials Name Provider Type    Renetta Lomeli OT Occupational Therapist                 Mobility/ADL's     Row Name 01/05/23 0938          Bed Mobility    Bed Mobility supine-sit  -     Supine-Sit [place] (Bed Mobility) modified independence  -     Assistive Device (Bed Mobility) head of bed elevated;bed rails  -     Row Name 01/05/23 0938          Sit-Stand Transfer    Sit-Stand [place] (Transfers) independent  -     Row Name 01/05/23 0938          Activities of Daily Living    BADL Assessment/Intervention lower body dressing;upper body dressing  -     Row Name 01/05/23 0938          Lower Body Dressing Assessment/Training    [place] Level (Lower Body Dressing) don;doff;socks;pants/bottoms;standby assist  -     Position (Lower Body Dressing) edge of bed sitting  -     Row Name 01/05/23 0938          Upper Body Dressing Assessment/Training    [place] Level (Upper Body Dressing) don;doff;other (see comments);standby assist  [facility] gown  -     Position (Upper Body Dressing) edge of bed sitting;unsupported standing  -           User Key  (r) = Recorded By, (t) = Taken By, (c) = Cosigned By    Initials Name Provider Type    Renetta Lomeli OT Occupational Therapist               Obj/Interventions     Row Name 01/05/23 0938          Sensory Assessment (Somatosensory)    Sensory Assessment (Somatosensory) UE sensation intact  -     Row Name 01/05/23 0938          Range of Motion Comprehensive    General Range of Motion bilateral upper extremity ROM WFL  -     Row Name 01/05/23 0938          Strength Comprehensive (MMT)    Comment, General Manual Muscle Testing (MMT) Assessment BUE MMT 4/5 grossly  -           User Key  (r) = Recorded By, (t) = Taken By, (c) = Cosigned By     Initials Name Provider Type     Renetta Fitzpatrick OT Occupational Therapist               Goals/Plan     Row Name 01/05/23 0938          Bathing Goal 1 (OT)    Activity/Device (Bathing Goal 1, OT) bathing skills, all  -     [place] Level/Cues Needed (Bathing Goal 1, OT) independent  -     Time Frame (Bathing Goal 1, OT) long term goal (LTG);by discharge  -     Progress/Outcomes (Bathing Goal 1, OT) goal not met  -     Row Name 01/05/23 0938          Strength Goal 1 (OT)    Strength Goal 1 (OT) Pt will increase BUE MMT by 1/2 a grade to improve independence in ADLs.  -     Time Frame (Strength Goal 1, OT) long term goal (LTG);by discharge  -     Progress/Outcome (Strength Goal 1, OT) goal not met  -     Row Name 01/05/23 0938          Problem Specific Goal 1 (OT)    Problem Specific Goal 1 (OT) Pt will participate in OOB fxnl activities for ~25 minutes to improve fxnl endurance.  -     Time Frame (Problem Specific Goal 1, OT) long term goal (LTG);by discharge  -     Progress/Outcome (Problem Specific Goal 1, OT) goal not met  -     Row Name 01/05/23 0938          Therapy Assessment/Plan (OT)    Planned Therapy Interventions (OT) activity tolerance training;manual therapy/joint mobilization;patient/caregiver education/training;adaptive equipment training;BADL retraining;cognitive/visual perception retraining;edema control/reduction;IADL retraining;neuromuscular control/coordination retraining;occupation/activity based interventions;passive ROM/stretching;transfer/mobility retraining;strengthening exercise;ROM/therapeutic exercise  -           User Key  (r) = Recorded By, (t) = Taken By, (c) = Cosigned By    Initials Name Provider Type     Renetta Fitzpatrick, OT Occupational Therapist               Clinical Impression     Row Name 01/05/23 0938          Pain Assessment    Pretreatment Pain Rating 0/10 - no pain  -     Posttreatment Pain Rating 0/10 - no pain  -     Row Name  01/05/23 0938          Plan of Care Review    Plan of Care Reviewed With patient  -     Outcome Evaluation OT eval completed, co-eval with PT. Pt fowlers in bed and agreeable to the session. Pt mod I for bed mobility. Pt independent for sit to stand t/f and toilet t/f. Pt independent for fxnl mobility in the room. Pt SBA for LBD and UBD while sitting EOB and unsupported standing. Pt demonstrates decreased fxnl endurance and independence in ADLs. Pt would benefit from skilled OT services. Recommend home at d/c.  -     Row Name 01/05/23 0938          Therapy Assessment/Plan (OT)    Patient/Family Therapy Goal Statement (OT) return home  -     Rehab Potential (OT) good, to achieve stated therapy goals  -     Criteria for Skilled Therapeutic Interventions Met (OT) yes;skilled treatment is necessary  -     Therapy Frequency (OT) other (see comments)  3-7 d/wk  -     Predicted Duration of Therapy Intervention (OT) until all goals met or d/c  -     Row Name 01/05/23 0938          Therapy Plan Review/Discharge Plan (OT)    Anticipated Discharge Disposition (OT) home  -     Row Name 01/05/23 0938          Vital Signs    Pre Systolic BP Rehab 168  -MC     Pre Treatment Diastolic BP 72  -     Pretreatment Heart Rate (beats/min) 72  -     Pre SpO2 (%) 95  -     O2 Delivery Pre Treatment nasal cannula  2.5L  -     Row Name 01/05/23 0938          Positioning and Restraints    Pre-Treatment Position in bed  -     Post Treatment Position chair  -     In Chair sitting;call light within reach;encouraged to call for assist  -           User Key  (r) = Recorded By, (t) = Taken By, (c) = Cosigned By    Initials Name Provider Type    Renetta Lomeli, KIMBER Occupational Therapist               Outcome Measures     Row Name 01/05/23 0938          How much help from another is currently needed...    Putting on and taking off regular lower body clothing? 4  -MC     Bathing (including washing, rinsing, and  drying) 4  -MC     Toileting (which includes using toilet bed pan or urinal) 4  -MC     Putting on and taking off regular upper body clothing 4  -MC     Taking care of personal grooming (such as brushing teeth) 4  -MC     Eating meals 4  -     AM-PAC 6 Clicks Score (OT) 24  -     Row Name 01/05/23 0937 01/05/23 0856       How much help from another person do you currently need...    Turning from your back to your side while in flat bed without using bedrails? 4  -CZ 4  -AD    Moving from lying on back to sitting on the side of a flat bed without bedrails? 4  -CZ 4  -AD    Moving to and from a bed to a chair (including a wheelchair)? 4  -CZ 4  -AD    Standing up from a chair using your arms (e.g., wheelchair, bedside chair)? 4  -CZ 3  -AD    Climbing 3-5 steps with a railing? 3  -CZ 3  -AD    To walk in hospital room? 4  -CZ 3  -AD    AM-PAC 6 Clicks Score (PT) 23  -CZ 21  -AD    Highest level of mobility 7 --> Walked 25 feet or more  -CZ 6 --> Walked 10 steps or more  -AD    Row Name 01/05/23 0938 01/05/23 0937       Functional Assessment    Outcome Measure Options AM-PAC 6 Clicks Daily Activity (OT)  -[facility] 6 Clicks Basic Mobility (PT)  -          User Key  (r) = Recorded By, (t) = Taken By, (c) = Cosigned By    Initials Name Provider Type    CZ Ruben Weeks, PT Physical Therapist     Renetta Fitzpatrick, OT Occupational Therapist    Cameron Brown, RN Registered Nurse                Occupational Therapy Education     Title: PT OT SLP Therapies (In Progress)     Topic: Occupational Therapy (Done)     Point: ADL training (Done)     Description:   Instruct learner(s) on proper safety adaptation and remediation techniques during self care or transfers.   Instruct in proper use of assistive devices.              Learning Progress Summary           Patient Acceptance, E,TB, VU by  at 1/5/2023 1019    Comment: OT role, POC, t/f training                   Point: Home exercise program (Done)      Description:   Instruct learner(s) on appropriate technique for monitoring, assisting and/or progressing therapeutic exercises/activities.              Learning Progress Summary           Patient Acceptance, E,TB, VU by  at 1/5/2023 1019    Comment: OT role, POC, t/f training                   Point: Body mechanics (Done)     Description:   Instruct learner(s) on proper positioning and spine alignment during self-care, functional mobility activities and/or exercises.              Learning Progress Summary           Patient Acceptance, E,TB, VU by  at 1/5/2023 1019    Comment: OT role, POC, t/f training                               User Key     Initials Effective Dates Name Provider Type Discipline     10/19/22 -  Renetta Fitzpatrick, OT Occupational Therapist OT              OT Recommendation and Plan  Planned Therapy Interventions (OT): activity tolerance training, manual therapy/joint mobilization, patient/caregiver education/training, adaptive equipment training, BADL retraining, cognitive/visual perception retraining, edema control/reduction, IADL retraining, neuromuscular control/coordination retraining, occupation/activity based interventions, passive ROM/stretching, transfer/mobility retraining, strengthening exercise, ROM/therapeutic exercise  Therapy Frequency (OT): other (see comments) (3-7 d/wk)  Plan of Care Review  Plan of Care Reviewed With: patient  Outcome Evaluation: OT eval completed, co-eval with PT. Pt fowlers in bed and agreeable to the session. Pt mod I for bed mobility. Pt independent for sit to stand t/f and toilet t/f. Pt independent for fxnl mobility in the room. Pt SBA for LBD and UBD while sitting EOB and unsupported standing. Pt demonstrates decreased fxnl endurance and independence in ADLs. Pt would benefit from skilled OT services. Recommend home at d/c.     Time Calculation:    Time Calculation- OT     Row Name 01/05/23 8397             Time Calculation- OT    OT Start Time 5465   -      OT Stop Time 1016  -      OT Time Calculation (min) 40 min  -      OT Received On 01/05/23  -      OT Goal Re-Cert Due Date 01/18/23  -         Untimed Charges    OT Eval/Re-eval Minutes 40  -         Total Minutes    Untimed Charges Total Minutes 40  -       Total Minutes 40  -MC            User Key  (r) = Recorded By, (t) = Taken By, (c) = Cosigned By    Initials Name Provider Type     Renetta Fitzpatrick OT Occupational Therapist              Therapy Charges for Today     Code Description Service Date Service Provider Modifiers Qty    [id number] HC OT EVAL LOW COMPLEXITY 3 1/5/2023 Renetta Fitzpatrick OT GO 1               Renetta Fitzpatrick OT  1/5/2023

## 2023-01-05 NOTE — PROGRESS NOTES
[facility] Medicine Services  INPATIENT PROGRESS NOTE      Length of Stay: 1  Date of Admission: 1/4/2023  Primary Care Physician: Brook Arreola MD    Subjective   Chief Complaint: Shortness of breath  HPI: Patient reports feeling some improvement in her breathing but still has ongoing wheezing shortness of breath as well as cough.    Review of Systems   Constitutional: Positive for activity change, chills and fatigue. Negative for fever.   Respiratory: Positive for cough, shortness of breath and wheezing.    Cardiovascular: Negative for chest pain and palpitations.   Gastrointestinal: Negative.    Genitourinary: Negative.    Musculoskeletal: Negative.    Skin: Negative.    Neurological: Negative.    Psychiatric/Behavioral: Negative.    All other systems reviewed and are negative.     All pertinent negatives and positives are as above. All other systems have been reviewed and are negative unless otherwise stated.     Objective    As of today 01/05/23  Temp:  [96.7 °F (35.9 °C)-97.7 °F (36.5 °C)] 97.1 °F (36.2 °C)  Heart Rate:  [59-81] 70  Resp:  [16-24] 18  BP: (133-166)/(63-73) 153/69    Physical Exam  Constitutional:       Comments: Ill appearing   HENT:      Head: Normocephalic and atraumatic.      Right Ear: External ear normal.      Left Ear: External ear normal.      Nose: Nose normal.      Mouth/Throat:      Mouth: Mucous membranes are moist.      Pharynx: Oropharynx is clear.   Eyes:      Conjunctiva/sclera: Conjunctivae normal.   Cardiovascular:      Rate and Rhythm: Normal rate and regular rhythm.      Pulses: Normal pulses.      Heart sounds: Normal heart sounds.   Pulmonary:      Effort: Pulmonary effort is normal. No respiratory distress.      Breath sounds: Normal breath sounds.   Abdominal:      General: Bowel sounds are normal.      Palpations: Abdomen is soft.      Tenderness: There is no abdominal tenderness.   Musculoskeletal:          General: No deformity.   Skin:     General: Skin is warm and dry.      Capillary Refill: Capillary refill takes less than 2 seconds.   Neurological:      General: No focal deficit present.      Mental Status: She is alert and oriented to person, place, and time. Mental status is at baseline.   Psychiatric:         Behavior: Behavior normal.         Thought Content: Thought content normal.         Results Review:  I have reviewed the labs, radiology results, and diagnostic studies.    Laboratory Data:   Results from last 7 days   Lab Units 01/05/23  0602 01/04/23  1354   SODIUM mmol/L 142 137   POTASSIUM mmol/L 4.3 3.6   CHLORIDE mmol/L 103 101   CO2 mmol/L 25.0 26.0   BUN mg/dL 29* 22   CREATININE mg/dL 1.51* 1.43*   GLUCOSE mg/dL 209* 157*   CALCIUM mg/dL 9.5 9.2   BILIRUBIN mg/dL  --  0.3   ALK PHOS U/L  --  113   ALT (SGPT) U/L  --  23   AST (SGOT) U/L  --  25   ANION GAP mmol/L 14.0 10.0     Estimated Creatinine Clearance: 36 mL/min (A) (by C-G formula based on SCr of 1.51 mg/dL (H)).          Results from last 7 days   Lab Units 01/05/23  0602 01/04/23  1354   WBC 10*3/mm3 9.79 11.38*   HEMOGLOBIN g/dL 10.3* 10.1*   HEMATOCRIT % 32.2* 30.3*   PLATELETS 10*3/mm3 176 175           Culture Data:   Blood Culture   Date Value Ref Range Status   01/04/2023 No growth at 24 hours  Preliminary   01/04/2023 No growth at 24 hours  Preliminary     No results found for: URINECX  No results found for: RESPCX  No results found for: WOUNDCX  No results found for: STOOLCX  No components found for: BODYFLD    Radiology Data:   Imaging Results (Last 24 Hours)     ** No results found for the last 24 hours. **          I have reviewed the patient's current medications.     Assessment/Plan     Principal Problem:    Pneumonia of both lungs due to infectious organism, unspecified part of lung  Acute COPD exacerbation  Hypertension  Type 2 diabetes mellitus  Enlarged lymph node noted on CT scan    Plan:  - Continue supplemental oxygen  wean as tolerated, typically wears 2.5 L at home at night but may needed full-time  - Continue current IV steroids  - Continue bronchodilators  - Pulmonary toilet  - Continue antibiotics but rebounds from azithromycin to doxycycline and continue Rocephin  - Discussed CT findings of enlarged lymph node likely related to underlying pneumonia but cannot fully rule out malignancy with the patient.  Discussed need for follow-up.  She was understanding agreement to this  - Continue home medications as appropriate  - PT and OT ordered  - Continue current insulin orders and glucose checks  - DVT prophylaxis with Lovenox  - CODE STATUS: Full      Medical Decision Making  Number and Complexity of problems: 3 high, 2 moderate      Conditions and Status:        Condition is improving.     MDM Data  External documents reviewed: None     Discussed with: The patient     Treatment Plan  As above    Care Planning  Shared decision making: with the patient  Code status and discussions: Full    Disposition  Social Determinants of Health that impact treatment or disposition: none  I expect the patient to be discharged to home in 1-2 days.       I have utilized all available immediate resources to obtain, update, or review the patient's current medications (including all prescriptions, over-the-counter products, herbals, cannabis/cannabidiol products, and vitamin/mineral/dietary (nutritional) supplements).   I confirmed that the patient's Advance Care Plan is present, code status is documented, or surrogate decision maker is listed in the patient's medical record.    Discharge Planning: In process    Shant Maradiaga MD

## 2023-01-06 LAB
ANION GAP SERPL CALCULATED.3IONS-SCNC: 11 MMOL/L (ref 5–15)
BASOPHILS # BLD AUTO: 0.02 10*3/MM3 (ref 0–0.2)
BASOPHILS NFR BLD AUTO: 0.2 % (ref 0–1.5)
BUN SERPL-MCNC: 35 MG/DL (ref 8–23)
BUN/CREAT SERPL: 24.8 (ref 7–25)
CALCIUM SPEC-SCNC: 9.1 MG/DL (ref 8.6–10.5)
CHLORIDE SERPL-SCNC: 104 MMOL/L (ref 98–107)
CO2 SERPL-SCNC: 23 MMOL/L (ref 22–29)
CREAT SERPL-MCNC: 1.41 MG/DL (ref 0.57–1)
DEPRECATED RDW RBC AUTO: 44.2 FL (ref 37–54)
EGFRCR SERPLBLD CKD-EPI 2021: 38.3 ML/MIN/1.73
EOSINOPHIL # BLD AUTO: 0 10*3/MM3 (ref 0–0.4)
EOSINOPHIL NFR BLD AUTO: 0 % (ref 0.3–6.2)
ERYTHROCYTE [DISTWIDTH] IN BLOOD BY AUTOMATED COUNT: 12.5 % (ref 12.3–15.4)
GLUCOSE BLDC GLUCOMTR-MCNC: 177 MG/DL (ref 70–130)
GLUCOSE BLDC GLUCOMTR-MCNC: 193 MG/DL (ref 70–130)
GLUCOSE BLDC GLUCOMTR-MCNC: 271 MG/DL (ref 70–130)
GLUCOSE BLDC GLUCOMTR-MCNC: 319 MG/DL (ref 70–130)
GLUCOSE SERPL-MCNC: 168 MG/DL (ref 65–99)
HCT VFR BLD AUTO: 32.1 % (ref 34–46.6)
HGB BLD-MCNC: 9.9 G/DL (ref 12–15.9)
IMM GRANULOCYTES # BLD AUTO: 0.25 10*3/MM3 (ref 0–0.05)
IMM GRANULOCYTES NFR BLD AUTO: 2 % (ref 0–0.5)
LYMPHOCYTES # BLD AUTO: 1.26 10*3/MM3 (ref 0.7–3.1)
LYMPHOCYTES NFR BLD AUTO: 10 % (ref 19.6–45.3)
MCH RBC QN AUTO: 29.9 PG (ref 26.6–33)
MCHC RBC AUTO-ENTMCNC: 30.8 G/DL (ref 31.5–35.7)
MCV RBC AUTO: 97 FL (ref 79–97)
MONOCYTES # BLD AUTO: 0.6 10*3/MM3 (ref 0.1–0.9)
MONOCYTES NFR BLD AUTO: 4.8 % (ref 5–12)
NEUTROPHILS NFR BLD AUTO: 10.41 10*3/MM3 (ref 1.7–7)
NEUTROPHILS NFR BLD AUTO: 83 % (ref 42.7–76)
NRBC BLD AUTO-RTO: 0 /100 WBC (ref 0–0.2)
PLATELET # BLD AUTO: 184 10*3/MM3 (ref 140–450)
PMV BLD AUTO: 11.5 FL (ref 6–12)
POTASSIUM SERPL-SCNC: 4.4 MMOL/L (ref 3.5–5.2)
RBC # BLD AUTO: 3.31 10*6/MM3 (ref 3.77–5.28)
SODIUM SERPL-SCNC: 138 MMOL/L (ref 136–145)
WBC NRBC COR # BLD: 12.54 10*3/MM3 (ref 3.4–10.8)

## 2023-01-06 PROCEDURE — 94799 UNLISTED PULMONARY SVC/PX: CPT

## 2023-01-06 PROCEDURE — 94760 N-INVAS EAR/PLS OXIMETRY 1: CPT

## 2023-01-06 PROCEDURE — 25010000002 METHYLPREDNISOLONE PER 40 MG: Performed by: PHYSICIAN ASSISTANT

## 2023-01-06 PROCEDURE — 82962 GLUCOSE BLOOD TEST: CPT

## 2023-01-06 PROCEDURE — 63710000001 INSULIN ASPART PER 5 UNITS: Performed by: PHYSICIAN ASSISTANT

## 2023-01-06 PROCEDURE — 80048 BASIC METABOLIC PNL TOTAL CA: CPT | Performed by: PHYSICIAN ASSISTANT

## 2023-01-06 PROCEDURE — 94664 DEMO&/EVAL PT USE INHALER: CPT

## 2023-01-06 PROCEDURE — 97535 SELF CARE MNGMENT TRAINING: CPT

## 2023-01-06 PROCEDURE — 25010000002 CEFTRIAXONE PER 250 MG: Performed by: PHYSICIAN ASSISTANT

## 2023-01-06 PROCEDURE — 85025 COMPLETE CBC W/AUTO DIFF WBC: CPT | Performed by: PHYSICIAN ASSISTANT

## 2023-01-06 PROCEDURE — 25010000002 ENOXAPARIN PER 10 MG: Performed by: PHYSICIAN ASSISTANT

## 2023-01-06 RX ADMIN — ROPINIROLE 2 MG: 1 TABLET, FILM COATED ORAL at 21:39

## 2023-01-06 RX ADMIN — ALLOPURINOL 100 MG: 100 TABLET ORAL at 09:21

## 2023-01-06 RX ADMIN — IPRATROPIUM BROMIDE AND ALBUTEROL SULFATE 3 ML: .5; 2.5 SOLUTION RESPIRATORY (INHALATION) at 08:15

## 2023-01-06 RX ADMIN — FOLIC ACID 1000 MCG: 1 TABLET ORAL at 09:21

## 2023-01-06 RX ADMIN — DOXYCYCLINE 100 MG: 100 INJECTION, POWDER, LYOPHILIZED, FOR SOLUTION INTRAVENOUS at 21:40

## 2023-01-06 RX ADMIN — OXYCODONE HYDROCHLORIDE AND ACETAMINOPHEN 1 TABLET: 10; 325 TABLET ORAL at 18:04

## 2023-01-06 RX ADMIN — SODIUM CHLORIDE 40 ML: 9 INJECTION, SOLUTION INTRAVENOUS at 05:34

## 2023-01-06 RX ADMIN — METHYLPREDNISOLONE SODIUM SUCCINATE 40 MG: 40 INJECTION, POWDER, FOR SOLUTION INTRAMUSCULAR; INTRAVENOUS at 05:35

## 2023-01-06 RX ADMIN — GUAIFENESIN 1200 MG: 600 TABLET, EXTENDED RELEASE ORAL at 09:21

## 2023-01-06 RX ADMIN — AMLODIPINE BESYLATE 10 MG: 10 TABLET ORAL at 09:21

## 2023-01-06 RX ADMIN — ENOXAPARIN SODIUM 40 MG: 40 INJECTION SUBCUTANEOUS at 18:04

## 2023-01-06 RX ADMIN — LISINOPRIL 20 MG: 20 TABLET ORAL at 09:21

## 2023-01-06 RX ADMIN — Medication 10 ML: at 09:23

## 2023-01-06 RX ADMIN — ISOSORBIDE MONONITRATE 30 MG: 30 TABLET, EXTENDED RELEASE ORAL at 09:21

## 2023-01-06 RX ADMIN — ROSUVASTATIN CALCIUM 10 MG: 10 TABLET, FILM COATED ORAL at 09:21

## 2023-01-06 RX ADMIN — BENZONATATE 200 MG: 100 CAPSULE ORAL at 09:34

## 2023-01-06 RX ADMIN — Medication 10 ML: at 21:40

## 2023-01-06 RX ADMIN — IPRATROPIUM BROMIDE AND ALBUTEROL SULFATE 3 ML: .5; 2.5 SOLUTION RESPIRATORY (INHALATION) at 10:59

## 2023-01-06 RX ADMIN — GUAIFENESIN 1200 MG: 600 TABLET, EXTENDED RELEASE ORAL at 21:40

## 2023-01-06 RX ADMIN — IPRATROPIUM BROMIDE AND ALBUTEROL SULFATE 3 ML: .5; 2.5 SOLUTION RESPIRATORY (INHALATION) at 20:21

## 2023-01-06 RX ADMIN — NEBIVOLOL HYDROCHLORIDE 5 MG: 5 TABLET ORAL at 09:21

## 2023-01-06 RX ADMIN — METFORMIN HYDROCHLORIDE 500 MG: 500 TABLET, EXTENDED RELEASE ORAL at 21:41

## 2023-01-06 RX ADMIN — ASPIRIN AND EXTENDED-RELEASE DIPYRIDAMOLE 1 CAPSULE: 25; 200 CAPSULE ORAL at 21:46

## 2023-01-06 RX ADMIN — INSULIN ASPART 2 UNITS: 100 INJECTION, SOLUTION INTRAVENOUS; SUBCUTANEOUS at 18:26

## 2023-01-06 RX ADMIN — METHYLPREDNISOLONE SODIUM SUCCINATE 40 MG: 40 INJECTION, POWDER, FOR SOLUTION INTRAMUSCULAR; INTRAVENOUS at 18:04

## 2023-01-06 RX ADMIN — DOCUSATE SODIUM 100 MG: 100 CAPSULE, LIQUID FILLED ORAL at 21:40

## 2023-01-06 RX ADMIN — ASPIRIN AND EXTENDED-RELEASE DIPYRIDAMOLE 1 CAPSULE: 25; 200 CAPSULE ORAL at 09:21

## 2023-01-06 RX ADMIN — CEFTRIAXONE SODIUM 1 G: 1 INJECTION, POWDER, FOR SOLUTION INTRAMUSCULAR; INTRAVENOUS at 05:33

## 2023-01-06 RX ADMIN — DOXYCYCLINE 100 MG: 100 INJECTION, POWDER, LYOPHILIZED, FOR SOLUTION INTRAVENOUS at 11:21

## 2023-01-06 RX ADMIN — VENLAFAXINE HYDROCHLORIDE 75 MG: 37.5 CAPSULE, EXTENDED RELEASE ORAL at 09:21

## 2023-01-06 RX ADMIN — DOCUSATE SODIUM 100 MG: 100 CAPSULE, LIQUID FILLED ORAL at 09:21

## 2023-01-06 RX ADMIN — INSULIN ASPART 6 UNITS: 100 INJECTION, SOLUTION INTRAVENOUS; SUBCUTANEOUS at 11:21

## 2023-01-06 RX ADMIN — OXYCODONE HYDROCHLORIDE AND ACETAMINOPHEN 1 TABLET: 10; 325 TABLET ORAL at 03:48

## 2023-01-06 RX ADMIN — IPRATROPIUM BROMIDE AND ALBUTEROL SULFATE 3 ML: .5; 2.5 SOLUTION RESPIRATORY (INHALATION) at 15:04

## 2023-01-06 RX ADMIN — INSULIN ASPART 2 UNITS: 100 INJECTION, SOLUTION INTRAVENOUS; SUBCUTANEOUS at 09:34

## 2023-01-06 RX ADMIN — OXYCODONE HYDROCHLORIDE AND ACETAMINOPHEN 1 TABLET: 10; 325 TABLET ORAL at 09:34

## 2023-01-06 RX ADMIN — MELATONIN 6 MG: 3 TAB ORAL at 23:00

## 2023-01-06 NOTE — PROGRESS NOTES
[facility] Medicine Services  INPATIENT PROGRESS NOTE      Length of Stay: 2  Date of Admission: 1/4/2023  Primary Care Physician: Brook Arreola MD    Subjective   Chief Complaint: Shortness of breath  HPI: Still having a lot of dyspnea with exertion and cough.  Overall still feeling improved though.    Review of Systems   Constitutional: Positive for activity change, chills and fatigue. Negative for fever.   Respiratory: Positive for cough, shortness of breath and wheezing.    Cardiovascular: Negative for chest pain and palpitations.   Gastrointestinal: Negative.    Genitourinary: Negative.    Musculoskeletal: Negative.    Skin: Negative.    Neurological: Negative.    Psychiatric/Behavioral: Negative.    All other systems reviewed and are negative.     All pertinent negatives and positives are as above. All other systems have been reviewed and are negative unless otherwise stated.     Objective    As of today 01/06/23  Temp:  [97 °F (36.1 °C)-98.5 °F (36.9 °C)] 97.4 °F (36.3 °C)  Heart Rate:  [60-77] 75  Resp:  [18-22] 20  BP: (130-165)/(62-74) 130/62    Physical Exam  HENT:      Head: Normocephalic and atraumatic.      Right Ear: External ear normal.      Left Ear: External ear normal.      Nose: Nose normal.      Mouth/Throat:      Mouth: Mucous membranes are moist.      Pharynx: Oropharynx is clear.   Eyes:      Conjunctiva/sclera: Conjunctivae normal.   Cardiovascular:      Rate and Rhythm: Normal rate and regular rhythm.      Pulses: Normal pulses.      Heart sounds: Normal heart sounds.   Pulmonary:      Effort: Pulmonary effort is normal. No respiratory distress.      Breath sounds: Normal breath sounds.   Abdominal:      General: Bowel sounds are normal.      Palpations: Abdomen is soft.      Tenderness: There is no abdominal tenderness.   Musculoskeletal:         General: No deformity.   Skin:     General: Skin is warm and dry.      Capillary  Refill: Capillary refill takes less than 2 seconds.   Neurological:      General: No focal deficit present.      Mental Status: She is alert and oriented to person, place, and time. Mental status is at baseline.   Psychiatric:         Behavior: Behavior normal.         Thought Content: Thought content normal.         Results Review:  I have reviewed the labs, radiology results, and diagnostic studies.    Laboratory Data:   Results from last 7 days   Lab Units 01/06/23  0550 01/05/23  0602 01/04/23  1354   SODIUM mmol/L 138 142 137   POTASSIUM mmol/L 4.4 4.3 3.6   CHLORIDE mmol/L 104 103 101   CO2 mmol/L 23.0 25.0 26.0   BUN mg/dL 35* 29* 22   CREATININE mg/dL 1.41* 1.51* 1.43*   GLUCOSE mg/dL 168* 209* 157*   CALCIUM mg/dL 9.1 9.5 9.2   BILIRUBIN mg/dL  --   --  0.3   ALK PHOS U/L  --   --  113   ALT (SGPT) U/L  --   --  23   AST (SGOT) U/L  --   --  25   ANION GAP mmol/L 11.0 14.0 10.0     Estimated Creatinine Clearance: 39.5 mL/min (A) (by C-G formula based on SCr of 1.41 mg/dL (H)).          Results from last 7 days   Lab Units 01/06/23  0550 01/05/23  0602 01/04/23  1354   WBC 10*3/mm3 12.54* 9.79 11.38*   HEMOGLOBIN g/dL 9.9* 10.3* 10.1*   HEMATOCRIT % 32.1* 32.2* 30.3*   PLATELETS 10*3/mm3 184 176 175           Culture Data:   Blood Culture   Date Value Ref Range Status   01/04/2023 No growth at 24 hours  Preliminary   01/04/2023 No growth at 24 hours  Preliminary     No results found for: URINECX  Respiratory Culture   Date Value Ref Range Status   01/05/2023   Preliminary    Scant growth (1+) The culture consists of normal respiratory saul. This is a preliminary report; final report to follow.     No results found for: WOUNDCX  No results found for: STOOLCX  No components found for: BODYFLD    Radiology Data:   Imaging Results (Last 24 Hours)     ** No results found for the last 24 hours. **          I have reviewed the patient's current medications.     Assessment/Plan     Principal Problem:    Pneumonia  of both lungs due to infectious organism, unspecified part of lung  Acute COPD exacerbation  Hypertension  Type 2 diabetes mellitus  Enlarged lymph node noted on CT scan    Plan:  - Continue supplemental oxygen and wean as tolerated, typically wears 2.5 L at home and can wear it full-time if needed  - Continue current IV steroids  - Continue bronchodilators  - Pulmonary toilet  - Continue Rocephin and doxycycline  - Discussed CT findings of enlarged lymph node likely related to underlying pneumonia but cannot fully rule out malignancy with the patient.  Discussed need for follow-up.  She was understanding agreement to this  - Continue home medications as appropriate  - PT and OT ordered  - Continue current insulin orders and glucose checks  - DVT prophylaxis with Lovenox  - CODE STATUS: Full      Medical Decision Making  Number and Complexity of problems: 3 high, 2 moderate      Conditions and Status:        Condition is improving.     [facility] Data  External documents reviewed: None     Discussed with: The patient     Treatment Plan  As above    Care Planning  Shared decision making: with the patient  Code status and discussions: Full    Disposition  Social Determinants of Health that impact treatment or disposition: none  I expect the patient to be discharged to home in 1-2 days.       I have utilized all available immediate resources to obtain, update, or review the patient's current medications (including all prescriptions, over-the-counter products, herbals, cannabis/cannabidiol products, and vitamin/mineral/dietary (nutritional) supplements).   I confirmed that the patient's Advance Care Plan is present, code status is documented, or surrogate decision maker is listed in the patient's medical record.    Discharge Planning: In process    Shant Maradiaga MD

## 2023-01-06 NOTE — SIGNIFICANT NOTE
01/06/23 1032   OTHER   Discipline physical therapy assistant   Rehab Time/Intention   Session Not Performed other (see comments)  (RN advised no Tx this date)

## 2023-01-06 NOTE — THERAPY TREATMENT NOTE
Patient Name: Ronda Blair  : 1945    MRN: [id number]                              Today's Date: 2023       Admit Date: 2023    Visit Dx:     ICD-10-CM ICD-9-CM   1. Pneumonia of both lungs due to infectious organism, unspecified part of lung  J18.9 483.8   2. COPD exacerbation (HCC)  J44.1 491.21   3. Chronic kidney disease, unspecified CKD stage  N18.9 585.9   4. Anemia, unspecified type  D64.9 285.9   5. Impaired mobility and ADLs  Z74.09 V49.89    Z78.9    6. Impaired functional mobility, balance, gait, and endurance  Z74.09 V49.89     Patient Active Problem List   Diagnosis   • Degeneration of intervertebral disc of mid-cervical region   • Morbid obesity due to excess calories (HCC)   • Former smoker   • Chest pain   • Diabetes mellitus (HCC)   • Chronic obstructive pulmonary disease (HCC)   • Dyslipidemia   • Adrenal nodule (HCC)   • Fecal occult blood test positive   • Benign neoplasm of colon   • Chronic nonalcoholic liver disease   • Diverticular disease   • Dysphagia   • Essential hypertension   • Internal hemorrhoids   • Knee pain, bilateral   • Lumbar disc disease with radiculopathy   • Neuralgia, geniculate   • Stage 2 chronic kidney disease   • Gait disturbance   • Nocturnal hypoxia   • Chronic heart failure with preserved ejection fraction (HCC)   • Normocytic anemia   • Acute gout involving toe of left foot   • MARISOL (obstructive sleep apnea)   • MARISOL and COPD overlap syndrome (HCC)   • Precordial pain   • Obesity (BMI 30-39.9)   • CKD (chronic kidney disease) stage 3, GFR 30-59 ml/min (HCC)   • Acute kidney injury (HCC)   • Elevation of level of transaminase and lactic acid dehydrogenase (LDH)   • Gout due to renal impairment, unspecified site   • Chronic obstructive pulmonary disease (HCC)   • Type 2 diabetes mellitus (HCC)   • Essential hypertension   • Stage 3a chronic kidney disease (HCC)   • Stage 3b chronic kidney disease (HCC)   • Pneumonia of both lungs due to  infectious organism, unspecified part of lung     Past Medical History:   Diagnosis Date   • Anxiety    • Arthritis    • COPD (chronic obstructive pulmonary disease) (HCC)    • Depression    • Diabetes mellitus (HCC)    • History of transfusion    • Hyperlipidemia    • Hypertension    • Stroke (HCC)     Greater than 5 years ago     Past Surgical History:   Procedure Laterality Date   • CERVICAL FUSION  1999   • EYE SURGERY Bilateral 2014    lasix eye surgery   • HEMORRHOIDECTOMY     • HYSTERECTOMY     • INTRATHECAL PUMP REMOVAL      2006   • KNEE ARTHROSCOPY Right 2015   • LUMBAR SPINE SURGERY  1999   • PAIN PUMP INSERTION/REVISION      Removed in 2006      General Information     Row Name 01/06/23 1030 01/06/23 0926       OT Time and Intention    Document Type therapy note (daily note)  -KD therapy note (daily note)  -KD    Mode of Treatment individual therapy;occupational therapy  -KD individual therapy;occupational therapy  -KD    Row Name 01/06/23 1030 01/06/23 0926       General Information    Patient Profile Reviewed yes  -KD yes  -KD    Existing Precautions/Restrictions oxygen therapy device and L/min  -KD oxygen therapy device and L/min  -KD    Row Name 01/06/23 1030 01/06/23 0926       Cognition    Orientation Status (Cognition) oriented x 4  -KD oriented x 4  -KD    Row Name 01/06/23 1030 01/06/23 0926       Safety Issues, Functional Mobility    Impairments Affecting Function (Mobility) endurance/activity tolerance;shortness of breath  -KD endurance/activity tolerance;shortness of breath  -KD          User Key  (r) = Recorded By, (t) = Taken By, (c) = Cosigned By    Initials Name Provider Type    KD Colleen Schmidt COTA Occupational Therapist Assistant                 Mobility/ADL's     Row Name 01/06/23 0926          Bed Mobility    Bed Mobility sit-supine  -KD     Supine-Sit [place] (Bed Mobility) independent  -KD     Sit-Supine [place] (Bed Mobility) independent  -KD     Row Name 01/06/23  0926          Sit-Stand Transfer    Sit-Stand [place] (Transfers) independent  -     Row Name 01/06/23 0926          Functional Mobility    Functional Mobility- Ind. Level independent  -     Functional Mobility-Distance (Feet) 15  -KD     Row Name 01/06/23 0926          Activities of Daily Living    BADL Assessment/Intervention bathing;upper body dressing;lower body dressing;grooming;toileting  -KD     Row Name 01/06/23 0926          Lower Body Dressing Assessment/Training    [place] Level (Lower Body Dressing) lower body dressing skills;doff;don;socks;independent  -KD     Position (Lower Body Dressing) edge of bed sitting  -KD     Row Name 01/06/23 0926          Upper Body Dressing Assessment/Training    [place] Level (Upper Body Dressing) upper body dressing skills;doff;don;independent  -KD     Position (Upper Body Dressing) edge of bed sitting  -     Row Name 01/06/23 0926          Bathing Assessment/Intervention    [place] Level (Bathing) bathing skills;lower body;upper body;independent  -     Position (Bathing) sink side  -     Row Name 01/06/23 0926          Grooming Assessment/Training    [place] Level (Grooming) grooming skills;hair care, combing/brushing;wash face, hands;oral care regimen;independent  -     Position (Grooming) sink side  -KD     Row Name 01/06/23 0926          Toileting Assessment/Training    [place] Level (Toileting) toileting skills;adjust/manage clothing;perform perineal hygiene;independent  -     Assistive Devices (Toileting) commode  -     Position (Toileting) unsupported sitting  -           User Key  (r) = Recorded By, (t) = Taken By, (c) = Cosigned By    Initials Name Provider Type    Colleen Varner COTA Occupational Therapist Assistant               Obj/Interventions    No documentation.                Goals/Plan     Row Name 01/06/23 0926          Bathing Goal 1 (OT)    Activity/Device (Bathing Goal 1, OT) bathing skills, all   -KD     [place] Level/Cues Needed (Bathing Goal 1, OT) independent  -KD     Time Frame (Bathing Goal 1, OT) long term goal (LTG);by discharge  -KD     Progress/Outcomes (Bathing Goal 1, OT) goal met  -KD     Row Name 01/06/23 0926          Strength Goal 1 (OT)    Strength Goal 1 (OT) Pt will increase BUE MMT by 1/2 a grade to improve independence in ADLs.  -KD     Time Frame (Strength Goal 1, OT) long term goal (LTG);by discharge  -KD     Progress/Outcome (Strength Goal 1, OT) goal not met  -KD     Row Name 01/06/23 0926          Problem Specific Goal 1 (OT)    Problem Specific Goal 1 (OT) Pt will participate in OOB fxnl activities for ~25 minutes to improve fxnl endurance.  -KD     Time Frame (Problem Specific Goal 1, OT) long term goal (LTG);by discharge  -KD     Progress/Outcome (Problem Specific Goal 1, OT) goal not met  -KD           User Key  (r) = Recorded By, (t) = Taken By, (c) = Cosigned By    Initials Name Provider Type    KD Colleen Schmidt COTA Occupational Therapist Assistant               Clinical Impression     Row Name 01/06/23 0926          Pain Assessment    Pretreatment Pain Rating 0/10 - no pain  -KD     Posttreatment Pain Rating 0/10 - no pain  -KD     Row Name 01/06/23 0926          Plan of Care Review    Plan of Care Reviewed With patient  -KD     Progress improving  -KD     Outcome Evaluation OT tx completed this date. Sup-sit-Ind. Sit-stand-sit-Ind. Pt stood sinkside for oral care, brush teeth and wash face. UB/LB bathing/dressing-Ind. Fxl mobility- ~15' Ind w/ no AD. Pt w/ all needs in reach. Cont OT POC.  -KD     Row Name 01/06/23 0926          Therapy Assessment/Plan (OT)    Therapy Frequency (OT) other (see comments)  3-7 days a week  -KD     Row Name 01/06/23 0926          Therapy Plan Review/Discharge Plan (OT)    Anticipated Discharge Disposition (OT) home  -KD     Row Name 01/06/23 0926          Vital Signs    Pre Systolic BP Rehab 162  -KD     Pre Treatment Diastolic BP 71   -KD     Pretreatment Heart Rate (beats/min) 64  -KD     Pre SpO2 (%) 96  -KD     O2 Delivery Pre Treatment nasal cannula  -KD     Pre Patient Position Supine  -KD     Intra Patient Position Standing  -KD     Post Patient Position Supine  -KD     Row Name 01/06/23 0926          Positioning and Restraints    Pre-Treatment Position in bed  -KD     Post Treatment Position bed  -KD     In Bed fowlers;call light within reach;encouraged to call for assist;exit alarm on  -KD           User Key  (r) = Recorded By, (t) = Taken By, (c) = Cosigned By    Initials Name Provider Type    Colleen Varner COTA Occupational Therapist Assistant               Outcome Measures     Row Name 01/06/23 0926          How much help from another is currently needed...    Putting on and taking off regular lower body clothing? 4  -KD     Bathing (including washing, rinsing, and drying) 4  -KD     Toileting (which includes using toilet bed pan or urinal) 4  -KD     Putting on and taking off regular upper body clothing 4  -KD     Taking care of personal grooming (such as brushing teeth) 4  -KD     Eating meals 4  -KD     AM-PAC 6 Clicks Score (OT) 24  -KD     Row Name 01/06/23 0912          How much help from another person do you currently need...    Turning from your back to your side while in flat bed without using bedrails? 4  -AG     Moving from lying on back to sitting on the side of a flat bed without bedrails? 4  -AG     Moving to and from a bed to a chair (including a wheelchair)? 4  -AG     Standing up from a chair using your arms (e.g., wheelchair, bedside chair)? 4  -AG     Climbing 3-5 steps with a railing? 3  -AG     To walk in hospital room? 3  -AG     AM-PAC 6 Clicks Score (PT) 22  -AG     Highest level of mobility 7 --> Walked 25 feet or more  -AG           User Key  (r) = Recorded By, (t) = Taken By, (c) = Cosigned By    Initials Name Provider Type    Colleen Varner COTA Occupational Therapist Assistant    SHANTAL Cevallos  Katina LUNA RN Registered Nurse                Occupational Therapy Education     Title: PT OT SLP Therapies (Done)     Topic: Occupational Therapy (Done)     Point: ADL training (Done)     Description:   Instruct learner(s) on proper safety adaptation and remediation techniques during self care or transfers.   Instruct in proper use of assistive devices.              Learning Progress Summary           Patient Acceptance, E,TB, VU by  at 1/5/2023 1019    Comment: OT role, POC, t/f training                   Point: Home exercise program (Done)     Description:   Instruct learner(s) on appropriate technique for monitoring, assisting and/or progressing therapeutic exercises/activities.              Learning Progress Summary           Patient Acceptance, E,TB, VU by  at 1/5/2023 1019    Comment: OT role, POC, t/f training                   Point: Body mechanics (Done)     Description:   Instruct learner(s) on proper positioning and spine alignment during self-care, functional mobility activities and/or exercises.              Learning Progress Summary           Patient Acceptance, E,TB, VU by  at 1/5/2023 1019    Comment: OT role, POC, t/f training                               User Key     Initials Effective Dates Name Provider Type Discipline     10/19/22 -  Renetta Fitzpatrick OT Occupational Therapist OT              OT Recommendation and Plan  Therapy Frequency (OT): other (see comments) (3-7 days a week)  Plan of Care Review  Plan of Care Reviewed With: patient  Progress: improving  Outcome Evaluation: OT tx completed this date. Sup-sit-Ind. Sit-stand-sit-Ind. Pt stood sinkside for oral care, brush teeth and wash face. UB/LB bathing/dressing-Ind. Fxl mobility- ~15' Ind w/ no AD. Pt w/ all needs in reach. Cont OT POC.     Time Calculation:    Time Calculation- OT     Row Name 01/06/23 0926             Time Calculation- OT    OT Start Time 0926  -KD      OT Stop Time 1004  -KD      OT Time Calculation (min) 38  min  -KD      Total Timed Code Minutes- OT 38 minute(s)  -KD      OT Received On 01/06/23  -KD         Timed Charges    94742 - OT Self Care/Mgmt Minutes 38  -KD         Total Minutes    Timed Charges Total Minutes 38  -KD       Total Minutes 38  -KD            User Key  (r) = Recorded By, (t) = Taken By, (c) = Cosigned By    Initials Name Provider Type    Colleen Varner COTA Occupational Therapist Assistant              Therapy Charges for Today     Code Description Service Date Service Provider Modifiers Qty    [id number] HC OT SELF CARE/MGMT/TRAIN EA 15 MIN 1/6/2023 Colleen Schmidt COTA GO 3               FARZANEH Lauren  1/6/2023

## 2023-01-06 NOTE — PLAN OF CARE
Goal Outcome Evaluation:  Plan of Care Reviewed With: patient        Progress: improving  Outcome Evaluation: OT tx completed this date. Sup-sit-Ind. Sit-stand-sit-Ind. Pt stood sinkside for oral care, brush teeth and wash face. UB/LB bathing/dressing-Ind. Fxl mobility- ~15' Ind w/ no AD. Pt w/ all needs in reach. Cont OT POC.

## 2023-01-07 LAB
ANION GAP SERPL CALCULATED.3IONS-SCNC: 10 MMOL/L (ref 5–15)
BACTERIA SPEC RESP CULT: NORMAL
BASOPHILS # BLD AUTO: 0.03 10*3/MM3 (ref 0–0.2)
BASOPHILS NFR BLD AUTO: 0.2 % (ref 0–1.5)
BUN SERPL-MCNC: 32 MG/DL (ref 8–23)
BUN/CREAT SERPL: 25.4 (ref 7–25)
CALCIUM SPEC-SCNC: 8.9 MG/DL (ref 8.6–10.5)
CHLORIDE SERPL-SCNC: 104 MMOL/L (ref 98–107)
CO2 SERPL-SCNC: 26 MMOL/L (ref 22–29)
CREAT SERPL-MCNC: 1.26 MG/DL (ref 0.57–1)
DEPRECATED RDW RBC AUTO: 44.3 FL (ref 37–54)
EGFRCR SERPLBLD CKD-EPI 2021: 43.8 ML/MIN/1.73
EOSINOPHIL # BLD AUTO: 0 10*3/MM3 (ref 0–0.4)
EOSINOPHIL NFR BLD AUTO: 0 % (ref 0.3–6.2)
ERYTHROCYTE [DISTWIDTH] IN BLOOD BY AUTOMATED COUNT: 12.8 % (ref 12.3–15.4)
GLUCOSE BLDC GLUCOMTR-MCNC: 125 MG/DL (ref 70–130)
GLUCOSE BLDC GLUCOMTR-MCNC: 238 MG/DL (ref 70–130)
GLUCOSE BLDC GLUCOMTR-MCNC: 245 MG/DL (ref 70–130)
GLUCOSE BLDC GLUCOMTR-MCNC: 398 MG/DL (ref 70–130)
GLUCOSE SERPL-MCNC: 109 MG/DL (ref 65–99)
GRAM STN SPEC: NORMAL
HCT VFR BLD AUTO: 31.4 % (ref 34–46.6)
HGB BLD-MCNC: 10 G/DL (ref 12–15.9)
IMM GRANULOCYTES # BLD AUTO: 0.48 10*3/MM3 (ref 0–0.05)
IMM GRANULOCYTES NFR BLD AUTO: 3.4 % (ref 0–0.5)
LYMPHOCYTES # BLD AUTO: 1.39 10*3/MM3 (ref 0.7–3.1)
LYMPHOCYTES NFR BLD AUTO: 9.8 % (ref 19.6–45.3)
MCH RBC QN AUTO: 30.5 PG (ref 26.6–33)
MCHC RBC AUTO-ENTMCNC: 31.8 G/DL (ref 31.5–35.7)
MCV RBC AUTO: 95.7 FL (ref 79–97)
MONOCYTES # BLD AUTO: 0.88 10*3/MM3 (ref 0.1–0.9)
MONOCYTES NFR BLD AUTO: 6.2 % (ref 5–12)
NEUTROPHILS NFR BLD AUTO: 11.47 10*3/MM3 (ref 1.7–7)
NEUTROPHILS NFR BLD AUTO: 80.4 % (ref 42.7–76)
NRBC BLD AUTO-RTO: 0 /100 WBC (ref 0–0.2)
PLATELET # BLD AUTO: 187 10*3/MM3 (ref 140–450)
PMV BLD AUTO: 10.9 FL (ref 6–12)
POTASSIUM SERPL-SCNC: 4.4 MMOL/L (ref 3.5–5.2)
QT INTERVAL: 396 MS
QTC INTERVAL: 424 MS
RBC # BLD AUTO: 3.28 10*6/MM3 (ref 3.77–5.28)
SODIUM SERPL-SCNC: 140 MMOL/L (ref 136–145)
WBC NRBC COR # BLD: 14.25 10*3/MM3 (ref 3.4–10.8)

## 2023-01-07 PROCEDURE — 63710000001 INSULIN ASPART PER 5 UNITS: Performed by: PHYSICIAN ASSISTANT

## 2023-01-07 PROCEDURE — 25010000002 METHYLPREDNISOLONE PER 40 MG: Performed by: PHYSICIAN ASSISTANT

## 2023-01-07 PROCEDURE — 85025 COMPLETE CBC W/AUTO DIFF WBC: CPT | Performed by: PHYSICIAN ASSISTANT

## 2023-01-07 PROCEDURE — 94664 DEMO&/EVAL PT USE INHALER: CPT

## 2023-01-07 PROCEDURE — 94799 UNLISTED PULMONARY SVC/PX: CPT

## 2023-01-07 PROCEDURE — 97116 GAIT TRAINING THERAPY: CPT

## 2023-01-07 PROCEDURE — 25010000002 CEFTRIAXONE PER 250 MG: Performed by: PHYSICIAN ASSISTANT

## 2023-01-07 PROCEDURE — 80048 BASIC METABOLIC PNL TOTAL CA: CPT | Performed by: PHYSICIAN ASSISTANT

## 2023-01-07 PROCEDURE — 94760 N-INVAS EAR/PLS OXIMETRY 1: CPT

## 2023-01-07 PROCEDURE — 25010000002 ENOXAPARIN PER 10 MG: Performed by: PHYSICIAN ASSISTANT

## 2023-01-07 PROCEDURE — 82962 GLUCOSE BLOOD TEST: CPT

## 2023-01-07 PROCEDURE — 97530 THERAPEUTIC ACTIVITIES: CPT

## 2023-01-07 PROCEDURE — 97535 SELF CARE MNGMENT TRAINING: CPT

## 2023-01-07 RX ADMIN — OXYCODONE HYDROCHLORIDE AND ACETAMINOPHEN 1 TABLET: 10; 325 TABLET ORAL at 06:15

## 2023-01-07 RX ADMIN — ASPIRIN AND EXTENDED-RELEASE DIPYRIDAMOLE 1 CAPSULE: 25; 200 CAPSULE ORAL at 21:33

## 2023-01-07 RX ADMIN — GUAIFENESIN 1200 MG: 600 TABLET, EXTENDED RELEASE ORAL at 20:22

## 2023-01-07 RX ADMIN — ROPINIROLE 2 MG: 1 TABLET, FILM COATED ORAL at 20:21

## 2023-01-07 RX ADMIN — VENLAFAXINE HYDROCHLORIDE 75 MG: 37.5 CAPSULE, EXTENDED RELEASE ORAL at 06:13

## 2023-01-07 RX ADMIN — METHYLPREDNISOLONE SODIUM SUCCINATE 40 MG: 40 INJECTION, POWDER, FOR SOLUTION INTRAMUSCULAR; INTRAVENOUS at 17:37

## 2023-01-07 RX ADMIN — ALLOPURINOL 100 MG: 100 TABLET ORAL at 08:13

## 2023-01-07 RX ADMIN — IPRATROPIUM BROMIDE AND ALBUTEROL SULFATE 3 ML: .5; 2.5 SOLUTION RESPIRATORY (INHALATION) at 11:27

## 2023-01-07 RX ADMIN — DOXYCYCLINE 100 MG: 100 INJECTION, POWDER, LYOPHILIZED, FOR SOLUTION INTRAVENOUS at 21:33

## 2023-01-07 RX ADMIN — GUAIFENESIN 1200 MG: 600 TABLET, EXTENDED RELEASE ORAL at 08:12

## 2023-01-07 RX ADMIN — OXYCODONE HYDROCHLORIDE AND ACETAMINOPHEN 1 TABLET: 10; 325 TABLET ORAL at 12:47

## 2023-01-07 RX ADMIN — ROSUVASTATIN CALCIUM 10 MG: 10 TABLET, FILM COATED ORAL at 08:13

## 2023-01-07 RX ADMIN — FOLIC ACID 1000 MCG: 1 TABLET ORAL at 08:13

## 2023-01-07 RX ADMIN — ISOSORBIDE MONONITRATE 30 MG: 30 TABLET, EXTENDED RELEASE ORAL at 08:13

## 2023-01-07 RX ADMIN — ASPIRIN AND EXTENDED-RELEASE DIPYRIDAMOLE 1 CAPSULE: 25; 200 CAPSULE ORAL at 08:13

## 2023-01-07 RX ADMIN — METFORMIN HYDROCHLORIDE 500 MG: 500 TABLET, EXTENDED RELEASE ORAL at 08:13

## 2023-01-07 RX ADMIN — Medication 10 ML: at 20:22

## 2023-01-07 RX ADMIN — ENOXAPARIN SODIUM 40 MG: 40 INJECTION SUBCUTANEOUS at 17:37

## 2023-01-07 RX ADMIN — AMLODIPINE BESYLATE 10 MG: 10 TABLET ORAL at 08:13

## 2023-01-07 RX ADMIN — IPRATROPIUM BROMIDE AND ALBUTEROL SULFATE 3 ML: .5; 2.5 SOLUTION RESPIRATORY (INHALATION) at 19:13

## 2023-01-07 RX ADMIN — LISINOPRIL 20 MG: 20 TABLET ORAL at 08:13

## 2023-01-07 RX ADMIN — Medication 10 ML: at 08:13

## 2023-01-07 RX ADMIN — IPRATROPIUM BROMIDE AND ALBUTEROL SULFATE 3 ML: .5; 2.5 SOLUTION RESPIRATORY (INHALATION) at 08:25

## 2023-01-07 RX ADMIN — METHYLPREDNISOLONE SODIUM SUCCINATE 40 MG: 40 INJECTION, POWDER, FOR SOLUTION INTRAMUSCULAR; INTRAVENOUS at 06:10

## 2023-01-07 RX ADMIN — DOXYCYCLINE 100 MG: 100 INJECTION, POWDER, LYOPHILIZED, FOR SOLUTION INTRAVENOUS at 09:30

## 2023-01-07 RX ADMIN — INSULIN ASPART 4 UNITS: 100 INJECTION, SOLUTION INTRAVENOUS; SUBCUTANEOUS at 17:37

## 2023-01-07 RX ADMIN — IPRATROPIUM BROMIDE AND ALBUTEROL SULFATE 3 ML: .5; 2.5 SOLUTION RESPIRATORY (INHALATION) at 15:36

## 2023-01-07 RX ADMIN — NEBIVOLOL HYDROCHLORIDE 5 MG: 5 TABLET ORAL at 08:13

## 2023-01-07 RX ADMIN — INSULIN ASPART 8 UNITS: 100 INJECTION, SOLUTION INTRAVENOUS; SUBCUTANEOUS at 12:06

## 2023-01-07 RX ADMIN — CEFTRIAXONE SODIUM 1 G: 1 INJECTION, POWDER, FOR SOLUTION INTRAMUSCULAR; INTRAVENOUS at 06:12

## 2023-01-07 RX ADMIN — METFORMIN HYDROCHLORIDE 500 MG: 500 TABLET, EXTENDED RELEASE ORAL at 20:21

## 2023-01-07 RX ADMIN — DOCUSATE SODIUM 100 MG: 100 CAPSULE, LIQUID FILLED ORAL at 08:13

## 2023-01-07 RX ADMIN — OXYCODONE HYDROCHLORIDE AND ACETAMINOPHEN 1 TABLET: 10; 325 TABLET ORAL at 20:25

## 2023-01-07 NOTE — PLAN OF CARE
Goal Outcome Evaluation:  Plan of Care Reviewed With: patient        Progress: improving  Outcome Evaluation: Pt standing up in room upon entry. Pt repositioning her bed linens. Pt agreeable to therapy and was able to stand ind and amb without AD for 44ft x2 with O2 applied. Pt remained above 90% throughout tx. Pt remained sitting EOB upon completion of tx and was left with CNA in room to take VS. Pt would cont to benefit from therapy.

## 2023-01-07 NOTE — PROGRESS NOTES
Item 0 Points 1 Point 2 Points 3 Points 4 Points Subtotal   Mental Status Alert, oriented, cooperative Lethargic, follows commands Confused, not following commands Obtunded or Somnolent Comatose 0   Respiratory Pattern Regular RR 8-16 breaths/minute Increased RR 18-25 breaths/minute Dyspnea on exertion, irregular RR 26-30 breaths/minute Shortness of breath,  RR 31-35 breaths/minute Accessory muscle use, severe SOB  RR > 35 breaths/minute 0   Breath Sounds Clear Decreased unilaterally Decreased bilaterally Basilar crackles Wheezing and/or rhonchi 4   Cough Strong, spontaneous, non-productive Strong productive Weak, non-productive Weak, productive or weak with rhonchi Absent or may require suctioning 0   Pulmonary Status Nonsmoker, no previous history, >1 year quit < 1 PPD  <1 year quit > or = 1 PPD Diagnosed pulmonary disease (severe or chronic) Severe or chronic pulmonary disease with exacerbation 4   Surgical Status None General surgery (non-abdominal or non-thoracic) Lower abdominal Thoracic or upper abdominal Thoracic with pulmonary disease 0   Chest X-ray Clear Chronic changes Infiltrates, atelectasis or pleural effusion Infiltrates in > 1 lobe Diffuse infiltrates and atelectasis and/or effusions 2   Activity   Level Ambulatory Ambulatory with assistance Non-ambulatory Paraplegic Quadriplegic 0        Total Score   10     Score    Drug Therapy Frequency 20 or >    Q4 Duoneb with Q2 Albuterol PRN 15-19    Q6 Duoneb with Q4 Albuterol PRN 10-14    QID Duoneb with Q4 Albuterol PRN 5-9    TID Duoneb with Q6 Albuterol PRN 0-4    Q4 PRN Duoneb                  Lung Expansion Therapy (PEP) Bronchopulmonary Hygiene (CPT)   Q4 & PRN - Severe atelectasis, poor oxygenation Q4 - Copious secretions, dyspnea, unable to sleep   QID - High risk for persistent atelectasis, existence of atelectasis QID & Q4 PRN - Moderate secretion production   TID - At risk for developing atelectasis TID - Small amounts of secretions with poor  cough   BID - Prevention of atelectasis BID - Unable to breathe deeply and cough spontaneously     RT Comments / Recommendations:      RT protocol re-eval; neb txs to remain qid; will reassess in 48 hr

## 2023-01-07 NOTE — PLAN OF CARE
Goal Outcome Evaluation:  Plan of Care Reviewed With: patient        Progress: improving  Outcome Evaluation: Patient SOB with activity, O2 increased to 5 L, pain controlled with PO pain medication, resting between care.

## 2023-01-07 NOTE — THERAPY TREATMENT NOTE
Acute Care - Physical Therapy Treatment Note  [facility]     Patient Name: Ronda Blair  : 1945  MRN: [id number]  Today's Date: 2023      Visit Dx:     ICD-10-CM ICD-9-CM   1. Pneumonia of both lungs due to infectious organism, unspecified part of lung  J18.9 483.8   2. COPD exacerbation (HCC)  J44.1 491.21   3. Chronic kidney disease, unspecified CKD stage  N18.9 585.9   4. Anemia, unspecified type  D64.9 285.9   5. Impaired mobility and ADLs  Z74.09 V49.89    Z78.9    6. Impaired functional mobility, balance, gait, and endurance  Z74.09 V49.89     Patient Active Problem List   Diagnosis   • Degeneration of intervertebral disc of mid-cervical region   • Morbid obesity due to excess calories (HCC)   • Former smoker   • Chest pain   • Diabetes mellitus (HCC)   • Chronic obstructive pulmonary disease (HCC)   • Dyslipidemia   • Adrenal nodule (HCC)   • Fecal occult blood test positive   • Benign neoplasm of colon   • Chronic nonalcoholic liver disease   • Diverticular disease   • Dysphagia   • Essential hypertension   • Internal hemorrhoids   • Knee pain, bilateral   • Lumbar disc disease with radiculopathy   • Neuralgia, geniculate   • Stage 2 chronic kidney disease   • Gait disturbance   • Nocturnal hypoxia   • Chronic heart failure with preserved ejection fraction (HCC)   • Normocytic anemia   • Acute gout involving toe of left foot   • MARISOL (obstructive sleep apnea)   • MARISOL and COPD overlap syndrome (HCC)   • Precordial pain   • Obesity (BMI 30-39.9)   • CKD (chronic kidney disease) stage 3, GFR 30-59 ml/min (HCC)   • Acute kidney injury (HCC)   • Elevation of level of transaminase and lactic acid dehydrogenase (LDH)   • Gout due to renal impairment, unspecified site   • Chronic obstructive pulmonary disease (HCC)   • Type 2 diabetes mellitus (HCC)   • Essential hypertension   • Stage 3a chronic kidney disease (HCC)   • Stage 3b chronic kidney disease (HCC)   • Pneumonia of both lungs  due to infectious organism, unspecified part of lung     Past Medical History:   Diagnosis Date   • Anxiety    • Arthritis    • COPD (chronic obstructive pulmonary disease) (HCC)    • Depression    • Diabetes mellitus (HCC)    • History of transfusion    • Hyperlipidemia    • Hypertension    • Stroke (HCC)     Greater than 5 years ago     Past Surgical History:   Procedure Laterality Date   • CERVICAL FUSION  1999   • EYE SURGERY Bilateral 2014    [facility] eye surgery   • HEMORRHOIDECTOMY     • HYSTERECTOMY     • INTRATHECAL PUMP REMOVAL      2006   • KNEE ARTHROSCOPY Right 2015   • LUMBAR SPINE SURGERY  1999   • PAIN PUMP INSERTION/REVISION      Removed in 2006     PT Assessment (last 12 hours)     PT Evaluation and Treatment     Row Name 01/07/23 1451          Physical Therapy Time and Intention    Subjective Information no complaints  -TW     Document Type therapy note (daily note)  -TW     Mode of Treatment physical therapy;individual therapy  -TW     Patient Effort good  -TW     Comment Pt up ablib fixing her bed linens.  -TW     Row Name 01/07/23 1451          General Information    Patient Profile Reviewed yes  -TW     Patient Observations alert;cooperative;agree to therapy  -TW     Patient/Family/Caregiver Comments/Observations none  -TW     General Observations of Patient Pt standing in room repositioning her blanket on her bed.  -TW     Existing Precautions/Restrictions oxygen therapy device and L/min  -TW     Row Name 01/07/23 1451          Pain    Pretreatment Pain Rating 0/10 - no pain  -TW     Posttreatment Pain Rating 0/10 - no pain  -TW     Row Name 01/07/23 1451          Cognition    Affect/Mental Status (Cognition) WFL  -TW     Orientation Status (Cognition) oriented x 4  -TW     Follows Commands (Cognition) WFL  -TW     Cognitive Function WFL  -TW     Personal Safety Interventions fall prevention program maintained;muscle strengthening facilitated;nonskid shoes/slippers when out of bed  -TW     Row  Name 01/07/23 1451          Bed Mobility    Comment, (Bed Mobility) NT pt standing up in room upon entry and remained sitting EOB upon departure.  -TW     Row Name 01/07/23 1451          Transfers    Transfers sit-stand transfer;stand-sit transfer  -TW     Row Name 01/07/23 1451          Sit-Stand Transfer    Sit-Stand [place] (Transfers) independent  -TW     Row Name 01/07/23 1451          Stand-Sit Transfer    Stand-Sit [place] (Transfers) verbal cues  -TW     Row Name 01/07/23 1451          Gait/Stairs (Locomotion)    [place] Level (Gait) independent  -TW     Assistive Device (Gait) other (see comments)  No AD.  -TW     Distance in Feet (Gait) 44ft x2  -TW     Row Name 01/07/23 1451          Plan of Care Review    Plan of Care Reviewed With patient  -TW     Progress improving  -TW     Outcome Evaluation Pt standing up in room upon entry. Pt repositioning her bed linens. Pt agreeable to therapy and was able to stand ind and amb without AD for 44ft x2 with O2 applied. Pt remained above 90% throughout tx. Pt remained sitting EOB upon completion of tx and was left with CNA in room to take VS. Pt would cont to benefit from therapy.  -TW     Row Name 01/07/23 1451          Vital Signs    Pretreatment Heart Rate (beats/min) 72  -TW     Intratreatment Heart Rate (beats/min) 68  -TW     Posttreatment Heart Rate (beats/min) 70  -TW     Pre SpO2 (%) 94  -TW     O2 Delivery Pre Treatment supplemental O2  -TW     Intra SpO2 (%) 94  -TW     O2 Delivery Intra Treatment supplemental O2  -TW     Post SpO2 (%) 93  -TW     O2 Delivery Post Treatment supplemental O2  -TW     Pre Patient Position Sitting  -TW     Intra Patient Position Standing  -TW     Post Patient Position Sitting  -TW     Row Name 01/07/23 1451          Positioning and Restraints    Pre-Treatment Position in bed  -TW     Post Treatment Position bed  -TW     In Bed sitting EOB;call light within reach;encouraged to call for assist  -TW     Row Name  01/07/23 1451          Therapy Assessment/Plan (PT)    Rehab Potential (PT) good, to achieve stated therapy goals  -TW     Row Name 01/07/23 1451          Bed Mobility Goal 1 (PT)    Activity/Assistive Device (Bed Mobility Goal 1, PT) sit to supine/supine to sit  -TW     [place] Level/Cues Needed (Bed Mobility Goal 1, PT) independent  -TW     Time Frame (Bed Mobility Goal 1, PT) by discharge  -TW     Strategies/Barriers (Bed Mobility Goal 1, PT) HOB flat, no bed rails.  -TW     Progress/Outcomes (Bed Mobility Goal 1, PT) goal not met  -TW     Row Name 01/07/23 1451          Gait Training Goal 1 (PT)    Activity/Assistive Device (Gait Training Goal 1, PT) gait (walking locomotion)  -TW     [place] Level (Gait Training Goal 1, PT) independent  -TW     Distance (Gait Training Goal 1, PT) 150'x1.  -TW     Strategies/Barriers (Gait Training Goal 1, PT) Maintain SpO2 >90% on 2.5 LPM.  -TW     Progress/Outcome (Gait Training Goal 1, PT) goal not met  -TW     Row Name 01/07/23 1451          Stairs Goal 1 (PT)    Activity/Assistive Device (Stairs Goal 1, PT) using handrail, right;using handrail, left  -TW     [place] Level/Cues Needed (Stairs Goal 1, PT) modified independence  -TW     Number of Stairs (Stairs Goal 1, PT) 4 steps into camper.  -TW     Time Frame (Stairs Goal 1, PT) by discharge  -TW     Strategies/Barriers (Stairs Goal 1, PT) Maintain SpO2 >90% on 2.5 LPM.  -TW     Progress/Outcome (Stairs Goal 1, PT) goal not met  -TW     Row Name 01/07/23 1451          Problem Specific Goal 1 (PT)    Problem Specific Goal 1 (PT) Score 28/28 on Tinetti fall risk assessment.  -TW     Time Frame (Problem Specific Goal 1, PT) by discharge  -TW     Progress/Outcome (Problem Specific Goal 1, PT) goal not met  -TW           User Key  (r) = Recorded By, (t) = Taken By, (c) = Cosigned By    Initials Name Provider Type    TW Jose Carlos Arredondo, PTA Physical Therapist Assistant                Physical Therapy Education      Title: PT OT SLP Therapies (Done)     Topic: Physical Therapy (Done)     Point: Mobility training (Done)     Learning Progress Summary           Patient Acceptance, E, VU by AB at 1/6/2023 0700                   Point: Home exercise program (Done)     Learning Progress Summary           Patient Acceptance, E, VU by AB at 1/6/2023 0700                   Point: Body mechanics (Done)     Learning Progress Summary           Patient Acceptance, E, VU by AB at 1/6/2023 0700                   Point: Precautions (Done)     Learning Progress Summary           Patient Acceptance, E, VU by AB at 1/6/2023 0700    Acceptance, E, VU by  at 1/5/2023 0959    Comment: PT POC, goals.    Acceptance, E, VU by AB at 1/5/2023 0508                               User Key     Initials Effective Dates Name Provider Type Discipline    CZ 09/18/22 -  Ruben Weeks, PT Physical Therapist PT    AB 11/07/22 -  Joan Jack LPN Licensed Nurse Nurse              PT Recommendation and Plan  Anticipated Discharge Disposition (PT): home  Plan of Care Reviewed With: patient  Progress: improving  Outcome Evaluation: Pt standing up in room upon entry. Pt repositioning her bed linens. Pt agreeable to therapy and was able to stand ind and amb without AD for 44ft x2 with O2 applied. Pt remained above 90% throughout tx. Pt remained sitting EOB upon completion of tx and was left with CNA in room to take VS. Pt would cont to benefit from therapy.       Time Calculation:    PT Charges     Row Name 01/07/23 1548             Time Calculation    Start Time 1451  -TW      Stop Time 1516  -TW      Time Calculation (min) 25 min  -TW         Time Calculation- PT    Total Timed Code Minutes- PT 25 minute(s)  -TW            User Key  (r) = Recorded By, (t) = Taken By, (c) = Cosigned By    Initials Name Provider Type    TW Jose Carlos Arredondo, PTA Physical Therapist Assistant              Therapy Charges for Today     Code Description Service Date Service  Provider Modifiers Qty    62097282039 HC GAIT TRAINING EA 15 MIN 1/7/2023 Jose Carlos Arredondo, PTA GP 1    89795504831 HC PT THERAPEUTIC ACT EA 15 MIN 1/7/2023 Jose Carlos Arredondo, AMADOU GP 1          PT G-Codes  Outcome Measure Options: AM-PAC 6 Clicks Daily Activity (OT)  AM-PAC 6 Clicks Score (PT): 24  AM-PAC 6 Clicks Score (OT): 24    Jose Carlos Arredondo PTA  1/7/2023

## 2023-01-07 NOTE — PLAN OF CARE
Goal Outcome Evaluation:  Plan of Care Reviewed With: patient        Progress: improving  Outcome Evaluation: Pt in bathroom upon arrival , SOA with O2 90. Pt performed fx'al mobility I in room, performing UB bathing I in chair, and LB bathing I  standing in bathroom. Pt doffed/donned HG I. Pt sitting EOB at end of tx with all needs in reach.

## 2023-01-07 NOTE — PLAN OF CARE
Goal Outcome Evaluation:  Plan of Care Reviewed With: patient        Progress: improving  Outcome Evaluation: VSS. SOB present on exertion. PRN melatonin effective per pt. Ambulating to void. Pt has voiced concern and anxiety of going home too soon.

## 2023-01-07 NOTE — THERAPY TREATMENT NOTE
Patient Name: Ronda Blair  : 1945    MRN: [id number]                              Today's Date: 2023       Admit Date: 2023    Visit Dx:     ICD-10-CM ICD-9-CM   1. Pneumonia of both lungs due to infectious organism, unspecified part of lung  J18.9 483.8   2. COPD exacerbation (HCC)  J44.1 491.21   3. Chronic kidney disease, unspecified CKD stage  N18.9 585.9   4. Anemia, unspecified type  D64.9 285.9   5. Impaired mobility and ADLs  Z74.09 V49.89    Z78.9    6. Impaired functional mobility, balance, gait, and endurance  Z74.09 V49.89     Patient Active Problem List   Diagnosis   • Degeneration of intervertebral disc of mid-cervical region   • Morbid obesity due to excess calories (HCC)   • Former smoker   • Chest pain   • Diabetes mellitus (HCC)   • Chronic obstructive pulmonary disease (HCC)   • Dyslipidemia   • Adrenal nodule (HCC)   • Fecal occult blood test positive   • Benign neoplasm of colon   • Chronic nonalcoholic liver disease   • Diverticular disease   • Dysphagia   • Essential hypertension   • Internal hemorrhoids   • Knee pain, bilateral   • Lumbar disc disease with radiculopathy   • Neuralgia, geniculate   • Stage 2 chronic kidney disease   • Gait disturbance   • Nocturnal hypoxia   • Chronic heart failure with preserved ejection fraction (HCC)   • Normocytic anemia   • Acute gout involving toe of left foot   • MARISOL (obstructive sleep apnea)   • MARISOL and COPD overlap syndrome (HCC)   • Precordial pain   • Obesity (BMI 30-39.9)   • CKD (chronic kidney disease) stage 3, GFR 30-59 ml/min (HCC)   • Acute kidney injury (HCC)   • Elevation of level of transaminase and lactic acid dehydrogenase (LDH)   • Gout due to renal impairment, unspecified site   • Chronic obstructive pulmonary disease (HCC)   • Type 2 diabetes mellitus (HCC)   • Essential hypertension   • Stage 3a chronic kidney disease (HCC)   • Stage 3b chronic kidney disease (HCC)   • Pneumonia of both lungs due to  infectious organism, unspecified part of lung     Past Medical History:   Diagnosis Date   • Anxiety    • Arthritis    • COPD (chronic obstructive pulmonary disease) (HCC)    • Depression    • Diabetes mellitus (HCC)    • History of transfusion    • Hyperlipidemia    • Hypertension    • Stroke (HCC)     Greater than 5 years ago     Past Surgical History:   Procedure Laterality Date   • CERVICAL FUSION  1999   • EYE SURGERY Bilateral 2014    lasix eye surgery   • HEMORRHOIDECTOMY     • HYSTERECTOMY     • INTRATHECAL PUMP REMOVAL      2006   • KNEE ARTHROSCOPY Right 2015   • LUMBAR SPINE SURGERY  1999   • PAIN PUMP INSERTION/REVISION      Removed in 2006      General Information     Row Name 01/07/23 1033          OT Time and Intention    Document Type therapy note (daily note)  -TO     Mode of Treatment individual therapy;occupational therapy  -TO     Row Name 01/07/23 1033          General Information    Patient Profile Reviewed yes  -TO     Existing Precautions/Restrictions oxygen therapy device and L/min  -TO     Row Name 01/07/23 1033          Cognition    Orientation Status (Cognition) oriented x 4  -TO     [facility] Name 01/07/23 1033          Safety Issues, Functional Mobility    Impairments Affecting Function (Mobility) endurance/activity tolerance;shortness of breath  -TO           User Key  (r) = Recorded By, (t) = Taken By, (c) = Cosigned By    Initials Name Provider Type    TO Rome Pinedo COTA Occupational Therapist Assistant                 Mobility/ADL's     Row Name 01/07/23 1033          Bed Mobility    Bed Mobility sit-supine  -TO     Supine-Sit [place] (Bed Mobility) independent  -TO     Sit-Supine [place] (Bed Mobility) independent  -TO     Assistive Device (Bed Mobility) head of bed elevated;bed rails  -TO     Row Name 01/07/23 1033          Transfers    Transfers sit-stand transfer;stand-sit transfer  -TO     Row Name 01/07/23 1033          Sit-Stand Transfer    Sit-Stand [place]  (Transfers) independent  -TO     Row Name 01/07/23 1033          Stand-Sit Transfer    Stand-Sit [place] (Transfers) verbal cues  -TO     Row Name 01/07/23 1033          Functional Mobility    Functional Mobility- Ind. Level independent  -TO     Functional Mobility-Distance (Feet) 15  -TO     Row Name 01/07/23 1033          Activities of Daily Living    BADL Assessment/Intervention bathing;upper body dressing;lower body dressing;toileting  -TO     [facility] Name 01/07/23 1033          Lower Body Dressing Assessment/Training    [place] Level (Lower Body Dressing) lower body dressing skills;doff;don;socks;independent  -TO     Position (Lower Body Dressing) supported sitting  -TO     [facility] Name 01/07/23 1033          Upper Body Dressing Assessment/Training    [place] Level (Upper Body Dressing) upper body dressing skills;doff;don;independent  -TO     Position (Upper Body Dressing) unsupported standing  -TO     [facility] Name 01/07/23 1033          Bathing Assessment/Intervention    [place] Level (Bathing) bathing skills;lower body;upper body;independent  -TO     Position (Bathing) sink side  -TO     [facility] Name 01/07/23 1033          Grooming Assessment/Training    [place] Level (Grooming) wash face, hands;independent  -TO     Position (Grooming) sink side  -TO     Row Name 01/07/23 1033          Toileting Assessment/Training    [place] Level (Toileting) toileting skills;adjust/manage clothing;perform perineal hygiene;independent  -TO     Assistive Devices (Toileting) commode  -TO     Position (Toileting) unsupported sitting  -TO           User Key  (r) = Recorded By, (t) = Taken By, (c) = Cosigned By    Initials Name Provider Type    TO Rome Pinedo COTA Occupational Therapist Assistant               Obj/Interventions    No documentation.                Goals/Plan    No documentation.                Clinical Impression     Row Name 01/07/23 1033          Pain Assessment    Pretreatment Pain Rating  0/10 - no pain  -TO     Posttreatment Pain Rating 0/10 - no pain  -TO     [facility] Name 01/07/23 1033          Plan of Care Review    Plan of Care Reviewed With patient  -TO     Progress improving  -TO     Outcome Evaluation Pt in bathroom upon arrival , SOA with O2 90. Pt performed fx'al mobility I in room, performing UB bathing I in chair, and LB bathing I  standing in bathroom. Pt doffed/donned HG I. Pt sitting EOB at end of tx with all needs in reach.  -TO     [facility] Name 01/07/23 1033          Therapy Assessment/Plan (OT)    Rehab Potential (OT) good, to achieve stated therapy goals  -TO     Criteria for Skilled Therapeutic Interventions Met (OT) yes;skilled treatment is necessary  -TO     Therapy Frequency (OT) other (see comments)  3-7 days a week  -TO     [facility] Name 01/07/23 1033          Therapy Plan Review/Discharge Plan (OT)    Anticipated Discharge Disposition (OT) home  -TO     Row Name 01/07/23 1033          Vital Signs    Pre Systolic BP Rehab 141  -TO     Pre Treatment Diastolic BP 68  -TO     Pretreatment Heart Rate (beats/min) 68  -TO     Pre SpO2 (%) 90  -TO     O2 Delivery Pre Treatment supplemental O2  -TO     Post SpO2 (%) 92  -TO     O2 Delivery Post Treatment supplemental O2  -TO     Pre Patient Position Sitting  -TO     Intra Patient Position Standing  -TO     Post Patient Position Sitting  -TO     Row Name 01/07/23 1033          Positioning and Restraints    Pre-Treatment Position bathroom  -TO     Post Treatment Position bed  -TO     In Bed call light within reach;sitting EOB;encouraged to call for assist  -TO           User Key  (r) = Recorded By, (t) = Taken By, (c) = Cosigned By    Initials Name Provider Type    TO Rome Pinedo COTA Occupational Therapist Assistant               Outcome Measures     Row Name 01/07/23 1033          How much help from another is currently needed...    Putting on and taking off regular lower body clothing? 4  -TO     Bathing (including washing, rinsing, and  drying) 4  -TO     Toileting (which includes using toilet bed pan or urinal) 4  -TO     Putting on and taking off regular upper body clothing 4  -TO     Taking care of personal grooming (such as brushing teeth) 4  -TO     Eating meals 4  -TO     AM-PAC 6 Clicks Score (OT) 24  -TO     Row Name 01/07/23 0801          How much help from another person do you currently need...    Turning from your back to your side while in flat bed without using bedrails? 4  -AG     Moving from lying on back to sitting on the side of a flat bed without bedrails? 4  -AG     Moving to and from a bed to a chair (including a wheelchair)? 4  -AG     Standing up from a chair using your arms (e.g., wheelchair, bedside chair)? 4  -AG     Climbing 3-5 steps with a railing? 4  -AG     To walk in hospital room? 4  -AG     AM-PAC 6 Clicks Score (PT) 24  -AG     Highest level of mobility 8 --> Walked 250 feet or more  -AG           User Key  (r) = Recorded By, (t) = Taken By, (c) = Cosigned By    Initials Name Provider Type    TO Rome Pinedo COTA Occupational Therapist Assistant     Katina Cevallos, RN Registered Nurse                Occupational Therapy Education     Title: PT OT SLP Therapies (Done)     Topic: Occupational Therapy (Done)     Point: ADL training (Done)     Description:   Instruct learner(s) on proper safety adaptation and remediation techniques during self care or transfers.   Instruct in proper use of assistive devices.              Learning Progress Summary           Patient Acceptance, E,TB, VU by  at 1/5/2023 1019    Comment: OT role, POC, t/f training                   Point: Home exercise program (Done)     Description:   Instruct learner(s) on appropriate technique for monitoring, assisting and/or progressing therapeutic exercises/activities.              Learning Progress Summary           Patient Acceptance, E,TB, VU by  at 1/5/2023 1019    Comment: OT role, POC, t/f training                   Point: Body  mechanics (Done)     Description:   Instruct learner(s) on proper positioning and spine alignment during self-care, functional mobility activities and/or exercises.              Learning Progress Summary           Patient Acceptance, E,TB, VU by  at 1/5/2023 1019    Comment: OT role, POC, t/f training                               User Key     Initials Effective Dates Name Provider Type Discipline     10/19/22 -  Renetta Fitzpatrick, OT Occupational Therapist OT              OT Recommendation and Plan  Therapy Frequency (OT): other (see comments) (3-7 days a week)  Plan of Care Review  Plan of Care Reviewed With: patient  Progress: improving  Outcome Evaluation: Pt in bathroom upon arrival , SOA with O2 90. Pt performed fx'al mobility I in room, performing UB bathing I in chair, and LB bathing I  standing in bathroom. Pt doffed/donned HG I. Pt sitting EOB at end of tx with all needs in reach.     Time Calculation:    Time Calculation- OT     Row Name 01/07/23 1723             Time Calculation- OT    OT Start Time 1033  -TO      OT Stop Time 1057  -TO      OT Time Calculation (min) 24 min  -TO      Total Timed Code Minutes- OT 24 minute(s)  -TO      OT Received On 01/07/23  -TO         Timed Charges    66754 - OT Self Care/Mgmt Minutes 24  -TO         Total Minutes    Timed Charges Total Minutes 24  -TO       Total Minutes 24  -TO            User Key  (r) = Recorded By, (t) = Taken By, (c) = Cosigned By    Initials Name Provider Type    TO Rome Pinedo COTA Occupational Therapist Assistant              Therapy Charges for Today     Code Description Service Date Service Provider Modifiers Qty    [id number]  OT SELF CARE/MGMT/TRAIN EA 15 MIN 1/7/2023 Rome Pinedo COTA GO 2               FARZANEH Yanez  1/7/2023

## 2023-01-07 NOTE — PROGRESS NOTES
[facility] Medicine Services  INPATIENT PROGRESS NOTE      Length of Stay: 3  Date of Admission: 1/4/2023  Primary Care Physician: Brook Arreola MD    Subjective   Chief Complaint: Shortness of breath  HPI: Had some increased dyspnea earlier requiring increased O2. Feeling some better on my exam.  No other new concerns.     Review of Systems   Constitutional: Positive for activity change, chills and fatigue. Negative for fever.   Respiratory: Positive for cough, shortness of breath and wheezing.    Cardiovascular: Negative for chest pain and palpitations.   Gastrointestinal: Negative.    Genitourinary: Negative.    Musculoskeletal: Negative.    Skin: Negative.    Neurological: Negative.    Psychiatric/Behavioral: Negative.    All other systems reviewed and are negative.     All pertinent negatives and positives are as above. All other systems have been reviewed and are negative unless otherwise stated.     Objective    As of today 01/07/23  Temp:  [96.3 °F (35.7 °C)-97.7 °F (36.5 °C)] 96.6 °F (35.9 °C)  Heart Rate:  [62-86] 62  Resp:  [16-28] 16  BP: (145-162)/(60-73) 155/69    Physical Exam  HENT:      Head: Normocephalic and atraumatic.      Right Ear: External ear normal.      Left Ear: External ear normal.      Nose: Nose normal.      Mouth/Throat:      Mouth: Mucous membranes are moist.      Pharynx: Oropharynx is clear.   Eyes:      Conjunctiva/sclera: Conjunctivae normal.   Cardiovascular:      Rate and Rhythm: Normal rate and regular rhythm.      Pulses: Normal pulses.      Heart sounds: Normal heart sounds.   Pulmonary:      Effort: Pulmonary effort is normal. No respiratory distress.      Breath sounds: Normal breath sounds.   Abdominal:      General: Bowel sounds are normal.      Palpations: Abdomen is soft.      Tenderness: There is no abdominal tenderness.   Musculoskeletal:         General: No deformity.   Skin:     General: Skin is warm and  dry.      Capillary Refill: Capillary refill takes less than 2 seconds.   Neurological:      General: No focal deficit present.      Mental Status: She is alert and oriented to person, place, and time. Mental status is at baseline.   Psychiatric:         Behavior: Behavior normal.         Thought Content: Thought content normal.         Results Review:  I have reviewed the labs, radiology results, and diagnostic studies.    Laboratory Data:   Results from last 7 days   Lab Units 01/07/23  0707 01/06/23  0550 01/05/23  0602 01/04/23  1354   SODIUM mmol/L 140 138 142 137   POTASSIUM mmol/L 4.4 4.4 4.3 3.6   CHLORIDE mmol/L 104 104 103 101   CO2 mmol/L 26.0 23.0 25.0 26.0   BUN mg/dL 32* 35* 29* 22   CREATININE mg/dL 1.26* 1.41* 1.51* 1.43*   GLUCOSE mg/dL 109* 168* 209* 157*   CALCIUM mg/dL 8.9 9.1 9.5 9.2   BILIRUBIN mg/dL  --   --   --  0.3   ALK PHOS U/L  --   --   --  113   ALT (SGPT) U/L  --   --   --  23   AST (SGOT) U/L  --   --   --  25   ANION GAP mmol/L 10.0 11.0 14.0 10.0     Estimated Creatinine Clearance: 44.2 mL/min (A) (by C-G formula based on SCr of 1.26 mg/dL (H)).          Results from last 7 days   Lab Units 01/07/23  0707 01/06/23  0550 01/05/23  0602 01/04/23  1354   WBC 10*3/mm3 14.25* 12.54* 9.79 11.38*   HEMOGLOBIN g/dL 10.0* 9.9* 10.3* 10.1*   HEMATOCRIT % 31.4* 32.1* 32.2* 30.3*   PLATELETS 10*3/mm3 187 184 176 175           Culture Data:   No results found for: BLOODCX  No results found for: URINECX  Respiratory Culture   Date Value Ref Range Status   01/05/2023   Final    Scant growth (1+) Normal respiratory saul. No S. aureus or Pseudomonas aeruginosa detected. Final report.     No results found for: WOUNDCX  No results found for: STOOLCX  No components found for: BODYFLD    Radiology Data:   Imaging Results (Last 24 Hours)     ** No results found for the last 24 hours. **          I have reviewed the patient's current medications.     Assessment/Plan     Principal Problem:    Pneumonia  of both lungs due to infectious organism, unspecified part of lung  Acute COPD exacerbation  Hypertension  Type 2 diabetes mellitus  Enlarged lymph node noted on CT scan    Plan:  - Continue supplemental oxygen and wean as tolerated, typically wears 2.5 L at home and can wear it full-time if needed  - Continue current IV steroids  - Continue bronchodilators  - Pulmonary toilet  - Continue Rocephin and doxycycline  - Discussed CT findings of enlarged lymph node likely related to underlying pneumonia but cannot fully rule out malignancy with the patient.  Discussed need for follow-up, discussed with her PCP who will plan repeat imaging when appropriate.   - Continue home medications as appropriate  - PT and OT ordered  - Continue current insulin orders and glucose checks  - DVT prophylaxis with Lovenox  - CODE STATUS: Full      Medical Decision Making  Number and Complexity of problems: 3 high, 2 moderate      Conditions and Status:        Condition is unchanged.     [facility] Data  External documents reviewed: None     Discussed with: The patient     Treatment Plan  As above    Care Planning  Shared decision making: with the patient  Code status and discussions: Full    Disposition  Social Determinants of Health that impact treatment or disposition: none  I expect the patient to be discharged to home in 1-2 days.       I have utilized all available immediate resources to obtain, update, or review the patient's current medications (including all prescriptions, over-the-counter products, herbals, cannabis/cannabidiol products, and vitamin/mineral/dietary (nutritional) supplements).   I confirmed that the patient's Advance Care Plan is present, code status is documented, or surrogate decision maker is listed in the patient's medical record.    Discharge Planning: In process    Shant Maradiaga MD

## 2023-01-08 ENCOUNTER — HOME HEALTH ADMISSION (OUTPATIENT)
Dept: HOME HEALTH SERVICES | Facility: HOME HEALTHCARE | Age: 78
End: 2023-01-08
Payer: MEDICARE

## 2023-01-08 ENCOUNTER — READMISSION MANAGEMENT (OUTPATIENT)
Dept: CALL CENTER | Facility: HOSPITAL | Age: 78
End: 2023-01-08
Payer: MEDICARE

## 2023-01-08 VITALS
SYSTOLIC BLOOD PRESSURE: 147 MMHG | WEIGHT: 219 LBS | DIASTOLIC BLOOD PRESSURE: 68 MMHG | RESPIRATION RATE: 18 BRPM | BODY MASS INDEX: 35.2 KG/M2 | OXYGEN SATURATION: 95 % | TEMPERATURE: 97.1 F | HEIGHT: 66 IN | HEART RATE: 72 BPM

## 2023-01-08 PROBLEM — R59.9 ENLARGED LYMPH NODE: Status: ACTIVE | Noted: 2023-01-08

## 2023-01-08 PROBLEM — J44.1 COPD WITH ACUTE EXACERBATION: Status: ACTIVE | Noted: 2022-02-14

## 2023-01-08 LAB
GLUCOSE BLDC GLUCOMTR-MCNC: 123 MG/DL (ref 70–130)
GLUCOSE BLDC GLUCOMTR-MCNC: 383 MG/DL (ref 70–130)

## 2023-01-08 PROCEDURE — 94799 UNLISTED PULMONARY SVC/PX: CPT

## 2023-01-08 PROCEDURE — 94760 N-INVAS EAR/PLS OXIMETRY 1: CPT

## 2023-01-08 PROCEDURE — 25010000002 METHYLPREDNISOLONE PER 40 MG: Performed by: PHYSICIAN ASSISTANT

## 2023-01-08 PROCEDURE — 63710000001 INSULIN ASPART PER 5 UNITS: Performed by: PHYSICIAN ASSISTANT

## 2023-01-08 PROCEDURE — 94664 DEMO&/EVAL PT USE INHALER: CPT

## 2023-01-08 PROCEDURE — 25010000002 CEFTRIAXONE PER 250 MG: Performed by: PHYSICIAN ASSISTANT

## 2023-01-08 PROCEDURE — 82962 GLUCOSE BLOOD TEST: CPT

## 2023-01-08 RX ORDER — DOXYCYCLINE HYCLATE 100 MG/1
100 CAPSULE ORAL 2 TIMES DAILY
Qty: 6 CAPSULE | Refills: 0 | Status: SHIPPED | OUTPATIENT
Start: 2023-01-08 | End: 2023-01-12

## 2023-01-08 RX ORDER — PREDNISONE 10 MG/1
TABLET ORAL
Qty: 20 TABLET | Refills: 0 | Status: SHIPPED | OUTPATIENT
Start: 2023-01-08 | End: 2023-01-12

## 2023-01-08 RX ORDER — IPRATROPIUM BROMIDE AND ALBUTEROL SULFATE 2.5; .5 MG/3ML; MG/3ML
3 SOLUTION RESPIRATORY (INHALATION)
Qty: 360 ML | Refills: 0 | Status: SHIPPED | OUTPATIENT
Start: 2023-01-08

## 2023-01-08 RX ADMIN — GUAIFENESIN 1200 MG: 600 TABLET, EXTENDED RELEASE ORAL at 08:59

## 2023-01-08 RX ADMIN — METFORMIN HYDROCHLORIDE 500 MG: 500 TABLET, EXTENDED RELEASE ORAL at 08:59

## 2023-01-08 RX ADMIN — VENLAFAXINE HYDROCHLORIDE 75 MG: 37.5 CAPSULE, EXTENDED RELEASE ORAL at 06:17

## 2023-01-08 RX ADMIN — ASPIRIN AND EXTENDED-RELEASE DIPYRIDAMOLE 1 CAPSULE: 25; 200 CAPSULE ORAL at 09:00

## 2023-01-08 RX ADMIN — OXYCODONE HYDROCHLORIDE AND ACETAMINOPHEN 1 TABLET: 10; 325 TABLET ORAL at 09:00

## 2023-01-08 RX ADMIN — INSULIN ASPART 8 UNITS: 100 INJECTION, SOLUTION INTRAVENOUS; SUBCUTANEOUS at 11:44

## 2023-01-08 RX ADMIN — ISOSORBIDE MONONITRATE 30 MG: 30 TABLET, EXTENDED RELEASE ORAL at 08:59

## 2023-01-08 RX ADMIN — DOXYCYCLINE 100 MG: 100 INJECTION, POWDER, LYOPHILIZED, FOR SOLUTION INTRAVENOUS at 10:15

## 2023-01-08 RX ADMIN — AMLODIPINE BESYLATE 10 MG: 10 TABLET ORAL at 09:00

## 2023-01-08 RX ADMIN — CEFTRIAXONE SODIUM 1 G: 1 INJECTION, POWDER, FOR SOLUTION INTRAMUSCULAR; INTRAVENOUS at 06:16

## 2023-01-08 RX ADMIN — DOCUSATE SODIUM 100 MG: 100 CAPSULE, LIQUID FILLED ORAL at 09:00

## 2023-01-08 RX ADMIN — IPRATROPIUM BROMIDE AND ALBUTEROL SULFATE 3 ML: .5; 2.5 SOLUTION RESPIRATORY (INHALATION) at 07:29

## 2023-01-08 RX ADMIN — FOLIC ACID 1000 MCG: 1 TABLET ORAL at 08:59

## 2023-01-08 RX ADMIN — NEBIVOLOL HYDROCHLORIDE 5 MG: 5 TABLET ORAL at 08:59

## 2023-01-08 RX ADMIN — LISINOPRIL 20 MG: 20 TABLET ORAL at 08:59

## 2023-01-08 RX ADMIN — ROSUVASTATIN CALCIUM 10 MG: 10 TABLET, FILM COATED ORAL at 10:15

## 2023-01-08 RX ADMIN — IPRATROPIUM BROMIDE AND ALBUTEROL SULFATE 3 ML: .5; 2.5 SOLUTION RESPIRATORY (INHALATION) at 11:36

## 2023-01-08 RX ADMIN — ALLOPURINOL 100 MG: 100 TABLET ORAL at 08:59

## 2023-01-08 RX ADMIN — METHYLPREDNISOLONE SODIUM SUCCINATE 40 MG: 40 INJECTION, POWDER, FOR SOLUTION INTRAMUSCULAR; INTRAVENOUS at 06:16

## 2023-01-08 RX ADMIN — MELATONIN 6 MG: 3 TAB ORAL at 00:09

## 2023-01-08 RX ADMIN — Medication 10 ML: at 10:16

## 2023-01-08 NOTE — DISCHARGE PLACEMENT REQUEST
"Jese Cain (77 y.o. Female)     Date of Birth   1945    Social Security Number       Address   [address] [place] [address]    Home Phone   [phone]    MRN   [id number]       Religious   Holiness    Marital Status                               Admission Date   1/4/23    Admission Type   Emergency    Admitting Provider   Chandrakant Sal MD    Attending Provider   Chandrakant Sal MD    Department, Room/Bed   [address], 422/1       Discharge Date       Discharge Disposition   Home or Self Care    Discharge Destination                               Attending Provider: Chandrakant Sal MD    Allergies: No Known Allergies    Isolation: None   Infection: None   Code Status: CPR    Ht: 166.4 cm (65.5\")   Wt: 99.3 kg (219 lb)    Admission Cmt: None   Principal Problem: Pneumonia of both lungs due to infectious organism, unspecified part of lung [J18.9]                 Active Insurance as of 1/4/2023     Primary Coverage     Payor Plan Insurance Group Employer/Plan Group    HUMANA MEDICARE REPLACEMENT HUMANA MEDICARE REPLACEMENT [id number]     Payor Plan Address Payor Plan Phone Number Payor Plan Fax Number Effective Dates    [address] [address] [phone]  1/1/2013 - None Entered    [facility] [id number]       Subscriber Name Subscriber Birth Date Member ID       JESE CAIN 1945 [id number]                 Emergency Contacts      (Rel.) Home Phone Work Phone Mobile Phone    Martha Ho (Daughter) [phone] -- [phone]               History & Physical      Elle Santos PA-C at 01/04/23 1651     Attestation signed by Chandrakant Sal MD at 01/04/23 2995    I personally evaluated and examined the patient in conjunction with MEÑO Maurer and agree with the assessment, treatment plan, and disposition of the patient as recorded.  Cardiovascular: Normal S1 and S2.  Lungs: Good air entry " bilaterally with coarse rhonchi.                    [facility]  HISTORY AND PHYSICAL      Date of Admission: 1/4/2023  Primary Care Physician: Brook Arreola MD    Subjective     Chief Complaint: Dyspnea, productive cough, fever    History of Present Illness  Patient is a 77 year old female (PMHx COPD, MARISOL, DM, HLD, HTN, CVA, Arthritis) who presented to [facility] ED today with complaints of dyspnea along with productive cough and fever. Onset of symptoms in the last 4 days. She does have COPD and has supplemental oxygen at home, which she typically uses at night. She has found herself needing to use this during the day and notes increased dyspnea with minimal exertion. Patient reports sputum produced with cough is thick, yellow-green in color. She denies any chest pain.     ED findings include CXR findings of bilateral opacities consistent with pneumonia. Leukocytosis of 11,000 noted with mild left shift; remainder of blood counts stable, consistent with chronic anemia. Creatinine level today is 1.43; baseline appears 1.2-1.3. Electrolytes stable. No elevation of lactic acid. D-dimer elevation prompted CTA imaging of the chest; no pulmonary embolism identified, but bilateral opacities again noted appearing consistent with probable early pneumonia; enlarged subcarinal node also noted which may be reactive given current infectious status. Hospitalist team consulted for admission and management.    Review of Systems   Constitutional: Positive for fatigue and fever. Negative for appetite change, chills and diaphoresis.   HENT: Negative for congestion, ear pain, postnasal drip, rhinorrhea, sinus pressure, sinus pain, sneezing, sore throat and trouble swallowing.    Eyes: Negative for photophobia, pain and discharge.   Respiratory: Positive for cough (productive) and shortness of breath. Negative for chest tightness and wheezing.    Cardiovascular: Negative for  chest pain, palpitations and leg swelling.   Gastrointestinal: Negative for abdominal pain, blood in stool, constipation, diarrhea, nausea and vomiting.   Endocrine: Negative for polydipsia, polyphagia and polyuria.   Genitourinary: Negative for difficulty urinating, dysuria, flank pain, frequency, hematuria and urgency.   Musculoskeletal: Negative for arthralgias, back pain, myalgias and neck pain.   Skin: Negative for color change, rash and wound.   Neurological: Negative for dizziness, seizures, syncope, weakness and headaches.   Hematological: Does not bruise/bleed easily.   Psychiatric/Behavioral: Negative for behavioral problems, confusion, hallucinations, sleep disturbance and suicidal ideas.        Otherwise complete ROS reviewed and negative except as mentioned in the HPI.    Past Medical History:   Past Medical History:   Diagnosis Date   • Anxiety    • Arthritis    • COPD (chronic obstructive pulmonary disease) (HCC)    • Depression    • Diabetes mellitus (HCC)    • History of transfusion    • Hyperlipidemia    • Hypertension    • Stroke (HCC)     Greater than 5 years ago     Past Surgical History:  Past Surgical History:   Procedure Laterality Date   • CERVICAL FUSION  1999   • EYE SURGERY Bilateral 2014    lasix eye surgery   • HEMORRHOIDECTOMY     • HYSTERECTOMY     • INTRATHECAL PUMP REMOVAL      2006   • KNEE ARTHROSCOPY Right 2015   • LUMBAR SPINE SURGERY  1999   • PAIN PUMP INSERTION/REVISION      Removed in 2006     Social History:  reports that she quit smoking about 24 years ago. Her smoking use included cigarettes. She has a 40.00 pack-year smoking history. She has never used smokeless tobacco. She reports that she does not drink alcohol and does not use drugs.    Family History: family history includes Cancer in her sister; Diabetes in an other family member; Heart attack (age of onset: 75) in her father; Hypertension in her sister and another family member; Kidney disease in an other family  member; Lung cancer in her brother; Other in an other family member.       Allergies:  No Known Allergies    Medications:  Prior to Admission medications    Medication Sig Start Date End Date Taking? Authorizing Provider   albuterol sulfate  (90 Base) MCG/ACT inhaler Inhale 2 puffs Every 6 (Six) Hours As Needed for Wheezing or Shortness of Air. 7/12/22   Aubree Duke APRN   allopurinol (ZYLOPRIM) 100 MG tablet Take half tablet by mouth daily 7/5/22   Brook Arreola MD   amLODIPine (NORVASC) 10 MG tablet TAKE 1 TABLET BY MOUTH EVERY DAY 10/20/22   Brook Arreola MD   aspirin-dipyridamole (AGGRENOX)  MG per 12 hr capsule TAKE 1 CAPSULE BY MOUTH TWICE A DAY 8/3/22   Brook Arreola MD   cholecalciferol (VITAMIN D3) 25 MCG (1000 UT) tablet Take 1,000 Units by mouth Daily.    Samina Leggett MD   colchicine 0.6 MG tablet Take 2 tabs (1.2 mg) today, then 1 tab (0.6 mg) daily for up to 2 weeks after gout flare resolves. 8/12/22   Brook Arreola MD   docusate sodium (COLACE) 100 MG capsule Take 100 mg by mouth 2 (Two) Times a Day.    Samina Leggett MD   folic acid (FOLVITE) 1 MG tablet TAKE 1 TABLET BY MOUTH EVERY DAY 10/31/22   Brook Arreola MD   furosemide (LASIX) 40 MG tablet TAKE 1/2 TABLET BY MOUTH DAILY AS NEEDED (LOWER EXTREMITY EDEMA). 3/29/22   Brook Arreola MD   gabapentin (NEURONTIN) 100 MG capsule  12/9/21   Samina Leggett MD   hydroCHLOROthiazide (HYDRODIURIL) 25 MG tablet Take 25 mg by mouth Daily. 6/9/22   Samina Leggett MD   ipratropium-albuterol (DUO-NEB) 0.5-2.5 mg/3 ml nebulizer Take 3 mL by nebulization 4 (Four) Times a Day. 7/12/21   Wisam Arboleda MD   isosorbide mononitrate (IMDUR) 30 MG 24 hr tablet TAKE 1 TABLET BY MOUTH EVERY DAY 3/29/22   Brook Arreola MD   lisinopril (PRINIVIL,ZESTRIL) 20 MG tablet Take 1 tablet by mouth Daily. 3/28/22   Brook Arreola MD   loratadine (CLARITIN) 10 MG tablet TAKE 1 TABLET BY  "MOUTH EVERY DAY 8/15/22   Brook Arreola MD   metFORMIN ER (GLUCOPHAGE-XR) 500 MG 24 hr tablet Take 1 tablet by mouth 2 (Two) Times a Day. 11/14/22   Brook Arreola MD   nebivolol (BYSTOLIC) 5 MG tablet TAKE 1 TABLET BY MOUTH EVERY DAY 1/3/23   Brook Arreola MD   nystatin (MYCOSTATIN) 408066 UNIT/GM powder Apply  topically to the appropriate area as directed 3 (Three) Times a Day. 7/26/22   Brook Arreola MD   Omega-3 Fatty Acids (FISH OIL) 1200 MG capsule capsule Take 1,200 mg by mouth 3 (Three) Times a Day With Meals.    ProviderSamina MD   oxyCODONE-acetaminophen (PERCOCET)  MG per tablet  6/21/22   ProviderSamina MD   rOPINIRole (REQUIP) 1 MG tablet TAKE 2 TABLETS BY MOUTH EVERY NIGHT 1 HOUR BEFORE BEDTIME. 8/3/22   Brook Arreola MD   rosuvastatin (CRESTOR) 10 MG tablet TAKE 1 TABLET BY MOUTH EVERY DAY 11/29/21   Brook Arreola MD   tiotropium bromide-olodaterol (Stiolto Respimat) 2.5-2.5 MCG/ACT aerosol solution inhaler Inhale 2 puffs Daily. 11/19/21   Alejandra Santos,    tiZANidine (ZANAFLEX) 4 MG tablet TAKE 1 TABLET BY MOUTH EVERY 8 HOURS AS NEEDED FOR MUSCLE SPASMS (AT LUNCH AND BEDTIME AS NEEDED) 10/17/22   Brook Arreola MD   Triamcinolone Acetonide (NASACORT) 55 MCG/ACT nasal inhaler INSTILL 1 SPRAY INTO THE NOSTRIL(S) AS DIRECTED BY PROVIDER EVERY OTHER DAY. *NOT COVERED* 5/21/21   Brook Arreola MD   venlafaxine (EFFEXOR) 37.5 MG tablet Take 75 mg by mouth Every Morning.    ProviderSamina MD     I have utilized all available immediate resources to obtain, update, and review the patient's current medications.    Objective     Vital Signs: /64   Pulse 75   Temp 98.6 °F (37 °C) (Oral)   Resp 24   Ht 166.4 cm (65.5\")   Wt 98.4 kg (217 lb)   SpO2 94%   BMI 35.56 kg/m²   Physical Exam  Vitals and nursing note reviewed.   Constitutional:       Appearance: Normal appearance. She is obese.   HENT:      Head: Normocephalic and " atraumatic.      Right Ear: External ear normal.      Left Ear: External ear normal.      Nose: Nose normal. No congestion or rhinorrhea.      Mouth/Throat:      Mouth: Mucous membranes are moist.      Pharynx: Oropharynx is clear.   Eyes:      General: No scleral icterus.     Extraocular Movements: Extraocular movements intact.      Conjunctiva/sclera: Conjunctivae normal.      Pupils: Pupils are equal, round, and reactive to light.   Neck:      Vascular: No carotid bruit.   Cardiovascular:      Rate and Rhythm: Normal rate and regular rhythm.      Pulses: Normal pulses.      Heart sounds: Normal heart sounds. No murmur heard.  Pulmonary:      Effort: Pulmonary effort is normal.      Breath sounds: Examination of the right-middle field reveals rhonchi. Examination of the left-middle field reveals rhonchi. Examination of the right-lower field reveals rhonchi. Examination of the left-lower field reveals rhonchi. Rhonchi present. No wheezing or rales.   Abdominal:      General: Abdomen is flat. Bowel sounds are normal.      Palpations: Abdomen is soft.      Tenderness: There is no abdominal tenderness. There is no guarding.   Musculoskeletal:         General: No swelling or deformity. Normal range of motion.      Cervical back: Normal range of motion and neck supple. No tenderness.      Right lower leg: No edema.      Left lower leg: No edema.   Skin:     General: Skin is warm and dry.      Capillary Refill: Capillary refill takes less than 2 seconds.      Findings: No rash.   Neurological:      General: No focal deficit present.      Mental Status: She is alert and oriented to person, place, and time.      Motor: No weakness.   Psychiatric:         Mood and Affect: Mood normal.         Behavior: Behavior normal.         Thought Content: Thought content normal.         Judgment: Judgment normal.              Results Reviewed:  Lab Results (last 24 hours)     Procedure Component Value Units Date/Time    [place] Draw  [[id number]] Collected: 01/04/23 1354    Specimen: Blood Updated: 01/04/23 1500    Narrative:      The following orders were created for panel order [place] Draw.  Procedure                               Abnormality         Status                     ---------                               -----------         ------                     Green Top (Gel)[[id number]]                                  Final result               Lavender Top[[id number]]                                     Final result               Gold Top - SST[[id number]]                                   Final result               Light Blue Top[[id number]]                                   Final result                 Please view results for these tests on the individual orders.    Light Blue Top [[id number]] Collected: 01/04/23 1354    Specimen: Blood Updated: 01/04/23 1500     Extra Tube Hold for add-ons.     Comment: Auto resulted       Green Top (Gel) [[id number]] Collected: 01/04/23 1354    Specimen: Blood Updated: 01/04/23 1500     Extra Tube Hold for add-ons.     Comment: Auto resulted.       Lavender Top [[id number]] Collected: 01/04/23 1354    Specimen: Blood Updated: 01/04/23 1500     Extra Tube hold for add-on     Comment: Auto resulted       Gold Top - SST [[id number]] Collected: 01/04/23 1354    Specimen: Blood Updated: 01/04/23 1500     Extra Tube Hold for add-ons.     Comment: Auto resulted.       Blood Culture - Blood, Hand, Right [[id number]] Collected: 01/04/23 1441    Specimen: Blood from Hand, Right Updated: 01/04/23 1444    Blood Culture - Blood, Arm, Right [[id number]] Collected: 01/04/23 1422    Specimen: Blood from Arm, Right Updated: 01/04/23 1437    Lactic Acid, Plasma [[id number]]  (Normal) Collected: 01/04/23 1401    Specimen: Blood Updated: 01/04/23 1429     Lactate 1.3 mmol/L     Comprehensive Metabolic Panel [[id number]]  (Abnormal) Collected: 01/04/23 1354    Specimen: Blood Updated: 01/04/23 1421     Glucose 157 mg/dL      BUN 22  mg/dL      Creatinine 1.43 mg/dL      Sodium 137 mmol/L      Potassium 3.6 mmol/L      Chloride 101 mmol/L      CO2 26.0 mmol/L      Calcium 9.2 mg/dL      Total Protein 7.1 g/dL      Albumin 3.8 g/dL      ALT (SGPT) 23 U/L      AST (SGOT) 25 U/L      Alkaline Phosphatase 113 U/L      Total Bilirubin 0.3 mg/dL      Globulin 3.3 gm/dL      A/G Ratio 1.2 g/dL      BUN/Creatinine Ratio 15.4     Anion Gap 10.0 mmol/L      eGFR 37.6 mL/min/1.73      Comment: National Kidney Foundation and American Society of Nephrology (ASN) Task Force recommended calculation based on the Chronic Kidney Disease Epidemiology Collaboration (CKD-EPI) equation refit without adjustment for race.       Narrative:      GFR Normal >60  Chronic Kidney Disease <60  Kidney Failure <15    The GFR formula is only valid for adults with stable renal function between ages 18 and 70.    Troponin [[id number]]  (Normal) Collected: 01/04/23 1354    Specimen: Blood Updated: 01/04/23 1421     Troponin T <0.010 ng/mL     Narrative:      Troponin T Reference Range:  <= 0.03 ng/mL-   Negative for AMI  >0.03 ng/mL-     Abnormal for myocardial necrosis.  Clinicians would have to utilize clinical acumen, EKG, Troponin and serial changes to determine if it is an Acute Myocardial Infarction or myocardial injury due to an underlying chronic condition.       Results may be falsely decreased if patient taking Biotin.      BNP [[id number]]  (Normal) Collected: 01/04/23 1354    Specimen: Blood Updated: 01/04/23 1419     proBNP 1,230.0 pg/mL     Narrative:      Among patients with dyspnea, NT-proBNP is highly sensitive for the detection of acute congestive heart failure. In addition NT-proBNP of <300 pg/ml effectively rules out acute congestive heart failure with 99% negative predictive value.      D-dimer, Quantitative [[id number]]  (Abnormal) Collected: 01/04/23 1354    Specimen: Blood Updated: 01/04/23 1418     D-Dimer, Quantitative 1,270 ng/mL (FEU)     Narrative:       "According to the assay 's published package insert, a normal (<500 ng/mL (FEU)) D-dimer result in conjunction with a non-high clinical probability assessment, excludes deep vein thrombosis (DVT) and pulmonary embolism (PE) with high sensitivity.    D-dimer values increase with age and this can make VTE exclusion of an older population difficult. To address this, the American College of Physicians, based on best available evidence and recent guidelines, recommends that clinicians use age-adjusted D-dimer thresholds in patients greater than 50 years of age with: a) a low probability of PE who do not meet all Pulmonary Embolism Rule Out Criteria, or b) in those with intermediate probability of PE.   The formula for an age-adjusted D-dimer cut-off is \"age*10\".  For example, a 60 year old patient would have an age-adjusted cut-off of 600 ng/mL (FEU) and an 80 year old 800 ng/mL (FEU).      COVID-19 and FLU A/B PCR - Swab, Nasopharynx [[id number]]  (Normal) Collected: 01/04/23 1343    Specimen: Swab from Nasopharynx Updated: 01/04/23 1409     COVID19 Not Detected     Influenza A PCR Not Detected     Influenza B PCR Not Detected    Narrative:      Fact sheet for providers: https://www.fda.gov/media/777171/download    Fact sheet for patients: https://www.fda.gov/media/268494/download    Test performed by PCR.    CBC & Differential [[id number]]  (Abnormal) Collected: 01/04/23 1354    Specimen: Blood Updated: 01/04/23 1403    Narrative:      The following orders were created for panel order CBC & Differential.  Procedure                               Abnormality         Status                     ---------                               -----------         ------                     CBC Auto Differential[[id number]]        Abnormal            Final result                 Please view results for these tests on the individual orders.    CBC Auto Differential [[id number]]  (Abnormal) Collected: 01/04/23 1354    Specimen: " Blood Updated: 01/04/23 1403     WBC 11.38 10*3/mm3      RBC 3.24 10*6/mm3      Hemoglobin 10.1 g/dL      Hematocrit 30.3 %      MCV 93.5 fL      MCH 31.2 pg      MCHC 33.3 g/dL      RDW 12.6 %      RDW-SD 43.4 fl      MPV 11.0 fL      Platelets 175 10*3/mm3      Neutrophil % 80.3 %      Lymphocyte % 9.8 %      Monocyte % 7.6 %      Eosinophil % 1.2 %      Basophil % 0.4 %      Immature Grans % 0.7 %      Neutrophils, Absolute 9.13 10*3/mm3      Lymphocytes, Absolute 1.12 10*3/mm3      Monocytes, Absolute 0.87 10*3/mm3      Eosinophils, Absolute 0.14 10*3/mm3      Basophils, Absolute 0.04 10*3/mm3      Immature Grans, Absolute 0.08 10*3/mm3      nRBC 0.0 /100 WBC     Extra Tubes [[id number]] Collected: 01/04/23 1401    Specimen: Blood Updated: 01/04/23 1401    Narrative:      The following orders were created for panel order Extra Tubes.  Procedure                               Abnormality         Status                     ---------                               -----------         ------                     [place] Top[[id number]]                                         In process                   Please view results for these tests on the individual orders.    Gray Top [[id number]] Collected: 01/04/23 1401    Specimen: Blood Updated: 01/04/23 1401        Imaging Results (Last 24 Hours)     Procedure Component Value Units Date/Time    CT Angiogram Chest [[id number]] Collected: 01/04/23 1458     Updated: 01/04/23 1603    Narrative:        PROCEDURE: CT -CTA Thorax/PE with contrast     REASON FOR EXAM: d dimer+    FINDINGS: Comparison study dated  July 21, 2020. Axial computer  tomography sequential imaging was performed from the thoracic  inlet through the diaphragms after administration of IV contrast  . Sagittal and coronal reformation was performed. This exam was  performed according to our departmental dose optimization  program, which includes automated exposure control, adjustment of  the mA and/or KV according to  patient size and/or use of  iterative reconstruction technique.     Streak artifact emanates from the dense contrast within the SVC.  Pulmonary arteries are normal. There is no filling defect  identified to suggest pulmonary embolus. Enlarged subcarinal  lymph node which measures 2.24 x 1.6 cm. This lymph node is of  uncertain etiology by strict size criteria. Shotty pretracheal,  precarinal, and prevascular lymph nodes with short axis diameter  less than 1 cm. Mediastinal structures of the chest are otherwise  unremarkable. No pericardial effusion. Right upper lobe apical  segment small nodular groundglass opacity. Right middle lobe  small linear opacity. Right middle lobe small peripheral  groundglass opacity. Right middle lobe medial small patchy  opacity. Left upper lobe lingula two small patchy opacities.  Lungs are otherwise clear. Pleural spaces are normal. Visualized  upper abdominal structures reveals stable left adrenal gland 1.14  cm nodule. This prolonged interval stability is reassuring for  benign process. Otherwise visualized upper abdominal structures  are unremarkable. No acute osseous abnormality. Anterior metallic  plate fixation of the lower cervical spine.      Impression:      1. No evidence of pulmonary embolus.  2. Enlarged subcarinal lymph node which measures 2.24 x 1.6 cm.  This lymph node is of uncertain etiology by strict size criteria.  The differential would include probable inflammatory  lymphadenopathy versus less likely lymphadenopathy secondary to  neoplastic involvement. Recommend clinical correlation and  consideration of follow-up CT in three months to further  evaluate.  3.Right upper lobe apical segment small nodular groundglass  opacity. Right middle lobe small peripheral groundglass opacity.  Cannot exclude early inflammatory changes such as early viral  pneumonia. Recommend clinical correlation.  4. Right middle lobe linear opacity suspicious for  discoid  atelectasis.  5.Right middle lobe medial small patchy opacity. Left upper lobe  lingula two small patchy opacities. These opacities would be  suspicious for subsegmental atelectasis versus early pneumonia.  Recommend clinical correlation.  6. Visualized upper abdominal structures reveals stable left  adrenal gland 1.14 cm nodule. This prolonged interval stability  is reassuring for benign process.   7. Remainder CT chest exam is unremarkable as described above.    Electronically signed by:  Addison Saha MD  1/4/2023 4:01 PM CST  Workstation: [id number]    XR Chest 2 View [[id number]] Collected: 01/04/23 1357     Updated: 01/04/23 1454    Narrative:      EXAM: XR CHEST 2 VIEWS     HISTORY: [facility] Triage Protocol.    COMPARISON: None     FINDINGS:    Heart: Enlarged    Mediastinum: Mild venous congestion.    Pleura: No significant effusion    Lungs: Ill-defined bilateral pulmonary opacities.    Bones: Probable old right rib fractures. Osteopenia. Postsurgical  changes in the cervical spine    Abdomen: Visualized upper abdomen is unremarkable      Impression:        Mild congestion. Ill-defined bilateral opacities may be related  to edema or atypical pneumonia.    Electronically signed by:  Jesus Adams DO  1/4/2023 2:52 PM CST  Workstation: [id number]        I have personally reviewed and interpreted the radiology studies and ECG obtained at time of admission.       Assessment / Plan     Assessment:   Active Hospital Problems    Diagnosis    • **Pneumonia of both lungs due to infectious organism, unspecified part of lung      Treatment Plan:   1. Pneumonia of both lungs, New:   -Productive cough with radiographic evidence of pneumonia, mild leukocytosis   -Antibiotic coverage initiated with Azithromycin/Rocephin; IV steroids ordered   -Supportive care including supplemental oxygen, nebs, inhalers, Mucinex, Robitussin   -Sputum culture and Blood cultures in process   -Hypoxia on room air; Patient is on oxygen  at home, mainly at night; anticipate will continue daytime use for some time following hospitalization    2. COPD, established:   -exacerbation in conjunction with new onset pneumonia, as above   -Continue with supportive care as above including steroids    3. HTN, established:   -Continue with home medications of Norvasc, Lisinopril, Bystolic    4. HLD, established:    -Continue with Crestor    5. DM, established:   -Last A1C 8.0 on 12/14/22   -Consistent carbohydrate diet, Acchecks with sliding scale insulin, continue Metformin    6. Anxiety, established:   -continue with Effexor    7. Arthritis, established:   -continue oxycodone     Medical Decision Making  Number and Complexity of problems: 7; high complexity    Conditions and Status:        Condition is unchanged.    [facility] Data  External documents reviewed: Last pulmonology visit, last PCP visit  My EKG interpretation: Sinus rhythm with PVCs noted; ventricular rate 69, no ST wave changes; PVCs new compared to tracing on 06/14/22  My plain film interpretation: Bilateral infiltrates, mid-lower lung fields  Tests considered but not ordered: n/a      Discussed with: Attending provider Dr. Sal     I have utilized all available immediate resources to obtain, update, or review the patient's current medications (including all prescriptions, over-the-counter products, herbals, cannabis/cannabidiol products, and vitamin/mineral/dietary (nutritional) supplements).     Care Planning  Shared decision making: Patient updated on current status and informed of proposed care plan; is in agreement with plan  Code status and discussions: FULL CODE, Healthcare surrogate is Martha Ho, daughter  I confirmed that the patient's Advance Care Plan is present, code status is documented, or surrogate decision maker is listed in the patient's medical record.       Disposition  Social Determinants of Health that impact treatment or disposition: lives alone, weakened physical  "state  I expect the patient to be discharged to 1 in 3 days.     The patient was seen and examined by me on 23  at 1635.        Electronically signed by Elle Santos PA-C, 23, 16:52 CST.                Electronically signed by Chandrakant Sal MD at 23 1846       [address] [address]  Dept. Phone:  [phone]  Dept. Fax:   Date Ordered: 2023         Patient:  Ronda Blair MRN:  [id number]   [address] [place] [id number] :  1945  SSN:    Phone: [phone] Sex:  F     Weight: 99.3 kg (219 lb)         Ht Readings from Last 1 Encounters:   23 166.4 cm (65.5\")         Home Nebulizer          (Order ID: [id number])    Diagnosis:  Chronic obstructive pulmonary disease, unspecified COPD type (HCC) (J44.9 [ICD-10-CM] 496 [ICD-9-CM])   Quantity:  1     Nebulizer Equipment:  Nebulizer w/ Compressor  Nebulizer Accessories:  Nebulizer Kit - Administration Set, Non-Disposable  Length of Need (99 Months = Lifetime): 99 Months = Lifetime        Authorizing Provider's Phone: [phone]  Authorizing Provider:Shant Maradiaga MD  Authorizing Provider's NPI: [id number]  Order Entered By: Shant Maradiaga MD 2023 12:26 PM     Electronically signed by: Shant Maradiaga MD 2023 12:26 PM       "

## 2023-01-08 NOTE — OUTREACH NOTE
Prep Survey    Flowsheet Row Responses   [facility] patient discharged from? [place]   Is LACE score < 7 ? No   Eligibility [facility]   Date of Admission 01/04/23   Date of Discharge 01/08/23   Discharge Disposition Home or Self Care   Discharge diagnosis Pneumonia,    COPD with acute exacerbation    Does the patient have one of the following disease processes/diagnoses(primary or secondary)? Pneumonia   Does the patient have Home health ordered? Yes   What is the Home health agency?  [facility]     Is there a DME ordered? Yes   What DME was ordered? nebulizer - [facility]    Prep survey completed? Yes          PATSY CARDOZA - Registered Nurse

## 2023-01-08 NOTE — DISCHARGE SUMMARY
[facility]  DISCHARGE SUMMARY       Date of Admission: 1/4/2023  Date of Discharge:  1/8/2023  Primary Care Physician: Brook Arreola MD    Presenting Problem/History of Present Illness:  COPD exacerbation (HCC) [J44.1]  Pneumonia of both lungs due to infectious organism, unspecified part of lung [J18.9]  Anemia, unspecified type [D64.9]  Chronic kidney disease, unspecified CKD stage [N18.9]       Final Discharge Diagnoses:  Active Hospital Problems    Diagnosis    • **Pneumonia of both lungs due to infectious organism, unspecified part of lung    • Enlarged lymph node    • COPD with acute exacerbation (HCC)        Consults:   Consults     Date and Time Order Name Status Description    1/4/2023  4:15 PM Hospitalist (on-call MD unless specified)            Procedures Performed:                 Pertinent Test Results:   Lab Results (most recent)     Procedure Component Value Units Date/Time    POC Glucose Once [[id number]]  (Abnormal) Collected: 01/08/23 1042    Specimen: Blood Updated: 01/08/23 1123     Glucose 383 mg/dL      Comment: RN NotifiedOperator: [id number] ELIECER KENNEDYANNAMeter ID: [id number]       POC Glucose Once [[id number]]  (Normal) Collected: 01/08/23 0700    Specimen: Blood Updated: 01/08/23 0730     Glucose 123 mg/dL      Comment: RN NotifiedOperator: [id number] ELIECER KENNEDYANNAMeter ID: [id number]       Blood Culture - Blood, Hand, Right [[id number]]  (Normal) Collected: 01/04/23 1441    Specimen: Blood from Hand, Right Updated: 01/07/23 1445     Blood Culture No growth at 3 days    Blood Culture - Blood, Arm, Right [[id number]]  (Normal) Collected: 01/04/23 1422    Specimen: Blood from Arm, Right Updated: 01/07/23 1445     Blood Culture No growth at 3 days    Respiratory Culture - Sputum, Cough [[id number]] Collected: 01/05/23 0535    Specimen: Sputum from Cough Updated: 01/07/23 0817     Respiratory Culture Scant growth  (1+) Normal respiratory saul. No S. aureus or Pseudomonas aeruginosa detected. Final report.     Gram Stain Many (4+) WBCs per low power field      Rare (1+) Epithelial cells per low power field      Moderate (3+) Yeast with hyphae seen      Few (2+) Mixed bacterial saul    Basic Metabolic Panel [[id number]]  (Abnormal) Collected: 01/07/23 0707    Specimen: Blood Updated: 01/07/23 0750     Glucose 109 mg/dL      BUN 32 mg/dL      Creatinine 1.26 mg/dL      Sodium 140 mmol/L      Potassium 4.4 mmol/L      Chloride 104 mmol/L      CO2 26.0 mmol/L      Calcium 8.9 mg/dL      BUN/Creatinine Ratio 25.4     Anion Gap 10.0 mmol/L      eGFR 43.8 mL/min/1.73      Comment: National Kidney Foundation and American Society of Nephrology (ASN) Task Force recommended calculation based on the Chronic Kidney Disease Epidemiology Collaboration (CKD-EPI) equation refit without adjustment for race.       Narrative:      GFR Normal >60  Chronic Kidney Disease <60  Kidney Failure <15    The GFR formula is only valid for adults with stable renal function between ages 18 and 70.    CBC & Differential [[id number]]  (Abnormal) Collected: 01/07/23 0707    Specimen: Blood Updated: 01/07/23 0733    Narrative:      The following orders were created for panel order CBC & Differential.  Procedure                               Abnormality         Status                     ---------                               -----------         ------                     CBC Auto Differential[[id number]]        Abnormal            Final result                 Please view results for these tests on the individual orders.    CBC Auto Differential [[id number]]  (Abnormal) Collected: 01/07/23 0707    Specimen: Blood Updated: 01/07/23 0733     WBC 14.25 10*3/mm3      RBC 3.28 10*6/mm3      Hemoglobin 10.0 g/dL      Hematocrit 31.4 %      MCV 95.7 fL      MCH 30.5 pg      MCHC 31.8 g/dL      RDW 12.8 %      RDW-SD 44.3 fl      MPV 10.9 fL      Platelets 187 10*3/mm3       Neutrophil % 80.4 %      Lymphocyte % 9.8 %      Monocyte % 6.2 %      Eosinophil % 0.0 %      Basophil % 0.2 %      Immature Grans % 3.4 %      Neutrophils, Absolute 11.47 10*3/mm3      Lymphocytes, Absolute 1.39 10*3/mm3      Monocytes, Absolute 0.88 10*3/mm3      Eosinophils, Absolute 0.00 10*3/mm3      Basophils, Absolute 0.03 10*3/mm3      Immature Grans, Absolute 0.48 10*3/mm3      nRBC 0.0 /100 WBC     Basic Metabolic Panel [[id number]]  (Abnormal) Collected: 01/06/23 0550    Specimen: Blood Updated: 01/06/23 0653     Glucose 168 mg/dL      BUN 35 mg/dL      Creatinine 1.41 mg/dL      Sodium 138 mmol/L      Potassium 4.4 mmol/L      Chloride 104 mmol/L      CO2 23.0 mmol/L      Calcium 9.1 mg/dL      BUN/Creatinine Ratio 24.8     Anion Gap 11.0 mmol/L      eGFR 38.3 mL/min/1.73      Comment: National Kidney Foundation and American Society of Nephrology (ASN) Task Force recommended calculation based on the Chronic Kidney Disease Epidemiology Collaboration (CKD-EPI) equation refit without adjustment for race.       Narrative:      GFR Normal >60  Chronic Kidney Disease <60  Kidney Failure <15    The GFR formula is only valid for adults with stable renal function between ages 18 and 70.    CBC & Differential [[id number]]  (Abnormal) Collected: 01/06/23 0550    Specimen: Blood Updated: 01/06/23 0642    Narrative:      The following orders were created for panel order CBC & Differential.  Procedure                               Abnormality         Status                     ---------                               -----------         ------                     CBC Auto Differential[[id number]]        Abnormal            Final result                 Please view results for these tests on the individual orders.    CBC Auto Differential [[id number]]  (Abnormal) Collected: 01/06/23 0550    Specimen: Blood Updated: 01/06/23 0642     WBC 12.54 10*3/mm3      RBC 3.31 10*6/mm3      Hemoglobin 9.9 g/dL      Hematocrit 32.1  %      MCV 97.0 fL      MCH 29.9 pg      MCHC 30.8 g/dL      RDW 12.5 %      RDW-SD 44.2 fl      MPV 11.5 fL      Platelets 184 10*3/mm3      Neutrophil % 83.0 %      Lymphocyte % 10.0 %      Monocyte % 4.8 %      Eosinophil % 0.0 %      Basophil % 0.2 %      Immature Grans % 2.0 %      Neutrophils, Absolute 10.41 10*3/mm3      Lymphocytes, Absolute 1.26 10*3/mm3      Monocytes, Absolute 0.60 10*3/mm3      Eosinophils, Absolute 0.00 10*3/mm3      Basophils, Absolute 0.02 10*3/mm3      Immature Grans, Absolute 0.25 10*3/mm3      nRBC 0.0 /100 WBC     Extra Tubes [[id number]] Collected: 01/04/23 1401    Specimen: Blood Updated: 01/04/23 1815    Narrative:      The following orders were created for panel order Extra Tubes.  Procedure                               Abnormality         Status                     ---------                               -----------         ------                     Dela Cruz Top[[id number]]                                         Final result                 Please view results for these tests on the individual orders.    Gray Top [[id number]] Collected: 01/04/23 1401    Specimen: Blood Updated: 01/04/23 1815     Extra Tube Hold for add-ons.     Comment: Auto resulted.       [place] Draw [[id number]] Collected: 01/04/23 1354    Specimen: Blood Updated: 01/04/23 1500    Narrative:      The following orders were created for panel order [place] Draw.  Procedure                               Abnormality         Status                     ---------                               -----------         ------                     Green Top (Gel)[[id number]]                                  Final result               Lavender Top[[id number]]                                     Final result               Gold Top - SST[[id number]]                                   Final result               Light Blue Top[[id number]]                                   Final result                 Please view results for these tests  on the individual orders.    Light Blue Top [[id number]] Collected: 01/04/23 1354    Specimen: Blood Updated: 01/04/23 1500     Extra Tube Hold for add-ons.     Comment: Auto resulted       Green Top (Gel) [[id number]] Collected: 01/04/23 1354    Specimen: Blood Updated: 01/04/23 1500     Extra Tube Hold for add-ons.     Comment: Auto resulted.       Lavender Top [[id number]] Collected: 01/04/23 1354    Specimen: Blood Updated: 01/04/23 1500     Extra Tube hold for add-on     Comment: Auto resulted       Gold Top - SST [[id number]] Collected: 01/04/23 1354    Specimen: Blood Updated: 01/04/23 1500     Extra Tube Hold for add-ons.     Comment: Auto resulted.       Lactic Acid, Plasma [[id number]]  (Normal) Collected: 01/04/23 1401    Specimen: Blood Updated: 01/04/23 1429     Lactate 1.3 mmol/L     Comprehensive Metabolic Panel [[id number]]  (Abnormal) Collected: 01/04/23 1354    Specimen: Blood Updated: 01/04/23 1421     Glucose 157 mg/dL      BUN 22 mg/dL      Creatinine 1.43 mg/dL      Sodium 137 mmol/L      Potassium 3.6 mmol/L      Chloride 101 mmol/L      CO2 26.0 mmol/L      Calcium 9.2 mg/dL      Total Protein 7.1 g/dL      Albumin 3.8 g/dL      ALT (SGPT) 23 U/L      AST (SGOT) 25 U/L      Alkaline Phosphatase 113 U/L      Total Bilirubin 0.3 mg/dL      Globulin 3.3 gm/dL      A/G Ratio 1.2 g/dL      BUN/Creatinine Ratio 15.4     Anion Gap 10.0 mmol/L      eGFR 37.6 mL/min/1.73      Comment: National Kidney Foundation and American Society of Nephrology (ASN) Task Force recommended calculation based on the Chronic Kidney Disease Epidemiology Collaboration (CKD-EPI) equation refit without adjustment for race.       Narrative:      GFR Normal >60  Chronic Kidney Disease <60  Kidney Failure <15    The GFR formula is only valid for adults with stable renal function between ages 18 and 70.    Troponin [[id number]]  (Normal) Collected: 01/04/23 1354    Specimen: Blood Updated: 01/04/23 1421     Troponin T <0.010 ng/mL      "Narrative:      Troponin T Reference Range:  <= 0.03 ng/mL-   Negative for AMI  >0.03 ng/mL-     Abnormal for myocardial necrosis.  Clinicians would have to utilize clinical acumen, EKG, Troponin and serial changes to determine if it is an Acute Myocardial Infarction or myocardial injury due to an underlying chronic condition.       Results may be falsely decreased if patient taking Biotin.      BNP [[id number]]  (Normal) Collected: 01/04/23 1354    Specimen: Blood Updated: 01/04/23 1419     proBNP 1,230.0 pg/mL     Narrative:      Among patients with dyspnea, NT-proBNP is highly sensitive for the detection of acute congestive heart failure. In addition NT-proBNP of <300 pg/ml effectively rules out acute congestive heart failure with 99% negative predictive value.      D-dimer, Quantitative [[id number]]  (Abnormal) Collected: 01/04/23 1354    Specimen: Blood Updated: 01/04/23 1418     D-Dimer, Quantitative 1,270 ng/mL (FEU)     Narrative:      According to the assay 's published package insert, a normal (<500 ng/mL (FEU)) D-dimer result in conjunction with a non-high clinical probability assessment, excludes deep vein thrombosis (DVT) and pulmonary embolism (PE) with high sensitivity.    D-dimer values increase with age and this can make VTE exclusion of an older population difficult. To address this, the American College [employer], based on best available evidence and recent guidelines, recommends that clinicians use age-adjusted D-dimer thresholds in patients greater than 50 years of age with: a) a low probability of PE who do not meet all Pulmonary Embolism Rule Out Criteria, or b) in those with intermediate probability of PE.   The formula for an age-adjusted D-dimer cut-off is \"age*10\".  For example, a 60 year old patient would have an age-adjusted cut-off of 600 ng/mL (FEU) and an 80 year old 800 ng/mL (FEU).      COVID-19 and FLU A/B PCR - Swab, Nasopharynx [[id number]]  (Normal) Collected: " 01/04/23 1343    Specimen: Swab from Nasopharynx Updated: 01/04/23 1409     COVID19 Not Detected     Influenza A PCR Not Detected     Influenza B PCR Not Detected    Narrative:      Fact sheet for providers: https://www.fda.gov/media/937336/download    Fact sheet for patients: https://www.fda.gov/media/863551/download    Test performed by PCR.        Imaging Results (Most Recent)     Procedure Component Value Units Date/Time    CT Angiogram Chest [[id number]] Collected: 01/04/23 1458     Updated: 01/04/23 1603    Narrative:        PROCEDURE: CT -CTA Thorax/PE with contrast     REASON FOR EXAM: d dimer+    FINDINGS: Comparison study dated  July 21, 2020. Axial computer  tomography sequential imaging was performed from the thoracic  inlet through the diaphragms after administration of IV contrast  . Sagittal and coronal reformation was performed. This exam was  performed according to our departmental dose optimization  program, which includes automated exposure control, adjustment of  the mA and/or KV according to patient size and/or use of  iterative reconstruction technique.     Streak artifact emanates from the dense contrast within the SVC.  Pulmonary arteries are normal. There is no filling defect  identified to suggest pulmonary embolus. Enlarged subcarinal  lymph node which measures 2.24 x 1.6 cm. This lymph node is of  uncertain etiology by strict size criteria. Shotty pretracheal,  precarinal, and prevascular lymph nodes with short axis diameter  less than 1 cm. Mediastinal structures of the chest are otherwise  unremarkable. No pericardial effusion. Right upper lobe apical  segment small nodular groundglass opacity. Right middle lobe  small linear opacity. Right middle lobe small peripheral  groundglass opacity. Right middle lobe medial small patchy  opacity. Left upper lobe lingula two small patchy opacities.  Lungs are otherwise clear. Pleural spaces are normal. Visualized  upper abdominal structures  reveals stable left adrenal gland 1.14  cm nodule. This prolonged interval stability is reassuring for  benign process. Otherwise visualized upper abdominal structures  are unremarkable. No acute osseous abnormality. Anterior metallic  plate fixation of the lower cervical spine.      Impression:      1. No evidence of pulmonary embolus.  2. Enlarged subcarinal lymph node which measures 2.24 x 1.6 cm.  This lymph node is of uncertain etiology by strict size criteria.  The differential would include probable inflammatory  lymphadenopathy versus less likely lymphadenopathy secondary to  neoplastic involvement. Recommend clinical correlation and  consideration of follow-up CT in three months to further  evaluate.  3.Right upper lobe apical segment small nodular groundglass  opacity. Right middle lobe small peripheral groundglass opacity.  Cannot exclude early inflammatory changes such as early viral  pneumonia. Recommend clinical correlation.  4. Right middle lobe linear opacity suspicious for discoid  atelectasis.  5.Right middle lobe medial small patchy opacity. Left upper lobe  lingula two small patchy opacities. These opacities would be  suspicious for subsegmental atelectasis versus early pneumonia.  Recommend clinical correlation.  6. Visualized upper abdominal structures reveals stable left  adrenal gland 1.14 cm nodule. This prolonged interval stability  is reassuring for benign process.   7. Remainder CT chest exam is unremarkable as described above.    Electronically signed by:  Addison Saha MD  1/4/2023 4:01 PM CST  Workstation: [id number]    XR Chest 2 View [[id number]] Collected: 01/04/23 1357     Updated: 01/04/23 1456    Narrative:      EXAM: XR CHEST 2 VIEWS     HISTORY: SOA Triage Protocol.    COMPARISON: None     FINDINGS:    Heart: Enlarged    Mediastinum: Mild venous congestion.    Pleura: No significant effusion    Lungs: Ill-defined bilateral pulmonary opacities.    Bones: Probable old right rib  "fractures. Osteopenia. Postsurgical  changes in the cervical spine    Abdomen: Visualized upper abdomen is unremarkable      Impression:        Mild congestion. Ill-defined bilateral opacities may be related  to edema or atypical pneumonia.    Electronically signed by:  Jesus Adams DO  1/4/2023 2:52 PM CST  Workstation: [id number]          Chief Complaint on Day of Discharge: No new complaints    Hospital Course:  The patient is a 77 y.o. female with medical history notable for COPD, MARISOL, diabetes mellitus, hypertension, hyperlipidemia, arthritis, and previous stroke who presented to [facility] with worsening shortness of breath.  Patient was admitted and treated for underlying COPD exacerbation complicated by bacterial pneumonia.  Patient underwent CTA of her chest in the ER due to elevated D-dimer which was negative for PE but enlarged lymph node was noted concerning for likely inflammatory from her infection versus malignancy.  Patient gradually improved during her stay and was found to be stable on her home oxygen settings.  CT findings were discussed with the patient and she will follow-up with her PCP about this who is aware.  Patient otherwise noted feeling much improved and ready for discharge.  She will be discharged home to complete her steroid and antibiotic course and follow-up closely with her PCP as an outpatient.  Patient voiced understanding agreement..      Condition on Discharge: Stable    Physical Exam on Discharge:  /68 (BP Location: Right arm, Patient Position: Lying)   Pulse 72   Temp 97.1 °F (36.2 °C) (Oral)   Resp 18   Ht 166.4 cm (65.5\")   Wt 99.3 kg (219 lb)   SpO2 95%   BMI 35.89 kg/m²   Physical Exam  Constitutional:       General: She is not in acute distress.     Appearance: She is not toxic-appearing.   HENT:      Head: Normocephalic and atraumatic.      Right Ear: External ear normal.      Left Ear: External ear normal.   Cardiovascular:      Rate and " Rhythm: Normal rate and regular rhythm.      Pulses: Normal pulses.      Heart sounds: Normal heart sounds.   Pulmonary:      Comments: Diminished bilaterally but otherwise clear  Abdominal:      General: Bowel sounds are normal.      Palpations: Abdomen is soft.      Tenderness: There is no abdominal tenderness.   Skin:     General: Skin is warm and dry.      Capillary Refill: Capillary refill takes less than 2 seconds.   Neurological:      General: No focal deficit present.      Mental Status: She is alert and oriented to person, place, and time. Mental status is at baseline.   Psychiatric:         Behavior: Behavior normal.         [place] [facility] [place] Medications:     Discharge Medications      New Medications      Instructions Start Date   doxycycline 100 MG capsule  Commonly known as: VIBRAMYCIN   100 mg, Oral, 2 Times Daily      predniSONE 10 MG tablet  Commonly known as: DELTASONE   40 mg x2 days, 30 mg x2 days, 20 mg x2 days, 10 mg x2 days and stop         Changes to Medications      Instructions Start Date   allopurinol 100 MG tablet  Commonly known as: ZYLOPRIM  What changed:   · how much to take  · additional instructions   Take half tablet by mouth daily         Continue These Medications      Instructions Start Date   albuterol sulfate  (90 Base) MCG/ACT inhaler  Commonly known as: PROVENTIL HFA;VENTOLIN HFA;PROAIR HFA   2 puffs, Inhalation, Every 6 Hours PRN      amLODIPine 10 MG tablet  Commonly known as: NORVASC   TAKE 1 TABLET BY MOUTH EVERY DAY      aspirin-dipyridamole  MG per 12 hr capsule  Commonly known as: AGGRENOX   TAKE 1 CAPSULE BY MOUTH TWICE A DAY      cholecalciferol 25 MCG (1000 UT) tablet  Commonly known as: VITAMIN D3   1,000 Units, Oral, Daily      colchicine 0.6 MG tablet   Take 2 tabs (1.2 mg) today, then 1 tab (0.6 mg) daily for up to 2 weeks after gout flare resolves.      docusate sodium 100 MG capsule  Commonly known as:  COLACE   100 mg, Oral, 2 Times Daily      fish oil 1200 MG capsule capsule   1,200 mg, Oral, 3 Times Daily With Meals      folic acid 1 MG tablet  Commonly known as: FOLVITE   TAKE 1 TABLET BY MOUTH EVERY DAY      furosemide 40 MG tablet  Commonly known as: LASIX   TAKE 1/2 TABLET BY MOUTH DAILY AS NEEDED (LOWER EXTREMITY EDEMA).      gabapentin 100 MG capsule  Commonly known as: NEURONTIN   100 mg, Oral, Daily      ipratropium-albuterol 0.5-2.5 mg/3 ml nebulizer  Commonly known as: DUO-NEB   3 mL, Nebulization, 4 Times Daily - RT      isosorbide mononitrate 30 MG 24 hr tablet  Commonly known as: IMDUR   TAKE 1 TABLET BY MOUTH EVERY DAY      lisinopril 20 MG tablet  Commonly known as: PRINIVIL,ZESTRIL   20 mg, Oral, Daily      loratadine 10 MG tablet  Commonly known as: CLARITIN   TAKE 1 TABLET BY MOUTH EVERY DAY      metFORMIN  MG 24 hr tablet  Commonly known as: GLUCOPHAGE-XR   500 mg, Oral, 2 Times Daily      nebivolol 5 MG tablet  Commonly known as: BYSTOLIC   TAKE 1 TABLET BY MOUTH EVERY DAY      nystatin 271296 UNIT/GM powder  Commonly known as: MYCOSTATIN   Topical, 3 Times Daily      oxyCODONE-acetaminophen  MG per tablet  Commonly known as: PERCOCET   3 Times Daily      rOPINIRole 1 MG tablet  Commonly known as: REQUIP   TAKE 2 TABLETS BY MOUTH EVERY NIGHT 1 HOUR BEFORE BEDTIME.      rosuvastatin 10 MG tablet  Commonly known as: CRESTOR   TAKE 1 TABLET BY MOUTH EVERY DAY      Stiolto Respimat 2.5-2.5 MCG/ACT aerosol solution inhaler  Generic drug: tiotropium bromide-olodaterol   2 puffs, Inhalation, Daily      tiZANidine 4 MG tablet  Commonly known as: ZANAFLEX   TAKE 1 TABLET BY MOUTH EVERY 8 HOURS AS NEEDED FOR MUSCLE SPASMS (AT LUNCH AND BEDTIME AS NEEDED)      Triamcinolone Acetonide 55 MCG/ACT nasal inhaler  Commonly known as: NASACORT   INSTILL 1 SPRAY INTO THE NOSTRIL(S) AS DIRECTED BY PROVIDER EVERY OTHER DAY. *NOT COVERED*      venlafaxine 37.5 MG tablet  Commonly known as: EFFEXOR   75  mg, Oral, Every Morning             Discharge Diet:   Diet Instructions     Diet: Consistent Carbohydrate      Discharge Diet: Consistent Carbohydrate          Activity at Discharge:   Activity Instructions     Gradually Increase Activity Until at Pre-Hospitalization Level            Discharge Care Plan/Instructions: Take medications as prescribed, follow-up with PCP, and return for worsening symptoms.    Follow-up Appointments:   Future Appointments   Date Time Provider Department Center   1/12/2023 10:30 AM Aubree Duke APRN [facility] P[facility]M [facility]   2/6/2023  1:15 PM Brook Arreola MD [facility] FM [facility]   3/10/2023  9:45 AM Genaro Cota MD [facility] CD [facility] None   10/24/2023 10:30 AM Valentina Hernandez APRN [facility]  [facility]       Test Results Pending at Discharge:   Pending Labs     Order Current Status    Blood Culture - Blood, Arm, Right Preliminary result    Blood Culture - Blood, Hand, Right Preliminary result          Shant Maradiaga MD    Time: 32 minutes

## 2023-01-09 ENCOUNTER — TRANSITIONAL CARE MANAGEMENT TELEPHONE ENCOUNTER (OUTPATIENT)
Dept: CALL CENTER | Facility: HOSPITAL | Age: 78
End: 2023-01-09
Payer: MEDICARE

## 2023-01-09 LAB
BACTERIA SPEC AEROBE CULT: NORMAL
BACTERIA SPEC AEROBE CULT: NORMAL

## 2023-01-09 NOTE — OUTREACH NOTE
Call Center TCM Note    Flowsheet Row Responses   [facility] patient discharged from? [place]   Does the patient have one of the following disease processes/diagnoses(primary or secondary)? Pneumonia   TCM attempt successful? Yes   Call start time 1233   Call end time 1238   Discharge diagnosis Pneumonia,    COPD with acute exacerbation    Meds reviewed with patient/caregiver? Yes   Is the patient having any side effects they believe may be caused by any medication additions or changes? No   Does the patient have all medications ordered at discharge? Yes   Is the patient taking all medications as directed (includes completed medication regime)? Yes   Medication comments Encouraged to complete ATBs and steroids as ordered   Comments PCP FOLLOW UP APPOINTMENT IS 1/13/23@400pm   Does the patient have an appointment with their PCP within 7 days of discharge? Yes   What is the Home health agency?  [facility]    Has home health visited the patient within 72 hours of discharge? Unsure   Home health comments SN, PT and OT ordered   Has all DME been delivered? No   DME comments Pt is using O2 at 3 lpm per nc at all times    Pulse Ox monitoring Intermittent   Pulse Ox device source Patient   O2 Sat comments Sats 95% on 3 lpm per --reports it O2 dropped briefly to 85% without O2 after exertion but improved with O2 rest and deep breathing   O2 Sat: education provided Sat levels, Monitoring frequency, When to seek care   Did the patient receive a copy of their discharge instructions? Yes   Nursing interventions Reviewed instructions with patient   What is the patient's perception of their health status since discharge? Improving  [Pt still with cough but has had entire life--feels better than when she was first admitted.  Has no questions today]   Nursing Interventions Nurse provided patient education   Is the patient/caregiver able to teach back the hierarchy of who to call/visit for symptoms/problems? PCP, Specialist,  Home health nurse, Urgent Care, ED, 911 Yes   Is the patient/caregiver able to teach back signs and symptoms of worsening condition: Fever/chills, Shortness of breath, Chest pain   Is the patient/caregiver able to teach back importance of completing antibiotic course of treatment? Yes   [facility] call completed? Yes   Call end time [id number]          Rosy Porctor RN    1/9/2023, 12:40 CST

## 2023-01-09 NOTE — OUTREACH NOTE
Call Center TCM Note    Flowsheet Row Responses   [facility] facility patient discharged from? [place]   Does the patient have one of the following disease processes/diagnoses(primary or secondary)? Pneumonia   TCM attempt successful? No  [verbal release ]   Unsuccessful attempts Attempt 1          Rosy Proctor RN    2023, 08:35 CST       no

## 2023-01-09 NOTE — PAYOR COMM NOTE
"Adriana Byrnemore  [facility]  Case Management Extender  [phone] phone  [phone] fax      Auth# [id number]    Jese Cain (77 y.o. Female)     Date of Birth   1945    Social Security Number       Address   [address] [place] [address]    Home Phone   [phone]    MRN   [id number]       Restorationism   [place]    Marital Status                               Admission Date   1/4/23    Admission Type   Emergency    Admitting Provider   Chandrakant Sal MD    Attending Provider       Department, Room/Bed   [address], 422/1       Discharge Date   1/8/2023    [place] [facility] [place]                               Attending Provider: (none)   Allergies: No Known Allergies    Isolation: None   Infection: None   Code Status: Prior    Ht: 166.4 cm (65.5\")   Wt: 99.3 kg (219 lb)    Admission Cmt: None   Principal Problem: Pneumonia of both lungs due to infectious organism, unspecified part of lung [J18.9]                 Active Insurance as of 1/4/2023     Primary Coverage     Payor Plan Insurance Group Employer/Plan Group    HUMANA MEDICARE REPLACEMENT HUMANA MEDICARE REPLACEMENT [id number]     Payor Plan Address Payor Plan Phone Number Payor Plan Fax Number Effective Dates    [address] [address] [phone]  1/1/2013 - None Entered    [facility] [id number]       Subscriber Name Subscriber Birth Date Member ID       JESE CAIN 1945 [id number]                 Emergency Contacts      (Rel.) Home Phone Work Phone Mobile Phone    Martha Ho (Daughter) [phone] -- [phone]               Discharge Summary      Shant Maradiaga MD at 01/08/23 [facility]              [facility] Medicine Services  DISCHARGE SUMMARY       Date of Admission: 1/4/2023  Date of Discharge:  1/8/2023  Primary Care " Physician: Brook Arreola MD    Presenting Problem/History of Present Illness:  COPD exacerbation (HCC) [J44.1]  Pneumonia of both lungs due to infectious organism, unspecified part of lung [J18.9]  Anemia, unspecified type [D64.9]  Chronic kidney disease, unspecified CKD stage [N18.9]       Final Discharge Diagnoses:  Active Hospital Problems    Diagnosis    • **Pneumonia of both lungs due to infectious organism, unspecified part of lung    • Enlarged lymph node    • COPD with acute exacerbation (HCC)        Consults:   Consults     Date and Time Order Name Status Description    1/4/2023  4:15 PM Hospitalist (on-call MD unless specified)            Procedures Performed:                 Pertinent Test Results:   Lab Results (most recent)     Procedure Component Value Units Date/Time    POC Glucose Once [[id number]]  (Abnormal) Collected: 01/08/23 1042    Specimen: Blood Updated: 01/08/23 1123     Glucose 383 mg/dL      Comment: RN NotifiedOperator: [id number] ELIECER KENNEDYANNAMeter ID: [id number]       POC Glucose Once [[id number]]  (Normal) Collected: 01/08/23 0700    Specimen: Blood Updated: 01/08/23 0730     Glucose 123 mg/dL      Comment: RN NotifiedOperator: [id number] ELIECER KENNEDYANNAMeter ID: [id number]       Blood Culture - Blood, Hand, Right [[id number]]  (Normal) Collected: 01/04/23 1441    Specimen: Blood from Hand, Right Updated: 01/07/23 1445     Blood Culture No growth at 3 days    Blood Culture - Blood, Arm, Right [[id number]]  (Normal) Collected: 01/04/23 1422    Specimen: Blood from Arm, Right Updated: 01/07/23 1445     Blood Culture No growth at 3 days    Respiratory Culture - Sputum, Cough [[id number]] Collected: 01/05/23 0535    Specimen: Sputum from Cough Updated: 01/07/23 0817     Respiratory Culture Scant growth (1+) Normal respiratory saul. No S. aureus or Pseudomonas aeruginosa detected. Final report.     Gram Stain Many (4+) WBCs per low power field      Rare (1+) Epithelial cells per  low power field      Moderate (3+) Yeast with hyphae seen      Few (2+) Mixed bacterial saul    Basic Metabolic Panel [[id number]]  (Abnormal) Collected: 01/07/23 0707    Specimen: Blood Updated: 01/07/23 0750     Glucose 109 mg/dL      BUN 32 mg/dL      Creatinine 1.26 mg/dL      Sodium 140 mmol/L      Potassium 4.4 mmol/L      Chloride 104 mmol/L      CO2 26.0 mmol/L      Calcium 8.9 mg/dL      BUN/Creatinine Ratio 25.4     Anion Gap 10.0 mmol/L      eGFR 43.8 mL/min/1.73      Comment: National Kidney Foundation and American Society of Nephrology (ASN) Task Force recommended calculation based on the Chronic Kidney Disease Epidemiology Collaboration (CKD-EPI) equation refit without adjustment for race.       Narrative:      GFR Normal >60  Chronic Kidney Disease <60  Kidney Failure <15    The GFR formula is only valid for adults with stable renal function between ages 18 and 70.    CBC & Differential [[id number]]  (Abnormal) Collected: 01/07/23 0707    Specimen: Blood Updated: 01/07/23 0733    Narrative:      The following orders were created for panel order CBC & Differential.  Procedure                               Abnormality         Status                     ---------                               -----------         ------                     CBC Auto Differential[[id number]]        Abnormal            Final result                 Please view results for these tests on the individual orders.    CBC Auto Differential [[id number]]  (Abnormal) Collected: 01/07/23 0707    Specimen: Blood Updated: 01/07/23 0733     WBC 14.25 10*3/mm3      RBC 3.28 10*6/mm3      Hemoglobin 10.0 g/dL      Hematocrit 31.4 %      MCV 95.7 fL      MCH 30.5 pg      MCHC 31.8 g/dL      RDW 12.8 %      RDW-SD 44.3 fl      MPV 10.9 fL      Platelets 187 10*3/mm3      Neutrophil % 80.4 %      Lymphocyte % 9.8 %      Monocyte % 6.2 %      Eosinophil % 0.0 %      Basophil % 0.2 %      Immature Grans % 3.4 %      Neutrophils, Absolute 11.47  10*3/mm3      Lymphocytes, Absolute 1.39 10*3/mm3      Monocytes, Absolute 0.88 10*3/mm3      Eosinophils, Absolute 0.00 10*3/mm3      Basophils, Absolute 0.03 10*3/mm3      Immature Grans, Absolute 0.48 10*3/mm3      nRBC 0.0 /100 WBC     Basic Metabolic Panel [[id number]]  (Abnormal) Collected: 01/06/23 0550    Specimen: Blood Updated: 01/06/23 0653     Glucose 168 mg/dL      BUN 35 mg/dL      Creatinine 1.41 mg/dL      Sodium 138 mmol/L      Potassium 4.4 mmol/L      Chloride 104 mmol/L      CO2 23.0 mmol/L      Calcium 9.1 mg/dL      BUN/Creatinine Ratio 24.8     Anion Gap 11.0 mmol/L      eGFR 38.3 mL/min/1.73      Comment: National Kidney Foundation and American Society of Nephrology (ASN) Task Force recommended calculation based on the Chronic Kidney Disease Epidemiology Collaboration (CKD-EPI) equation refit without adjustment for race.       Narrative:      GFR Normal >60  Chronic Kidney Disease <60  Kidney Failure <15    The GFR formula is only valid for adults with stable renal function between ages 18 and 70.    CBC & Differential [[id number]]  (Abnormal) Collected: 01/06/23 0550    Specimen: Blood Updated: 01/06/23 0642    Narrative:      The following orders were created for panel order CBC & Differential.  Procedure                               Abnormality         Status                     ---------                               -----------         ------                     CBC Auto Differential[[id number]]        Abnormal            Final result                 Please view results for these tests on the individual orders.    CBC Auto Differential [[id number]]  (Abnormal) Collected: 01/06/23 0550    Specimen: Blood Updated: 01/06/23 0642     WBC 12.54 10*3/mm3      RBC 3.31 10*6/mm3      Hemoglobin 9.9 g/dL      Hematocrit 32.1 %      MCV 97.0 fL      MCH 29.9 pg      MCHC 30.8 g/dL      RDW 12.5 %      RDW-SD 44.2 fl      MPV 11.5 fL      Platelets 184 10*3/mm3      Neutrophil % 83.0 %      Lymphocyte  % 10.0 %      Monocyte % 4.8 %      Eosinophil % 0.0 %      Basophil % 0.2 %      Immature Grans % 2.0 %      Neutrophils, Absolute 10.41 10*3/mm3      Lymphocytes, Absolute 1.26 10*3/mm3      Monocytes, Absolute 0.60 10*3/mm3      Eosinophils, Absolute 0.00 10*3/mm3      Basophils, Absolute 0.02 10*3/mm3      Immature Grans, Absolute 0.25 10*3/mm3      nRBC 0.0 /100 WBC     Extra Tubes [[id number]] Collected: 01/04/23 1401    Specimen: Blood Updated: 01/04/23 1815    Narrative:      The following orders were created for panel order Extra Tubes.  Procedure                               Abnormality         Status                     ---------                               -----------         ------                     Dela Cruz Top[[id number]]                                         Final result                 Please view results for these tests on the individual orders.    Gray Top [[id number]] Collected: 01/04/23 1401    Specimen: Blood Updated: 01/04/23 1815     Extra Tube Hold for add-ons.     Comment: Auto resulted.       [place] Draw [[id number]] Collected: 01/04/23 1354    Specimen: Blood Updated: 01/04/23 1500    Narrative:      The following orders were created for panel order [place] Draw.  Procedure                               Abnormality         Status                     ---------                               -----------         ------                     Green Top (Gel)[[id number]]                                  Final result               Lavender Top[[id number]]                                     Final result               Gold Top - SST[[id number]]                                   Final result               Light Blue Top[[id number]]                                   Final result                 Please view results for these tests on the individual orders.    Light Blue Top [[id number]] Collected: 01/04/23 1354    Specimen: Blood Updated: 01/04/23 1500     Extra Tube Hold for add-ons.     Comment: Auto  resulted       Green Top (Gel) [[id number]] Collected: 01/04/23 1354    Specimen: Blood Updated: 01/04/23 1500     Extra Tube Hold for add-ons.     Comment: Auto resulted.       Lavender Top [[id number]] Collected: 01/04/23 1354    Specimen: Blood Updated: 01/04/23 1500     Extra Tube hold for add-on     Comment: Auto resulted       Gold Top - SST [[id number]] Collected: 01/04/23 1354    Specimen: Blood Updated: 01/04/23 1500     Extra Tube Hold for add-ons.     Comment: Auto resulted.       Lactic Acid, Plasma [[id number]]  (Normal) Collected: 01/04/23 1401    Specimen: Blood Updated: 01/04/23 1429     Lactate 1.3 mmol/L     Comprehensive Metabolic Panel [[id number]]  (Abnormal) Collected: 01/04/23 1354    Specimen: Blood Updated: 01/04/23 1421     Glucose 157 mg/dL      BUN 22 mg/dL      Creatinine 1.43 mg/dL      Sodium 137 mmol/L      Potassium 3.6 mmol/L      Chloride 101 mmol/L      CO2 26.0 mmol/L      Calcium 9.2 mg/dL      Total Protein 7.1 g/dL      Albumin 3.8 g/dL      ALT (SGPT) 23 U/L      AST (SGOT) 25 U/L      Alkaline Phosphatase 113 U/L      Total Bilirubin 0.3 mg/dL      Globulin 3.3 gm/dL      A/G Ratio 1.2 g/dL      BUN/Creatinine Ratio 15.4     Anion Gap 10.0 mmol/L      eGFR 37.6 mL/min/1.73      Comment: National Kidney Foundation and American Society of Nephrology (ASN) Task Force recommended calculation based on the Chronic Kidney Disease Epidemiology Collaboration (CKD-EPI) equation refit without adjustment for race.       Narrative:      GFR Normal >60  Chronic Kidney Disease <60  Kidney Failure <15    The GFR formula is only valid for adults with stable renal function between ages 18 and 70.    Troponin [[id number]]  (Normal) Collected: 01/04/23 1354    Specimen: Blood Updated: 01/04/23 1421     Troponin T <0.010 ng/mL     Narrative:      Troponin T Reference Range:  <= 0.03 ng/mL-   Negative for AMI  >0.03 ng/mL-     Abnormal for myocardial necrosis.  Clinicians would have to utilize clinical  "acumen, EKG, Troponin and serial changes to determine if it is an Acute Myocardial Infarction or myocardial injury due to an underlying chronic condition.       Results may be falsely decreased if patient taking Biotin.      BNP [[id number]]  (Normal) Collected: 01/04/23 1354    Specimen: Blood Updated: 01/04/23 1419     proBNP 1,230.0 pg/mL     Narrative:      Among patients with dyspnea, NT-proBNP is highly sensitive for the detection of acute congestive heart failure. In addition NT-proBNP of <300 pg/ml effectively rules out acute congestive heart failure with 99% negative predictive value.      D-dimer, Quantitative [[id number]]  (Abnormal) Collected: 01/04/23 1354    Specimen: Blood Updated: 01/04/23 1418     D-Dimer, Quantitative 1,270 ng/mL (FEU)     Narrative:      According to the assay 's published package insert, a normal (<500 ng/mL (FEU)) D-dimer result in conjunction with a non-high clinical probability assessment, excludes deep vein thrombosis (DVT) and pulmonary embolism (PE) with high sensitivity.    D-dimer values increase with age and this can make VTE exclusion of an older population difficult. To address this, the American College of Physicians, based on best available evidence and recent guidelines, recommends that clinicians use age-adjusted D-dimer thresholds in patients greater than 50 years of age with: a) a low probability of PE who do not meet all Pulmonary Embolism Rule Out Criteria, or b) in those with intermediate probability of PE.   The formula for an age-adjusted D-dimer cut-off is \"age*10\".  For example, a 60 year old patient would have an age-adjusted cut-off of 600 ng/mL (FEU) and an 80 year old 800 ng/mL (FEU).      COVID-19 and FLU A/B PCR - Swab, Nasopharynx [[id number]]  (Normal) Collected: 01/04/23 1343    Specimen: Swab from Nasopharynx Updated: 01/04/23 1409     COVID19 Not Detected     Influenza A PCR Not Detected     Influenza B PCR Not Detected    Narrative:   "    Fact sheet for providers: https://www.fda.gov/media/818023/download    Fact sheet for patients: https://www.fda.gov/media/749241/download    Test performed by PCR.        Imaging Results (Most Recent)     Procedure Component Value Units Date/Time    CT Angiogram Chest [[id number]] Collected: 01/04/23 1458     Updated: 01/04/23 1603    Narrative:        PROCEDURE: CT -CTA Thorax/PE with contrast     REASON FOR EXAM: d dimer+    FINDINGS: Comparison study dated  July 21, 2020. Axial computer  tomography sequential imaging was performed from the thoracic  inlet through the diaphragms after administration of IV contrast  . Sagittal and coronal reformation was performed. This exam was  performed according to our departmental dose optimization  program, which includes automated exposure control, adjustment of  the mA and/or KV according to patient size and/or use of  iterative reconstruction technique.     Streak artifact emanates from the dense contrast within the SVC.  Pulmonary arteries are normal. There is no filling defect  identified to suggest pulmonary embolus. Enlarged subcarinal  lymph node which measures 2.24 x 1.6 cm. This lymph node is of  uncertain etiology by strict size criteria. Shotty pretracheal,  precarinal, and prevascular lymph nodes with short axis diameter  less than 1 cm. Mediastinal structures of the chest are otherwise  unremarkable. No pericardial effusion. Right upper lobe apical  segment small nodular groundglass opacity. Right middle lobe  small linear opacity. Right middle lobe small peripheral  groundglass opacity. Right middle lobe medial small patchy  opacity. Left upper lobe lingula two small patchy opacities.  Lungs are otherwise clear. Pleural spaces are normal. Visualized  upper abdominal structures reveals stable left adrenal gland 1.14  cm nodule. This prolonged interval stability is reassuring for  benign process. Otherwise visualized upper abdominal structures  are  unremarkable. No acute osseous abnormality. Anterior metallic  plate fixation of the lower cervical spine.      Impression:      1. No evidence of pulmonary embolus.  2. Enlarged subcarinal lymph node which measures 2.24 x 1.6 cm.  This lymph node is of uncertain etiology by strict size criteria.  The differential would include probable inflammatory  lymphadenopathy versus less likely lymphadenopathy secondary to  neoplastic involvement. Recommend clinical correlation and  consideration of follow-up CT in three months to further  evaluate.  3.Right upper lobe apical segment small nodular groundglass  opacity. Right middle lobe small peripheral groundglass opacity.  Cannot exclude early inflammatory changes such as early viral  pneumonia. Recommend clinical correlation.  4. Right middle lobe linear opacity suspicious for discoid  atelectasis.  5.Right middle lobe medial small patchy opacity. Left upper lobe  lingula two small patchy opacities. These opacities would be  suspicious for subsegmental atelectasis versus early pneumonia.  Recommend clinical correlation.  6. Visualized upper abdominal structures reveals stable left  adrenal gland 1.14 cm nodule. This prolonged interval stability  is reassuring for benign process.   7. Remainder CT chest exam is unremarkable as described above.    Electronically signed by:  Addison Saha MD  1/4/2023 4:01 PM CST  Workstation: [id number]    XR Chest 2 View [[id number]] Collected: 01/04/23 1357     Updated: 01/04/23 1454    Narrative:      EXAM: XR CHEST 2 VIEWS     HISTORY: SOA Triage Protocol.    COMPARISON: None     FINDINGS:    Heart: Enlarged    Mediastinum: Mild venous congestion.    Pleura: No significant effusion    Lungs: Ill-defined bilateral pulmonary opacities.    Bones: Probable old right rib fractures. Osteopenia. Postsurgical  changes in the cervical spine    Abdomen: Visualized upper abdomen is unremarkable      Impression:        Mild congestion. Ill-defined  "bilateral opacities may be related  to edema or atypical pneumonia.    Electronically signed by:  Jesus Adams   1/4/2023 2:52 PM CST  Workstation: [id number]          Chief Complaint on Day of Discharge: No new complaints    Hospital Course:  The patient is a 77 y.o. female with medical history notable for COPD, MARISOL, diabetes mellitus, hypertension, hyperlipidemia, arthritis, and previous stroke who presented to [facility] with worsening shortness of breath.  Patient was admitted and treated for underlying COPD exacerbation complicated by bacterial pneumonia.  Patient underwent CTA of her chest in the ER due to elevated D-dimer which was negative for PE but enlarged lymph node was noted concerning for likely inflammatory from her infection versus malignancy.  Patient gradually improved during her stay and was found to be stable on her home oxygen settings.  CT findings were discussed with the patient and she will follow-up with her PCP about this who is aware.  Patient otherwise noted feeling much improved and ready for discharge.  She will be discharged home to complete her steroid and antibiotic course and follow-up closely with her PCP as an outpatient.  Patient voiced understanding agreement..      Condition on Discharge: Stable    Physical Exam on Discharge:  /68 (BP Location: Right arm, Patient Position: Lying)   Pulse 72   Temp 97.1 °F (36.2 °C) (Oral)   Resp 18   Ht 166.4 cm (65.5\")   Wt 99.3 kg (219 lb)   SpO2 95%   BMI 35.89 kg/m²   Physical Exam  Constitutional:       General: She is not in acute distress.     Appearance: She is not toxic-appearing.   HENT:      Head: Normocephalic and atraumatic.      Right Ear: External ear normal.      Left Ear: External ear normal.   Cardiovascular:      Rate and Rhythm: Normal rate and regular rhythm.      Pulses: Normal pulses.      Heart sounds: Normal heart sounds.   Pulmonary:      Comments: Diminished bilaterally but otherwise " clear  Abdominal:      General: Bowel sounds are normal.      Palpations: Abdomen is soft.      Tenderness: There is no abdominal tenderness.   Skin:     General: Skin is warm and dry.      Capillary Refill: Capillary refill takes less than 2 seconds.   Neurological:      General: No focal deficit present.      Mental Status: She is alert and oriented to person, place, and time. Mental status is at baseline.   Psychiatric:         Behavior: Behavior normal.         [place] [facility] [place] Medications:     Discharge Medications      New Medications      Instructions Start Date   doxycycline 100 MG capsule  Commonly known as: VIBRAMYCIN   100 mg, Oral, 2 Times Daily      predniSONE 10 MG tablet  Commonly known as: DELTASONE   40 mg x2 days, 30 mg x2 days, 20 mg x2 days, 10 mg x2 days and stop         Changes to Medications      Instructions Start Date   allopurinol 100 MG tablet  Commonly known as: ZYLOPRIM  What changed:   · how much to take  · additional instructions   Take half tablet by mouth daily         Continue These Medications      Instructions Start Date   albuterol sulfate  (90 Base) MCG/ACT inhaler  Commonly known as: PROVENTIL HFA;VENTOLIN HFA;PROAIR HFA   2 puffs, Inhalation, Every 6 Hours PRN      amLODIPine 10 MG tablet  Commonly known as: NORVASC   TAKE 1 TABLET BY MOUTH EVERY DAY      aspirin-dipyridamole  MG per 12 hr capsule  Commonly known as: AGGRENOX   TAKE 1 CAPSULE BY MOUTH TWICE A DAY      cholecalciferol 25 MCG (1000 UT) tablet  Commonly known as: VITAMIN D3   1,000 Units, Oral, Daily      colchicine 0.6 MG tablet   Take 2 tabs (1.2 mg) today, then 1 tab (0.6 mg) daily for up to 2 weeks after gout flare resolves.      docusate sodium 100 MG capsule  Commonly known as: COLACE   100 mg, Oral, 2 Times Daily      fish oil 1200 MG capsule capsule   1,200 mg, Oral, 3 Times Daily With Meals      folic acid 1 MG tablet  Commonly known as: FOLVITE    TAKE 1 TABLET BY MOUTH EVERY DAY      furosemide 40 MG tablet  Commonly known as: LASIX   TAKE 1/2 TABLET BY MOUTH DAILY AS NEEDED (LOWER EXTREMITY EDEMA).      gabapentin 100 MG capsule  Commonly known as: NEURONTIN   100 mg, Oral, Daily      ipratropium-albuterol 0.5-2.5 mg/3 ml nebulizer  Commonly known as: DUO-NEB   3 mL, Nebulization, 4 Times Daily - RT      isosorbide mononitrate 30 MG 24 hr tablet  Commonly known as: IMDUR   TAKE 1 TABLET BY MOUTH EVERY DAY      lisinopril 20 MG tablet  Commonly known as: PRINIVIL,ZESTRIL   20 mg, Oral, Daily      loratadine 10 MG tablet  Commonly known as: CLARITIN   TAKE 1 TABLET BY MOUTH EVERY DAY      metFORMIN  MG 24 hr tablet  Commonly known as: GLUCOPHAGE-XR   500 mg, Oral, 2 Times Daily      nebivolol 5 MG tablet  Commonly known as: BYSTOLIC   TAKE 1 TABLET BY MOUTH EVERY DAY      nystatin 921756 UNIT/GM powder  Commonly known as: MYCOSTATIN   Topical, 3 Times Daily      oxyCODONE-acetaminophen  MG per tablet  Commonly known as: PERCOCET   3 Times Daily      rOPINIRole 1 MG tablet  Commonly known as: REQUIP   TAKE 2 TABLETS BY MOUTH EVERY NIGHT 1 HOUR BEFORE BEDTIME.      rosuvastatin 10 MG tablet  Commonly known as: CRESTOR   TAKE 1 TABLET BY MOUTH EVERY DAY      Stiolto Respimat 2.5-2.5 MCG/ACT aerosol solution inhaler  Generic drug: tiotropium bromide-olodaterol   2 puffs, Inhalation, Daily      tiZANidine 4 MG tablet  Commonly known as: ZANAFLEX   TAKE 1 TABLET BY MOUTH EVERY 8 HOURS AS NEEDED FOR MUSCLE SPASMS (AT LUNCH AND BEDTIME AS NEEDED)      Triamcinolone Acetonide 55 MCG/ACT nasal inhaler  Commonly known as: NASACORT   INSTILL 1 SPRAY INTO THE NOSTRIL(S) AS DIRECTED BY PROVIDER EVERY OTHER DAY. *NOT COVERED*      venlafaxine 37.5 MG tablet  Commonly known as: EFFEXOR   75 mg, Oral, Every Morning             Discharge Diet:   Diet Instructions     Diet: Consistent Carbohydrate      Discharge Diet: Consistent Carbohydrate          Activity at  Discharge:   Activity Instructions     Gradually Increase Activity Until at Pre-Hospitalization Level            Discharge Care Plan/Instructions: Take medications as prescribed, follow-up with PCP, and return for worsening symptoms.    Follow-up Appointments:   Future Appointments   Date Time Provider Department Center   1/12/2023 10:30 AM Aubree Duke APRN [facility] PULM [facility]   2/6/2023  1:15 PM Brook Arreola MD [facility] FM [facility]3 [facility]   3/10/2023  9:45 AM Genaro Cota MD [facility] CD [facility] None   10/24/2023 10:30 AM Valentina Hernandez APRN [facility] SM [facility]       Test Results Pending at Discharge:   Pending Labs     Order Current Status    Blood Culture - Blood, Arm, Right Preliminary result    Blood Culture - Blood, Hand, Right Preliminary result          Leta Maradiaga MD    Time: 32 minutes      Electronically signed by Leta Maradiaga MD at 01/08/23 1321       Discharge Order (From admission, onward)     Start     Ordered    01/08/23 1321  Discharge patient  Once        Expected Discharge Date: 01/08/23    [place] Dis[place] [facility] [place]    Physician of Record for Attribution - Please select from Treatment Team: LETA MARADIAGA [[id number]]    Review needed by CMO to determine Physician of Record: No       Question Answer Comment   Physician of Record for Attribution - Please select from Treatment Team LETA MARADIAGA    Review needed by CMO to determine Physician of Record No        01/08/23 1321                 22-Mar-2018 15:50

## 2023-01-10 ENCOUNTER — HOME CARE VISIT (OUTPATIENT)
Dept: HOME HEALTH SERVICES | Facility: CLINIC | Age: 78
End: 2023-01-10
Payer: MEDICARE

## 2023-01-10 VITALS
DIASTOLIC BLOOD PRESSURE: 64 MMHG | RESPIRATION RATE: 18 BRPM | TEMPERATURE: 98.1 F | OXYGEN SATURATION: 98 % | SYSTOLIC BLOOD PRESSURE: 132 MMHG | HEART RATE: 78 BPM

## 2023-01-10 PROCEDURE — G0299 HHS/HOSPICE OF RN EA 15 MIN: HCPCS

## 2023-01-11 LAB — GLUCOSE BLDC GLUCOMTR-MCNC: 325 MG/DL (ref 70–130)

## 2023-01-12 ENCOUNTER — OFFICE VISIT (OUTPATIENT)
Dept: PULMONOLOGY | Facility: CLINIC | Age: 78
End: 2023-01-12
Payer: MEDICARE

## 2023-01-12 VITALS
HEIGHT: 66 IN | SYSTOLIC BLOOD PRESSURE: 142 MMHG | DIASTOLIC BLOOD PRESSURE: 86 MMHG | BODY MASS INDEX: 33.91 KG/M2 | OXYGEN SATURATION: 98 % | WEIGHT: 211 LBS | HEART RATE: 66 BPM

## 2023-01-12 DIAGNOSIS — J18.9 PNEUMONIA OF BOTH LUNGS DUE TO INFECTIOUS ORGANISM, UNSPECIFIED PART OF LUNG: Primary | ICD-10-CM

## 2023-01-12 PROCEDURE — 99214 OFFICE O/P EST MOD 30 MIN: CPT | Performed by: NURSE PRACTITIONER

## 2023-01-12 NOTE — PROGRESS NOTES
Pulmonary Office Visit      Thank you for asking me to see Ronda Blair for   Chief Complaint   Patient presents with   • COPD     Chief Complaint        History of Present Illness  Ronda Blair is a 77 y.o. female who has moderate COPD with FEV1 of 63% post drug.    1/12/23: Returns for 6 month follow up. Remains stable on 2.5L of nocturnal O2 bled into CPAP. Hospitalized 1/4-1/8 for pneumonia. She was sent home with Doxy and Prednisone. She had an enlarged lymph node on her CT scan which is not uncommon during an acute infection. Will re-check Ct in 3 months.   She started getting sick two weeks before her hospital admission. She went to [facility] for [place] and took her oxygen with her.   She is now on room air and doing well.     She still has a cough, but her productive cough has subsided. She feels back to baseline.   She is using her Duonebs pretty routinely.       7/12/22: 6 month follow up. Needs new script for her nocturnal O2. She is on 2.5L and has been on it for several years.   She has a CPAP also and follows here for that.   She is still using her Stiloto. She has not had much albuterol use. She has duonebs at home at home also.   She is using Mucinex, nasal spray, allergy pill.     She still gets out and grocery shops, and does her own chores. She is taking care of her ex  currently and her duties have doubled. She does not have trouble preforming these tasks.    She does have HFpEF and it is well controlled.      11/19/21  Patient here for follow up. At last visit we tried switching from Symbicort to stiolto. She feel slike she did well with this, no increased symptoms    Original OV 10/13/21  Patient referred from cardiology for COPD  Patient reports that she has been seen in [place] by pulmonary in the past for this, but not in a few years  She reports having albuterol hfa for emergency use andSymbicort which she only remembers to use once a day at most.  She has exertional dyspnea which is stable.      Tobacco use history:  Type: cigarettes  Amount: 1.5 ppd  Duration: 30 years  Cessation: quit 20 years ago   Willing to quit: N/A      Review of Systems: History obtained from chart review and the patient.  Review of Systems   Constitutional: Negative for fatigue and fever.   Respiratory: Positive for cough and shortness of breath. Negative for chest tightness and wheezing.    Cardiovascular: Negative for chest pain, palpitations and leg swelling.   Gastrointestinal: Negative for abdominal distention and blood in stool.   Genitourinary: Negative for hematuria.   Musculoskeletal: Negative for arthralgias and myalgias.   Skin: Negative for pallor and rash.   Neurological: Negative for dizziness, syncope and weakness.   Hematological: Does not bruise/bleed easily.   Psychiatric/Behavioral: Negative for confusion. The patient is not nervous/anxious.      As described in the HPI. Otherwise, remainder of ROS (14 systems) were negative.    Patient Active Problem List   Diagnosis   • Degeneration of intervertebral disc of mid-cervical region   • Morbid obesity due to excess calories (HCC)   • Former smoker   • Chest pain   • Diabetes mellitus (HCC)   • Chronic obstructive pulmonary disease (HCC)   • Dyslipidemia   • Adrenal nodule (HCC)   • Fecal occult blood test positive   • Benign neoplasm of colon   • Chronic nonalcoholic liver disease   • Diverticular disease   • Dysphagia   • Essential hypertension   • Internal hemorrhoids   • Knee pain, bilateral   • Lumbar disc disease with radiculopathy   • Neuralgia, geniculate   • Stage 2 chronic kidney disease   • Gait disturbance   • Nocturnal hypoxia   • Chronic heart failure with preserved ejection fraction (HCC)   • Normocytic anemia   • Acute gout involving toe of left foot   • MARISOL (obstructive sleep apnea)   • MARISOL and COPD overlap syndrome (HCC)   • Precordial pain   • Obesity (BMI 30-39.9)   • CKD (chronic kidney disease)  stage 3, GFR 30-59 ml/min (HCC)   • Acute kidney injury (HCC)   • Elevation of level of transaminase and lactic acid dehydrogenase (LDH)   • Gout due to renal impairment, unspecified site   • COPD with acute exacerbation (HCC)   • Type 2 diabetes mellitus (HCC)   • Essential hypertension   • Stage 3a chronic kidney disease (HCC)   • Stage 3b chronic kidney disease (HCC)   • Pneumonia of both lungs due to infectious organism, unspecified part of lung   • Enlarged lymph node         Current Outpatient Medications:   •  albuterol sulfate  (90 Base) MCG/ACT inhaler, Inhale 2 puffs Every 6 (Six) Hours As Needed for Wheezing or Shortness of Air., Disp: 6.7 g, Rfl: 11  •  allopurinol (ZYLOPRIM) 100 MG tablet, Take half tablet by mouth daily (Patient taking differently: 100 mg. Take 2 tablet by mouth daily), Disp: 180 tablet, Rfl: 1  •  amLODIPine (NORVASC) 10 MG tablet, TAKE 1 TABLET BY MOUTH EVERY DAY, Disp: 90 tablet, Rfl: 1  •  aspirin-dipyridamole (AGGRENOX)  MG per 12 hr capsule, TAKE 1 CAPSULE BY MOUTH TWICE A DAY, Disp: 180 capsule, Rfl: 1  •  cholecalciferol (VITAMIN D3) 25 MCG (1000 UT) tablet, Take 1,000 Units by mouth Daily., Disp: , Rfl:   •  colchicine 0.6 MG tablet, Take 2 tabs (1.2 mg) today, then 1 tab (0.6 mg) daily for up to 2 weeks after gout flare resolves., Disp: 15 tablet, Rfl: 0  •  docusate sodium (COLACE) 100 MG capsule, Take 100 mg by mouth 2 (Two) Times a Day., Disp: , Rfl:   •  folic acid (FOLVITE) 1 MG tablet, TAKE 1 TABLET BY MOUTH EVERY DAY, Disp: 90 tablet, Rfl: 2  •  furosemide (LASIX) 40 MG tablet, TAKE 1/2 TABLET BY MOUTH DAILY AS NEEDED (LOWER EXTREMITY EDEMA)., Disp: 45 tablet, Rfl: 3  •  gabapentin (NEURONTIN) 100 MG capsule, Take 100 mg by mouth Daily., Disp: , Rfl:   •  ipratropium-albuterol (DUO-NEB) 0.5-2.5 mg/3 ml nebulizer, Take 3 mL by nebulization 4 (Four) Times a Day., Disp: 360 mL, Rfl: 0  •  isosorbide mononitrate (IMDUR) 30 MG 24 hr tablet, TAKE 1 TABLET BY  MOUTH EVERY DAY, Disp: 30 tablet, Rfl: 1  •  lisinopril (PRINIVIL,ZESTRIL) 20 MG tablet, Take 1 tablet by mouth Daily., Disp: 90 tablet, Rfl: 3  •  loratadine (CLARITIN) 10 MG tablet, TAKE 1 TABLET BY MOUTH EVERY DAY, Disp: 90 tablet, Rfl: 1  •  metFORMIN ER (GLUCOPHAGE-XR) 500 MG 24 hr tablet, Take 1 tablet by mouth 2 (Two) Times a Day., Disp: 180 tablet, Rfl: 1  •  nebivolol (BYSTOLIC) 5 MG tablet, TAKE 1 TABLET BY MOUTH EVERY DAY, Disp: 90 tablet, Rfl: 1  •  nystatin (MYCOSTATIN) 112462 UNIT/GM powder, Apply  topically to the appropriate area as directed 3 (Three) Times a Day. (Patient taking differently: Apply 1 application topically to the appropriate area as directed Daily As Needed.), Disp: 120 g, Rfl: 2  •  Omega-3 Fatty Acids (FISH OIL) 1200 MG capsule capsule, Take 1,200 mg by mouth 3 (Three) Times a Day With Meals., Disp: , Rfl:   •  oxyCODONE-acetaminophen (PERCOCET)  MG per tablet, 3 (Three) Times a Day., Disp: , Rfl:   •  rOPINIRole (REQUIP) 1 MG tablet, TAKE 2 TABLETS BY MOUTH EVERY NIGHT 1 HOUR BEFORE BEDTIME., Disp: 180 tablet, Rfl: 1  •  rosuvastatin (CRESTOR) 10 MG tablet, TAKE 1 TABLET BY MOUTH EVERY DAY, Disp: 90 tablet, Rfl: 11  •  tiotropium bromide-olodaterol (Stiolto Respimat) 2.5-2.5 MCG/ACT aerosol solution inhaler, Inhale 2 puffs Daily., Disp: 1 each, Rfl: 11  •  tiZANidine (ZANAFLEX) 4 MG tablet, TAKE 1 TABLET BY MOUTH EVERY 8 HOURS AS NEEDED FOR MUSCLE SPASMS (AT LUNCH AND BEDTIME AS NEEDED), Disp: 270 tablet, Rfl: 1  •  Triamcinolone Acetonide (NASACORT) 55 MCG/ACT nasal inhaler, INSTILL 1 SPRAY INTO THE NOSTRIL(S) AS DIRECTED BY PROVIDER EVERY OTHER DAY. *NOT COVERED*, Disp: 16.5 g, Rfl: 2  •  venlafaxine (EFFEXOR) 37.5 MG tablet, Take 75 mg by mouth Every Morning., Disp: , Rfl:   •  predniSONE (DELTASONE) 10 MG tablet, 40 mg x2 days, 30 mg x2 days, 20 mg x2 days, 10 mg x2 days and stop, Disp: 20 tablet, Rfl: 0    No Known Allergies    Past Medical History:   Diagnosis Date   •  "Anxiety    • Arthritis    • COPD (chronic obstructive pulmonary disease) (HCC)    • Depression    • Diabetes mellitus (HCC)    • History of transfusion    • Hyperlipidemia    • Hypertension    • Stroke (HCC)     Greater than 5 years ago     Past Surgical History:   Procedure Laterality Date   • CERVICAL FUSION     • EYE SURGERY Bilateral 2014    lasix eye surgery   • HEMORRHOIDECTOMY     • HYSTERECTOMY     • INTRATHECAL PUMP REMOVAL         • KNEE ARTHROSCOPY Right 2015   • LUMBAR SPINE SURGERY     • PAIN PUMP INSERTION/REVISION      Removed in      Social History     Socioeconomic History   • Marital status:    Tobacco Use   • Smoking status: Former     Packs/day: 1.00     Years: 40.00     Pack years: 40.00     Types: Cigarettes     Quit date:      Years since quittin.0   • Smokeless tobacco: Never   Vaping Use   • Vaping Use: Never used   Substance and Sexual Activity   • Alcohol use: No   • Drug use: No   • Sexual activity: Defer     Family History   Problem Relation Age of Onset   • Diabetes Other    • Hypertension Other    • Other Other    • Kidney disease Other    • Heart attack Father 75   • Cancer Sister    • Lung cancer Brother    • Hypertension Sister           Objective     Blood pressure 142/86, pulse 66, height 166.4 cm (65.5\"), weight 95.7 kg (211 lb), SpO2 98 %, not currently breastfeeding.  Physical Exam  Vitals and nursing note reviewed.   Constitutional:       General: She is not in acute distress.     Appearance: She is well-developed. She is not diaphoretic.   HENT:      Head: Normocephalic.   Eyes:      Conjunctiva/sclera: Conjunctivae normal.   Neck:      Vascular: No JVD.   Cardiovascular:      Rate and Rhythm: Normal rate and regular rhythm.      Heart sounds: Normal heart sounds, S1 normal and S2 normal. No murmur heard.    No friction rub. No gallop.   Pulmonary:      Effort: Pulmonary effort is normal. No respiratory distress.      Breath sounds: Normal " breath sounds. No wheezing or rales.   Abdominal:      General: Bowel sounds are normal. There is no distension.      Palpations: Abdomen is soft.   Musculoskeletal:         General: Normal range of motion.   Skin:     General: Skin is warm and dry.      Findings: No erythema.   Neurological:      Mental Status: She is alert and oriented to person, place, and time.   Psychiatric:         Behavior: Behavior normal.         Thought Content: Thought content normal.         Judgment: Judgment normal.         PFTs:  (independently reviewed and interpreted by me)  9/10/2020  FVC 1.84L, 66%  FEV1 1.26L, 59%  Ratio 68%  TLC 3.02L, 57%  DLCO 59%      Radiology (independently reviewed and interpreted by me):  7/21/20 CTA chest- no concerning nodules or masses, no PE       Assessment & Plan     Discussion/ Recommendations:      Diagnosis Plan   1. Chronic obstructive pulmonary disease, unspecified COPD type (HCC)  - Continue Stiolto inhaler  - Albuterol HFA and duonebs PRN    Flu/Pneumonia/Covid vaccine recommended annually.       CT in 3 months.        Follow up 3 months for COPD and CT scan.       I spent 30 minutes caring for Ronda on this date of service. This time includes time spent by me in the following activities: preparing for the visit, reviewing tests, obtaining and/or reviewing a separately obtained history, performing a medically appropriate examination and/or evaluation, counseling and educating the patient/family/caregiver, ordering medications, tests, or procedures, referring and communicating with other health care professionals, documenting information in the medical record, independently interpreting results and communicating that information with the patient/family/caregiver and care coordination          This document has been electronically signed by CIARA Johnston on January 12, 2023 10:54 CST

## 2023-01-13 ENCOUNTER — OFFICE VISIT (OUTPATIENT)
Dept: FAMILY MEDICINE CLINIC | Facility: CLINIC | Age: 78
End: 2023-01-13
Payer: MEDICARE

## 2023-01-13 VITALS
HEART RATE: 64 BPM | HEIGHT: 66 IN | OXYGEN SATURATION: 98 % | TEMPERATURE: 98.6 F | SYSTOLIC BLOOD PRESSURE: 136 MMHG | WEIGHT: 212.9 LBS | DIASTOLIC BLOOD PRESSURE: 78 MMHG | RESPIRATION RATE: 18 BRPM | BODY MASS INDEX: 34.22 KG/M2

## 2023-01-13 DIAGNOSIS — Z09 HOSPITAL DISCHARGE FOLLOW-UP: Primary | ICD-10-CM

## 2023-01-13 DIAGNOSIS — G25.81 RESTLESS LEG SYNDROME: ICD-10-CM

## 2023-01-13 DIAGNOSIS — J18.9 PNEUMONIA DUE TO INFECTIOUS ORGANISM, UNSPECIFIED LATERALITY, UNSPECIFIED PART OF LUNG: ICD-10-CM

## 2023-01-13 PROCEDURE — 99496 TRANSJ CARE MGMT HIGH F2F 7D: CPT | Performed by: NURSE PRACTITIONER

## 2023-01-13 RX ORDER — ROPINIROLE 0.5 MG/1
0.5 TABLET, FILM COATED ORAL NIGHTLY
Qty: 90 TABLET | Refills: 0 | Status: SHIPPED | OUTPATIENT
Start: 2023-01-13 | End: 2023-03-10

## 2023-01-13 RX ORDER — ROPINIROLE 1 MG/1
2 TABLET, FILM COATED ORAL NIGHTLY
Qty: 180 TABLET | Refills: 0 | Status: SHIPPED | OUTPATIENT
Start: 2023-01-13 | End: 2023-03-10

## 2023-01-13 NOTE — PROGRESS NOTES
Transitional Care Follow Up Visit  Subjective     PatriziaVerna Blair is a 77 y.o. female who presents for a transitional care management visit.    Within 48 business hours after discharge our office contacted her via telephone to coordinate her care and needs.      I reviewed and discussed the details of that call along with the discharge summary, hospital problems, inpatient lab results, inpatient diagnostic studies, and consultation reports with Ronda.     Current outpatient and discharge medications have been reconciled for the patient.  Reviewed by: CIARA Kerr      Date of TCM Phone Call 1/8/2023   [facility]   Date of Admission 1/4/2023   Date of Discharge 1/8/2023   Discharge Disposition Home or Self Care     Risk for Readmission (LACE) Score: 13 (1/8/2023  5:00 AM)      History of Present Illness   Course During Hospital Stay: copied from hospital chart:      Chief Complaint on Day of Discharge: No new complaints     Hospital Course:  The patient is a 77 y.o. female with medical history notable for COPD, MARISOL, diabetes mellitus, hypertension, hyperlipidemia, arthritis, and previous stroke who presented to [facility] with worsening shortness of breath.  Patient was admitted and treated for underlying COPD exacerbation complicated by bacterial pneumonia.  Patient underwent CTA of her chest in the ER due to elevated D-dimer which was negative for PE but enlarged lymph node was noted concerning for likely inflammatory from her infection versus malignancy.  Patient gradually improved during her stay and was found to be stable on her home oxygen settings.  CT findings were discussed with the patient and she will follow-up with her PCP about this who is aware.  Patient otherwise noted feeling much improved and ready for discharge.  She will be discharged home to complete her steroid and antibiotic course and follow-up closely with her PCP as an  outpatient.  Patient voiced understanding agreement..       Condition on Discharge: Stable        CT scan ordered to follow up with lymph node.      The following portions of the patient's history were reviewed and updated as appropriate: allergies, current medications, past family history, past medical history, past social history, past surgical history and problem list.    Review of Systems    Objective   Physical Exam  Vitals and nursing note reviewed.   Constitutional:       Appearance: She is well-developed.   HENT:      Head: Normocephalic and atraumatic.   Eyes:      General: Lids are normal.      Conjunctiva/sclera: Conjunctivae normal.   Neck:      Thyroid: No thyroid mass or thyromegaly.      Trachea: Trachea normal. No tracheal tenderness.   Cardiovascular:      Rate and Rhythm: Normal rate.      Pulses: Normal pulses.      Heart sounds: Normal heart sounds.   Pulmonary:      Effort: Pulmonary effort is normal. No respiratory distress.      Breath sounds: Normal breath sounds. No wheezing.   Chest:      Chest wall: No tenderness.   Abdominal:      General: There is no distension.      Palpations: Abdomen is soft. There is no mass.   Musculoskeletal:         General: Normal range of motion.      Cervical back: Normal range of motion. No edema.   Skin:     General: Skin is warm and dry.      Coloration: Skin is not pale.      Findings: No abrasion, erythema or lesion.   Neurological:      Mental Status: She is alert and oriented to person, place, and time.   Psychiatric:         Mood and Affect: Mood is not anxious. Affect is not inappropriate.         Speech: Speech normal.         Behavior: Behavior normal.         Thought Content: Thought content normal.         Judgment: Judgment normal. Judgment is not impulsive.         Assessment & Plan   Diagnoses and all orders for this visit:    1. Hospital discharge follow-up (Primary)    2. Pneumonia due to infectious organism, unspecified laterality,  unspecified part of lung    3. Restless leg syndrome  -     rOPINIRole (REQUIP) 1 MG tablet; Take 2 tablets by mouth Every Night. Take 1 hour before bedtime.  Dispense: 180 tablet; Refill: 0  -     rOPINIRole (REQUIP) 0.5 MG tablet; Take 1 tablet by mouth Every Night. Take 1 hour before bedtime.  Dispense: 90 tablet; Refill: 0      Had an abnormal chest CT and is now following with Pulmonology due to node seen.  They will continue to monitor     Continue current medications     Continue to follow with PCP   Please call the office if you have any issues     Return if symptoms worsen or fail to improve, for Next scheduled follow up.        This document has been electronically signed by CIARA Kerr on January 17, 2023 12:41 CST

## 2023-01-13 NOTE — PROGRESS NOTES
Chief Complaint  Follow-up (Hospital fu from having pneumonia)    Subjective     {Problem List  Visit Diagnosis   Encounters  Notes  Medications  Labs  Result Review Imaging  Media :23}     Ronda Blair presents to [facility] [place]  History of Present Illness  Outpatient Medications Prior to Visit   Medication Sig Dispense Refill   • albuterol sulfate  (90 Base) MCG/ACT inhaler Inhale 2 puffs Every 6 (Six) Hours As Needed for Wheezing or Shortness of Air. 6.7 g 11   • allopurinol (ZYLOPRIM) 100 MG tablet Take half tablet by mouth daily (Patient taking differently: 100 mg. Take 2 tablet by mouth daily) 180 tablet 1   • amLODIPine (NORVASC) 10 MG tablet TAKE 1 TABLET BY MOUTH EVERY DAY 90 tablet 1   • aspirin-dipyridamole (AGGRENOX)  MG per 12 hr capsule TAKE 1 CAPSULE BY MOUTH TWICE A  capsule 1   • cholecalciferol (VITAMIN D3) 25 MCG (1000 UT) tablet Take 1,000 Units by mouth Daily.     • docusate sodium (COLACE) 100 MG capsule Take 100 mg by mouth 2 (Two) Times a Day.     • folic acid (FOLVITE) 1 MG tablet TAKE 1 TABLET BY MOUTH EVERY DAY 90 tablet 2   • furosemide (LASIX) 40 MG tablet TAKE 1/2 TABLET BY MOUTH DAILY AS NEEDED (LOWER EXTREMITY EDEMA). 45 tablet 3   • gabapentin (NEURONTIN) 100 MG capsule Take 100 mg by mouth Daily.     • ipratropium-albuterol (DUO-NEB) 0.5-2.5 mg/3 ml nebulizer Take 3 mL by nebulization 4 (Four) Times a Day. 360 mL 0   • isosorbide mononitrate (IMDUR) 30 MG 24 hr tablet TAKE 1 TABLET BY MOUTH EVERY DAY 30 tablet 1   • lisinopril (PRINIVIL,ZESTRIL) 20 MG tablet Take 1 tablet by mouth Daily. 90 tablet 3   • loratadine (CLARITIN) 10 MG tablet TAKE 1 TABLET BY MOUTH EVERY DAY 90 tablet 1   • metFORMIN ER (GLUCOPHAGE-XR) 500 MG 24 hr tablet Take 1 tablet by mouth 2 (Two) Times a Day. 180 tablet 1   • nebivolol (BYSTOLIC) 5 MG tablet TAKE 1 TABLET BY MOUTH EVERY DAY 90 tablet 1   • nystatin  "(MYCOSTATIN) 327682 UNIT/GM powder Apply  topically to the appropriate area as directed 3 (Three) Times a Day. (Patient taking differently: Apply 1 application topically to the appropriate area as directed Daily As Needed.) 120 g 2   • O2 (OXYGEN) Inhale 4 L/min Daily.     • Omega-3 Fatty Acids (FISH OIL) 1200 MG capsule capsule Take 1,200 mg by mouth 3 (Three) Times a Day With Meals.     • oxyCODONE-acetaminophen (PERCOCET)  MG per tablet Take 1 tablet by mouth 3 (Three) Times a Day. Indications: Pain     • rOPINIRole (REQUIP) 1 MG tablet TAKE 2 TABLETS BY MOUTH EVERY NIGHT 1 HOUR BEFORE BEDTIME. 180 tablet 1   • rosuvastatin (CRESTOR) 10 MG tablet TAKE 1 TABLET BY MOUTH EVERY DAY 90 tablet 11   • tiotropium bromide-olodaterol (Stiolto Respimat) 2.5-2.5 MCG/ACT aerosol solution inhaler Inhale 2 puffs Daily. 1 each 11   • tiZANidine (ZANAFLEX) 4 MG tablet TAKE 1 TABLET BY MOUTH EVERY 8 HOURS AS NEEDED FOR MUSCLE SPASMS (AT LUNCH AND BEDTIME AS NEEDED) 270 tablet 1   • Triamcinolone Acetonide (NASACORT) 55 MCG/ACT nasal inhaler INSTILL 1 SPRAY INTO THE NOSTRIL(S) AS DIRECTED BY PROVIDER EVERY OTHER DAY. *NOT COVERED* 16.5 g 2   • venlafaxine (EFFEXOR) 37.5 MG tablet Take 75 mg by mouth Every Morning.     • colchicine 0.6 MG tablet Take 2 tabs (1.2 mg) today, then 1 tab (0.6 mg) daily for up to 2 weeks after gout flare resolves. 15 tablet 0     No facility-administered medications prior to visit.       Review of Systems      Objective   Vital Signs:   Visit Vitals  /78   Pulse 64   Temp 98.6 °F (37 °C) (Infrared)   Resp 18   Ht 166.4 cm (65.5\")   Wt 96.6 kg (212 lb 14.4 oz)   SpO2 98%   BMI 34.89 kg/m²     Physical Exam   Result Review :{Labs  Result Review  Imaging  Med Tab  Media :23}   {The following data was reviewed by (Optional):31359}  {Ambulatory Labs (Optional):16048}  {Data reviewed (Optional):04217:::1}          Assessment and Plan {CC Problem List  Visit Diagnosis  ROS  Review (Popup)  " Health Maintenance  Quality  BestPractice  Medications  SmartSets  SnapShot Encounters  Media :23}   There are no diagnoses linked to this encounter.  {Time Spent (Optional):32440}  Follow Up {Instructions Charge Capture  Follow-up Communications :23}  No follow-ups on file.  Patient was given instructions and counseling regarding her condition or for health maintenance advice. Please see specific information pulled into the AVS if appropriate.           This document has been electronically signed by CIARA Kerr on January 13, 2023 16:23 CST

## 2023-01-16 PROCEDURE — G0180 MD CERTIFICATION HHA PATIENT: HCPCS | Performed by: FAMILY MEDICINE

## 2023-01-17 ENCOUNTER — HOME CARE VISIT (OUTPATIENT)
Dept: HOME HEALTH SERVICES | Facility: CLINIC | Age: 78
End: 2023-01-17
Payer: MEDICARE

## 2023-01-17 ENCOUNTER — TELEPHONE (OUTPATIENT)
Dept: FAMILY MEDICINE CLINIC | Facility: CLINIC | Age: 78
End: 2023-01-17
Payer: MEDICARE

## 2023-01-17 VITALS
SYSTOLIC BLOOD PRESSURE: 138 MMHG | TEMPERATURE: 97.6 F | DIASTOLIC BLOOD PRESSURE: 68 MMHG | OXYGEN SATURATION: 94 % | RESPIRATION RATE: 18 BRPM | HEART RATE: 67 BPM

## 2023-01-17 PROCEDURE — G0299 HHS/HOSPICE OF RN EA 15 MIN: HCPCS

## 2023-01-17 NOTE — TELEPHONE ENCOUNTER
Rosy nurse with [employer] says   There is a discrepancy in the lisinopril found during a medicine reconciliation . Whether she should be taking one 10 mg or 1 -20 mg daily     Please call patient and clarify so that are taking the correct dosage of lisinopril    [employer]

## 2023-01-19 ENCOUNTER — READMISSION MANAGEMENT (OUTPATIENT)
Dept: CALL CENTER | Facility: HOSPITAL | Age: 78
End: 2023-01-19
Payer: MEDICARE

## 2023-01-19 NOTE — OUTREACH NOTE
COPD/PN Week 2 Survey    Flowsheet Row Responses   [facility] [place] patient discharged from? [place]   Does the patient have one of the following disease processes/diagnoses(primary or secondary)? Pneumonia   Week 2 attempt successful? Yes   Call start time 1348   Call end time 1350   Discharge diagnosis Pneumonia,    COPD with acute exacerbation    Meds reviewed with patient/caregiver? Yes   Is the patient having any side effects they believe may be caused by any medication additions or changes? No   Does the patient have all medications ordered at discharge? Yes   Is the patient taking all medications as directed (includes completed medication regime)? Yes   Does the patient have a primary care provider?  Yes   Has the patient kept scheduled appointments due by today? Yes   What is the Home health agency?  [facility]    Has home health visited the patient within 72 hours of discharge? Yes   Has all DME been delivered? Yes   DME comments PATIENT IS ONLY USING O2 AT NIGHT NOW.    Pulse Ox monitoring Intermittent   Pulse Ox device source Patient   O2 Sat: education provided Sat levels, Monitoring frequency   Psychosocial issues? No   Did the patient receive a copy of their discharge instructions? Yes   Nursing interventions Reviewed instructions with patient   What is the patient's perception of their health status since discharge? Improving   Nursing Interventions Nurse provided patient education   If the patient is a current smoker, are they able to teach back resources for cessation? Not a smoker   Is the patient/caregiver able to teach back the hierarchy of who to call/visit for symptoms/problems? PCP, Specialist, Home health nurse, Urgent Care, ED, 911 Yes   Is the patient/caregiver able to teach back signs and symptoms of worsening condition: Fever/chills, Chest pain, Shortness of breath   Is the patient/caregiver able to teach back importance of completing antibiotic course of treatment? Yes   Week 2 call  completed? Yes          BENNETT GILL - Licensed Nurse

## 2023-01-24 ENCOUNTER — HOME CARE VISIT (OUTPATIENT)
Dept: HOME HEALTH SERVICES | Facility: CLINIC | Age: 78
End: 2023-01-24
Payer: MEDICARE

## 2023-01-24 VITALS
SYSTOLIC BLOOD PRESSURE: 138 MMHG | TEMPERATURE: 97.8 F | RESPIRATION RATE: 20 BRPM | HEART RATE: 76 BPM | OXYGEN SATURATION: 96 % | DIASTOLIC BLOOD PRESSURE: 70 MMHG

## 2023-01-24 PROCEDURE — G0493 RN CARE EA 15 MIN HH/HOSPICE: HCPCS

## 2023-01-26 ENCOUNTER — READMISSION MANAGEMENT (OUTPATIENT)
Dept: CALL CENTER | Facility: HOSPITAL | Age: 78
End: 2023-01-26
Payer: MEDICARE

## 2023-01-26 NOTE — OUTREACH NOTE
COPD/PN Week 3 Survey    Flowsheet Row Responses   [facility] facility patient discharged from? [place]   Does the patient have one of the following disease processes/diagnoses(primary or secondary)? Pneumonia   Week 3 attempt successful? No   Unsuccessful attempts Attempt 1          BENNETT Graves Licensed Nurse

## 2023-02-01 ENCOUNTER — READMISSION MANAGEMENT (OUTPATIENT)
Dept: CALL CENTER | Facility: HOSPITAL | Age: 78
End: 2023-02-01
Payer: MEDICARE

## 2023-02-01 ENCOUNTER — HOME CARE VISIT (OUTPATIENT)
Dept: HOME HEALTH SERVICES | Facility: CLINIC | Age: 78
End: 2023-02-01
Payer: MEDICARE

## 2023-02-01 VITALS
RESPIRATION RATE: 18 BRPM | SYSTOLIC BLOOD PRESSURE: 133 MMHG | TEMPERATURE: 97.1 F | HEART RATE: 84 BPM | OXYGEN SATURATION: 97 % | DIASTOLIC BLOOD PRESSURE: 62 MMHG

## 2023-02-01 PROCEDURE — G0493 RN CARE EA 15 MIN HH/HOSPICE: HCPCS

## 2023-02-01 NOTE — OUTREACH NOTE
COPD/PN Week 3 Survey    Flowsheet Row Responses   [facility] facility patient discharged from? [place]   Does the patient have one of the following disease processes/diagnoses(primary or secondary)? Pneumonia   Week 3 attempt successful? No   Unsuccessful attempts Attempt 2  [No answer.]   Discharge diagnosis Pneumonia,    COPD with acute exacerbation           NÉSTOR LEVY - Registered Nurse

## 2023-02-06 ENCOUNTER — OFFICE VISIT (OUTPATIENT)
Dept: FAMILY MEDICINE CLINIC | Facility: CLINIC | Age: 78
End: 2023-02-06
Payer: MEDICARE

## 2023-02-06 VITALS
SYSTOLIC BLOOD PRESSURE: 148 MMHG | BODY MASS INDEX: 34.55 KG/M2 | OXYGEN SATURATION: 95 % | DIASTOLIC BLOOD PRESSURE: 70 MMHG | WEIGHT: 215 LBS | HEIGHT: 66 IN | HEART RATE: 79 BPM

## 2023-02-06 DIAGNOSIS — G25.81 RESTLESS LEG SYNDROME: ICD-10-CM

## 2023-02-06 DIAGNOSIS — N18.32 STAGE 3B CHRONIC KIDNEY DISEASE: ICD-10-CM

## 2023-02-06 DIAGNOSIS — Z12.31 ENCOUNTER FOR SCREENING MAMMOGRAM FOR MALIGNANT NEOPLASM OF BREAST: ICD-10-CM

## 2023-02-06 DIAGNOSIS — Z78.0 POSTMENOPAUSAL: ICD-10-CM

## 2023-02-06 DIAGNOSIS — I10 ESSENTIAL HYPERTENSION: ICD-10-CM

## 2023-02-06 DIAGNOSIS — J44.9 CHRONIC OBSTRUCTIVE PULMONARY DISEASE, UNSPECIFIED COPD TYPE: ICD-10-CM

## 2023-02-06 DIAGNOSIS — E11.42 TYPE 2 DIABETES MELLITUS WITH DIABETIC POLYNEUROPATHY, WITHOUT LONG-TERM CURRENT USE OF INSULIN: Primary | ICD-10-CM

## 2023-02-06 PROCEDURE — 99214 OFFICE O/P EST MOD 30 MIN: CPT | Performed by: FAMILY MEDICINE

## 2023-02-06 RX ORDER — GUAIFENESIN 600 MG/1
600 TABLET, EXTENDED RELEASE ORAL 2 TIMES DAILY
Qty: 20 TABLET | Refills: 0 | Status: SHIPPED | OUTPATIENT
Start: 2023-02-06 | End: 2023-02-16

## 2023-02-06 NOTE — PROGRESS NOTES
Chief Complaint  Hypertension    Subjective    History of Present Illness {CC  Problem List  Visit  Diagnosis   Encounters  Notes  Medications  Labs  Result Review Imaging  Media :23}     Rnoda Blair presents to [facility] [place] for     Chief Complaint   Patient presents with   • Hypertension      Patient seen today for follow-up.  Notes that she is doing a little bit worse over the last couple of weeks.  Feels like she is having more pain in her lower extremities bilaterally.  She follows with pain management.  Has an upcoming appointment with back specialist as well later this week.  Patient notes that pain is bilateral, lateral thighs and down to her feet.  Says that neuropathy pain is bad as well.  She has not been monitoring her blood glucose levels at home.  Does watch her blood pressure, notes that systolic blood pressure readings are generally less than 140.  Patient has started noticing increased sputum production.  Has not been taking anything.  No fever or chills.  Does use inhalers for COPD as prescribed.        Current Outpatient Medications:   •  albuterol sulfate  (90 Base) MCG/ACT inhaler, Inhale 2 puffs Every 6 (Six) Hours As Needed for Wheezing or Shortness of Air., Disp: 6.7 g, Rfl: 11  •  allopurinol (ZYLOPRIM) 100 MG tablet, Take half tablet by mouth daily (Patient taking differently: 100 mg. Take 2 tablet by mouth daily), Disp: 180 tablet, Rfl: 1  •  amLODIPine (NORVASC) 10 MG tablet, TAKE 1 TABLET BY MOUTH EVERY DAY, Disp: 90 tablet, Rfl: 1  •  aspirin-dipyridamole (AGGRENOX)  MG per 12 hr capsule, TAKE 1 CAPSULE BY MOUTH TWICE A DAY, Disp: 180 capsule, Rfl: 1  •  cholecalciferol (VITAMIN D3) 25 MCG (1000 UT) tablet, Take 1,000 Units by mouth Daily., Disp: , Rfl:   •  colchicine 0.6 MG tablet, Take 2 tabs (1.2 mg) today, then 1 tab (0.6 mg) daily for up to 2 weeks after gout flare resolves., Disp: 15 tablet,  Rfl: 0  •  docusate sodium (COLACE) 100 MG capsule, Take 100 mg by mouth 2 (Two) Times a Day., Disp: , Rfl:   •  folic acid (FOLVITE) 1 MG tablet, TAKE 1 TABLET BY MOUTH EVERY DAY, Disp: 90 tablet, Rfl: 2  •  furosemide (LASIX) 40 MG tablet, TAKE 1/2 TABLET BY MOUTH DAILY AS NEEDED (LOWER EXTREMITY EDEMA)., Disp: 45 tablet, Rfl: 3  •  gabapentin (NEURONTIN) 100 MG capsule, Take 100 mg by mouth Daily., Disp: , Rfl:   •  ipratropium-albuterol (DUO-NEB) 0.5-2.5 mg/3 ml nebulizer, Take 3 mL by nebulization 4 (Four) Times a Day., Disp: 360 mL, Rfl: 0  •  isosorbide mononitrate (IMDUR) 30 MG 24 hr tablet, TAKE 1 TABLET BY MOUTH EVERY DAY, Disp: 30 tablet, Rfl: 1  •  lisinopril (PRINIVIL,ZESTRIL) 20 MG tablet, Take 1 tablet by mouth Daily., Disp: 90 tablet, Rfl: 3  •  loratadine (CLARITIN) 10 MG tablet, TAKE 1 TABLET BY MOUTH EVERY DAY, Disp: 90 tablet, Rfl: 1  •  metFORMIN ER (GLUCOPHAGE-XR) 500 MG 24 hr tablet, Take 1 tablet by mouth 2 (Two) Times a Day., Disp: 180 tablet, Rfl: 1  •  nebivolol (BYSTOLIC) 5 MG tablet, TAKE 1 TABLET BY MOUTH EVERY DAY, Disp: 90 tablet, Rfl: 1  •  nystatin (MYCOSTATIN) 069369 UNIT/GM powder, Apply  topically to the appropriate area as directed 3 (Three) Times a Day. (Patient taking differently: Apply 1 application topically to the appropriate area as directed Daily As Needed.), Disp: 120 g, Rfl: 2  •  O2 (OXYGEN), Inhale 4 L/min Daily., Disp: , Rfl:   •  Omega-3 Fatty Acids (FISH OIL) 1200 MG capsule capsule, Take 1,200 mg by mouth 3 (Three) Times a Day With Meals., Disp: , Rfl:   •  oxyCODONE-acetaminophen (PERCOCET)  MG per tablet, Take 1 tablet by mouth 3 (Three) Times a Day. Indications: Pain, Disp: , Rfl:   •  rOPINIRole (REQUIP) 0.5 MG tablet, Take 1 tablet by mouth Every Night. Take 1 hour before bedtime., Disp: 90 tablet, Rfl: 0  •  rOPINIRole (REQUIP) 1 MG tablet, Take 2 tablets by mouth Every Night. Take 1 hour before bedtime., Disp: 180 tablet, Rfl: 0  •  rosuvastatin  "(CRESTOR) 10 MG tablet, TAKE 1 TABLET BY MOUTH EVERY DAY, Disp: 90 tablet, Rfl: 11  •  tiotropium bromide-olodaterol (Stiolto Respimat) 2.5-2.5 MCG/ACT aerosol solution inhaler, Inhale 2 puffs Daily., Disp: 1 each, Rfl: 11  •  tiZANidine (ZANAFLEX) 4 MG tablet, TAKE 1 TABLET BY MOUTH EVERY 8 HOURS AS NEEDED FOR MUSCLE SPASMS (AT LUNCH AND BEDTIME AS NEEDED), Disp: 270 tablet, Rfl: 1  •  Triamcinolone Acetonide (NASACORT) 55 MCG/ACT nasal inhaler, INSTILL 1 SPRAY INTO THE NOSTRIL(S) AS DIRECTED BY PROVIDER EVERY OTHER DAY. *NOT COVERED*, Disp: 16.5 g, Rfl: 2  •  venlafaxine (EFFEXOR) 37.5 MG tablet, Take 75 mg by mouth Every Morning., Disp: , Rfl:      Objective       Vital Signs:   /70   Pulse 79   Ht 166.4 cm (65.5\")   Wt 97.5 kg (215 lb)   SpO2 95%   BMI 35.23 kg/m²     Physical Exam  Vitals reviewed.   Constitutional:       General: She is not in acute distress.     Appearance: She is well-developed.   Cardiovascular:      Rate and Rhythm: Normal rate and regular rhythm.      Heart sounds: Normal heart sounds. No murmur heard.  Pulmonary:      Effort: Pulmonary effort is normal. No respiratory distress.      Breath sounds: Normal breath sounds. No wheezing or rales.   Skin:     General: Skin is warm and dry.   Neurological:      Mental Status: She is alert and oriented to person, place, and time.        Result Review :{ Labs  Result Review  Imaging  Med Tab  Media :23}   The following data was reviewed by: Brook Arreola MD on 02/06/2023    Common labs    Common Labs 1/5/23 1/5/23 1/6/23 1/6/23 1/7/23 1/7/23    0602 [id number] 0550 0550 [id number]   Glucose  209 (A)  168 (A)  109 (A)   BUN  29 (A)  35 (A)  32 (A)   Creatinine  1.51 (A)  1.41 (A)  1.26 (A)   Sodium  142  138  140   Potassium  4.3  4.4  4.4   Chloride  103  104  104   Calcium  9.5  9.1  8.9   WBC 9.79  12.54 (A)  14.25 (A)    Hemoglobin 10.3 (A)  9.9 (A)  10.0 (A)    Hematocrit 32.2 (A)  32.1 (A)  31.4 (A)    Platelets 176  184  187  "   (A) Abnormal value                      Assessment and Plan {CC Problem List  Visit Diagnosis  ROS  Review (Popup)  Health Maintenance  Quality  BestPractice  Medications  SmartSets  SnapShot Encounters  Media :23}   Diagnoses and all orders for this visit:    1. Type 2 diabetes mellitus with diabetic polyneuropathy, without long-term current use of insulin (HCC) (Primary)    2. Restless leg syndrome    3. Essential hypertension    4. Stage 3b chronic kidney disease (HCC)    5. Chronic obstructive pulmonary disease, unspecified COPD type (HCC)  -     guaiFENesin (Mucinex) 600 MG 12 hr tablet; Take 1 tablet by mouth 2 (Two) Times a Day for 10 days.  Dispense: 20 tablet; Refill: 0    6. Encounter for screening mammogram for malignant neoplasm of breast  -     Mammo Screening Digital Tomosynthesis Bilateral With CAD; Future    7. Postmenopausal  -     DEXA Bone Density Axial; Future      Type 2 diabetes, last hemoglobin A1c not well controlled  Discussed importance of diabetic diet and regular exercise  Encourage patient to try to walk 10 to 15 minutes at least 3 times a week  Continue current medications for neuropathy and restless legs  Systolic blood pressure elevated today, however patient reports normal at home  Continue current medications  We will continue to monitor  Chronic kidney disease has been stable  Plan for recheck labs with next visit  Mucinex for increased mucus production  Patient will call with worsening or persistent respiratory symptoms  Due for mammogram and DEXA, ordered today      Follow Up {Instructions Charge Capture  Follow-up Communications :23}   Return in about 6 weeks (around 3/20/2023) for Recheck.  Patient was given instructions and counseling regarding her condition or for health maintenance advice. Please see specific information pulled into the AVS if appropriate.            This document has been electronically signed by Brook Arreola MD

## 2023-02-16 ENCOUNTER — HOSPITAL ENCOUNTER (OUTPATIENT)
Dept: MRI IMAGING | Facility: HOSPITAL | Age: 78
Discharge: HOME | End: 2023-02-16
Payer: MEDICARE

## 2023-02-16 ENCOUNTER — HOSPITAL ENCOUNTER (OUTPATIENT)
Dept: GENERAL RADIOLOGY | Facility: HOSPITAL | Age: 78
Discharge: HOME | End: 2023-02-16
Payer: MEDICARE

## 2023-02-16 DIAGNOSIS — M51.16 INTERVERTEBRAL DISC DISORDER WITH RADICULOPATHY OF LUMBAR REGION: ICD-10-CM

## 2023-02-16 DIAGNOSIS — M50.120 CERVICAL DISC DISORDER WITH RADICULOPATHY OF MID-CERVICAL REGION: ICD-10-CM

## 2023-02-16 DIAGNOSIS — M51.16 LUMBAR DISC PROLAPSE WITH COMPRESSION RADICULOPATHY: ICD-10-CM

## 2023-02-16 PROCEDURE — 72148 MRI LUMBAR SPINE W/O DYE: CPT

## 2023-02-16 PROCEDURE — 72110 X-RAY EXAM L-2 SPINE 4/>VWS: CPT

## 2023-02-21 DIAGNOSIS — I50.32 CHRONIC HEART FAILURE WITH PRESERVED EJECTION FRACTION: ICD-10-CM

## 2023-02-21 RX ORDER — ASPIRIN AND DIPYRIDAMOLE 25; 200 MG/1; MG/1
CAPSULE, EXTENDED RELEASE ORAL
Qty: 180 CAPSULE | Refills: 1 | Status: SHIPPED | OUTPATIENT
Start: 2023-02-21

## 2023-02-22 RX ORDER — ROSUVASTATIN CALCIUM 10 MG/1
TABLET, COATED ORAL
Qty: 90 TABLET | Refills: 11 | Status: SHIPPED | OUTPATIENT
Start: 2023-02-22

## 2023-02-22 NOTE — TELEPHONE ENCOUNTER
Patient called stating she has been having pain in her right lung when she moves and when she lays down. She said Dr. Arreola told her to let her know if anything changes. Patient is want to schedule an appt. Please advise [phone]

## 2023-02-22 NOTE — TELEPHONE ENCOUNTER
Any respiratory symptoms (cough/shortness of breath)?  Does it hurt to touch in this area?  - JEFFERSON Arreola

## 2023-03-01 ENCOUNTER — OFFICE VISIT (OUTPATIENT)
Dept: FAMILY MEDICINE CLINIC | Facility: CLINIC | Age: 78
End: 2023-03-01
Payer: MEDICARE

## 2023-03-01 VITALS
HEART RATE: 84 BPM | SYSTOLIC BLOOD PRESSURE: 146 MMHG | HEIGHT: 66 IN | OXYGEN SATURATION: 94 % | WEIGHT: 213 LBS | DIASTOLIC BLOOD PRESSURE: 62 MMHG | BODY MASS INDEX: 34.23 KG/M2

## 2023-03-01 DIAGNOSIS — R07.89 COSTOCHONDRAL PAIN: Primary | ICD-10-CM

## 2023-03-01 DIAGNOSIS — M54.9 MID BACK PAIN: ICD-10-CM

## 2023-03-01 DIAGNOSIS — M50.322 OTHER CERVICAL DISC DEGENERATION AT C5-C6 LEVEL: ICD-10-CM

## 2023-03-01 DIAGNOSIS — R05.2 SUBACUTE COUGH: ICD-10-CM

## 2023-03-01 PROCEDURE — 99214 OFFICE O/P EST MOD 30 MIN: CPT | Performed by: NURSE PRACTITIONER

## 2023-03-01 RX ORDER — METHYLPREDNISOLONE 4 MG/1
TABLET ORAL
Qty: 21 EACH | Refills: 0 | Status: SHIPPED | OUTPATIENT
Start: 2023-03-01 | End: 2023-03-10

## 2023-03-01 RX ORDER — TIZANIDINE 4 MG/1
4 TABLET ORAL EVERY 8 HOURS PRN
Qty: 270 TABLET | Refills: 1 | Status: SHIPPED | OUTPATIENT
Start: 2023-03-01

## 2023-03-01 RX ORDER — VENLAFAXINE HYDROCHLORIDE 75 MG/1
CAPSULE, EXTENDED RELEASE ORAL
COMMUNITY
Start: 2023-02-27

## 2023-03-01 NOTE — PROGRESS NOTES
Chief Complaint  Pain (Right rib cage )    Subjective          PatriziaVerna Blair presents to [facility] [place] with right sided mid back pain. She notices the pain when she lays on her side.         Pain  This is a new problem. The current episode started 1 to 4 weeks ago. The problem occurs constantly.     Outpatient Medications Prior to Visit   Medication Sig Dispense Refill   • albuterol sulfate  (90 Base) MCG/ACT inhaler Inhale 2 puffs Every 6 (Six) Hours As Needed for Wheezing or Shortness of Air. 6.7 g 11   • allopurinol (ZYLOPRIM) 100 MG tablet Take half tablet by mouth daily (Patient taking differently: 1 tablet. Take 2 tablet by mouth daily) 180 tablet 1   • amLODIPine (NORVASC) 10 MG tablet TAKE 1 TABLET BY MOUTH EVERY DAY 90 tablet 1   • aspirin-dipyridamole (AGGRENOX)  MG per 12 hr capsule TAKE 1 CAPSULE BY MOUTH TWICE A  capsule 1   • cholecalciferol (VITAMIN D3) 25 MCG (1000 UT) tablet Take 1 tablet by mouth Daily.     • colchicine 0.6 MG tablet Take 2 tabs (1.2 mg) today, then 1 tab (0.6 mg) daily for up to 2 weeks after gout flare resolves. 15 tablet 0   • docusate sodium (COLACE) 100 MG capsule Take 1 capsule by mouth 2 (Two) Times a Day.     • folic acid (FOLVITE) 1 MG tablet TAKE 1 TABLET BY MOUTH EVERY DAY 90 tablet 2   • furosemide (LASIX) 40 MG tablet TAKE 1/2 TABLET BY MOUTH DAILY AS NEEDED (LOWER EXTREMITY EDEMA). 45 tablet 3   • gabapentin (NEURONTIN) 100 MG capsule Take 1 capsule by mouth Daily.     • ipratropium-albuterol (DUO-NEB) 0.5-2.5 mg/3 ml nebulizer Take 3 mL by nebulization 4 (Four) Times a Day. 360 mL 0   • isosorbide mononitrate (IMDUR) 30 MG 24 hr tablet TAKE 1 TABLET BY MOUTH EVERY DAY 30 tablet 1   • lisinopril (PRINIVIL,ZESTRIL) 20 MG tablet Take 1 tablet by mouth Daily. 90 tablet 3   • loratadine (CLARITIN) 10 MG tablet TAKE 1 TABLET BY MOUTH EVERY DAY 90 tablet 1   • metFORMIN ER (GLUCOPHAGE-XR)  "500 MG 24 hr tablet Take 1 tablet by mouth 2 (Two) Times a Day. 180 tablet 1   • nebivolol (BYSTOLIC) 5 MG tablet TAKE 1 TABLET BY MOUTH EVERY DAY 90 tablet 1   • nystatin (MYCOSTATIN) 393681 UNIT/GM powder Apply  topically to the appropriate area as directed 3 (Three) Times a Day. (Patient taking differently: Apply 1 application topically to the appropriate area as directed Daily As Needed.) 120 g 2   • O2 (OXYGEN) Inhale 4 L/min Daily.     • Omega-3 Fatty Acids (FISH OIL) 1200 MG capsule capsule Take 1 capsule by mouth 3 (Three) Times a Day With Meals.     • oxyCODONE-acetaminophen (PERCOCET)  MG per tablet Take 1 tablet by mouth 3 (Three) Times a Day. Indications: Pain     • rOPINIRole (REQUIP) 0.5 MG tablet Take 1 tablet by mouth Every Night. Take 1 hour before bedtime. 90 tablet 0   • rOPINIRole (REQUIP) 1 MG tablet Take 2 tablets by mouth Every Night. Take 1 hour before bedtime. 180 tablet 0   • rosuvastatin (CRESTOR) 10 MG tablet TAKE 1 TABLET BY MOUTH EVERY DAY 90 tablet 11   • tiotropium bromide-olodaterol (Stiolto Respimat) 2.5-2.5 MCG/ACT aerosol solution inhaler Inhale 2 puffs Daily. 1 each 11   • Triamcinolone Acetonide (NASACORT) 55 MCG/ACT nasal inhaler INSTILL 1 SPRAY INTO THE NOSTRIL(S) AS DIRECTED BY PROVIDER EVERY OTHER DAY. *NOT COVERED* 16.5 g 2   • venlafaxine XR (EFFEXOR-XR) 75 MG 24 hr capsule      • tiZANidine (ZANAFLEX) 4 MG tablet TAKE 1 TABLET BY MOUTH EVERY 8 HOURS AS NEEDED FOR MUSCLE SPASMS (AT LUNCH AND BEDTIME AS NEEDED) 270 tablet 1   • venlafaxine (EFFEXOR) 37.5 MG tablet Take 75 mg by mouth Every Morning.       No facility-administered medications prior to visit.       Review of Systems      Objective   Vital Signs:   Visit Vitals  /62 (BP Location: Left arm, Patient Position: Sitting, Cuff Size: Large Adult)   Pulse 84   Ht 166.4 cm (65.51\")   Wt 96.6 kg (213 lb)   SpO2 94%   BMI 34.89 kg/m²     Physical Exam  Vitals and nursing note reviewed.   Constitutional:       " Appearance: She is well-developed.   HENT:      Head: Normocephalic and atraumatic.   Eyes:      General: Lids are normal.      Conjunctiva/sclera: Conjunctivae normal.   Neck:      Thyroid: No thyroid mass or thyromegaly.      Trachea: Trachea normal. No tracheal tenderness.   Cardiovascular:      Rate and Rhythm: Normal rate.      Pulses: Normal pulses.      Heart sounds: Normal heart sounds.   Pulmonary:      Effort: Pulmonary effort is normal. No respiratory distress.      Breath sounds: Normal breath sounds. No wheezing.   Chest:      Chest wall: Tenderness present.   Abdominal:      General: There is no distension.      Palpations: Abdomen is soft. There is no mass.   Musculoskeletal:         General: Normal range of motion.      Cervical back: Normal range of motion. No edema.   Skin:     General: Skin is warm and dry.      Coloration: Skin is not pale.      Findings: No abrasion, erythema or lesion.   Neurological:      Mental Status: She is alert and oriented to person, place, and time.   Psychiatric:         Mood and Affect: Mood is not anxious. Affect is not inappropriate.         Speech: Speech normal.         Behavior: Behavior normal.         Thought Content: Thought content normal.         Judgment: Judgment normal. Judgment is not impulsive.        Result Review :                 Assessment and Plan    Diagnoses and all orders for this visit:    1. Costochondral pain (Primary)    2. Mid back pain  -     XR Chest PA & Lateral; Future  -     methylPREDNISolone (MEDROL) 4 MG dose pack; Take as directed on package instructions.  Dispense: 21 each; Refill: 0    3. Subacute cough  -     XR Chest PA & Lateral; Future    4. Other cervical disc degeneration at C5-C6 level  -     tiZANidine (ZANAFLEX) 4 MG tablet; Take 1 tablet by mouth Every 8 (Eight) Hours As Needed for Muscle Spasms.  Dispense: 270 tablet; Refill: 1      Start prescribed medications   May use heat/ice on back       The current medical  regimen is effective;  continue present plan and medications.    Unable to use NSAID    Please call the office if pain worsens     If you have trouble breathing please go to [facility]       Follow Up   Return if symptoms worsen or fail to improve, for Next scheduled follow up.  Patient was given instructions and counseling regarding her condition or for health maintenance advice. Please see specific information pulled into the AVS if appropriate.           This document has been electronically signed by CIARA Kerr on March 3, 2023 09:20 CST

## 2023-03-10 ENCOUNTER — OFFICE VISIT (OUTPATIENT)
Dept: CARDIOLOGY | Facility: CLINIC | Age: 78
End: 2023-03-10
Payer: MEDICARE

## 2023-03-10 VITALS
DIASTOLIC BLOOD PRESSURE: 70 MMHG | WEIGHT: 210.9 LBS | HEART RATE: 74 BPM | HEIGHT: 66 IN | BODY MASS INDEX: 33.89 KG/M2 | SYSTOLIC BLOOD PRESSURE: 140 MMHG | OXYGEN SATURATION: 98 %

## 2023-03-10 DIAGNOSIS — G25.81 RESTLESS LEG SYNDROME: ICD-10-CM

## 2023-03-10 DIAGNOSIS — R06.02 SOB (SHORTNESS OF BREATH): ICD-10-CM

## 2023-03-10 DIAGNOSIS — I50.30 HEART FAILURE WITH PRESERVED EJECTION FRACTION, UNSPECIFIED HF CHRONICITY: Primary | ICD-10-CM

## 2023-03-10 DIAGNOSIS — I10 ESSENTIAL HYPERTENSION: ICD-10-CM

## 2023-03-10 DIAGNOSIS — J44.1 COPD WITH ACUTE EXACERBATION: ICD-10-CM

## 2023-03-10 DIAGNOSIS — I50.32 CHRONIC HEART FAILURE WITH PRESERVED EJECTION FRACTION: ICD-10-CM

## 2023-03-10 PROCEDURE — 1160F RVW MEDS BY RX/DR IN RCRD: CPT | Performed by: INTERNAL MEDICINE

## 2023-03-10 PROCEDURE — 1159F MED LIST DOCD IN RCRD: CPT | Performed by: INTERNAL MEDICINE

## 2023-03-10 PROCEDURE — 99214 OFFICE O/P EST MOD 30 MIN: CPT | Performed by: INTERNAL MEDICINE

## 2023-03-10 PROCEDURE — 3077F SYST BP >= 140 MM HG: CPT | Performed by: INTERNAL MEDICINE

## 2023-03-10 PROCEDURE — 3078F DIAST BP <80 MM HG: CPT | Performed by: INTERNAL MEDICINE

## 2023-03-10 PROCEDURE — 93000 ELECTROCARDIOGRAM COMPLETE: CPT | Performed by: INTERNAL MEDICINE

## 2023-03-10 RX ORDER — ROPINIROLE 0.5 MG/1
TABLET, FILM COATED ORAL
Qty: 90 TABLET | Refills: 0 | Status: SHIPPED | OUTPATIENT
Start: 2023-03-10 | End: 2023-03-29

## 2023-03-10 RX ORDER — HYDROCHLOROTHIAZIDE 25 MG/1
TABLET ORAL
Qty: 56 TABLET | Refills: 11 | OUTPATIENT
Start: 2023-03-10

## 2023-03-10 RX ORDER — AMLODIPINE BESYLATE 10 MG/1
TABLET ORAL
Qty: 90 TABLET | Refills: 1 | Status: SHIPPED | OUTPATIENT
Start: 2023-03-10

## 2023-03-10 RX ORDER — FUROSEMIDE 40 MG/1
TABLET ORAL
Qty: 45 TABLET | Refills: 3 | Status: SHIPPED | OUTPATIENT
Start: 2023-03-10

## 2023-03-10 RX ORDER — ROPINIROLE 1 MG/1
2 TABLET, FILM COATED ORAL NIGHTLY
Qty: 180 TABLET | Refills: 0 | Status: SHIPPED | OUTPATIENT
Start: 2023-03-10 | End: 2023-03-29

## 2023-03-10 RX ORDER — LISINOPRIL 20 MG/1
TABLET ORAL
Qty: 90 TABLET | Refills: 3 | Status: SHIPPED | OUTPATIENT
Start: 2023-03-10

## 2023-03-10 NOTE — PROGRESS NOTES
[facility] Cardiology  OFFICE NOTE    Cardiovascular Medicine  Genaro Cota M.D., RPVI         No referring provider defined for this encounter.    Thank you for asking me to see Ronda Blair for chest pain.    History of Present Illness  This is a 77 y.o. female with:    1.  Diabetes  2.  COPD  3.  Congestive heart failure preserved ejection fraction  4.  Coronary artery disease nonobstructive  5.  Hypertension  6.  Hyperlipidemia  7.  CKD  8.  Thrombocytopenia  9.  CVA    Ronda Blair is a 77 y.o. female who presents for consultation today.  Patient went to the hospital last week with COPD exacerbation.  She had a VQ scan at that time which was low probability for PE, ultrasound and was negative for DVT she had an echocardiogram in July 2020 which showed an EF of 56 to 60% % she had grade 2 diastolic dysfunction.  EKG was a sinus rhythm with sinus arrhythmia and PACs in a pattern of bigeminy.  Of note patient had a CTA coronary angiogram in July 2020 showed a calcium score of 345.  However she had mild nonobstructive disease in all her 3 vessels.  Patient is here for establishment of care.  Her shortness of breath is back to baseline.  Denying any chest pain or shortness of breath.  Was admitted again with COPD exacerbation    Complaining of claudications.  No chest pain.  Shortness of breath at baseline.    03/10/2023:  Was admitted to the hospital in January with COPD exacerbation.  CT chest was without PE.  Denying any chest pain.  Recovered from COPD exacerbation.    Review of Systems - ROS  Constitution: Negative for weakness, weight gain and weight loss.   HENT: Negative for congestion.    Eyes: Negative for blurred vision.   Cardiovascular: As mentioned above  Respiratory: Negative for cough and hemoptysis.    Endocrine: Negative for polydipsia and polyuria.   Hematologic/Lymphatic: Negative for bleeding problem. Does not bruise/bleed easily.   Skin: Negative for  flushing.   Musculoskeletal: Negative for neck pain and stiffness.   Gastrointestinal: Negative for abdominal pain, diarrhea, jaundice, melena, nausea and vomiting.   Genitourinary: Negative for dysuria and hematuria.   Neurological: Negative for dizziness, focal weakness and numbness.   Psychiatric/Behavioral: Negative for altered mental status and depression.     I reviewed the ROS as documented here and confirmed the accuracy of it with the patient today. 3/10/2023        All other systems were reviewed and were negative.    family history includes Cancer in her sister; Diabetes in an other family member; Heart attack (age of onset: 75) in her father; Hypertension in her sister and another family member; Kidney disease in an other family member; Lung cancer in her brother; Other in an other family member.     reports that she quit smoking about 24 years ago. Her smoking use included cigarettes. She has a 40.00 pack-year smoking history. She has never used smokeless tobacco. She reports that she does not drink alcohol and does not use drugs.    No Known Allergies      Current Outpatient Medications:   •  albuterol sulfate  (90 Base) MCG/ACT inhaler, Inhale 2 puffs Every 6 (Six) Hours As Needed for Wheezing or Shortness of Air., Disp: 6.7 g, Rfl: 11  •  allopurinol (ZYLOPRIM) 100 MG tablet, Take half tablet by mouth daily (Patient taking differently: 1 tablet. Take 2 tablet by mouth daily), Disp: 180 tablet, Rfl: 1  •  amLODIPine (NORVASC) 10 MG tablet, TAKE 1 TABLET BY MOUTH EVERY DAY, Disp: 90 tablet, Rfl: 1  •  aspirin-dipyridamole (AGGRENOX)  MG per 12 hr capsule, TAKE 1 CAPSULE BY MOUTH TWICE A DAY, Disp: 180 capsule, Rfl: 1  •  cholecalciferol (VITAMIN D3) 25 MCG (1000 UT) tablet, Take 1 tablet by mouth Daily., Disp: , Rfl:   •  colchicine 0.6 MG tablet, Take 2 tabs (1.2 mg) today, then 1 tab (0.6 mg) daily for up to 2 weeks after gout flare resolves., Disp: 15 tablet, Rfl: 0  •  docusate sodium  (COLACE) 100 MG capsule, Take 1 capsule by mouth 2 (Two) Times a Day., Disp: , Rfl:   •  folic acid (FOLVITE) 1 MG tablet, TAKE 1 TABLET BY MOUTH EVERY DAY, Disp: 90 tablet, Rfl: 2  •  furosemide (LASIX) 40 MG tablet, TAKE 1/2 TABLET BY MOUTH DAILY AS NEEDED (LOWER EXTREMITY EDEMA)., Disp: 45 tablet, Rfl: 3  •  ipratropium-albuterol (DUO-NEB) 0.5-2.5 mg/3 ml nebulizer, Take 3 mL by nebulization 4 (Four) Times a Day., Disp: 360 mL, Rfl: 0  •  isosorbide mononitrate (IMDUR) 30 MG 24 hr tablet, TAKE 1 TABLET BY MOUTH EVERY DAY, Disp: 30 tablet, Rfl: 1  •  lisinopril (PRINIVIL,ZESTRIL) 20 MG tablet, Take 1 tablet by mouth Daily., Disp: 90 tablet, Rfl: 3  •  loratadine (CLARITIN) 10 MG tablet, TAKE 1 TABLET BY MOUTH EVERY DAY, Disp: 90 tablet, Rfl: 1  •  metFORMIN ER (GLUCOPHAGE-XR) 500 MG 24 hr tablet, Take 1 tablet by mouth 2 (Two) Times a Day., Disp: 180 tablet, Rfl: 1  •  nebivolol (BYSTOLIC) 5 MG tablet, TAKE 1 TABLET BY MOUTH EVERY DAY, Disp: 90 tablet, Rfl: 1  •  nystatin (MYCOSTATIN) 445273 UNIT/GM powder, Apply  topically to the appropriate area as directed 3 (Three) Times a Day. (Patient taking differently: Apply 1 application topically to the appropriate area as directed Daily As Needed.), Disp: 120 g, Rfl: 2  •  O2 (OXYGEN), Inhale 4 L/min Daily., Disp: , Rfl:   •  Omega-3 Fatty Acids (FISH OIL) 1200 MG capsule capsule, Take 1 capsule by mouth 3 (Three) Times a Day With Meals., Disp: , Rfl:   •  oxyCODONE-acetaminophen (PERCOCET)  MG per tablet, Take 1 tablet by mouth 3 (Three) Times a Day. Indications: Pain, Disp: , Rfl:   •  rOPINIRole (REQUIP) 0.5 MG tablet, Take 1 tablet by mouth Every Night. Take 1 hour before bedtime., Disp: 90 tablet, Rfl: 0  •  rOPINIRole (REQUIP) 1 MG tablet, Take 2 tablets by mouth Every Night. Take 1 hour before bedtime., Disp: 180 tablet, Rfl: 0  •  rosuvastatin (CRESTOR) 10 MG tablet, TAKE 1 TABLET BY MOUTH EVERY DAY, Disp: 90 tablet, Rfl: 11  •  tiotropium  "bromide-olodaterol (Stiolto Respimat) 2.5-2.5 MCG/ACT aerosol solution inhaler, Inhale 2 puffs Daily., Disp: 1 each, Rfl: 11  •  tiZANidine (ZANAFLEX) 4 MG tablet, Take 1 tablet by mouth Every 8 (Eight) Hours As Needed for Muscle Spasms., Disp: 270 tablet, Rfl: 1  •  Triamcinolone Acetonide (NASACORT) 55 MCG/ACT nasal inhaler, INSTILL 1 SPRAY INTO THE NOSTRIL(S) AS DIRECTED BY PROVIDER EVERY OTHER DAY. *NOT COVERED*, Disp: 16.5 g, Rfl: 2  •  venlafaxine XR (EFFEXOR-XR) 75 MG 24 hr capsule, , Disp: , Rfl:   •  gabapentin (NEURONTIN) 100 MG capsule, Take 1 capsule by mouth Daily., Disp: , Rfl:     Physical Exam:  Vitals:    03/10/23 1007   BP: 140/70   BP Location: Right arm   Patient Position: Sitting   Cuff Size: Adult   Pulse: 74   SpO2: 98%   Weight: 95.7 kg (210 lb 14.4 oz)   Height: 166.4 cm (65.5\")   PainSc: 0-No pain   PainLoc: Chest     Current Pain Level: none  Pulse Ox: Normal  on room air  General: alert, appears stated age and cooperative     Body Habitus: well-nourished    HEENT: Head: Normocephalic, no lesions, without obvious abnormality. No arcus senilis, xanthelasma or xanthomas.    Neuro: alert, oriented x3  Pulses: 2+ and symmetric  JVP: Volume/Pulsation: Normal.  Normal waveforms.   Appropriate inspiratory decrease.  No Kussmaul's. No [place]'s.   Carotid Exam: no bruit normal pulsation bilaterally   Carotid Volume: normal.     Respirations: no increased work of breathing   Chest:  Normal    Pulmonary:Normal   Precordium: Normal impulses. P2 is not palpable.  RV Heave: absent  LV Heave: absent  Candor:  normal size and placement  Palpable S4: absent.  Heart rate: normal    Heart Rhythm: regular     Heart Sounds: S1: normal  S2: normal  S3: absent   S4: absent  Opening Snap: absent    Pericardial Rub:  Absent: .    Abdomen:   Appearance: normal .  Palpation: Soft, non-tender to palpation, bowel sounds positive in all four quadrants; no guarding or rebound tenderness  Extremity: no edema.   LE Skin: " no rashes  LE Hair:  normal  LE Pulses: well perfused with normal pulses in the distal extremities  Pallor on elevation: Absent. Rubor on dependency: None    I have reexamined the patient and the results are consistent with the previously documented exam. Genaro Cota MD       DATA REVIEWED:     EKG. I personally reviewed and interpreted the EKG.  Normal sinus rhythm with sinus arrhythmia.    ECG/EMG Results (all)     None        ---------------------------------------------------  TTE/ELVIS:  Results for orders placed during the hospital encounter of 07/21/20    Transthoracic Echo Complete With Contrast if Necessary Per Protocol    Interpretation Summary  · Estimated EF appears to be in the range of 56 - 60%.  · Left ventricular systolic function is normal.  · Left ventricular diastolic dysfunction (grade II) consistent with pseudonormalization.  · Left ventricular wall thickness is consistent with borderline concentric hypertrophy.  · Left atrial cavity size is moderately dilated.  · Mild mitral valve regurgitation is present  · Mild tricuspid valve regurgitation is present.      CT:   MRI Lumbar Spine Without Contrast    Result Date: 2/20/2023  Multilevel spondylosis. Moderate central canal narrowing at L3-L4 Electronically signed by:  Jesus Admas DO  2/20/2023 9:38 AM CST Workstation: [id number]    XR Chest PA & Lateral    Result Date: 3/6/2023  No acute cardiopulmonary process identified Electronically signed by:  Faith Gómez MD  3/6/2023 3:08 PM CST Workstation: [id number]    XR Spine Lumbar AP & Lateral With Flex & Ext    Result Date: 2/20/2023  Degenerative changes as above, with mild retrolisthesis of L1 1 on L2. No significant translation on flexion and extension views. Electronically signed by:  Faith Gómez MD  2/20/2023 11:04 AM CST Workstation: [id number]        --------------------------------------------------------------------------------------------------  LABS:     The CVD Risk score  (Jese et al., 2008) failed to calculate for the following reasons:    The 2008 CVD risk score is only valid for ages 30 to 74    The patient has a prior MI, stroke, CHF, or peripheral vascular disease diagnosis         Lab Results   Component Value Date    GLUCOSE 109 (H) 01/07/2023    BUN 32 (H) 01/07/2023    CREATININE 1.26 (H) 01/07/2023    EGFRIFNONA 41 (L) 02/14/2022    BCR 25.4 (H) 01/07/2023    K 4.4 01/07/2023    CO2 26.0 01/07/2023    CALCIUM 8.9 01/07/2023    ALBUMIN 3.8 01/04/2023    AST 25 01/04/2023    ALT 23 01/04/2023     Lab Results   Component Value Date    WBC 14.25 (H) 01/07/2023    HGB 10.0 (L) 01/07/2023    HCT 31.4 (L) 01/07/2023    MCV 95.7 01/07/2023     01/07/2023     Lab Results   Component Value Date    CHOL 162 02/14/2022    TRIG 422 (H) 02/14/2022    HDL 39 (L) 02/14/2022    LDL 59 02/14/2022     Lab Results   Component Value Date    TSH 0.539 08/24/2020     Lab Results   Component Value Date    CKTOTAL 137 07/09/2021    TROPONINT <0.010 01/04/2023     Lab Results   Component Value Date    HGBA1C 8.00 (H) 12/14/2022     No results found for: DDIMER  Lab Results   Component Value Date    ALT 23 01/04/2023     Lab Results   Component Value Date    HGBA1C 8.00 (H) 12/14/2022    HGBA1C 6.90 (H) 07/26/2022    HGBA1C 6.90 (H) 02/14/2022     Lab Results   Component Value Date    CREATININE 1.26 (H) 01/07/2023     Lab Results   Component Value Date    IRON 115 07/29/2021    TIBC 375 07/29/2021    FERRITIN 29.30 11/05/2020     Lab Results   Component Value Date    INR 0.95 07/21/2020    PROTIME 13.1 07/21/2020       Assessment/Plan     1.  Coronary artery disease:  CTA showed mild nonobstructive disease in all 3 vessels.  Risk factor modification for secondary prevention. Currently asymptomatic. If recurrent chest pain, consider cardiac cath to assess for progression of disease  She is on Aggrenox, not sure indication for that.  Patient reported she had CVA long time  ago.  Cardiology standpoint aspirin should be okay    2.  Congestive heart failure with preserved ejection fraction:  Optimal blood pressure control.  We will repeat echocardiogram.    3.  Hypertension:  Currently on amlodipine 10 mg, furosemide 40 mg twice a day, Imdur 30 mg, lisinopril 20 mg, Bystolic 5 mg twice daily  Switched her Lasix to hydrochlorothiazide but she is not taking it.  She can use her Lasix as needed  Has CKD, follows with nephrology.    4.  Hyperlipidemia:  Continue Crestor 10 mg.  Last LDL was 59.    5.  COPD:  PFTs showed moderate restriction.  Admitted again with COPD exacerbation in July.  [facility]    Complaining claudications.  ABIs are normal.    Prevention:  Patient's Body mass index is 34.56 kg/m². BMI is above normal parameters. Recommendations include: exercise counseling and nutrition counseling.      Patrizia Ann Johnnie  reports that she quit smoking about 24 years ago. Her smoking use included cigarettes. She has a 40.00 pack-year smoking history. She has never used smokeless tobacco..             This document has been electronically signed by Genaro Cota MD on March 10, 2023 10:12 CST

## 2023-03-14 ENCOUNTER — TELEPHONE (OUTPATIENT)
Dept: FAMILY MEDICINE CLINIC | Facility: CLINIC | Age: 78
End: 2023-03-14
Payer: MEDICARE

## 2023-03-14 NOTE — TELEPHONE ENCOUNTER
Per , Ms. Blair as been called with recent DEXA Bone Density Scan results & recommendations.  Continue current medications and follow-up as planned or sooner if any problems.       ----- Message from Brook Arreola MD sent at 3/13/2023  8:21 AM CDT -----  Please call and let patient know that DEXA bone scan showed osteopenia - weakening of the bones.  Would recommend healthy diet, adequate calcium/vitamin D and regular weight bearing exercise like walking for bone health.  Will plan to repeat for scre_  vignesh in 2 years.  Thanks, JEFFERSON Arreola

## 2023-03-14 NOTE — TELEPHONE ENCOUNTER
Per Dr. Arreola, Ms. Blair as been called with recent Screening Mammogram results & recommendations.  Continue current medications and follow-up as planned or sooner if any problems.     ----- Message from Brook Arreola MD sent at 3/14/2023 10:30 AM CDT -----  Please call and let patient know that mammogram is normal.  Plan to repeat in 1 year.  Thanks, JEFFERSON Arreola

## 2023-03-15 LAB
QT INTERVAL: 378 MS
QTC INTERVAL: 419 MS

## 2023-03-29 ENCOUNTER — OFFICE VISIT (OUTPATIENT)
Dept: FAMILY MEDICINE CLINIC | Facility: CLINIC | Age: 78
End: 2023-03-29
Payer: MEDICARE

## 2023-03-29 ENCOUNTER — LAB (OUTPATIENT)
Dept: LAB | Facility: HOSPITAL | Age: 78
End: 2023-03-29
Payer: MEDICARE

## 2023-03-29 VITALS
HEART RATE: 80 BPM | OXYGEN SATURATION: 95 % | SYSTOLIC BLOOD PRESSURE: 130 MMHG | DIASTOLIC BLOOD PRESSURE: 62 MMHG | HEIGHT: 66 IN | BODY MASS INDEX: 34.56 KG/M2

## 2023-03-29 DIAGNOSIS — G25.81 RESTLESS LEG SYNDROME: ICD-10-CM

## 2023-03-29 DIAGNOSIS — E11.42 TYPE 2 DIABETES MELLITUS WITH DIABETIC POLYNEUROPATHY, WITHOUT LONG-TERM CURRENT USE OF INSULIN: ICD-10-CM

## 2023-03-29 DIAGNOSIS — N18.32 STAGE 3B CHRONIC KIDNEY DISEASE: ICD-10-CM

## 2023-03-29 DIAGNOSIS — I10 ESSENTIAL HYPERTENSION: ICD-10-CM

## 2023-03-29 DIAGNOSIS — I10 ESSENTIAL HYPERTENSION: Primary | ICD-10-CM

## 2023-03-29 LAB
ALBUMIN SERPL-MCNC: 4.3 G/DL (ref 3.5–5.2)
ALBUMIN/GLOB SERPL: 1.7 G/DL
ALP SERPL-CCNC: 106 U/L (ref 39–117)
ALT SERPL W P-5'-P-CCNC: 19 U/L (ref 1–33)
ANION GAP SERPL CALCULATED.3IONS-SCNC: 9.3 MMOL/L (ref 5–15)
AST SERPL-CCNC: 27 U/L (ref 1–32)
BASOPHILS # BLD AUTO: 0.02 10*3/MM3 (ref 0–0.2)
BASOPHILS NFR BLD AUTO: 0.3 % (ref 0–1.5)
BILIRUB SERPL-MCNC: 0.3 MG/DL (ref 0–1.2)
BUN SERPL-MCNC: 23 MG/DL (ref 8–23)
BUN/CREAT SERPL: 21.3 (ref 7–25)
CALCIUM SPEC-SCNC: 9.6 MG/DL (ref 8.6–10.5)
CHLORIDE SERPL-SCNC: 103 MMOL/L (ref 98–107)
CHOLEST SERPL-MCNC: 131 MG/DL (ref 0–200)
CO2 SERPL-SCNC: 26.7 MMOL/L (ref 22–29)
CREAT SERPL-MCNC: 1.08 MG/DL (ref 0.57–1)
DEPRECATED RDW RBC AUTO: 42.6 FL (ref 37–54)
EGFRCR SERPLBLD CKD-EPI 2021: 52.7 ML/MIN/1.73
EOSINOPHIL # BLD AUTO: 0.16 10*3/MM3 (ref 0–0.4)
EOSINOPHIL NFR BLD AUTO: 2.4 % (ref 0.3–6.2)
ERYTHROCYTE [DISTWIDTH] IN BLOOD BY AUTOMATED COUNT: 13 % (ref 12.3–15.4)
GLOBULIN UR ELPH-MCNC: 2.5 GM/DL
GLUCOSE SERPL-MCNC: 157 MG/DL (ref 65–99)
HBA1C MFR BLD: 7.7 % (ref 4.8–5.6)
HCT VFR BLD AUTO: 35.9 % (ref 34–46.6)
HDLC SERPL-MCNC: 36 MG/DL (ref 40–60)
HGB BLD-MCNC: 12.1 G/DL (ref 12–15.9)
IMM GRANULOCYTES # BLD AUTO: 0.03 10*3/MM3 (ref 0–0.05)
IMM GRANULOCYTES NFR BLD AUTO: 0.5 % (ref 0–0.5)
LDLC SERPL CALC-MCNC: 45 MG/DL (ref 0–100)
LDLC/HDLC SERPL: 0.78 {RATIO}
LYMPHOCYTES # BLD AUTO: 1.67 10*3/MM3 (ref 0.7–3.1)
LYMPHOCYTES NFR BLD AUTO: 25.1 % (ref 19.6–45.3)
MCH RBC QN AUTO: 31 PG (ref 26.6–33)
MCHC RBC AUTO-ENTMCNC: 33.7 G/DL (ref 31.5–35.7)
MCV RBC AUTO: 92.1 FL (ref 79–97)
MONOCYTES # BLD AUTO: 0.52 10*3/MM3 (ref 0.1–0.9)
MONOCYTES NFR BLD AUTO: 7.8 % (ref 5–12)
NEUTROPHILS NFR BLD AUTO: 4.26 10*3/MM3 (ref 1.7–7)
NEUTROPHILS NFR BLD AUTO: 63.9 % (ref 42.7–76)
NRBC BLD AUTO-RTO: 0 /100 WBC (ref 0–0.2)
PLATELET # BLD AUTO: 175 10*3/MM3 (ref 140–450)
PMV BLD AUTO: 11.7 FL (ref 6–12)
POTASSIUM SERPL-SCNC: 4.5 MMOL/L (ref 3.5–5.2)
PROT SERPL-MCNC: 6.8 G/DL (ref 6–8.5)
RBC # BLD AUTO: 3.9 10*6/MM3 (ref 3.77–5.28)
SODIUM SERPL-SCNC: 139 MMOL/L (ref 136–145)
TRIGL SERPL-MCNC: 334 MG/DL (ref 0–150)
VLDLC SERPL-MCNC: 50 MG/DL (ref 5–40)
WBC NRBC COR # BLD: 6.66 10*3/MM3 (ref 3.4–10.8)

## 2023-03-29 PROCEDURE — 85025 COMPLETE CBC W/AUTO DIFF WBC: CPT

## 2023-03-29 PROCEDURE — 80053 COMPREHEN METABOLIC PANEL: CPT

## 2023-03-29 PROCEDURE — 36415 COLL VENOUS BLD VENIPUNCTURE: CPT

## 2023-03-29 PROCEDURE — 83036 HEMOGLOBIN GLYCOSYLATED A1C: CPT

## 2023-03-29 PROCEDURE — 80061 LIPID PANEL: CPT

## 2023-03-29 RX ORDER — ROPINIROLE 1 MG/1
3 TABLET, FILM COATED ORAL NIGHTLY
Qty: 90 TABLET | Refills: 5 | Status: SHIPPED | OUTPATIENT
Start: 2023-03-29

## 2023-03-29 NOTE — PROGRESS NOTES
Chief Complaint  Diabetes    Subjective    History of Present Illness {  Problem List  Visit  Diagnosis   Encounters  Notes  Medications  Labs  Result Review Imaging  Media :23}     Ronda Blair presents to [facility] [place] for     Chief Complaint   Patient presents with   • Diabetes      Patient seen today for follow up.   Has chronic medical problems including hypertension, hyperlipidemia, heart failure with preserved EF, chronic kidney disease, COPD, gout, allergic rhinitis, type 2 diabetes with neuropathy, restless legs syndrome, anemia, GERD and degenerative disc disease.  Blood pressure has been doing ok.  Leg symptoms worsening.  She has not been on gabapentin, thinks she ran out and never got it refilled.  Has been getting this prescription from mental health provider for anxiety control.       Current Outpatient Medications:   •  albuterol sulfate  (90 Base) MCG/ACT inhaler, Inhale 2 puffs Every 6 (Six) Hours As Needed for Wheezing or Shortness of Air., Disp: 6.7 g, Rfl: 11  •  allopurinol (ZYLOPRIM) 100 MG tablet, Take half tablet by mouth daily (Patient taking differently: 1 tablet. Take 2 tablet by mouth daily), Disp: 180 tablet, Rfl: 1  •  amLODIPine (NORVASC) 10 MG tablet, TAKE 1 TABLET BY MOUTH EVERY DAY, Disp: 90 tablet, Rfl: 1  •  aspirin-dipyridamole (AGGRENOX)  MG per 12 hr capsule, TAKE 1 CAPSULE BY MOUTH TWICE A DAY, Disp: 180 capsule, Rfl: 1  •  cholecalciferol (VITAMIN D3) 25 MCG (1000 UT) tablet, Take 1 tablet by mouth Daily., Disp: , Rfl:   •  colchicine 0.6 MG tablet, Take 2 tabs (1.2 mg) today, then 1 tab (0.6 mg) daily for up to 2 weeks after gout flare resolves., Disp: 15 tablet, Rfl: 0  •  docusate sodium (COLACE) 100 MG capsule, Take 1 capsule by mouth 2 (Two) Times a Day., Disp: , Rfl:   •  folic acid (FOLVITE) 1 MG tablet, TAKE 1 TABLET BY MOUTH EVERY DAY, Disp: 90 tablet, Rfl: 2  •  furosemide  (LASIX) 40 MG tablet, TAKE 1/2 TABLET BY MOUTH DAILY AS NEEDED (LOWER EXTREMITY EDEMA)., Disp: 45 tablet, Rfl: 3  •  gabapentin (NEURONTIN) 100 MG capsule, Take 1 capsule by mouth Daily., Disp: , Rfl:   •  ipratropium-albuterol (DUO-NEB) 0.5-2.5 mg/3 ml nebulizer, Take 3 mL by nebulization 4 (Four) Times a Day., Disp: 360 mL, Rfl: 0  •  isosorbide mononitrate (IMDUR) 30 MG 24 hr tablet, TAKE 1 TABLET BY MOUTH EVERY DAY, Disp: 30 tablet, Rfl: 1  •  lisinopril (PRINIVIL,ZESTRIL) 20 MG tablet, TAKE 1 TABLET BY MOUTH EVERY DAY, Disp: 90 tablet, Rfl: 3  •  loratadine (CLARITIN) 10 MG tablet, TAKE 1 TABLET BY MOUTH EVERY DAY, Disp: 90 tablet, Rfl: 1  •  metFORMIN ER (GLUCOPHAGE-XR) 500 MG 24 hr tablet, Take 1 tablet by mouth 2 (Two) Times a Day., Disp: 180 tablet, Rfl: 1  •  nebivolol (BYSTOLIC) 5 MG tablet, TAKE 1 TABLET BY MOUTH EVERY DAY, Disp: 90 tablet, Rfl: 1  •  nystatin (MYCOSTATIN) 526681 UNIT/GM powder, Apply  topically to the appropriate area as directed 3 (Three) Times a Day. (Patient taking differently: Apply 1 application topically to the appropriate area as directed Daily As Needed.), Disp: 120 g, Rfl: 2  •  O2 (OXYGEN), Inhale 4 L/min Daily., Disp: , Rfl:   •  Omega-3 Fatty Acids (FISH OIL) 1200 MG capsule capsule, Take 1 capsule by mouth 3 (Three) Times a Day With Meals., Disp: , Rfl:   •  oxyCODONE-acetaminophen (PERCOCET)  MG per tablet, Take 1 tablet by mouth 3 (Three) Times a Day. Indications: Pain, Disp: , Rfl:   •  rOPINIRole (REQUIP) 0.5 MG tablet, TAKE 1 TABLET BY MOUTH EVERY NIGHT 1 HOUR BEFORE BEDTIME, Disp: 90 tablet, Rfl: 0  •  rOPINIRole (REQUIP) 1 MG tablet, TAKE 2 TABLETS BY MOUTH EVERY NIGHT. TAKE 1 HOUR BEFORE BEDTIME., Disp: 180 tablet, Rfl: 0  •  rosuvastatin (CRESTOR) 10 MG tablet, TAKE 1 TABLET BY MOUTH EVERY DAY, Disp: 90 tablet, Rfl: 11  •  tiotropium bromide-olodaterol (Stiolto Respimat) 2.5-2.5 MCG/ACT aerosol solution inhaler, Inhale 2 puffs Daily., Disp: 1 each, Rfl: 11  •  " tiZANidine (ZANAFLEX) 4 MG tablet, Take 1 tablet by mouth Every 8 (Eight) Hours As Needed for Muscle Spasms., Disp: 270 tablet, Rfl: 1  •  Triamcinolone Acetonide (NASACORT) 55 MCG/ACT nasal inhaler, INSTILL 1 SPRAY INTO THE NOSTRIL(S) AS DIRECTED BY PROVIDER EVERY OTHER DAY. *NOT COVERED*, Disp: 16.5 g, Rfl: 2  •  venlafaxine XR (EFFEXOR-XR) 75 MG 24 hr capsule, , Disp: , Rfl:      Objective       Vital Signs:   /62   Pulse 80   Ht 166.4 cm (65.5\")   SpO2 95%   BMI 34.56 kg/m²     Physical Exam  Vitals reviewed.   Constitutional:       General: She is not in acute distress.     Appearance: She is well-developed.   Cardiovascular:      Rate and Rhythm: Normal rate and regular rhythm.      Heart sounds: Normal heart sounds. No murmur heard.  Pulmonary:      Effort: Pulmonary effort is normal. No respiratory distress.      Breath sounds: Normal breath sounds. No wheezing or rales.   Skin:     General: Skin is warm and dry.   Neurological:      Mental Status: She is alert and oriented to person, place, and time.        Result Review :{ Labs  Result Review  Imaging  Med Tab  Media :23}   The following data was reviewed by: Brook Arreola MD on 03/29/2023    Common labs    Common Labs 1/5/23 1/5/23 1/6/23 1/6/23 1/7/23 1/7/23    0602 0602 0550 0550 [id number]   Glucose  209 (A)  168 (A)  109 (A)   BUN  29 (A)  35 (A)  32 (A)   Creatinine  1.51 (A)  1.41 (A)  1.26 (A)   Sodium  142  138  140   Potassium  4.3  4.4  4.4   Chloride  103  104  104   Calcium  9.5  9.1  8.9   WBC 9.79  12.54 (A)  14.25 (A)    Hemoglobin 10.3 (A)  9.9 (A)  10.0 (A)    Hematocrit 32.2 (A)  32.1 (A)  31.4 (A)    Platelets 176  184  187    (A) Abnormal value             Lab Results   Component Value Date    HGBA1C 8.00 (H) 12/14/2022              Assessment and Plan {CC Problem List  Visit Diagnosis  ROS  Review (Popup)  Health Maintenance  Quality  BestPractice  Medications  SmartSets  SnapShot Encounters  Media :23} "   Diagnoses and all orders for this visit:    1. Essential hypertension (Primary)  -     Lipid Panel; Future  -     CBC & Differential; Future  -     Comprehensive Metabolic Panel; Future    2. Stage 3b chronic kidney disease    3. Restless leg syndrome  -     rOPINIRole (REQUIP) 1 MG tablet; Take 3 tablets by mouth Every Night. Take 1 hour before bedtime.  Dispense: 90 tablet; Refill: 5    4. Type 2 diabetes mellitus with diabetic polyneuropathy, without long-term current use of insulin  -     Hemoglobin A1c; Future      Hypertension controlled, continue current medication  Check labs as above for monitoring  Restless legs not well controlled, increased requip to 3 mg nightly   Type 2 diabetes has not been controlled, check HgbA1c  Will discuss medication changes if needed after labs  Encouraged healthy diet and exercise  Would benefit from gabapentin, will request refill  Discussed that prescription can come from our office for neuropathy symptoms if no longer planning to be used for treatment of anxiety through mental health office       Follow Up {Instructions Charge Capture  Follow-up Communications :23}   Return in about 3 months (around 6/29/2023) for Recheck.  Patient was given instructions and counseling regarding her condition or for health maintenance advice. Please see specific information pulled into the AVS if appropriate.            This document has been electronically signed by Brook Arreola MD

## 2023-04-05 ENCOUNTER — TELEPHONE (OUTPATIENT)
Dept: FAMILY MEDICINE CLINIC | Facility: CLINIC | Age: 78
End: 2023-04-05
Payer: MEDICARE

## 2023-04-05 NOTE — TELEPHONE ENCOUNTER
Per Dr. Arreola, Ms. Blair has been called with recent lab results & recommendations.  Continue current medications and follow-up as planned or sooner if any problems.       ----- Message from Brook Arreola MD sent at 4/5/2023  7:33 AM CDT -----  Labs overall ok.  HgbA1c improving at 7.7%.  Kidney function stable.  ThanksJEFFERSON

## 2023-04-17 ENCOUNTER — HOSPITAL ENCOUNTER (OUTPATIENT)
Dept: CT IMAGING | Facility: HOSPITAL | Age: 78
Discharge: HOME | End: 2023-04-17
Admitting: NURSE PRACTITIONER
Payer: MEDICARE

## 2023-04-17 DIAGNOSIS — J18.9 PNEUMONIA OF BOTH LUNGS DUE TO INFECTIOUS ORGANISM, UNSPECIFIED PART OF LUNG: ICD-10-CM

## 2023-04-17 PROCEDURE — 71250 CT THORAX DX C-: CPT

## 2023-04-24 ENCOUNTER — TRANSCRIBE ORDERS (OUTPATIENT)
Dept: LAB | Facility: HOSPITAL | Age: 78
End: 2023-04-24
Payer: MEDICARE

## 2023-04-24 ENCOUNTER — LAB (OUTPATIENT)
Dept: LAB | Facility: HOSPITAL | Age: 78
End: 2023-04-24
Payer: MEDICARE

## 2023-04-24 ENCOUNTER — OFFICE VISIT (OUTPATIENT)
Dept: PULMONOLOGY | Facility: CLINIC | Age: 78
End: 2023-04-24
Payer: MEDICARE

## 2023-04-24 VITALS
HEIGHT: 66 IN | SYSTOLIC BLOOD PRESSURE: 132 MMHG | BODY MASS INDEX: 33.75 KG/M2 | DIASTOLIC BLOOD PRESSURE: 68 MMHG | HEART RATE: 82 BPM | WEIGHT: 210 LBS | OXYGEN SATURATION: 94 %

## 2023-04-24 DIAGNOSIS — N18.32 CHRONIC KIDNEY DISEASE (CKD) STAGE G3B/A1, MODERATELY DECREASED GLOMERULAR FILTRATION RATE (GFR) BETWEEN 30-44 ML/MIN/1.73 SQUARE METER AND ALBUMINURIA CREATININE RATIO LESS THAN 30 MG/G (CMS/H*: ICD-10-CM

## 2023-04-24 DIAGNOSIS — E11.22 TYPE 2 DIABETES MELLITUS WITH ESRD (END-STAGE RENAL DISEASE): Primary | ICD-10-CM

## 2023-04-24 DIAGNOSIS — C79.9 COLON NEOPLASM, M1A: ICD-10-CM

## 2023-04-24 DIAGNOSIS — N18.6 TYPE 2 DIABETES MELLITUS WITH ESRD (END-STAGE RENAL DISEASE): ICD-10-CM

## 2023-04-24 DIAGNOSIS — C18.9 COLON NEOPLASM, M1A: ICD-10-CM

## 2023-04-24 DIAGNOSIS — I12.9 HYPERTENSIVE NEPHROPATHY: ICD-10-CM

## 2023-04-24 DIAGNOSIS — N18.6 TYPE 2 DIABETES MELLITUS WITH ESRD (END-STAGE RENAL DISEASE): Primary | ICD-10-CM

## 2023-04-24 DIAGNOSIS — J44.9 COPD, MODERATE: Primary | ICD-10-CM

## 2023-04-24 DIAGNOSIS — J44.9 OBSTRUCTIVE CHRONIC BRONCHITIS WITHOUT EXACERBATION: ICD-10-CM

## 2023-04-24 DIAGNOSIS — E11.22 TYPE 2 DIABETES MELLITUS WITH ESRD (END-STAGE RENAL DISEASE): ICD-10-CM

## 2023-04-24 DIAGNOSIS — G47.33 OSA (OBSTRUCTIVE SLEEP APNEA): Chronic | ICD-10-CM

## 2023-04-24 PROBLEM — J44.1 COPD WITH ACUTE EXACERBATION: Status: RESOLVED | Noted: 2022-02-14 | Resolved: 2023-04-24

## 2023-04-24 PROBLEM — J18.9 PNEUMONIA OF BOTH LUNGS DUE TO INFECTIOUS ORGANISM, UNSPECIFIED PART OF LUNG: Status: RESOLVED | Noted: 2023-01-04 | Resolved: 2023-04-24

## 2023-04-24 PROBLEM — G47.34 NOCTURNAL HYPOXIA: Status: RESOLVED | Noted: 2020-08-28 | Resolved: 2023-04-24

## 2023-04-24 LAB
25(OH)D3 SERPL-MCNC: 29.9 NG/ML (ref 30–100)
ALBUMIN SERPL-MCNC: 4.2 G/DL (ref 3.5–5.2)
ANION GAP SERPL CALCULATED.3IONS-SCNC: 13.9 MMOL/L (ref 5–15)
BUN SERPL-MCNC: 18 MG/DL (ref 8–23)
BUN/CREAT SERPL: 15.1 (ref 7–25)
CALCIUM SPEC-SCNC: 9.5 MG/DL (ref 8.6–10.5)
CHLORIDE SERPL-SCNC: 106 MMOL/L (ref 98–107)
CO2 SERPL-SCNC: 25.1 MMOL/L (ref 22–29)
CREAT SERPL-MCNC: 1.19 MG/DL (ref 0.57–1)
CREAT UR-MCNC: 64.7 MG/DL
EGFRCR SERPLBLD CKD-EPI 2021: 46.9 ML/MIN/1.73
GLUCOSE SERPL-MCNC: 131 MG/DL (ref 65–99)
HBA1C MFR BLD: 7.4 % (ref 4.8–5.6)
HCT VFR BLD AUTO: 36.3 % (ref 34–46.6)
HGB BLD-MCNC: 12.6 G/DL (ref 12–15.9)
PHOSPHATE SERPL-MCNC: 3 MG/DL (ref 2.5–4.5)
POTASSIUM SERPL-SCNC: 4.1 MMOL/L (ref 3.5–5.2)
PROT ?TM UR-MCNC: 96.7 MG/DL
PROT/CREAT UR: 1494.6 MG/G CREA (ref 0–200)
PTH-INTACT SERPL-MCNC: 85.1 PG/ML (ref 15–65)
SODIUM SERPL-SCNC: 145 MMOL/L (ref 136–145)
URATE SERPL-MCNC: 7.2 MG/DL (ref 2.4–5.7)

## 2023-04-24 PROCEDURE — 3075F SYST BP GE 130 - 139MM HG: CPT | Performed by: INTERNAL MEDICINE

## 2023-04-24 PROCEDURE — 1159F MED LIST DOCD IN RCRD: CPT | Performed by: INTERNAL MEDICINE

## 2023-04-24 PROCEDURE — 3078F DIAST BP <80 MM HG: CPT | Performed by: INTERNAL MEDICINE

## 2023-04-24 PROCEDURE — 84156 ASSAY OF PROTEIN URINE: CPT

## 2023-04-24 PROCEDURE — 80069 RENAL FUNCTION PANEL: CPT

## 2023-04-24 PROCEDURE — 83036 HEMOGLOBIN GLYCOSYLATED A1C: CPT

## 2023-04-24 PROCEDURE — 85018 HEMOGLOBIN: CPT

## 2023-04-24 PROCEDURE — 1160F RVW MEDS BY RX/DR IN RCRD: CPT | Performed by: INTERNAL MEDICINE

## 2023-04-24 PROCEDURE — 82570 ASSAY OF URINE CREATININE: CPT

## 2023-04-24 PROCEDURE — 83970 ASSAY OF PARATHORMONE: CPT

## 2023-04-24 PROCEDURE — 82306 VITAMIN D 25 HYDROXY: CPT

## 2023-04-24 PROCEDURE — 99213 OFFICE O/P EST LOW 20 MIN: CPT | Performed by: INTERNAL MEDICINE

## 2023-04-24 PROCEDURE — 36415 COLL VENOUS BLD VENIPUNCTURE: CPT

## 2023-04-24 PROCEDURE — 85014 HEMATOCRIT: CPT

## 2023-04-24 PROCEDURE — 84550 ASSAY OF BLOOD/URIC ACID: CPT

## 2023-04-24 NOTE — PROGRESS NOTES
Pulmonary Consultation    No ref. provider found,    Thank you for asking me to see Ronda Blair for   Chief Complaint   Patient presents with   • COPD   .      History of Present Illness  Ronda Blair is a 77 y.o. female     4/24/23  Patient here for follow up on Ct scan. Was hospitalized in Jan with pneumonia, had a follow up CT scan last week that showed complete resolution of infiltrate with no other concerning findings    She reports she is not using her inhaers regularly, thinks she is doing Stiolto every other day, albuterol a few times a week    she is only using her O2 to sleep with    Over all she is doing well      11/19/21  Patient here for follow up. At last visit we tried switching from Symbicort to stiolto. She feel slike she did well wit this, no increased symptoms    Original OV 10/13  Patient referred from cardiology for COPD  Patient reports that she has been seen in [place] by pulmonary in the past for this, but not in a few years  She reports having albuterol hfa for emergency use andSymbicort which she only remembers to use once a day at most. She has exertional dyspnea which is stable.      Tobacco use history:  Type: cigarettes  Amount: 1.5 ppd  Duration: 30 years  Cessation: quit 20 years ago   Willing to quit: N/A      Review of Systems: History obtained from chart review and the patient.  Review of Systems  As described in the HPI. Otherwise, remainder of ROS (14 systems) were negative.    Patient Active Problem List   Diagnosis   • Degeneration of intervertebral disc of mid-cervical region   • Morbid obesity due to excess calories   • Former smoker   • Chest pain   • Diabetes mellitus   • COPD, moderate   • Dyslipidemia   • Adrenal nodule   • Fecal occult blood test positive   • Benign neoplasm of colon   • Chronic nonalcoholic liver disease   • Diverticular disease   • Dysphagia   • Essential hypertension   • Internal hemorrhoids   • Knee pain, bilateral    • Lumbar disc disease with radiculopathy   • Neuralgia, geniculate   • Stage 2 chronic kidney disease   • Gait disturbance   • Chronic heart failure with preserved ejection fraction   • Normocytic anemia   • Acute gout involving toe of left foot   • MARISOL (obstructive sleep apnea)   • Precordial pain   • Obesity (BMI 30-39.9)   • CKD (chronic kidney disease) stage 3, GFR 30-59 ml/min   • Acute kidney injury   • Elevation of level of transaminase and lactic acid dehydrogenase (LDH)   • Gout due to renal impairment, unspecified site   • Type 2 diabetes mellitus   • Essential hypertension   • Stage 3a chronic kidney disease   • Stage 3b chronic kidney disease   • Enlarged lymph node         Current Outpatient Medications:   •  albuterol sulfate  (90 Base) MCG/ACT inhaler, Inhale 2 puffs Every 6 (Six) Hours As Needed for Wheezing or Shortness of Air., Disp: 6.7 g, Rfl: 11  •  allopurinol (ZYLOPRIM) 100 MG tablet, Take half tablet by mouth daily (Patient taking differently: 1 tablet. Take 2 tablet by mouth daily), Disp: 180 tablet, Rfl: 1  •  amLODIPine (NORVASC) 10 MG tablet, TAKE 1 TABLET BY MOUTH EVERY DAY, Disp: 90 tablet, Rfl: 1  •  aspirin-dipyridamole (AGGRENOX)  MG per 12 hr capsule, TAKE 1 CAPSULE BY MOUTH TWICE A DAY, Disp: 180 capsule, Rfl: 1  •  cholecalciferol (VITAMIN D3) 25 MCG (1000 UT) tablet, Take 1 tablet by mouth Daily., Disp: , Rfl:   •  colchicine 0.6 MG tablet, Take 2 tabs (1.2 mg) today, then 1 tab (0.6 mg) daily for up to 2 weeks after gout flare resolves., Disp: 15 tablet, Rfl: 0  •  docusate sodium (COLACE) 100 MG capsule, Take 1 capsule by mouth 2 (Two) Times a Day., Disp: , Rfl:   •  folic acid (FOLVITE) 1 MG tablet, TAKE 1 TABLET BY MOUTH EVERY DAY, Disp: 90 tablet, Rfl: 2  •  furosemide (LASIX) 40 MG tablet, TAKE 1/2 TABLET BY MOUTH DAILY AS NEEDED (LOWER EXTREMITY EDEMA)., Disp: 45 tablet, Rfl: 3  •  gabapentin (NEURONTIN) 100 MG capsule, Take 1 capsule by mouth Daily., Disp:  , Rfl:   •  ipratropium-albuterol (DUO-NEB) 0.5-2.5 mg/3 ml nebulizer, Take 3 mL by nebulization 4 (Four) Times a Day., Disp: 360 mL, Rfl: 0  •  isosorbide mononitrate (IMDUR) 30 MG 24 hr tablet, TAKE 1 TABLET BY MOUTH EVERY DAY, Disp: 30 tablet, Rfl: 1  •  lisinopril (PRINIVIL,ZESTRIL) 20 MG tablet, TAKE 1 TABLET BY MOUTH EVERY DAY, Disp: 90 tablet, Rfl: 3  •  loratadine (CLARITIN) 10 MG tablet, TAKE 1 TABLET BY MOUTH EVERY DAY, Disp: 90 tablet, Rfl: 1  •  metFORMIN ER (GLUCOPHAGE-XR) 500 MG 24 hr tablet, Take 1 tablet by mouth 2 (Two) Times a Day., Disp: 180 tablet, Rfl: 1  •  nebivolol (BYSTOLIC) 5 MG tablet, TAKE 1 TABLET BY MOUTH EVERY DAY, Disp: 90 tablet, Rfl: 1  •  nystatin (MYCOSTATIN) 524228 UNIT/GM powder, Apply  topically to the appropriate area as directed 3 (Three) Times a Day. (Patient taking differently: Apply 1 application topically to the appropriate area as directed Daily As Needed.), Disp: 120 g, Rfl: 2  •  O2 (OXYGEN), Inhale 4 L/min Daily., Disp: , Rfl:   •  Omega-3 Fatty Acids (FISH OIL) 1200 MG capsule capsule, Take 1 capsule by mouth 3 (Three) Times a Day With Meals., Disp: , Rfl:   •  oxyCODONE-acetaminophen (PERCOCET)  MG per tablet, Take 1 tablet by mouth 3 (Three) Times a Day. Indications: Pain, Disp: , Rfl:   •  rOPINIRole (REQUIP) 1 MG tablet, Take 3 tablets by mouth Every Night. Take 1 hour before bedtime., Disp: 90 tablet, Rfl: 5  •  rosuvastatin (CRESTOR) 10 MG tablet, TAKE 1 TABLET BY MOUTH EVERY DAY, Disp: 90 tablet, Rfl: 11  •  tiotropium bromide-olodaterol (Stiolto Respimat) 2.5-2.5 MCG/ACT aerosol solution inhaler, Inhale 2 puffs Daily., Disp: 1 each, Rfl: 11  •  tiZANidine (ZANAFLEX) 4 MG tablet, Take 1 tablet by mouth Every 8 (Eight) Hours As Needed for Muscle Spasms., Disp: 270 tablet, Rfl: 1  •  Triamcinolone Acetonide (NASACORT) 55 MCG/ACT nasal inhaler, INSTILL 1 SPRAY INTO THE NOSTRIL(S) AS DIRECTED BY PROVIDER EVERY OTHER DAY. *NOT COVERED*, Disp: 16.5 g, Rfl:  "2  •  venlafaxine XR (EFFEXOR-XR) 75 MG 24 hr capsule, , Disp: , Rfl:     No Known Allergies    Past Medical History:   Diagnosis Date   • Anxiety    • Arthritis    • COPD (chronic obstructive pulmonary disease)    • Depression    • Diabetes mellitus    • History of transfusion    • Hyperlipidemia    • Hypertension    • Stroke     Greater than 5 years ago     Past Surgical History:   Procedure Laterality Date   • CERVICAL FUSION     • EYE SURGERY Bilateral     lasix eye surgery   • HEMORRHOIDECTOMY     • HYSTERECTOMY     • INTRATHECAL PUMP REMOVAL         • KNEE ARTHROSCOPY Right 2015   • LUMBAR SPINE SURGERY     • PAIN PUMP INSERTION/REVISION      Removed in      Social History     Socioeconomic History   • Marital status:    Tobacco Use   • Smoking status: Former     Packs/day: 1.00     Years: 40.00     Pack years: 40.00     Types: Cigarettes     Quit date:      Years since quittin.3   • Smokeless tobacco: Never   Vaping Use   • Vaping Use: Never used   Substance and Sexual Activity   • Alcohol use: No   • Drug use: No   • Sexual activity: Defer     Family History   Problem Relation Age of Onset   • Diabetes Other    • Hypertension Other    • Other Other    • Kidney disease Other    • Heart attack Father 75   • Cancer Sister    • Lung cancer Brother    • Hypertension Sister           Objective     Blood pressure 132/68, pulse 82, height 166.4 cm (65.5\"), weight 95.3 kg (210 lb), SpO2 94 %, not currently breastfeeding.  Physical Exam  Vitals reviewed.   Constitutional:       Appearance: Normal appearance.   HENT:      Head: Normocephalic and atraumatic.      Nose: Nose normal.      Mouth/Throat:      Mouth: Mucous membranes are moist.      Pharynx: Oropharynx is clear.   Eyes:      Conjunctiva/sclera: Conjunctivae normal.      Pupils: Pupils are equal, round, and reactive to light.   Cardiovascular:      Rate and Rhythm: Normal rate and regular rhythm.      Pulses: Normal " pulses.      Heart sounds: Normal heart sounds.   Pulmonary:      Effort: Pulmonary effort is normal.      Breath sounds: Normal breath sounds.   Abdominal:      General: Abdomen is flat. Bowel sounds are normal.      Palpations: Abdomen is soft.   Musculoskeletal:         General: Normal range of motion.      Cervical back: Normal range of motion.   Skin:     General: Skin is warm and dry.   Neurological:      General: No focal deficit present.      Mental Status: She is alert and oriented to person, place, and time.   Psychiatric:         Mood and Affect: Mood normal.         Behavior: Behavior normal.         PFTs:  (independently reviewed and interpreted by me)  9/10/2020  FVC 1.84L, 66%  FEV1 1.26L, 59%  Ratio 68%  TLC 3.02L, 57%  DLCO 59%      Radiology (independently reviewed and interpreted by me):  7/21/20 CTA chest- no concerning nodules or masses, no PE  CT chest 4/17/23- clear, no concerning nodules or adenopathy       Assessment & Plan     Diagnoses and all orders for this visit:    1. COPD, moderate (Primary)    2. MARISOL (obstructive sleep apnea)         Discussion/ Recommendations:    Pneumonia is resolved. Moderate obstruction is stable, if breahting worsens recommend she take her Stiolto daily. Does not need any further Ct scans, out of the window for screening           Return in about 6 months (around 10/24/2023) for f/u COPD.      Thank you for allowing me to participate in the care of Ronda Blair. Please do not hesitate to contact me with any questions.         This document has been electronically signed by Alejandra Sanots DO on April 24, 2023 10:52 CDT

## 2023-05-31 ENCOUNTER — TRANSCRIBE ORDERS (OUTPATIENT)
Dept: WOUND CARE | Facility: HOSPITAL | Age: 78
End: 2023-05-31

## 2023-05-31 DIAGNOSIS — L97.911: Primary | ICD-10-CM

## 2023-06-05 NOTE — TELEPHONE ENCOUNTER
Incoming Refill Request      Medication requested (name and dose):   gabapentin (NEURONTIN) 100 MG capsule     Pharmacy where request should be sent:   [facility]    Additional details provided by patient:   NONE    Best call back number:   [phone]    Does the patient have less than a 3 day supply:  [x] Yes  [] No    Elijah Olivier Rep  06/05/23, 11:17 CDT

## 2023-06-05 NOTE — TELEPHONE ENCOUNTER
Not prescribed by me.  Needs to request refill from prescribing provider (louis).  Thanks, JEFFERSON Arreola

## 2023-06-06 ENCOUNTER — OFFICE VISIT (OUTPATIENT)
Dept: WOUND CARE | Facility: HOSPITAL | Age: 78
End: 2023-06-06
Payer: MEDICARE

## 2023-06-06 ENCOUNTER — OUTSIDE FACILITY SERVICE (OUTPATIENT)
Dept: WOUND CARE | Facility: HOSPITAL | Age: 78
End: 2023-06-06
Payer: MEDICARE

## 2023-06-06 RX ORDER — GABAPENTIN 100 MG/1
100 CAPSULE ORAL DAILY
OUTPATIENT
Start: 2023-06-06

## 2023-06-09 ENCOUNTER — TRANSCRIBE ORDERS (OUTPATIENT)
Dept: ADMINISTRATIVE | Facility: HOSPITAL | Age: 78
End: 2023-06-09
Payer: MEDICARE

## 2023-06-09 DIAGNOSIS — M54.16 LUMBAR RADICULOPATHY: Primary | ICD-10-CM

## 2023-06-15 LAB
BH CV ECHO MEAS - ACS: 1.51 CM
BH CV ECHO MEAS - AO MAX PG: 9.5 MMHG
BH CV ECHO MEAS - AO MEAN PG: 5 MMHG
BH CV ECHO MEAS - AO ROOT DIAM: 2.8 CM
BH CV ECHO MEAS - AO V2 MAX: 154 CM/SEC
BH CV ECHO MEAS - AO V2 VTI: 39.1 CM
BH CV ECHO MEAS - AVA(I,D): 1.8 CM2
BH CV ECHO MEAS - EDV(CUBED): 95.4 ML
BH CV ECHO MEAS - EDV(MOD-SP2): 57.5 ML
BH CV ECHO MEAS - EDV(MOD-SP4): 82.8 ML
BH CV ECHO MEAS - EF(MOD-BP): 51.6 %
BH CV ECHO MEAS - EF(MOD-SP2): 55.1 %
BH CV ECHO MEAS - EF(MOD-SP4): 51.7 %
BH CV ECHO MEAS - ESV(CUBED): 28.5 ML
BH CV ECHO MEAS - ESV(MOD-SP2): 25.8 ML
BH CV ECHO MEAS - ESV(MOD-SP4): 40 ML
BH CV ECHO MEAS - FS: 33.2 %
BH CV ECHO MEAS - IVS/LVPW: 1.05 CM
BH CV ECHO MEAS - IVSD: 1.2 CM
BH CV ECHO MEAS - LA DIMENSION: 4.8 CM
BH CV ECHO MEAS - LAT PEAK E' VEL: 10 CM/SEC
BH CV ECHO MEAS - LV MASS(C)D: 195.7 GRAMS
BH CV ECHO MEAS - LV MAX PG: 3.6 MMHG
BH CV ECHO MEAS - LV MEAN PG: 2 MMHG
BH CV ECHO MEAS - LV V1 MAX: 95.4 CM/SEC
BH CV ECHO MEAS - LV V1 VTI: 23.8 CM
BH CV ECHO MEAS - LVIDD: 4.6 CM
BH CV ECHO MEAS - LVIDS: 3.1 CM
BH CV ECHO MEAS - LVOT AREA: 3 CM2
BH CV ECHO MEAS - LVOT DIAM: 1.94 CM
BH CV ECHO MEAS - LVPWD: 1.14 CM
BH CV ECHO MEAS - MED PEAK E' VEL: 11 CM/SEC
BH CV ECHO MEAS - MR MAX PG: 136 MMHG
BH CV ECHO MEAS - MR MAX VEL: 583.1 CM/SEC
BH CV ECHO MEAS - MV A MAX VEL: 64.5 CM/SEC
BH CV ECHO MEAS - MV DEC TIME: 0.19 MSEC
BH CV ECHO MEAS - MV E MAX VEL: 93.8 CM/SEC
BH CV ECHO MEAS - MV E/A: 1.45
BH CV ECHO MEAS - MV MAX PG: 4.6 MMHG
BH CV ECHO MEAS - MV MEAN PG: 1.35 MMHG
BH CV ECHO MEAS - MV V2 VTI: 37.3 CM
BH CV ECHO MEAS - MVA(VTI): 1.89 CM2
BH CV ECHO MEAS - PA V2 MAX: 78.3 CM/SEC
BH CV ECHO MEAS - RAP SYSTOLE: 3 MMHG
BH CV ECHO MEAS - RV MAX PG: 2.15 MMHG
BH CV ECHO MEAS - RV V1 MAX: 73.4 CM/SEC
BH CV ECHO MEAS - RV V1 VTI: 19.5 CM
BH CV ECHO MEAS - RVSP: 41.7 MMHG
BH CV ECHO MEAS - SV(LVOT): 70.4 ML
BH CV ECHO MEAS - SV(MOD-SP2): 31.7 ML
BH CV ECHO MEAS - SV(MOD-SP4): 42.8 ML
BH CV ECHO MEAS - TR MAX PG: 38.7 MMHG
BH CV ECHO MEAS - TR MAX VEL: 310.8 CM/SEC
BH CV ECHO MEASUREMENTS AVERAGE E/E' RATIO: 8.93
BH CV XLRA - RV BASE: 3.6 CM
BH CV XLRA - RV MID: 2.8 CM
SINUS: 2.6 CM

## 2023-06-16 ENCOUNTER — TELEPHONE (OUTPATIENT)
Dept: CARDIOLOGY | Facility: CLINIC | Age: 78
End: 2023-06-16
Payer: MEDICARE

## 2023-06-16 NOTE — TELEPHONE ENCOUNTER
----- Message from Genaro Cota MD sent at 6/15/2023  6:53 PM CDT -----  Mr looks beter than before. No other significnat changes    ----- Message -----  From: Genaro Cota MD  Sent: 6/15/2023   6:53 PM CDT  To: Genaro Cota MD    Patient contacted with echo results per dr. Cota.

## 2023-07-26 DIAGNOSIS — G62.9 NEUROPATHY: ICD-10-CM

## 2023-07-27 DIAGNOSIS — E11.42 TYPE 2 DIABETES MELLITUS WITH DIABETIC POLYNEUROPATHY, WITHOUT LONG-TERM CURRENT USE OF INSULIN: ICD-10-CM

## 2023-07-27 DIAGNOSIS — G62.9 NEUROPATHY: ICD-10-CM

## 2023-07-27 RX ORDER — GABAPENTIN 300 MG/1
CAPSULE ORAL
Qty: 84 CAPSULE | Refills: 0 | Status: SHIPPED | OUTPATIENT
Start: 2023-07-27

## 2023-07-27 NOTE — TELEPHONE ENCOUNTER
Incoming Refill Request      Medication requested (name and dose): gabapentin (NEURONTIN) 300 MG capsule     Pharmacy where request should be sent: [facility]    Additional details provided by patient: pt wants to know if Dr. Arreola could increase the doseage because it is working    Best call back number: [phone]    Does the patient have less than a 3 day supply:  [x] Yes  [] No    Elijah Casey Rep  07/27/23, 10:11 CDT

## 2023-07-28 RX ORDER — GABAPENTIN 300 MG/1
300 CAPSULE ORAL 3 TIMES DAILY
Qty: 84 CAPSULE | Refills: 0 | Status: SHIPPED | OUTPATIENT
Start: 2023-07-28

## 2023-07-28 RX ORDER — METFORMIN HYDROCHLORIDE 500 MG/1
TABLET, EXTENDED RELEASE ORAL
Qty: 360 TABLET | Refills: 1 | Status: SHIPPED | OUTPATIENT
Start: 2023-07-28

## 2023-08-16 ENCOUNTER — HOSPITAL ENCOUNTER (OUTPATIENT)
Dept: MRI IMAGING | Facility: HOSPITAL | Age: 78
Discharge: HOME | End: 2023-08-16
Admitting: PAIN MEDICINE
Payer: MEDICARE

## 2023-08-16 DIAGNOSIS — M54.16 LUMBAR RADICULOPATHY: ICD-10-CM

## 2023-08-16 PROCEDURE — 72148 MRI LUMBAR SPINE W/O DYE: CPT

## 2023-09-06 DIAGNOSIS — D64.9 ANEMIA, UNSPECIFIED: ICD-10-CM

## 2023-09-06 DIAGNOSIS — G25.81 RESTLESS LEG SYNDROME: ICD-10-CM

## 2023-09-06 DIAGNOSIS — G62.9 NEUROPATHY: ICD-10-CM

## 2023-09-06 RX ORDER — FOLIC ACID 1 MG/1
1000 TABLET ORAL DAILY
Qty: 90 TABLET | Refills: 2 | Status: SHIPPED | OUTPATIENT
Start: 2023-09-06

## 2023-09-06 RX ORDER — GABAPENTIN 300 MG/1
300 CAPSULE ORAL 3 TIMES DAILY
Qty: 84 CAPSULE | Refills: 0 | Status: CANCELLED | OUTPATIENT
Start: 2023-09-06

## 2023-09-06 RX ORDER — ROPINIROLE 1 MG/1
3 TABLET, FILM COATED ORAL NIGHTLY
Qty: 90 TABLET | Refills: 5 | Status: SHIPPED | OUTPATIENT
Start: 2023-09-06

## 2023-09-06 NOTE — TELEPHONE ENCOUNTER
UPCOMING APPTS  With Family Medicine (Brook Arreola MD)  11/10/2023 at 3:45 PM  LAST OFFICE VISIT - THIS DEPT  6/28/2023 Brook Arreola MD

## 2023-09-06 NOTE — TELEPHONE ENCOUNTER
Incoming Refill Request      Medication requested (name and dose):Gabepentin, Ropinirole and Folic Acid    Pharmacy where [facility] sent[facility]    Additional details provided by patient: completely out    Best call back number: [phone]    Does the patient have less than a 3 day supply:  [x] Yes  [] No    Rosy Hill  09/06/23, 11:17 CDT

## 2023-09-08 ENCOUNTER — TELEPHONE (OUTPATIENT)
Dept: FAMILY MEDICINE CLINIC | Facility: CLINIC | Age: 78
End: 2023-09-08
Payer: MEDICARE

## 2023-09-08 NOTE — TELEPHONE ENCOUNTER
Patient called and said that her Neuropathy has gotten worse and wanted to see if you would increase her Gabapentin. She uses [facility] in [place]. Her phone is [phone].

## 2023-09-12 ENCOUNTER — TELEPHONE (OUTPATIENT)
Dept: FAMILY MEDICINE CLINIC | Facility: CLINIC | Age: 78
End: 2023-09-12
Payer: MEDICARE

## 2023-09-12 NOTE — TELEPHONE ENCOUNTER
Patient says the neuropathy is really bad and this has been for a while again asking that her Gabapentin be increased please     [facility]   Pt [phone]  Pt asks that we let her know

## 2023-09-14 DIAGNOSIS — G25.81 RESTLESS LEG SYNDROME: ICD-10-CM

## 2023-09-14 RX ORDER — ROPINIROLE 1 MG/1
TABLET, FILM COATED ORAL
Qty: 180 TABLET | Refills: 3 | Status: SHIPPED | OUTPATIENT
Start: 2023-09-14

## 2023-09-14 NOTE — TELEPHONE ENCOUNTER
Please ask that she increase water intake.  Would like for her to try walking for about 10 mins at least 3 times a week.  Can discuss medication changes at next visit.  If sooner appointment is needed, can be put in a same day spot.  ThanksJEFFERSON

## 2023-09-14 NOTE — TELEPHONE ENCOUNTER
Information relayed, appointment schedule for 09/22/2023, patient's next appointment was not until 11/2023

## 2023-09-22 ENCOUNTER — OFFICE VISIT (OUTPATIENT)
Dept: FAMILY MEDICINE CLINIC | Facility: CLINIC | Age: 78
End: 2023-09-22
Payer: MEDICARE

## 2023-09-22 VITALS
WEIGHT: 207 LBS | DIASTOLIC BLOOD PRESSURE: 78 MMHG | OXYGEN SATURATION: 97 % | HEIGHT: 66 IN | BODY MASS INDEX: 33.27 KG/M2 | HEART RATE: 69 BPM | SYSTOLIC BLOOD PRESSURE: 130 MMHG

## 2023-09-22 DIAGNOSIS — M25.511 CHRONIC RIGHT SHOULDER PAIN: ICD-10-CM

## 2023-09-22 DIAGNOSIS — G89.29 CHRONIC RIGHT SHOULDER PAIN: ICD-10-CM

## 2023-09-22 DIAGNOSIS — M54.2 NECK PAIN ON RIGHT SIDE: ICD-10-CM

## 2023-09-22 DIAGNOSIS — G62.9 NEUROPATHY: ICD-10-CM

## 2023-09-22 DIAGNOSIS — R20.2 PARESTHESIA OF LOWER EXTREMITY: ICD-10-CM

## 2023-09-22 DIAGNOSIS — E11.42 TYPE 2 DIABETES MELLITUS WITH DIABETIC POLYNEUROPATHY, WITHOUT LONG-TERM CURRENT USE OF INSULIN: Primary | ICD-10-CM

## 2023-09-22 PROCEDURE — 1160F RVW MEDS BY RX/DR IN RCRD: CPT | Performed by: FAMILY MEDICINE

## 2023-09-22 PROCEDURE — 3075F SYST BP GE 130 - 139MM HG: CPT | Performed by: FAMILY MEDICINE

## 2023-09-22 PROCEDURE — 3078F DIAST BP <80 MM HG: CPT | Performed by: FAMILY MEDICINE

## 2023-09-22 PROCEDURE — 99214 OFFICE O/P EST MOD 30 MIN: CPT | Performed by: FAMILY MEDICINE

## 2023-09-22 PROCEDURE — 1159F MED LIST DOCD IN RCRD: CPT | Performed by: FAMILY MEDICINE

## 2023-09-22 RX ORDER — GABAPENTIN 400 MG/1
400 CAPSULE ORAL 3 TIMES DAILY
Qty: 84 CAPSULE | Refills: 0 | Status: SHIPPED | OUTPATIENT
Start: 2023-09-22

## 2023-09-22 NOTE — PROGRESS NOTES
Chief Complaint  Pain (Nerve Pain)    Subjective    History of Present Illness {CC  Problem List  Visit  Diagnosis   Encounters  Notes  Medications  Labs  Result Review Imaging  Media :23}     Rnoda Blair presents to [facility] [place] for     Chief Complaint   Patient presents with    Pain     Nerve Pain      Patient seen today for follow up.  Has chronic medical problems including hypertension, hyperlipidemia, heart failure with preserved EF, chronic kidney disease, COPD, gout, allergic rhinitis, type 2 diabetes with neuropathy, restless legs syndrome, anemia, GERD and degenerative disc disease.  She has been having worsening neuropathy symptoms, especially at night.  Is taking gabapentin 300 mg three times a day.  Also complaining of right shoulder and neck pain.  Range of motion is limited.  This has been present for many months, seems to be getting worse.      Current Outpatient Medications:     albuterol sulfate  (90 Base) MCG/ACT inhaler, Inhale 2 puffs Every 6 (Six) Hours As Needed for Wheezing or Shortness of Air., Disp: 6.7 g, Rfl: 11    allopurinol (ZYLOPRIM) 100 MG tablet, Take half tablet by mouth daily (Patient taking differently: 1 tablet. Take 2 tablet by mouth daily), Disp: 180 tablet, Rfl: 1    amLODIPine (NORVASC) 10 MG tablet, TAKE 1 TABLET BY MOUTH EVERY DAY, Disp: 90 tablet, Rfl: 1    aspirin-dipyridamole (AGGRENOX)  MG per 12 hr capsule, TAKE 1 CAPSULE BY MOUTH TWICE A DAY, Disp: 180 capsule, Rfl: 1    cholecalciferol (VITAMIN D3) 25 MCG (1000 UT) tablet, Take 1 tablet by mouth Daily., Disp: , Rfl:     colchicine 0.6 MG tablet, Take 2 tabs (1.2 mg) today, then 1 tab (0.6 mg) daily for up to 2 weeks after gout flare resolves., Disp: 15 tablet, Rfl: 0    docusate sodium (COLACE) 100 MG capsule, Take 1 capsule by mouth 2 (Two) Times a Day., Disp: , Rfl:     folic acid (FOLVITE) 1 MG tablet, Take 1 tablet by mouth  Daily., Disp: 90 tablet, Rfl: 2    furosemide (LASIX) 40 MG tablet, TAKE 1/2 TABLET BY MOUTH DAILY AS NEEDED (LOWER EXTREMITY EDEMA)., Disp: 45 tablet, Rfl: 3    gabapentin (NEURONTIN) 300 MG capsule, TAKE 1 CAPSULE BY MOUTH THREE TIMES A DAY, Disp: 84 capsule, Rfl: 0    ipratropium-albuterol (DUO-NEB) 0.5-2.5 mg/3 ml nebulizer, Take 3 mL by nebulization 4 (Four) Times a Day., Disp: 360 mL, Rfl: 0    isosorbide mononitrate (IMDUR) 30 MG 24 hr tablet, TAKE 1 TABLET BY MOUTH EVERY DAY, Disp: 30 tablet, Rfl: 1    lisinopril (PRINIVIL,ZESTRIL) 20 MG tablet, TAKE 1 TABLET BY MOUTH EVERY DAY, Disp: 90 tablet, Rfl: 3    loratadine (CLARITIN) 10 MG tablet, TAKE 1 TABLET BY MOUTH EVERY DAY, Disp: 90 tablet, Rfl: 1    metFORMIN ER (GLUCOPHAGE-XR) 500 MG 24 hr tablet, TAKE 2 TABLETS BY MOUTH TWICE A DAY, Disp: 360 tablet, Rfl: 1    nebivolol (BYSTOLIC) 5 MG tablet, TAKE 1 TABLET BY MOUTH EVERY DAY, Disp: 90 tablet, Rfl: 1    nystatin (MYCOSTATIN) 580862 UNIT/GM powder, Apply  topically to the appropriate area as directed 3 (Three) Times a Day. (Patient taking differently: Apply 1 application  topically to the appropriate area as directed Daily As Needed.), Disp: 120 g, Rfl: 2    O2 (OXYGEN), Inhale 4 L/min Daily., Disp: , Rfl:     Omega-3 Fatty Acids (FISH OIL) 1200 MG capsule capsule, Take 1 capsule by mouth 3 (Three) Times a Day With Meals., Disp: , Rfl:     oxyCODONE-acetaminophen (PERCOCET)  MG per tablet, Take 1 tablet by mouth 3 (Three) Times a Day. Indications: Pain, Disp: , Rfl:     rOPINIRole (REQUIP) 1 MG tablet, TAKE 2 TABLETS BY MOUTH EVERY NIGHT 1 HOUR BEFORE BEDTIME., Disp: 180 tablet, Rfl: 3    rosuvastatin (CRESTOR) 10 MG tablet, TAKE 1 TABLET BY MOUTH EVERY DAY, Disp: 90 tablet, Rfl: 11    tiotropium bromide-olodaterol (Stiolto Respimat) 2.5-2.5 MCG/ACT aerosol solution inhaler, Inhale 2 puffs Daily., Disp: 1 each, Rfl: 11    tiZANidine (ZANAFLEX) 4 MG tablet, Take 1 tablet by mouth Every 8 (Eight) Hours  "As Needed for Muscle Spasms., Disp: 270 tablet, Rfl: 1    Triamcinolone Acetonide (NASACORT) 55 MCG/ACT nasal inhaler, INSTILL 1 SPRAY INTO THE NOSTRIL(S) AS DIRECTED BY PROVIDER EVERY OTHER DAY. *NOT COVERED*, Disp: 16.5 g, Rfl: 2    venlafaxine XR (EFFEXOR-XR) 75 MG 24 hr capsule, , Disp: , Rfl:      Objective       Vital Signs:   /78   Pulse 69   Ht 166.4 cm (65.5\")   Wt 93.9 kg (207 lb)   SpO2 97%   BMI 33.92 kg/m²     Physical Exam  Vitals reviewed.   Constitutional:       General: She is not in acute distress.     Appearance: She is well-developed.   Cardiovascular:      Rate and Rhythm: Normal rate and regular rhythm.      Heart sounds: Normal heart sounds. No murmur heard.  Pulmonary:      Effort: Pulmonary effort is normal. No respiratory distress.      Breath sounds: Normal breath sounds. No wheezing or rales.   Musculoskeletal:      Right shoulder: Tenderness (with internal and external rotation) present. Decreased range of motion. Normal strength.        Back:       Comments: Muscle tenderness on right cervical paraspinal muscles   Skin:     General: Skin is warm and dry.      Findings: No rash.   Neurological:      Mental Status: She is alert and oriented to person, place, and time.      Result Review :{ Labs  Result Review  Imaging  Med Tab  Media :23}   The following data was reviewed by: Brook Arreola MD on 09/22/2023    Common labs          3/29/2023    12:02 4/24/2023    11:38   Common Labs   Glucose 157  131    BUN 23  18    Creatinine 1.08  1.19    Sodium 139  145    Potassium 4.5  4.1    Chloride 103  106    Calcium 9.6  9.5    Albumin 4.3  4.2    Total Bilirubin 0.3     Alkaline Phosphatase 106     AST (SGOT) 27     ALT (SGPT) 19     WBC 6.66     Hemoglobin 12.1  12.6    Hematocrit 35.9  36.3    Platelets 175     Total Cholesterol 131     Triglycerides 334     HDL Cholesterol 36     LDL Cholesterol  45     Hemoglobin A1C 7.70  7.40    Uric Acid  7.2                Assessment " and Plan {CC Problem List  Visit Diagnosis  ROS  Review (Popup)  Health Maintenance  Quality  BestPractice  Medications  SmartSets  SnapShot Encounters  Media :23}   Diagnoses and all orders for this visit:    1. Type 2 diabetes mellitus with diabetic polyneuropathy, without long-term current use of insulin (Primary)  -     Hemoglobin A1c; Future  -     Iron and TIBC; Future  -     Ferritin; Future  -     Vitamin B12; Future    2. Paresthesia of lower extremity  -     Hemoglobin A1c; Future  -     Iron and TIBC; Future  -     Ferritin; Future  -     Vitamin B12; Future    3. Neuropathy  -     gabapentin (NEURONTIN) 400 MG capsule; Take 1 capsule by mouth 3 (Three) Times a Day.  Dispense: 84 capsule; Refill: 0    4. Chronic right shoulder pain  -     XR Shoulder 2+ View Right; Future    5. Neck pain on right side  -     XR Spine Cervical Complete 4 or 5 View; Future       Patient seen today for follow up  Type 2 diabetes has historically been controlled  Neuropathy symptoms not well controlled  Increase gabapentin to 400 mg three times a day  Check labs as above for monitoring  Chronic right shoulder pain  Check Xray right shoulder  Patient says that cervical radiculopathy has presented like this previously, surgery before on cervical spine  Will check Xray cervical spine as well  Suspect rotator cuff tendonitis  Encouraged ice and home exercises - provided as part of AVS      Follow Up {Instructions Charge Capture  Follow-up Communications :23}   Return if symptoms worsen or fail to improve, for Next scheduled follow up.  Patient was given instructions and counseling regarding her condition or for health maintenance advice. Please see specific information pulled into the AVS if appropriate.          This document has been electronically signed by Brook Arreola MD

## 2023-09-25 ENCOUNTER — LAB (OUTPATIENT)
Dept: LAB | Facility: HOSPITAL | Age: 78
End: 2023-09-25
Payer: MEDICARE

## 2023-09-25 ENCOUNTER — HOSPITAL ENCOUNTER (OUTPATIENT)
Dept: GENERAL RADIOLOGY | Facility: HOSPITAL | Age: 78
Discharge: HOME | End: 2023-09-25
Payer: MEDICARE

## 2023-09-25 ENCOUNTER — TRANSCRIBE ORDERS (OUTPATIENT)
Dept: LAB | Facility: HOSPITAL | Age: 78
End: 2023-09-25
Payer: MEDICARE

## 2023-09-25 DIAGNOSIS — I12.9 HYPERTENSIVE NEPHROPATHY: ICD-10-CM

## 2023-09-25 DIAGNOSIS — N18.6 TYPE 2 DIABETES MELLITUS WITH ESRD (END-STAGE RENAL DISEASE): ICD-10-CM

## 2023-09-25 DIAGNOSIS — N18.32 CHRONIC KIDNEY DISEASE (CKD) STAGE G3B/A1, MODERATELY DECREASED GLOMERULAR FILTRATION RATE (GFR) BETWEEN 30-44 ML/MIN/1.73 SQUARE METER AND ALBUMINURIA CREATININE RATIO LESS THAN 30 MG/G (CMS/H*: ICD-10-CM

## 2023-09-25 DIAGNOSIS — N18.6 TYPE 2 DIABETES MELLITUS WITH ESRD (END-STAGE RENAL DISEASE): Primary | ICD-10-CM

## 2023-09-25 DIAGNOSIS — C79.9 COLON NEOPLASM, M1A: ICD-10-CM

## 2023-09-25 DIAGNOSIS — J44.89 OBSTRUCTIVE CHRONIC BRONCHITIS WITHOUT EXACERBATION: ICD-10-CM

## 2023-09-25 DIAGNOSIS — C18.9 COLON NEOPLASM, M1A: ICD-10-CM

## 2023-09-25 DIAGNOSIS — E11.22 TYPE 2 DIABETES MELLITUS WITH ESRD (END-STAGE RENAL DISEASE): ICD-10-CM

## 2023-09-25 DIAGNOSIS — M54.2 NECK PAIN ON RIGHT SIDE: ICD-10-CM

## 2023-09-25 DIAGNOSIS — E11.22 TYPE 2 DIABETES MELLITUS WITH ESRD (END-STAGE RENAL DISEASE): Primary | ICD-10-CM

## 2023-09-25 DIAGNOSIS — M25.511 CHRONIC RIGHT SHOULDER PAIN: ICD-10-CM

## 2023-09-25 DIAGNOSIS — G89.29 CHRONIC RIGHT SHOULDER PAIN: ICD-10-CM

## 2023-09-25 PROCEDURE — 81001 URINALYSIS AUTO W/SCOPE: CPT | Performed by: NURSE PRACTITIONER

## 2023-09-25 PROCEDURE — 73030 X-RAY EXAM OF SHOULDER: CPT

## 2023-09-25 PROCEDURE — 80069 RENAL FUNCTION PANEL: CPT

## 2023-09-25 PROCEDURE — 72050 X-RAY EXAM NECK SPINE 4/5VWS: CPT

## 2023-09-25 PROCEDURE — 36415 COLL VENOUS BLD VENIPUNCTURE: CPT

## 2023-09-26 LAB
ALBUMIN SERPL-MCNC: 4 G/DL (ref 3.5–5.2)
ANION GAP SERPL CALCULATED.3IONS-SCNC: 12.9 MMOL/L (ref 5–15)
BACTERIA UR QL AUTO: ABNORMAL /HPF
BILIRUB UR QL STRIP: NEGATIVE
BUN SERPL-MCNC: 20 MG/DL (ref 8–23)
BUN/CREAT SERPL: 17.2 (ref 7–25)
CALCIUM SPEC-SCNC: 9.6 MG/DL (ref 8.6–10.5)
CHLORIDE SERPL-SCNC: 101 MMOL/L (ref 98–107)
CLARITY UR: CLEAR
CO2 SERPL-SCNC: 26.1 MMOL/L (ref 22–29)
COLOR UR: YELLOW
CREAT SERPL-MCNC: 1.16 MG/DL (ref 0.57–1)
EGFRCR SERPLBLD CKD-EPI 2021: 48.1 ML/MIN/1.73
GLUCOSE SERPL-MCNC: 214 MG/DL (ref 65–99)
GLUCOSE UR STRIP-MCNC: NEGATIVE MG/DL
HGB UR QL STRIP.AUTO: NEGATIVE
HYALINE CASTS UR QL AUTO: ABNORMAL /LPF
KETONES UR QL STRIP: NEGATIVE
LEUKOCYTE ESTERASE UR QL STRIP.AUTO: NEGATIVE
NITRITE UR QL STRIP: NEGATIVE
PH UR STRIP.AUTO: 6 [PH] (ref 5–8)
PHOSPHATE SERPL-MCNC: 3.5 MG/DL (ref 2.5–4.5)
POTASSIUM SERPL-SCNC: 4.2 MMOL/L (ref 3.5–5.2)
PROT UR QL STRIP: ABNORMAL
RBC # UR STRIP: ABNORMAL /HPF
REF LAB TEST METHOD: ABNORMAL
SODIUM SERPL-SCNC: 140 MMOL/L (ref 136–145)
SP GR UR STRIP: 1.02 (ref 1–1.03)
SQUAMOUS #/AREA URNS HPF: ABNORMAL /HPF
UROBILINOGEN UR QL STRIP: ABNORMAL
WBC # UR STRIP: ABNORMAL /HPF

## 2023-09-28 DIAGNOSIS — M50.322 OTHER CERVICAL DISC DEGENERATION AT C5-C6 LEVEL: ICD-10-CM

## 2023-09-28 RX ORDER — TIZANIDINE 4 MG/1
TABLET ORAL
Qty: 270 TABLET | Refills: 1 | Status: SHIPPED | OUTPATIENT
Start: 2023-09-28